# Patient Record
Sex: MALE | Race: WHITE | NOT HISPANIC OR LATINO | URBAN - METROPOLITAN AREA
[De-identification: names, ages, dates, MRNs, and addresses within clinical notes are randomized per-mention and may not be internally consistent; named-entity substitution may affect disease eponyms.]

---

## 2017-03-03 ENCOUNTER — OUTPATIENT (OUTPATIENT)
Dept: OUTPATIENT SERVICES | Facility: HOSPITAL | Age: 77
LOS: 1 days | End: 2017-03-03
Payer: MEDICARE

## 2017-03-03 DIAGNOSIS — L97.401 NON-PRESSURE CHRONIC ULCER OF UNSPECIFIED HEEL AND MIDFOOT LIMITED TO BREAKDOWN OF SKIN: ICD-10-CM

## 2017-03-03 DIAGNOSIS — Z95.1 PRESENCE OF AORTOCORONARY BYPASS GRAFT: Chronic | ICD-10-CM

## 2017-03-03 DIAGNOSIS — Z90.49 ACQUIRED ABSENCE OF OTHER SPECIFIED PARTS OF DIGESTIVE TRACT: Chronic | ICD-10-CM

## 2017-03-03 PROCEDURE — 73630 X-RAY EXAM OF FOOT: CPT | Mod: 26,50

## 2017-03-03 PROCEDURE — G0463: CPT

## 2017-03-03 PROCEDURE — 73630 X-RAY EXAM OF FOOT: CPT

## 2017-03-04 DIAGNOSIS — Z85.46 PERSONAL HISTORY OF MALIGNANT NEOPLASM OF PROSTATE: ICD-10-CM

## 2017-03-04 DIAGNOSIS — Z82.49 FAMILY HISTORY OF ISCHEMIC HEART DISEASE AND OTHER DISEASES OF THE CIRCULATORY SYSTEM: ICD-10-CM

## 2017-03-04 DIAGNOSIS — Z90.49 ACQUIRED ABSENCE OF OTHER SPECIFIED PARTS OF DIGESTIVE TRACT: ICD-10-CM

## 2017-03-04 DIAGNOSIS — M17.0 BILATERAL PRIMARY OSTEOARTHRITIS OF KNEE: ICD-10-CM

## 2017-03-04 DIAGNOSIS — I25.2 OLD MYOCARDIAL INFARCTION: ICD-10-CM

## 2017-03-04 DIAGNOSIS — Z83.3 FAMILY HISTORY OF DIABETES MELLITUS: ICD-10-CM

## 2017-03-04 DIAGNOSIS — I89.0 LYMPHEDEMA, NOT ELSEWHERE CLASSIFIED: ICD-10-CM

## 2017-03-04 DIAGNOSIS — E78.00 PURE HYPERCHOLESTEROLEMIA, UNSPECIFIED: ICD-10-CM

## 2017-03-04 DIAGNOSIS — E11.621 TYPE 2 DIABETES MELLITUS WITH FOOT ULCER: ICD-10-CM

## 2017-03-04 DIAGNOSIS — Z80.0 FAMILY HISTORY OF MALIGNANT NEOPLASM OF DIGESTIVE ORGANS: ICD-10-CM

## 2017-03-04 DIAGNOSIS — E11.40 TYPE 2 DIABETES MELLITUS WITH DIABETIC NEUROPATHY, UNSPECIFIED: ICD-10-CM

## 2017-03-04 DIAGNOSIS — Z98.890 OTHER SPECIFIED POSTPROCEDURAL STATES: ICD-10-CM

## 2017-03-04 DIAGNOSIS — Z79.899 OTHER LONG TERM (CURRENT) DRUG THERAPY: ICD-10-CM

## 2017-03-04 DIAGNOSIS — L97.423 NON-PRESSURE CHRONIC ULCER OF LEFT HEEL AND MIDFOOT WITH NECROSIS OF MUSCLE: ICD-10-CM

## 2017-03-04 DIAGNOSIS — I83.12 VARICOSE VEINS OF LEFT LOWER EXTREMITY WITH INFLAMMATION: ICD-10-CM

## 2017-03-04 DIAGNOSIS — Z83.6 FAMILY HISTORY OF OTHER DISEASES OF THE RESPIRATORY SYSTEM: ICD-10-CM

## 2017-03-04 DIAGNOSIS — E03.9 HYPOTHYROIDISM, UNSPECIFIED: ICD-10-CM

## 2017-03-04 DIAGNOSIS — I25.810 ATHEROSCLEROSIS OF CORONARY ARTERY BYPASS GRAFT(S) WITHOUT ANGINA PECTORIS: ICD-10-CM

## 2017-03-04 DIAGNOSIS — Z95.1 PRESENCE OF AORTOCORONARY BYPASS GRAFT: ICD-10-CM

## 2017-03-04 DIAGNOSIS — I83.11 VARICOSE VEINS OF RIGHT LOWER EXTREMITY WITH INFLAMMATION: ICD-10-CM

## 2017-03-04 DIAGNOSIS — Z87.891 PERSONAL HISTORY OF NICOTINE DEPENDENCE: ICD-10-CM

## 2017-03-04 DIAGNOSIS — Z92.3 PERSONAL HISTORY OF IRRADIATION: ICD-10-CM

## 2017-03-04 DIAGNOSIS — I10 ESSENTIAL (PRIMARY) HYPERTENSION: ICD-10-CM

## 2017-03-06 ENCOUNTER — OUTPATIENT (OUTPATIENT)
Dept: OUTPATIENT SERVICES | Facility: HOSPITAL | Age: 77
LOS: 1 days | End: 2017-03-06
Payer: MEDICARE

## 2017-03-06 DIAGNOSIS — Z95.1 PRESENCE OF AORTOCORONARY BYPASS GRAFT: Chronic | ICD-10-CM

## 2017-03-06 DIAGNOSIS — Z90.49 ACQUIRED ABSENCE OF OTHER SPECIFIED PARTS OF DIGESTIVE TRACT: Chronic | ICD-10-CM

## 2017-03-06 DIAGNOSIS — L97.401 NON-PRESSURE CHRONIC ULCER OF UNSPECIFIED HEEL AND MIDFOOT LIMITED TO BREAKDOWN OF SKIN: ICD-10-CM

## 2017-03-06 PROCEDURE — 87186 SC STD MICRODIL/AGAR DIL: CPT

## 2017-03-06 PROCEDURE — 11042 DBRDMT SUBQ TIS 1ST 20SQCM/<: CPT

## 2017-03-06 PROCEDURE — 87070 CULTURE OTHR SPECIMN AEROBIC: CPT

## 2017-03-07 LAB
GRAM STN FLD: SIGNIFICANT CHANGE UP
SPECIMEN SOURCE: SIGNIFICANT CHANGE UP

## 2017-03-08 LAB
-  AMIKACIN: SIGNIFICANT CHANGE UP
-  AMPICILLIN/SULBACTAM: SIGNIFICANT CHANGE UP
-  AMPICILLIN: SIGNIFICANT CHANGE UP
-  AZTREONAM: SIGNIFICANT CHANGE UP
-  CEFAZOLIN: SIGNIFICANT CHANGE UP
-  CEFEPIME: SIGNIFICANT CHANGE UP
-  CEFOXITIN: SIGNIFICANT CHANGE UP
-  CEFTAZIDIME: SIGNIFICANT CHANGE UP
-  CEFTRIAXONE: SIGNIFICANT CHANGE UP
-  CIPROFLOXACIN: SIGNIFICANT CHANGE UP
-  ERTAPENEM: SIGNIFICANT CHANGE UP
-  GENTAMICIN: SIGNIFICANT CHANGE UP
-  LEVOFLOXACIN: SIGNIFICANT CHANGE UP
-  MEROPENEM: SIGNIFICANT CHANGE UP
-  PIPERACILLIN/TAZOBACTAM: SIGNIFICANT CHANGE UP
-  TOBRAMYCIN: SIGNIFICANT CHANGE UP
-  TRIMETHOPRIM/SULFAMETHOXAZOLE: SIGNIFICANT CHANGE UP
METHOD TYPE: SIGNIFICANT CHANGE UP

## 2017-03-10 LAB
-  AMPICILLIN/SULBACTAM: SIGNIFICANT CHANGE UP
-  CEFAZOLIN: SIGNIFICANT CHANGE UP
-  CIPROFLOXACIN: SIGNIFICANT CHANGE UP
-  CLINDAMYCIN: SIGNIFICANT CHANGE UP
-  ERYTHROMYCIN: SIGNIFICANT CHANGE UP
-  GENTAMICIN: SIGNIFICANT CHANGE UP
-  LEVOFLOXACIN: SIGNIFICANT CHANGE UP
-  MOXIFLOXACIN(AEROBIC): SIGNIFICANT CHANGE UP
-  OXACILLIN: SIGNIFICANT CHANGE UP
-  PENICILLIN: SIGNIFICANT CHANGE UP
-  RIFAMPIN: SIGNIFICANT CHANGE UP
-  TETRACYCLINE: SIGNIFICANT CHANGE UP
-  TRIMETHOPRIM/SULFAMETHOXAZOLE: SIGNIFICANT CHANGE UP
-  VANCOMYCIN: SIGNIFICANT CHANGE UP
CULTURE RESULTS: SIGNIFICANT CHANGE UP
METHOD TYPE: SIGNIFICANT CHANGE UP
ORGANISM # SPEC MICROSCOPIC CNT: SIGNIFICANT CHANGE UP
SPECIMEN SOURCE: SIGNIFICANT CHANGE UP

## 2017-03-11 DIAGNOSIS — E11.621 TYPE 2 DIABETES MELLITUS WITH FOOT ULCER: ICD-10-CM

## 2017-03-11 DIAGNOSIS — E11.40 TYPE 2 DIABETES MELLITUS WITH DIABETIC NEUROPATHY, UNSPECIFIED: ICD-10-CM

## 2017-03-11 DIAGNOSIS — L97.423 NON-PRESSURE CHRONIC ULCER OF LEFT HEEL AND MIDFOOT WITH NECROSIS OF MUSCLE: ICD-10-CM

## 2017-03-12 DIAGNOSIS — A49.8 OTHER BACTERIAL INFECTIONS OF UNSPECIFIED SITE: ICD-10-CM

## 2017-03-12 DIAGNOSIS — A49.1 STREPTOCOCCAL INFECTION, UNSPECIFIED SITE: ICD-10-CM

## 2017-03-13 ENCOUNTER — OUTPATIENT (OUTPATIENT)
Dept: OUTPATIENT SERVICES | Facility: HOSPITAL | Age: 77
LOS: 1 days | End: 2017-03-13
Payer: MEDICARE

## 2017-03-13 DIAGNOSIS — E11.621 TYPE 2 DIABETES MELLITUS WITH FOOT ULCER: ICD-10-CM

## 2017-03-13 DIAGNOSIS — L97.401 NON-PRESSURE CHRONIC ULCER OF UNSPECIFIED HEEL AND MIDFOOT LIMITED TO BREAKDOWN OF SKIN: ICD-10-CM

## 2017-03-13 DIAGNOSIS — Z95.1 PRESENCE OF AORTOCORONARY BYPASS GRAFT: Chronic | ICD-10-CM

## 2017-03-13 DIAGNOSIS — Z90.49 ACQUIRED ABSENCE OF OTHER SPECIFIED PARTS OF DIGESTIVE TRACT: Chronic | ICD-10-CM

## 2017-03-13 LAB
ALBUMIN SERPL ELPH-MCNC: 3.5 G/DL — SIGNIFICANT CHANGE UP (ref 3.3–5)
ALP SERPL-CCNC: 132 U/L — HIGH (ref 40–120)
ALT FLD-CCNC: 25 U/L — SIGNIFICANT CHANGE UP (ref 12–78)
ANION GAP SERPL CALC-SCNC: 11 MMOL/L — SIGNIFICANT CHANGE UP (ref 5–17)
AST SERPL-CCNC: 20 U/L — SIGNIFICANT CHANGE UP (ref 15–37)
BASOPHILS # BLD AUTO: 0.1 K/UL — SIGNIFICANT CHANGE UP (ref 0–0.2)
BASOPHILS NFR BLD AUTO: 0.8 % — SIGNIFICANT CHANGE UP (ref 0–2)
BILIRUB SERPL-MCNC: 0.7 MG/DL — SIGNIFICANT CHANGE UP (ref 0.2–1.2)
BUN SERPL-MCNC: 39 MG/DL — HIGH (ref 7–23)
CALCIUM SERPL-MCNC: 9.5 MG/DL — SIGNIFICANT CHANGE UP (ref 8.5–10.1)
CHLORIDE SERPL-SCNC: 102 MMOL/L — SIGNIFICANT CHANGE UP (ref 96–108)
CO2 SERPL-SCNC: 27 MMOL/L — SIGNIFICANT CHANGE UP (ref 22–31)
CREAT SERPL-MCNC: 1.5 MG/DL — HIGH (ref 0.5–1.3)
CRP SERPL-MCNC: 0.85 MG/DL — HIGH (ref 0–0.4)
EOSINOPHIL # BLD AUTO: 0.2 K/UL — SIGNIFICANT CHANGE UP (ref 0–0.5)
EOSINOPHIL NFR BLD AUTO: 1.8 % — SIGNIFICANT CHANGE UP (ref 0–6)
ERYTHROCYTE [SEDIMENTATION RATE] IN BLOOD: 17 MM/HR — SIGNIFICANT CHANGE UP (ref 0–20)
GLUCOSE SERPL-MCNC: 115 MG/DL — HIGH (ref 70–99)
HCT VFR BLD CALC: 44.2 % — SIGNIFICANT CHANGE UP (ref 39–50)
HGB BLD-MCNC: 14.2 G/DL — SIGNIFICANT CHANGE UP (ref 13–17)
LYMPHOCYTES # BLD AUTO: 2.2 K/UL — SIGNIFICANT CHANGE UP (ref 1–3.3)
LYMPHOCYTES # BLD AUTO: 21.4 % — SIGNIFICANT CHANGE UP (ref 13–44)
MCHC RBC-ENTMCNC: 28.2 PG — SIGNIFICANT CHANGE UP (ref 27–34)
MCHC RBC-ENTMCNC: 32.1 GM/DL — SIGNIFICANT CHANGE UP (ref 32–36)
MCV RBC AUTO: 87.8 FL — SIGNIFICANT CHANGE UP (ref 80–100)
MONOCYTES # BLD AUTO: 0.9 K/UL — SIGNIFICANT CHANGE UP (ref 0–0.9)
MONOCYTES NFR BLD AUTO: 9 % — SIGNIFICANT CHANGE UP (ref 1–9)
NEUTROPHILS # BLD AUTO: 6.8 K/UL — SIGNIFICANT CHANGE UP (ref 1.8–7.4)
NEUTROPHILS NFR BLD AUTO: 67 % — SIGNIFICANT CHANGE UP (ref 43–77)
PLATELET # BLD AUTO: 223 K/UL — SIGNIFICANT CHANGE UP (ref 150–400)
POTASSIUM SERPL-MCNC: 4.9 MMOL/L — SIGNIFICANT CHANGE UP (ref 3.5–5.3)
POTASSIUM SERPL-SCNC: 4.9 MMOL/L — SIGNIFICANT CHANGE UP (ref 3.5–5.3)
PREALB SERPL-MCNC: 20 MG/DL — SIGNIFICANT CHANGE UP (ref 20–40)
PROT SERPL-MCNC: 7.5 G/DL — SIGNIFICANT CHANGE UP (ref 6–8.3)
RBC # BLD: 5.03 M/UL — SIGNIFICANT CHANGE UP (ref 4.2–5.8)
RBC # FLD: 14 % — SIGNIFICANT CHANGE UP (ref 10.3–14.5)
SODIUM SERPL-SCNC: 140 MMOL/L — SIGNIFICANT CHANGE UP (ref 135–145)
WBC # BLD: 10.1 K/UL — SIGNIFICANT CHANGE UP (ref 3.8–10.5)
WBC # FLD AUTO: 10.1 K/UL — SIGNIFICANT CHANGE UP (ref 3.8–10.5)

## 2017-03-13 PROCEDURE — 73720 MRI LWR EXTREMITY W/O&W/DYE: CPT | Mod: 26,LT

## 2017-03-13 PROCEDURE — 80053 COMPREHEN METABOLIC PANEL: CPT

## 2017-03-13 PROCEDURE — 71046 X-RAY EXAM CHEST 2 VIEWS: CPT

## 2017-03-13 PROCEDURE — G0463: CPT

## 2017-03-13 PROCEDURE — A9579: CPT

## 2017-03-13 PROCEDURE — 85027 COMPLETE CBC AUTOMATED: CPT

## 2017-03-13 PROCEDURE — 85652 RBC SED RATE AUTOMATED: CPT

## 2017-03-13 PROCEDURE — 71020: CPT | Mod: 26

## 2017-03-13 PROCEDURE — 83036 HEMOGLOBIN GLYCOSYLATED A1C: CPT

## 2017-03-13 PROCEDURE — 73720 MRI LWR EXTREMITY W/O&W/DYE: CPT

## 2017-03-13 PROCEDURE — 84134 ASSAY OF PREALBUMIN: CPT

## 2017-03-13 PROCEDURE — 86140 C-REACTIVE PROTEIN: CPT

## 2017-03-14 LAB — HBA1C BLD-MCNC: 10.7 % — HIGH (ref 4–5.6)

## 2017-03-16 ENCOUNTER — OUTPATIENT (OUTPATIENT)
Dept: OUTPATIENT SERVICES | Facility: HOSPITAL | Age: 77
LOS: 1 days | End: 2017-03-16
Payer: MEDICARE

## 2017-03-16 DIAGNOSIS — I89.0 LYMPHEDEMA, NOT ELSEWHERE CLASSIFIED: ICD-10-CM

## 2017-03-16 DIAGNOSIS — Z82.49 FAMILY HISTORY OF ISCHEMIC HEART DISEASE AND OTHER DISEASES OF THE CIRCULATORY SYSTEM: ICD-10-CM

## 2017-03-16 DIAGNOSIS — E11.40 TYPE 2 DIABETES MELLITUS WITH DIABETIC NEUROPATHY, UNSPECIFIED: ICD-10-CM

## 2017-03-16 DIAGNOSIS — Z83.3 FAMILY HISTORY OF DIABETES MELLITUS: ICD-10-CM

## 2017-03-16 DIAGNOSIS — E11.621 TYPE 2 DIABETES MELLITUS WITH FOOT ULCER: ICD-10-CM

## 2017-03-16 DIAGNOSIS — Z95.1 PRESENCE OF AORTOCORONARY BYPASS GRAFT: ICD-10-CM

## 2017-03-16 DIAGNOSIS — L97.401 NON-PRESSURE CHRONIC ULCER OF UNSPECIFIED HEEL AND MIDFOOT LIMITED TO BREAKDOWN OF SKIN: ICD-10-CM

## 2017-03-16 DIAGNOSIS — M17.0 BILATERAL PRIMARY OSTEOARTHRITIS OF KNEE: ICD-10-CM

## 2017-03-16 DIAGNOSIS — E78.00 PURE HYPERCHOLESTEROLEMIA, UNSPECIFIED: ICD-10-CM

## 2017-03-16 DIAGNOSIS — I83.11 VARICOSE VEINS OF RIGHT LOWER EXTREMITY WITH INFLAMMATION: ICD-10-CM

## 2017-03-16 DIAGNOSIS — Z98.890 OTHER SPECIFIED POSTPROCEDURAL STATES: ICD-10-CM

## 2017-03-16 DIAGNOSIS — I83.12 VARICOSE VEINS OF LEFT LOWER EXTREMITY WITH INFLAMMATION: ICD-10-CM

## 2017-03-16 DIAGNOSIS — L97.423 NON-PRESSURE CHRONIC ULCER OF LEFT HEEL AND MIDFOOT WITH NECROSIS OF MUSCLE: ICD-10-CM

## 2017-03-16 DIAGNOSIS — Z90.49 ACQUIRED ABSENCE OF OTHER SPECIFIED PARTS OF DIGESTIVE TRACT: Chronic | ICD-10-CM

## 2017-03-16 DIAGNOSIS — E03.9 HYPOTHYROIDISM, UNSPECIFIED: ICD-10-CM

## 2017-03-16 DIAGNOSIS — Z79.899 OTHER LONG TERM (CURRENT) DRUG THERAPY: ICD-10-CM

## 2017-03-16 DIAGNOSIS — I10 ESSENTIAL (PRIMARY) HYPERTENSION: ICD-10-CM

## 2017-03-16 DIAGNOSIS — Z92.3 PERSONAL HISTORY OF IRRADIATION: ICD-10-CM

## 2017-03-16 DIAGNOSIS — Z90.49 ACQUIRED ABSENCE OF OTHER SPECIFIED PARTS OF DIGESTIVE TRACT: ICD-10-CM

## 2017-03-16 DIAGNOSIS — Z95.1 PRESENCE OF AORTOCORONARY BYPASS GRAFT: Chronic | ICD-10-CM

## 2017-03-16 DIAGNOSIS — M86.672 OTHER CHRONIC OSTEOMYELITIS, LEFT ANKLE AND FOOT: ICD-10-CM

## 2017-03-16 DIAGNOSIS — Z85.46 PERSONAL HISTORY OF MALIGNANT NEOPLASM OF PROSTATE: ICD-10-CM

## 2017-03-16 DIAGNOSIS — Z80.0 FAMILY HISTORY OF MALIGNANT NEOPLASM OF DIGESTIVE ORGANS: ICD-10-CM

## 2017-03-16 DIAGNOSIS — Z87.891 PERSONAL HISTORY OF NICOTINE DEPENDENCE: ICD-10-CM

## 2017-03-16 DIAGNOSIS — E66.9 OBESITY, UNSPECIFIED: ICD-10-CM

## 2017-03-16 DIAGNOSIS — I25.810 ATHEROSCLEROSIS OF CORONARY ARTERY BYPASS GRAFT(S) WITHOUT ANGINA PECTORIS: ICD-10-CM

## 2017-03-16 DIAGNOSIS — I25.2 OLD MYOCARDIAL INFARCTION: ICD-10-CM

## 2017-03-16 DIAGNOSIS — Z83.6 FAMILY HISTORY OF OTHER DISEASES OF THE RESPIRATORY SYSTEM: ICD-10-CM

## 2017-03-16 PROCEDURE — 82962 GLUCOSE BLOOD TEST: CPT

## 2017-03-16 PROCEDURE — G0277: CPT

## 2017-03-17 ENCOUNTER — OUTPATIENT (OUTPATIENT)
Dept: OUTPATIENT SERVICES | Facility: HOSPITAL | Age: 77
LOS: 1 days | End: 2017-03-17

## 2017-03-17 DIAGNOSIS — Z90.49 ACQUIRED ABSENCE OF OTHER SPECIFIED PARTS OF DIGESTIVE TRACT: Chronic | ICD-10-CM

## 2017-03-17 DIAGNOSIS — Z95.1 PRESENCE OF AORTOCORONARY BYPASS GRAFT: Chronic | ICD-10-CM

## 2017-03-17 DIAGNOSIS — L97.401 NON-PRESSURE CHRONIC ULCER OF UNSPECIFIED HEEL AND MIDFOOT LIMITED TO BREAKDOWN OF SKIN: ICD-10-CM

## 2017-03-18 DIAGNOSIS — E11.621 TYPE 2 DIABETES MELLITUS WITH FOOT ULCER: ICD-10-CM

## 2017-03-18 DIAGNOSIS — L97.423 NON-PRESSURE CHRONIC ULCER OF LEFT HEEL AND MIDFOOT WITH NECROSIS OF MUSCLE: ICD-10-CM

## 2017-03-18 DIAGNOSIS — M86.672 OTHER CHRONIC OSTEOMYELITIS, LEFT ANKLE AND FOOT: ICD-10-CM

## 2017-03-18 DIAGNOSIS — Z79.4 LONG TERM (CURRENT) USE OF INSULIN: ICD-10-CM

## 2017-03-20 ENCOUNTER — OUTPATIENT (OUTPATIENT)
Dept: OUTPATIENT SERVICES | Facility: HOSPITAL | Age: 77
LOS: 1 days | End: 2017-03-20

## 2017-03-20 ENCOUNTER — INPATIENT (INPATIENT)
Facility: HOSPITAL | Age: 77
LOS: 3 days | Discharge: HOME | DRG: 603 | End: 2017-03-24
Attending: INTERNAL MEDICINE | Admitting: INTERNAL MEDICINE
Payer: MEDICARE

## 2017-03-20 VITALS
WEIGHT: 274.92 LBS | OXYGEN SATURATION: 97 % | HEIGHT: 71 IN | SYSTOLIC BLOOD PRESSURE: 155 MMHG | RESPIRATION RATE: 18 BRPM | DIASTOLIC BLOOD PRESSURE: 78 MMHG | HEART RATE: 71 BPM | TEMPERATURE: 98 F

## 2017-03-20 DIAGNOSIS — Z90.49 ACQUIRED ABSENCE OF OTHER SPECIFIED PARTS OF DIGESTIVE TRACT: Chronic | ICD-10-CM

## 2017-03-20 DIAGNOSIS — L97.401 NON-PRESSURE CHRONIC ULCER OF UNSPECIFIED HEEL AND MIDFOOT LIMITED TO BREAKDOWN OF SKIN: ICD-10-CM

## 2017-03-20 DIAGNOSIS — Z95.1 PRESENCE OF AORTOCORONARY BYPASS GRAFT: Chronic | ICD-10-CM

## 2017-03-20 LAB
ALBUMIN SERPL ELPH-MCNC: 3.1 G/DL — LOW (ref 3.3–5)
ALP SERPL-CCNC: 136 U/L — HIGH (ref 40–120)
ALT FLD-CCNC: 27 U/L — SIGNIFICANT CHANGE UP (ref 12–78)
ANION GAP SERPL CALC-SCNC: 10 MMOL/L — SIGNIFICANT CHANGE UP (ref 5–17)
AST SERPL-CCNC: 13 U/L — LOW (ref 15–37)
BASOPHILS # BLD AUTO: 0.1 K/UL — SIGNIFICANT CHANGE UP (ref 0–0.2)
BASOPHILS NFR BLD AUTO: 0.8 % — SIGNIFICANT CHANGE UP (ref 0–2)
BILIRUB SERPL-MCNC: 0.9 MG/DL — SIGNIFICANT CHANGE UP (ref 0.2–1.2)
BUN SERPL-MCNC: 40 MG/DL — HIGH (ref 7–23)
CALCIUM SERPL-MCNC: 9 MG/DL — SIGNIFICANT CHANGE UP (ref 8.5–10.1)
CHLORIDE SERPL-SCNC: 106 MMOL/L — SIGNIFICANT CHANGE UP (ref 96–108)
CO2 SERPL-SCNC: 24 MMOL/L — SIGNIFICANT CHANGE UP (ref 22–31)
CREAT SERPL-MCNC: 1.4 MG/DL — HIGH (ref 0.5–1.3)
EOSINOPHIL # BLD AUTO: 0.2 K/UL — SIGNIFICANT CHANGE UP (ref 0–0.5)
EOSINOPHIL NFR BLD AUTO: 1.6 % — SIGNIFICANT CHANGE UP (ref 0–6)
GLUCOSE SERPL-MCNC: 121 MG/DL — HIGH (ref 70–99)
HCT VFR BLD CALC: 40 % — SIGNIFICANT CHANGE UP (ref 39–50)
HGB BLD-MCNC: 13.6 G/DL — SIGNIFICANT CHANGE UP (ref 13–17)
INR BLD: 1.2 RATIO — HIGH (ref 0.88–1.16)
LACTATE SERPL-SCNC: 1.6 MMOL/L — SIGNIFICANT CHANGE UP (ref 0.7–2)
LYMPHOCYTES # BLD AUTO: 1.7 K/UL — SIGNIFICANT CHANGE UP (ref 1–3.3)
LYMPHOCYTES # BLD AUTO: 14.1 % — SIGNIFICANT CHANGE UP (ref 13–44)
MCHC RBC-ENTMCNC: 29 PG — SIGNIFICANT CHANGE UP (ref 27–34)
MCHC RBC-ENTMCNC: 34.1 GM/DL — SIGNIFICANT CHANGE UP (ref 32–36)
MCV RBC AUTO: 85 FL — SIGNIFICANT CHANGE UP (ref 80–100)
MONOCYTES # BLD AUTO: 0.9 K/UL — SIGNIFICANT CHANGE UP (ref 0–0.9)
MONOCYTES NFR BLD AUTO: 7.3 % — SIGNIFICANT CHANGE UP (ref 1–9)
NEUTROPHILS # BLD AUTO: 9.2 K/UL — HIGH (ref 1.8–7.4)
NEUTROPHILS NFR BLD AUTO: 76.2 % — SIGNIFICANT CHANGE UP (ref 43–77)
PLATELET # BLD AUTO: 235 K/UL — SIGNIFICANT CHANGE UP (ref 150–400)
POTASSIUM SERPL-MCNC: 4.4 MMOL/L — SIGNIFICANT CHANGE UP (ref 3.5–5.3)
POTASSIUM SERPL-SCNC: 4.4 MMOL/L — SIGNIFICANT CHANGE UP (ref 3.5–5.3)
PROT SERPL-MCNC: 7.3 G/DL — SIGNIFICANT CHANGE UP (ref 6–8.3)
PROTHROM AB SERPL-ACNC: 13.4 SEC — HIGH (ref 10–13.1)
RBC # BLD: 4.7 M/UL — SIGNIFICANT CHANGE UP (ref 4.2–5.8)
RBC # FLD: 12.6 % — SIGNIFICANT CHANGE UP (ref 10.3–14.5)
SODIUM SERPL-SCNC: 140 MMOL/L — SIGNIFICANT CHANGE UP (ref 135–145)
WBC # BLD: 12.1 K/UL — HIGH (ref 3.8–10.5)
WBC # FLD AUTO: 12.1 K/UL — HIGH (ref 3.8–10.5)

## 2017-03-20 PROCEDURE — 99285 EMERGENCY DEPT VISIT HI MDM: CPT | Mod: 25

## 2017-03-20 RX ORDER — PIPERACILLIN AND TAZOBACTAM 4; .5 G/20ML; G/20ML
3.38 INJECTION, POWDER, LYOPHILIZED, FOR SOLUTION INTRAVENOUS ONCE
Qty: 0 | Refills: 0 | Status: COMPLETED | OUTPATIENT
Start: 2017-03-20 | End: 2017-03-20

## 2017-03-20 RX ORDER — VANCOMYCIN HCL 1 G
1000 VIAL (EA) INTRAVENOUS ONCE
Qty: 0 | Refills: 0 | Status: COMPLETED | OUTPATIENT
Start: 2017-03-20 | End: 2017-03-20

## 2017-03-20 RX ADMIN — Medication 250 MILLIGRAM(S): at 22:00

## 2017-03-20 RX ADMIN — PIPERACILLIN AND TAZOBACTAM 200 GRAM(S): 4; .5 INJECTION, POWDER, LYOPHILIZED, FOR SOLUTION INTRAVENOUS at 23:30

## 2017-03-20 NOTE — ED PROVIDER NOTE - OBJECTIVE STATEMENT
A 76 year old male with a pmh of diabetes came into the ER presenting with left lower extremity redness and swelling. The patient has been seeing wound care everyday for a diabetic ulcer on his foot, and stated he first noticed the leg swelling during one of his wound care appointments on thursday. Due to peripheral neuropathy, the patient states he did not feel any type of pain, burning or sensation in that extremity. He was prescribed antibiotics on thursday for cellulitis which he took throughout the weekend. At his next appointment the following week, the wound care doctor felt he needed to go to the ER for his cellulitis and to rule out a DVT. The patient has no other complaints at this visit.

## 2017-03-20 NOTE — ED PROVIDER NOTE - PMH
Bladder stones  s/p extraction  CAD (coronary artery disease)    Hyperlipidemia    Hypothyroidism    Myocardial infarction  1995  Prostate cancer  s/p brachytherapy  Type 2 diabetes mellitus without complication    Venous stasis dermatitis of both lower extremities

## 2017-03-20 NOTE — ED ADULT NURSE NOTE - PSH
S/P appendectomy    S/P CABG x 3  saphenous vein harvested from LLE, performed in March 1996 at [facility] by Dr. Mcgraw

## 2017-03-20 NOTE — ED ADULT NURSE NOTE - OBJECTIVE STATEMENT
sent for eval of lower leg redness and heat from,  wound care, no fevers, no other c/o. multiple amol lower leg diabetic venous ulcers.

## 2017-03-20 NOTE — ED PROVIDER NOTE - PSH
S/P appendectomy    S/P CABG x 3  saphenous vein harvested from LLE, performed in March 1996 at [facility] by Dr. Mcgraw

## 2017-03-21 DIAGNOSIS — Z41.8 ENCOUNTER FOR OTHER PROCEDURES FOR PURPOSES OTHER THAN REMEDYING HEALTH STATE: ICD-10-CM

## 2017-03-21 DIAGNOSIS — R60.9 EDEMA, UNSPECIFIED: ICD-10-CM

## 2017-03-21 DIAGNOSIS — Z79.4 LONG TERM (CURRENT) USE OF INSULIN: ICD-10-CM

## 2017-03-21 DIAGNOSIS — I25.10 ATHEROSCLEROTIC HEART DISEASE OF NATIVE CORONARY ARTERY WITHOUT ANGINA PECTORIS: ICD-10-CM

## 2017-03-21 DIAGNOSIS — E11.621 TYPE 2 DIABETES MELLITUS WITH FOOT ULCER: ICD-10-CM

## 2017-03-21 DIAGNOSIS — E11.9 TYPE 2 DIABETES MELLITUS WITHOUT COMPLICATIONS: ICD-10-CM

## 2017-03-21 DIAGNOSIS — L03.119 CELLULITIS OF UNSPECIFIED PART OF LIMB: ICD-10-CM

## 2017-03-21 DIAGNOSIS — F39 UNSPECIFIED MOOD [AFFECTIVE] DISORDER: ICD-10-CM

## 2017-03-21 DIAGNOSIS — L03.115 CELLULITIS OF RIGHT LOWER LIMB: ICD-10-CM

## 2017-03-21 DIAGNOSIS — M86.672 OTHER CHRONIC OSTEOMYELITIS, LEFT ANKLE AND FOOT: ICD-10-CM

## 2017-03-21 DIAGNOSIS — E03.9 HYPOTHYROIDISM, UNSPECIFIED: ICD-10-CM

## 2017-03-21 DIAGNOSIS — R79.89 OTHER SPECIFIED ABNORMAL FINDINGS OF BLOOD CHEMISTRY: ICD-10-CM

## 2017-03-21 DIAGNOSIS — L97.423 NON-PRESSURE CHRONIC ULCER OF LEFT HEEL AND MIDFOOT WITH NECROSIS OF MUSCLE: ICD-10-CM

## 2017-03-21 PROCEDURE — 99223 1ST HOSP IP/OBS HIGH 75: CPT | Mod: AI,GC

## 2017-03-21 PROCEDURE — 93970 EXTREMITY STUDY: CPT | Mod: 26

## 2017-03-21 PROCEDURE — 93010 ELECTROCARDIOGRAM REPORT: CPT

## 2017-03-21 RX ORDER — SERTRALINE 25 MG/1
100 TABLET, FILM COATED ORAL DAILY
Qty: 0 | Refills: 0 | Status: DISCONTINUED | OUTPATIENT
Start: 2017-03-21 | End: 2017-03-24

## 2017-03-21 RX ORDER — INSULIN GLARGINE 100 [IU]/ML
12 INJECTION, SOLUTION SUBCUTANEOUS AT BEDTIME
Qty: 0 | Refills: 0 | Status: DISCONTINUED | OUTPATIENT
Start: 2017-03-21 | End: 2017-03-24

## 2017-03-21 RX ORDER — DEXTROSE 50 % IN WATER 50 %
1 SYRINGE (ML) INTRAVENOUS ONCE
Qty: 0 | Refills: 0 | Status: DISCONTINUED | OUTPATIENT
Start: 2017-03-21 | End: 2017-03-24

## 2017-03-21 RX ORDER — DIPHENHYDRAMINE HCL 50 MG
25 CAPSULE ORAL ONCE
Qty: 0 | Refills: 0 | Status: COMPLETED | OUTPATIENT
Start: 2017-03-21 | End: 2017-03-22

## 2017-03-21 RX ORDER — DEXTROSE 50 % IN WATER 50 %
25 SYRINGE (ML) INTRAVENOUS ONCE
Qty: 0 | Refills: 0 | Status: DISCONTINUED | OUTPATIENT
Start: 2017-03-21 | End: 2017-03-24

## 2017-03-21 RX ORDER — SODIUM CHLORIDE 9 MG/ML
1000 INJECTION, SOLUTION INTRAVENOUS
Qty: 0 | Refills: 0 | Status: DISCONTINUED | OUTPATIENT
Start: 2017-03-21 | End: 2017-03-24

## 2017-03-21 RX ORDER — LEVOTHYROXINE SODIUM 125 MCG
50 TABLET ORAL DAILY
Qty: 0 | Refills: 0 | Status: DISCONTINUED | OUTPATIENT
Start: 2017-03-21 | End: 2017-03-24

## 2017-03-21 RX ORDER — DEXTROSE 50 % IN WATER 50 %
12.5 SYRINGE (ML) INTRAVENOUS ONCE
Qty: 0 | Refills: 0 | Status: DISCONTINUED | OUTPATIENT
Start: 2017-03-21 | End: 2017-03-24

## 2017-03-21 RX ORDER — INSULIN LISPRO 100/ML
VIAL (ML) SUBCUTANEOUS
Qty: 0 | Refills: 0 | Status: DISCONTINUED | OUTPATIENT
Start: 2017-03-21 | End: 2017-03-24

## 2017-03-21 RX ORDER — HEPARIN SODIUM 5000 [USP'U]/ML
5000 INJECTION INTRAVENOUS; SUBCUTANEOUS EVERY 8 HOURS
Qty: 0 | Refills: 0 | Status: DISCONTINUED | OUTPATIENT
Start: 2017-03-21 | End: 2017-03-24

## 2017-03-21 RX ORDER — VANCOMYCIN HCL 1 G
1750 VIAL (EA) INTRAVENOUS EVERY 12 HOURS
Qty: 0 | Refills: 0 | Status: DISCONTINUED | OUTPATIENT
Start: 2017-03-21 | End: 2017-03-21

## 2017-03-21 RX ORDER — GLUCAGON INJECTION, SOLUTION 0.5 MG/.1ML
1 INJECTION, SOLUTION SUBCUTANEOUS ONCE
Qty: 0 | Refills: 0 | Status: DISCONTINUED | OUTPATIENT
Start: 2017-03-21 | End: 2017-03-24

## 2017-03-21 RX ORDER — PIPERACILLIN AND TAZOBACTAM 4; .5 G/20ML; G/20ML
3.38 INJECTION, POWDER, LYOPHILIZED, FOR SOLUTION INTRAVENOUS EVERY 8 HOURS
Qty: 0 | Refills: 0 | Status: DISCONTINUED | OUTPATIENT
Start: 2017-03-21 | End: 2017-03-24

## 2017-03-21 RX ORDER — INSULIN LISPRO 100/ML
VIAL (ML) SUBCUTANEOUS AT BEDTIME
Qty: 0 | Refills: 0 | Status: DISCONTINUED | OUTPATIENT
Start: 2017-03-21 | End: 2017-03-24

## 2017-03-21 RX ORDER — VANCOMYCIN HCL 1 G
1250 VIAL (EA) INTRAVENOUS EVERY 12 HOURS
Qty: 0 | Refills: 0 | Status: DISCONTINUED | OUTPATIENT
Start: 2017-03-21 | End: 2017-03-21

## 2017-03-21 RX ADMIN — INSULIN GLARGINE 12 UNIT(S): 100 INJECTION, SOLUTION SUBCUTANEOUS at 21:41

## 2017-03-21 RX ADMIN — HEPARIN SODIUM 5000 UNIT(S): 5000 INJECTION INTRAVENOUS; SUBCUTANEOUS at 14:17

## 2017-03-21 RX ADMIN — Medication 1: at 18:18

## 2017-03-21 RX ADMIN — PIPERACILLIN AND TAZOBACTAM 25 GRAM(S): 4; .5 INJECTION, POWDER, LYOPHILIZED, FOR SOLUTION INTRAVENOUS at 21:41

## 2017-03-21 RX ADMIN — Medication 50 MICROGRAM(S): at 07:23

## 2017-03-21 RX ADMIN — HEPARIN SODIUM 5000 UNIT(S): 5000 INJECTION INTRAVENOUS; SUBCUTANEOUS at 07:28

## 2017-03-21 RX ADMIN — HEPARIN SODIUM 5000 UNIT(S): 5000 INJECTION INTRAVENOUS; SUBCUTANEOUS at 21:42

## 2017-03-21 RX ADMIN — Medication 2: at 12:52

## 2017-03-21 RX ADMIN — PIPERACILLIN AND TAZOBACTAM 25 GRAM(S): 4; .5 INJECTION, POWDER, LYOPHILIZED, FOR SOLUTION INTRAVENOUS at 14:17

## 2017-03-21 RX ADMIN — SERTRALINE 100 MILLIGRAM(S): 25 TABLET, FILM COATED ORAL at 12:53

## 2017-03-21 RX ADMIN — PIPERACILLIN AND TAZOBACTAM 25 GRAM(S): 4; .5 INJECTION, POWDER, LYOPHILIZED, FOR SOLUTION INTRAVENOUS at 07:24

## 2017-03-21 NOTE — H&P ADULT. - PROBLEM SELECTOR PLAN 2
-continue with synthroid -pt unsure of medications, pt used to go to [facility] pharmacy which closed, now goes to [facility] in [place] which was closed  -pt will have wife bring in list of medications in morning -continue with levothyroxine 50mcg (dose as per pharmacy but confirm with med list brought in by wife in AM)

## 2017-03-21 NOTE — H&P ADULT. - ATTENDING COMMENTS
75 y.o. M with PMHx of poorly controlled type II DM, mood disorder, CAD s/p MI and CABG (1996), prostate Ca s/p radiation seed treatment, and hypothyroidism admitted for left leg / foot cellulitis; DVT ruled out - on IV vanco / zosyn (MRI negative for osteo)    Significant examination / lab findings include Left leg erythema, edema worse than right, WBC - 12.1, BUN / Cr - 40/1.4      Plan  Outlines:    - Admit to  Medicine  - IV vancomycin / zosyn   - Heparin  for DVT prophylaxis  - Home meds reconciled  - Consult - ID requested  - I have personally seen and examined the patient.  - Please see detailed History & Physical document above for additional / complete details regarding history, examination, assessment, plan - these were discussed and formulated together with the resident and I am in agreement.  - Treatment plan and benefits / risks / alternatives were explained and understood & all questions of the patient / relatives were answered.

## 2017-03-21 NOTE — H&P ADULT. - PROBLEM SELECTOR PLAN 1
-continue with vanco and zosyn   -follow-up ID c/s-Dr. Ochoa   -follow-up wound care c/s   -follow-up blood cultures  -dopplers negative for dvt -continue with vanco and zosyn (weight-based dosing)  -follow-up ID c/s-Dr. Ochoa   -follow-up wound care c/s   -follow-up blood cultures  -dopplers negative for dvt -continue with vanco and zosyn (weight-based dosing)  -follow-up ID c/s-Dr. Ochoa   -follow-up wound care c/s   -follow-up blood cultures  -dopplers negative for dvt  -had recent MRI of foot which showed cellulitis, no signs of osteo -continue with vanco (weight-based dosing) and zosyn   -follow-up ID c/s-Dr. Ochoa   -follow-up wound care c/s   -follow-up blood cultures  -had recent MRI (3/17) of foot which showed cellulitis, no signs of osteo

## 2017-03-21 NOTE — H&P ADULT. - HISTORY OF PRESENT ILLNESS
75 y.o. M with PMHx or poorly controlled type II DM and hypothyroidism presenting with worsening edema and erythema of left lower extremity for ..... Pt has been going to wound care center for treatment of diabetic ulcer of oot and noticed that he developed edema at his wound care appt on past thursday. He was given script for antibiotic for presumed cellulitis. At wound care appt he was told to go to ER for celluitis and rule out DVT. Pt denies fevers, night sweats, chills. Denies trauma to area, denies pain 2/2 peripheral neuropathy.     In ED, labs significant for mild leukocytosis: 12.1, bun/cr: 40/1.40   Vitals stable.   Given Vanco and Zosyn x1 75 y.o. M with PMHx of poorly controlled type II DM, CAD s/p MI and CABG (1996), prostate Ca s/p radiation seed treatment, and hypothyroidism presents with worsening edema and erythema of left lower extremity for 5-6 days. Pt was referred by his podiatrist to wound care center for treatment of diabetic ulcer of foot. Pt had first appt on Thursday where Dr. Melara noticed erythema and edema of left lower extremity. Pt was given script for antibiotic for presumed cellulitis. At wound care appt today, pt was told to go to ER for cellulitis vs DVT as erythema and edema had not resolved after antibiotic therapy. Pt denies fevers, night sweats, chills. Denies trauma to left lower extremity but had small abrasion on right lower extremity. Pt has some peripheral edema at baseline 2/2 venous insufficiency and sedentary lifestyle but reports left leg is worse than usual at this time. Pt was once placed on Lasix for LE edema but stopped it on his own as it caused him to urinate too fequently. Denies CP, SOB, abd pain, dysuria.    In ED, labs significant for mild leukocytosis: 12.1, bun/cr: 40/1.40   Vitals stable  Given Vanco and Zosyn x1 75 y.o. M with PMHx of poorly controlled type II DM (recent hgA1c: 10.7), peripheral neuropathy, CAD s/p MI and CABG (1996), prostate Ca s/p radiation seed treatment, and hypothyroidism presents with worsening edema and erythema of left lower extremity for 5-6 days. Pt was referred by his podiatrist to wound care center for treatment of left-sided diabetic foot ulcert. Pt had first appt on Thursday where Dr. Melara noticed erythema and edema of left lower extremity. Pt was given script for antibiotic for presumed cellulitis. At wound care appt today, pt was told to go to ER for cellulitis vs DVT as erythema and edema had not resolved after antibiotic therapy. Pt denies fevers, night sweats, chills. Denies trauma to left lower extremity but had small abrasion on right lower extremity. Pt has some peripheral edema at baseline 2/2 venous insufficiency and sedentary lifestyle but reports left leg is worse than usual at this time. Pt was once placed on Lasix for LE edema but stopped it on his own as it caused him to urinate too frequently Denies CP, SOB, abd pain, dysuria.    In ED, labs significant for mild leukocytosis: 12.1, bun/cr: 40/1.40   Vitals stable  Given Vanco and Zosyn x1 75 y.o. M with PMHx of poorly controlled type II DM (recent hgA1c: 10.7), peripheral neuropathy, mood disorder, CAD s/p MI and CABG (1996), prostate Ca s/p radiation seed treatment, and hypothyroidism presents with worsening edema and erythema of left lower extremity for 5-6 days. Pt was referred by his podiatrist to wound care center for treatment of left-sided diabetic foot ulcert. Pt had first appt on Thursday where Dr. Melara noticed erythema and edema of left lower extremity. Pt was given script for antibiotic for presumed cellulitis. At wound care appt today, pt was told to go to ER for cellulitis vs DVT as erythema and edema had not resolved after antibiotic therapy. Pt denies fevers, night sweats, chills. Denies trauma to left lower extremity but had small abrasion on right lower extremity. Pt has some peripheral edema at baseline 2/2 venous insufficiency and sedentary lifestyle but reports left leg is worse than usual at this time. Pt was once placed on Lasix for LE edema but stopped it on his own as it caused him to urinate too frequently Denies CP, SOB, abd pain, dysuria.    NOTE: Called [facility] pharmacy, pt has not filled the following meds since December: metformin ER 1000mg qd, Flomax 0,4mg BID, simvastatin 80mg daily, please confirm with med list which wife will bring in if he should be on these meds     In ED, labs significant for mild leukocytosis: 12.1, bun/cr: 40/1.40   Vitals stable  Given Vanco and Zosyn x1

## 2017-03-21 NOTE — H&P ADULT. - PROBLEM SELECTOR PLAN 4
-pt is on   -will start on low dose insulin sliding scale   -serial FS, hypoglycemic protocol, diabetic diet -pt is on insulin and oral diabetic medications but unsure of names and dosages, follow-up with med list from wife in AM   -will start on low dose insulin sliding scale   -serial FS, hypoglycemic protocol, diabetic diet -pt is on insulin and oral diabetic medications but unsure of names and dosages, follow-up with med list from wife in AM   -will start on low dose insulin sliding scale for now  -serial FS, hypoglycemic protocol, diabetic diet -pt is on insulin and oral diabetic medications  -according to [facility] pt filled victoza 1.2mg daily, Januvia 100mg qd and Tresiba 16u qd recently but has not filled Metformin ER 1000mg qd since December, follow-up with med list in AM   -called [facility] pharmacy, Treciba to Lantus conversion is roughly 1:1, will start at 80% of usual dosage which is ~12u, adjust as needed   -pre-meal and bedtime coverage with low dose insulin sliding scale   -serial FS, hypoglycemic protocol, diabetic diet  -endo c/s-dr. perlman -pt is on insulin and oral diabetic medications  -according to [facility] pt filled victoza 1.2mg daily, Januvia 100mg qd and Tresiba 16u qd recently but has not filled Metformin ER 1000mg qd since December, follow-up with med list in AM   -called [facility] pharmacy, Treciba to Lantus conversion is roughly 1:1, will start at 80% of usual dosage which is ~12u, adjust as needed   -pre-meal and bedtime coverage with low dose insulin sliding scale   -serial FS, hypoglycemic protocol, diabetic diet  -consider endo c/s-dr. perlman

## 2017-03-21 NOTE — H&P ADULT. - ASSESSMENT
75 y.o. M with PMHx of poorly controlled type II DM, CAD s/p MI and CABG (1996), prostate Ca s/p radiation seed treatment, and hypothyroidism presents with worsening edema and erythema of left lower extremity for 5-6 days, admitted with left LE cellulitis failing outpt abx. 75 y.o. M with PMHx of poorly controlled type II DM, mood disorder, CAD s/p MI and CABG (1996), prostate Ca s/p radiation seed treatment, and hypothyroidism presents with worsening edema and erythema of left lower extremity for 5-6 days, admitted with left LE cellulitis failing outpt abx.

## 2017-03-21 NOTE — ED ADULT NURSE REASSESSMENT NOTE - COMFORT CARE
meal provided/warm blanket provided/wait time explained/plan of care explained
wait time explained/plan of care explained
warm blanket provided/darkened lights/wait time explained
plan of care explained/wait time explained

## 2017-03-21 NOTE — ED ADULT NURSE REASSESSMENT NOTE - ANCILLARY STATUS
await U/S/lab results pending
called U/S, doing a few emergencies, will call when up to this pt/awaiting radiology
pt to US/awaiting radiology/radiology results pending

## 2017-03-21 NOTE — H&P ADULT. - PROBLEM SELECTOR PLAN 5
-pt unsure of baseline bun/cr, will have wife bring in recent bloodwork done at PCP office  -continue to trend bun/cr  -likely CKD from poorly controlled diabetes  -avoid nephrotoxic meds  -if bun/cr trends upwards, consider renal sono

## 2017-03-21 NOTE — H&P ADULT. - PROBLEM SELECTOR PLAN 3
-stable  -EKG shows -stable -stable  -pt gets occasional angina when he goes from lying to sitting position for which he takes nitrostat 0.4mg PRN, consider starting if pt complains of chest pain

## 2017-03-21 NOTE — ED ADULT NURSE REASSESSMENT NOTE - GENERAL PATIENT STATE
comfortable appearance/smiling/interactive/cooperative
cooperative/smiling/interactive/comfortable appearance
smiling/interactive/resting/sleeping/comfortable appearance
smiling/interactive/resting/sleeping/comfortable appearance/no change observed

## 2017-03-21 NOTE — PATIENT PROFILE ADULT. - PSH
S/P appendectomy    S/P CABG x 3  saphenous vein harvested from LLE, performed in March 1996 at [facility] by Dr. Mcgraw

## 2017-03-21 NOTE — H&P ADULT. - PROBLEM SELECTOR PLAN 7
-dopplers negative for dvt  -likely 2/2 venous insufficiency and PAD   -pt has been on Lasix in the past but stopped it on his own   -elevate legs  -consider lasix if no improvement and bun/cr stable

## 2017-03-21 NOTE — H&P ADULT. - PSH
S/P appendectomy    S/P CABG x 3  saphenous vein harvested from LLE, performed in March 1996 at [facility] by Dr. Mcgraw

## 2017-03-22 ENCOUNTER — TRANSCRIPTION ENCOUNTER (OUTPATIENT)
Age: 77
End: 2017-03-22

## 2017-03-22 PROCEDURE — 99233 SBSQ HOSP IP/OBS HIGH 50: CPT | Mod: GC

## 2017-03-22 RX ORDER — SENNA PLUS 8.6 MG/1
1 TABLET ORAL DAILY
Qty: 0 | Refills: 0 | Status: DISCONTINUED | OUTPATIENT
Start: 2017-03-22 | End: 2017-03-24

## 2017-03-22 RX ORDER — TAMSULOSIN HYDROCHLORIDE 0.4 MG/1
0.4 CAPSULE ORAL AT BEDTIME
Qty: 0 | Refills: 0 | Status: DISCONTINUED | OUTPATIENT
Start: 2017-03-22 | End: 2017-03-24

## 2017-03-22 RX ORDER — PETROLATUM,WHITE
1 JELLY (GRAM) TOPICAL DAILY
Qty: 0 | Refills: 0 | Status: DISCONTINUED | OUTPATIENT
Start: 2017-03-22 | End: 2017-03-24

## 2017-03-22 RX ORDER — SIMVASTATIN 20 MG/1
80 TABLET, FILM COATED ORAL AT BEDTIME
Qty: 0 | Refills: 0 | Status: DISCONTINUED | OUTPATIENT
Start: 2017-03-22 | End: 2017-03-24

## 2017-03-22 RX ORDER — DOCUSATE SODIUM 100 MG
100 CAPSULE ORAL THREE TIMES A DAY
Qty: 0 | Refills: 0 | Status: DISCONTINUED | OUTPATIENT
Start: 2017-03-22 | End: 2017-03-24

## 2017-03-22 RX ORDER — LOSARTAN POTASSIUM 100 MG/1
25 TABLET, FILM COATED ORAL DAILY
Qty: 0 | Refills: 0 | Status: DISCONTINUED | OUTPATIENT
Start: 2017-03-22 | End: 2017-03-24

## 2017-03-22 RX ORDER — DIPHENHYDRAMINE HCL 50 MG
25 CAPSULE ORAL AT BEDTIME
Qty: 0 | Refills: 0 | Status: DISCONTINUED | OUTPATIENT
Start: 2017-03-22 | End: 2017-03-24

## 2017-03-22 RX ORDER — LOSARTAN POTASSIUM 100 MG/1
25 TABLET, FILM COATED ORAL DAILY
Qty: 0 | Refills: 0 | Status: DISCONTINUED | OUTPATIENT
Start: 2017-03-22 | End: 2017-03-22

## 2017-03-22 RX ADMIN — HEPARIN SODIUM 5000 UNIT(S): 5000 INJECTION INTRAVENOUS; SUBCUTANEOUS at 05:38

## 2017-03-22 RX ADMIN — HEPARIN SODIUM 5000 UNIT(S): 5000 INJECTION INTRAVENOUS; SUBCUTANEOUS at 21:32

## 2017-03-22 RX ADMIN — HEPARIN SODIUM 5000 UNIT(S): 5000 INJECTION INTRAVENOUS; SUBCUTANEOUS at 14:45

## 2017-03-22 RX ADMIN — TAMSULOSIN HYDROCHLORIDE 0.4 MILLIGRAM(S): 0.4 CAPSULE ORAL at 21:32

## 2017-03-22 RX ADMIN — PIPERACILLIN AND TAZOBACTAM 25 GRAM(S): 4; .5 INJECTION, POWDER, LYOPHILIZED, FOR SOLUTION INTRAVENOUS at 14:45

## 2017-03-22 RX ADMIN — Medication 2: at 17:18

## 2017-03-22 RX ADMIN — Medication 50 MICROGRAM(S): at 05:38

## 2017-03-22 RX ADMIN — INSULIN GLARGINE 12 UNIT(S): 100 INJECTION, SOLUTION SUBCUTANEOUS at 21:33

## 2017-03-22 RX ADMIN — PIPERACILLIN AND TAZOBACTAM 25 GRAM(S): 4; .5 INJECTION, POWDER, LYOPHILIZED, FOR SOLUTION INTRAVENOUS at 05:37

## 2017-03-22 RX ADMIN — LOSARTAN POTASSIUM 25 MILLIGRAM(S): 100 TABLET, FILM COATED ORAL at 12:23

## 2017-03-22 RX ADMIN — Medication 25 MILLIGRAM(S): at 00:55

## 2017-03-22 RX ADMIN — Medication 25 MILLIGRAM(S): at 23:42

## 2017-03-22 RX ADMIN — PIPERACILLIN AND TAZOBACTAM 25 GRAM(S): 4; .5 INJECTION, POWDER, LYOPHILIZED, FOR SOLUTION INTRAVENOUS at 21:32

## 2017-03-22 RX ADMIN — SERTRALINE 100 MILLIGRAM(S): 25 TABLET, FILM COATED ORAL at 12:23

## 2017-03-22 RX ADMIN — Medication 100 MILLIGRAM(S): at 21:32

## 2017-03-22 RX ADMIN — Medication 1: at 12:23

## 2017-03-22 RX ADMIN — SENNA PLUS 1 TABLET(S): 8.6 TABLET ORAL at 17:18

## 2017-03-22 RX ADMIN — Medication 1 APPLICATION(S): at 12:24

## 2017-03-22 RX ADMIN — SIMVASTATIN 80 MILLIGRAM(S): 20 TABLET, FILM COATED ORAL at 21:32

## 2017-03-22 NOTE — DISCHARGE NOTE ADULT - CARE PROVIDERS DIRECT ADDRESSES
,DirectAddress_Unknown,DirectAddress_Unknown ,DirectAddress_Unknown,DirectAddress_Unknown,[email or web address],DirectAddress_Unknown

## 2017-03-22 NOTE — DISCHARGE NOTE ADULT - PLAN OF CARE
Continue with home meds Complete course of antibiotics  Follow up with PCP and wound care within one week of discharge Resolution Complete course of antibiotics  Follow up with PCP and wound care Dr. Melara within one week of discharge Complete levaquin 500mg for 10 days  Follow up with PCP and podiatry Dr. Govea within one week of discharge Complete levaquin 500mg for 5 days  Follow up with PCP and podiatry Dr. Govea within one week of discharge ckd2

## 2017-03-22 NOTE — DISCHARGE NOTE ADULT - MEDICATION SUMMARY - MEDICATIONS TO TAKE
I will START or STAY ON the medications listed below when I get home from the hospital:    Cozaar 25 mg oral tablet  -- 1 tab(s) by mouth once a day  -- Indication: For Htn    Flomax 0.4 mg oral capsule  -- 1 cap(s) by mouth once a day  -- Indication: For bph    nitroglycerin 2.5 mg oral capsule, extended release  -- 1 cap(s) by mouth once a day (at bedtime), As Needed  -- Indication: For Cad    sertraline 100 mg oral tablet  -- 1 tab(s) by mouth once a day  -- Indication: For Mood disorder    Victoza 18 mg/3 mL subcutaneous solution  -- 1.2 milligram(s) subcutaneous once a day  -- Indication: For diabetes    Tresiba FlexTouch 200 units/mL subcutaneous solution  -- 16 unit(s) subcutaneous once a day  -- Indication: For diabetes    Januvia 100 mg oral tablet  -- 1 tab(s) by mouth once a day  -- Indication: For diabetes    metFORMIN 1000 mg oral tablet  --  by mouth once a day  -- Indication: For diabetes    simvastatin 80 mg oral tablet  -- 1 tab(s) by mouth once a day (at bedtime)  -- Indication: For Hyperlipidemia    furosemide 20 mg oral tablet  -- 1 tab(s) by mouth once a day, As Needed  -- Indication: For Htn    Levaquin 500 mg oral tablet  -- 1 tab(s) by mouth once a day  -- Avoid prolonged or excessive exposure to direct and/or artificial sunlight while taking this medication.  Do not take dairy products, antacids, or iron preparations within one hour of this medication.  Finish all this medication unless otherwise directed by prescriber.  May cause drowsiness or dizziness.  Medication should be taken with plenty of water.    -- Indication: For Cellulitis of extremity    Synthroid 50 mcg (0.05 mg) oral tablet  -- 1 tab(s) by mouth once a day  -- Indication: For Hypothyroidism    potassium citrate 10 mEq oral tablet, extended release  -- 1 tab(s) by mouth 3 times a day  -- Indication: For Potassium I will START or STAY ON the medications listed below when I get home from the hospital:    Cozaar 25 mg oral tablet  -- 1 tab(s) by mouth once a day  -- Indication: For Htn    Flomax 0.4 mg oral capsule  -- 1 cap(s) by mouth once a day  -- Indication: For bph    nitroglycerin 2.5 mg oral capsule, extended release  -- 1 cap(s) by mouth once a day (at bedtime), As Needed  -- Indication: For Cad    sertraline 100 mg oral tablet  -- 1 tab(s) by mouth once a day  -- Indication: For Mood disorder    Victoza 18 mg/3 mL subcutaneous solution  -- 1.2 milligram(s) subcutaneous once a day  -- Indication: For diabetes    Tresiba FlexTouch 200 units/mL subcutaneous solution  -- 16 unit(s) subcutaneous once a day  -- Indication: For diabetes    Januvia 100 mg oral tablet  -- 1 tab(s) by mouth once a day  -- Indication: For diabetes    metFORMIN 1000 mg oral tablet  --  by mouth once a day  -- Indication: For diabetes    simvastatin 80 mg oral tablet  -- 1 tab(s) by mouth once a day (at bedtime)  -- Indication: For Hyperlipidemia    furosemide 20 mg oral tablet  -- 1 tab(s) by mouth once a day, As Needed  -- Indication: For leg edema as needed/ watch cr with it     Levaquin 500 mg oral tablet  -- 1 tab(s) by mouth once a day  -- Avoid prolonged or excessive exposure to direct and/or artificial sunlight while taking this medication.  Do not take dairy products, antacids, or iron preparations within one hour of this medication.  Finish all this medication unless otherwise directed by prescriber.  May cause drowsiness or dizziness.  Medication should be taken with plenty of water.    -- Indication: For Cellulitis of extremity    Synthroid 50 mcg (0.05 mg) oral tablet  -- 1 tab(s) by mouth once a day  -- Indication: For Hypothyroidism    potassium citrate 10 mEq oral tablet, extended release  -- 1 tab(s) by mouth 3 times a day  -- Indication: For use when use lasix

## 2017-03-22 NOTE — DISCHARGE NOTE ADULT - PATIENT PORTAL LINK FT
“You can access the [facility] Patient Portal, offered by [facility], by registering with the following website: [email or web address]”

## 2017-03-22 NOTE — DISCHARGE NOTE ADULT - CARE PROVIDER_API CALL
Isidro Melara), Surgery; [facility]  [address] [place] [address]  Phone: [phone]  Fax: [phone] Isidro Melara), Surgery; [place] [address]  Phone: [phone]  Fax: [phone]    Maurice Govea (KAYLI), Podiatric Medicine and Surgery  [address] [place] [address]  Phone: [phone]  Fax: [phone]    Goldberg, Steven M (MD), Cardiovascular Disease; Internal Medicine  [address] [place] [address]  Phone: [phone]  Fax: [phone]

## 2017-03-22 NOTE — DISCHARGE NOTE ADULT - INSTRUCTIONS
WOUND CARE INSTRUCTIONS    Change dressing every other day    1) Rinse  wound (LEFT Medial heel) with normal saline  2) Dry foot with sterile 4x4 gauze  3) Apply Silver Alingate to area of wound  4) Apply dry sterile dressing of gauze, abdominal pads and john  5) Monitor wound for signs of infection (redness, pus, out of proportion pain)  6) Alternative dressing is dry sterile dressing of gauze, abdominal pads and john  7) Keep dressing clean, dry and intact between dressing change  8) Pt may weightbear as tolerated. Keep offloaded while in bed    PLEASE FOLLOW UP IN WOUND CARE CLINIC WITHIN 1 WEEK OF DISCHARGE Continue with low salt, low fat, low carbohydrate diet

## 2017-03-22 NOTE — DISCHARGE NOTE ADULT - ADDITIONAL INSTRUCTIONS
Follow up with PCP within one week of discharge  Follow up with Dr. Melara wound care within one week of discharge Follow up with PCP within one week of discharge  Follow up with Dr. Govea podiatry within one week of discharge Follow up with PCP dr goldberg  within one week of discharge  Follow up with Dr. Govea podiatry within one week of discharge

## 2017-03-22 NOTE — DISCHARGE NOTE ADULT - CARE PLAN
Principal Discharge DX:	Cellulitis of lower leg  Goal:	Resolution  Instructions for follow-up, activity and diet:	Complete course of antibiotics  Follow up with PCP and wound care within one week of discharge  Secondary Diagnosis:	Hyperlipidemia  Instructions for follow-up, activity and diet:	Continue with home meds  Secondary Diagnosis:	Azotemia  Instructions for follow-up, activity and diet:	Continue with home meds  Secondary Diagnosis:	Mood disorder  Instructions for follow-up, activity and diet:	Continue with home meds  Secondary Diagnosis:	Hypothyroidism  Instructions for follow-up, activity and diet:	Continue with home meds Principal Discharge DX:	Cellulitis of lower leg  Goal:	Resolution  Instructions for follow-up, activity and diet:	Complete course of antibiotics  Follow up with PCP and wound care Dr. Melara within one week of discharge  Secondary Diagnosis:	Hyperlipidemia  Instructions for follow-up, activity and diet:	Continue with home meds  Secondary Diagnosis:	Azotemia  Instructions for follow-up, activity and diet:	Continue with home meds  Secondary Diagnosis:	Mood disorder  Instructions for follow-up, activity and diet:	Continue with home meds  Secondary Diagnosis:	Hypothyroidism  Instructions for follow-up, activity and diet:	Continue with home meds Principal Discharge DX:	Cellulitis of lower leg  Goal:	Resolution  Instructions for follow-up, activity and diet:	Complete levaquin 500mg for 10 days  Follow up with PCP and podiatry Dr. Govea within one week of discharge  Secondary Diagnosis:	Hyperlipidemia  Instructions for follow-up, activity and diet:	Continue with home meds  Secondary Diagnosis:	Azotemia  Instructions for follow-up, activity and diet:	Continue with home meds  Secondary Diagnosis:	Mood disorder  Instructions for follow-up, activity and diet:	Continue with home meds  Secondary Diagnosis:	Hypothyroidism  Instructions for follow-up, activity and diet:	Continue with home meds Principal Discharge DX:	Cellulitis of lower leg  Goal:	Resolution  Instructions for follow-up, activity and diet:	Complete levaquin 500mg for 5 days  Follow up with PCP and podiatry Dr. Govea within one week of discharge  Secondary Diagnosis:	Hyperlipidemia  Instructions for follow-up, activity and diet:	Continue with home meds  Secondary Diagnosis:	Azotemia  Instructions for follow-up, activity and diet:	Continue with home meds  Secondary Diagnosis:	Mood disorder  Instructions for follow-up, activity and diet:	Continue with home meds  Secondary Diagnosis:	Hypothyroidism  Instructions for follow-up, activity and diet:	Continue with home meds Principal Discharge DX:	Cellulitis of lower leg  Goal:	Resolution  Instructions for follow-up, activity and diet:	Complete levaquin 500mg for 5 days  Follow up with PCP and podiatry Dr. Govea within one week of discharge  Secondary Diagnosis:	Hyperlipidemia  Instructions for follow-up, activity and diet:	Continue with home meds  Secondary Diagnosis:	Azotemia  Goal:	ckd2  Instructions for follow-up, activity and diet:	Continue with home meds  Secondary Diagnosis:	Mood disorder  Instructions for follow-up, activity and diet:	Continue with home meds  Secondary Diagnosis:	Hypothyroidism  Instructions for follow-up, activity and diet:	Continue with home meds

## 2017-03-22 NOTE — DISCHARGE NOTE ADULT - HOSPITAL COURSE
75 y.o. M with PMHx of poorly controlled type II DM (recent hgA1c: 10.7), peripheral neuropathy, mood disorder, CAD s/p MI and CABG (1996), prostate Ca s/p radiation seed treatment, and hypothyroidism presents with worsening edema and erythema of left lower extremity for 5-6 days. Pt was referred by his podiatrist to wound care center for treatment of left-sided diabetic foot ulcert. Pt had first appt on Thursday where Dr. Melara noticed erythema and edema of left lower extremity. Pt was given script for antibiotic for presumed cellulitis. At wound care appt today, pt was told to go to ER for cellulitis vs DVT as erythema and edema had not resolved after antibiotic therapy. Pt denies fevers, night sweats, chills. Denies trauma to left lower extremity but had small abrasion on right lower extremity. Pt has some peripheral edema at baseline 2/2 venous insufficiency and sedentary lifestyle but reports left leg is worse than usual at this time. Pt was once placed on Lasix for LE edema but stopped it on his own as it caused him to urinate too frequently Denies CP, SOB, abd pain, dysuria.    In ED, labs significant for mild leukocytosis: 12.1, bun/cr: 40/1.40   Vitals stable  Given Vanco and Zosyn x1    Pt was admitted for LLE cellulitis. Podiatry and Infectious disease evaluated pt during stay. Continued on zosyn on floors. Blood cultures showed no growth. LE dopplers showed no DVT. Pt continued home medications and aquaphor. PCP notified. Pt will follow up outpatient with wound care, podiatry, and PCP within one week and complete course of outpatient antibiotics. 75 y.o. M with PMHx of poorly controlled type II DM (recent hgA1c: 10.7), peripheral neuropathy, mood disorder, CAD s/p MI and CABG (1996), prostate Ca s/p radiation seed treatment, and hypothyroidism presents with worsening edema and erythema of left lower extremity for 5-6 days. Pt was referred by his podiatrist to wound care center for treatment of left-sided diabetic foot ulcert. Pt had first appt on Thursday where Dr. Melara noticed erythema and edema of left lower extremity. Pt was given script for antibiotic for presumed cellulitis. At wound care appt today, pt was told to go to ER for cellulitis vs DVT as erythema and edema had not resolved after antibiotic therapy. Pt denies fevers, night sweats, chills. Denies trauma to left lower extremity but had small abrasion on right lower extremity. Pt has some peripheral edema at baseline 2/2 venous insufficiency and sedentary lifestyle but reports left leg is worse than usual at this time. Pt was once placed on Lasix for LE edema but stopped it on his own as it caused him to urinate too frequently Denies CP, SOB, abd pain, dysuria.    In ED, labs significant for mild leukocytosis: 12.1, bun/cr: 40/1.40   Vitals stable  Given Vanco and Zosyn x1    Pt was admitted for LLE cellulitis. Podiatry and Infectious disease evaluated pt during stay and recommended to continue zosyn on floors. Blood cultures showed no growth. LE dopplers showed no DVT. Pt continued home medications and aquaphor. PCP notified. Pt will follow up outpatient with wound care, podiatry, and PCP within one week and complete course of outpatient antibiotics.  Stable for discharge per ID, podiatry, wound care. 75 y.o. M with PMHx of poorly controlled type II DM (recent hgA1c: 10.7), peripheral neuropathy, mood disorder, CAD s/p MI and CABG (1996), prostate Ca s/p radiation seed treatment, and hypothyroidism presents with worsening edema and erythema of left lower extremity for 5-6 days. Pt was referred by his podiatrist to wound care center for treatment of left-sided diabetic foot ulcert. Pt had first appt on Thursday where Dr. Melara noticed erythema and edema of left lower extremity. Pt was given script for antibiotic for presumed cellulitis. At wound care appt today, pt was told to go to ER for cellulitis vs DVT as erythema and edema had not resolved after antibiotic therapy. Pt denies fevers, night sweats, chills. Denies trauma to left lower extremity but had small abrasion on right lower extremity. Pt has some peripheral edema at baseline 2/2 venous insufficiency and sedentary lifestyle but reports left leg is worse than usual at this time. Pt was once placed on Lasix for LE edema but stopped it on his own as it caused him to urinate too frequently Denies CP, SOB, abd pain, dysuria.    In ED, labs significant for mild leukocytosis: 12.1, bun/cr: 40/1.40   Vitals stable  Given Vanco and Zosyn x1    Pt was admitted for LLE cellulitis. Podiatry and Infectious disease evaluated pt during stay and recommended to continue zosyn on floors. Blood cultures showed no growth. LE dopplers showed no DVT. Pt continued home medications and aquaphor. PCP notified. Pt will follow up outpatient with wound care Dr. Melara, podiatry, and PCP within one week and complete course of outpatient antibiotics.  Stable for discharge per ID, podiatry, wound care. On day of discharge 3/24/17, Physical Exam: Vitals 97.2F, HR 76, 127/75, 16 RR , 100% room air. General: Resting comfortably, no acute distress. Head: NC/AT. Eyes: EOMI intact. Cardio: +s1s2, RRR. Resp: CTA B/L, no wheezes, rales, rhonchi B/L. Abd: nontender, nondistended, positive bowel sounds all four quadrants, obese. Ext: B/L LE venous insufficiency changes, skin discolored, edematatous. 75 y.o. M with PMHx of poorly controlled type II DM (recent hgA1c: 10.7), peripheral neuropathy, mood disorder, CAD s/p MI and CABG (1996), prostate Ca s/p radiation seed treatment, and hypothyroidism presents with worsening edema and erythema of left lower extremity for 5-6 days. Pt was referred by his podiatrist to wound care center for treatment of left-sided diabetic foot ulcert. Pt had first appt on Thursday where Dr. Melara noticed erythema and edema of left lower extremity. Pt was given script for antibiotic for presumed cellulitis. At wound care appt today, pt was told to go to ER for cellulitis vs DVT as erythema and edema had not resolved after antibiotic therapy. Pt denies fevers, night sweats, chills. Denies trauma to left lower extremity but had small abrasion on right lower extremity. Pt has some peripheral edema at baseline 2/2 venous insufficiency and sedentary lifestyle but reports left leg is worse than usual at this time. Pt was once placed on Lasix for LE edema but stopped it on his own as it caused him to urinate too frequently Denies CP, SOB, abd pain, dysuria.    In ED, labs significant for mild leukocytosis: 12.1, bun/cr: 40/1.40   Vitals stable  Given Vanco and Zosyn x1    Pt was admitted for LLE cellulitis. Podiatry Dr. Govea and Infectious disease Dr. Ochoa evaluated pt during stay and recommended to continue zosyn on floors. Blood cultures showed no growth. LE dopplers showed no DVT. Pt continued home medications and aquaphor. PCP Dr. Goldberg notified. Pt will follow up outpatient with Dr. Govea podiatry, and PCP within one week and complete course of outpatient antibiotics (levaquin 500mg for 10 days).  Stable for discharge per ID, podiatry, wound care. On day of discharge 3/24/17, Physical Exam: Vitals 97.2F, HR 76, 127/75, 16 RR , 100% room air. General: Resting comfortably, no acute distress. Head: NC/AT. Eyes: EOMI intact. Cardio: +s1s2, RRR. Resp: CTA B/L, no wheezes, rales, rhonchi B/L. Abd: nontender, nondistended, positive bowel sounds all four quadrants, obese. Ext: B/L LE venous insufficiency changes, skin discolored, edematatous. No complaints on day of discharge, stable for d/c. PCP aware. No change in any home medications. 75 y.o. M with PMHx of poorly controlled type II DM (recent hgA1c: 10.7), peripheral neuropathy, mood disorder, CAD s/p MI and CABG (1996), prostate Ca s/p radiation seed treatment, and hypothyroidism presents with worsening edema and erythema of left lower extremity for 5-6 days. Pt was referred by his podiatrist to wound care center for treatment of left-sided diabetic foot ulcert. Pt had first appt on Thursday where Dr. Melara noticed erythema and edema of left lower extremity. Pt was given script for antibiotic for presumed cellulitis. At wound care appt today, pt was told to go to ER for cellulitis vs DVT as erythema and edema had not resolved after antibiotic therapy. Pt denies fevers, night sweats, chills. Denies trauma to left lower extremity but had small abrasion on right lower extremity. Pt has some peripheral edema at baseline 2/2 venous insufficiency and sedentary lifestyle but reports left leg is worse than usual at this time. Pt was once placed on Lasix for LE edema but stopped it on his own as it caused him to urinate too frequently Denies CP, SOB, abd pain, dysuria.    In ED, labs significant for mild leukocytosis: 12.1, bun/cr: 40/1.40   Vitals stable  Given Vanco and Zosyn x1    Pt was admitted for LLE cellulitis. Podiatry Dr. Govea and Infectious disease Dr. Ochoa evaluated pt during stay and recommended to continue zosyn on floors. Blood cultures showed no growth. LE dopplers showed no DVT. Pt continued home medications and aquaphor. PCP Dr. Goldberg notified. Pt will follow up outpatient with Dr. Govea podiatry, and PCP within one week and complete course of outpatient antibiotics (levaquin 500mg for 5 days).  Stable for discharge per ID, podiatry, wound care. On day of discharge 3/24/17, Physical Exam: Vitals 97.2F, HR 76, 127/75, 16 RR , 100% room air. General: Resting comfortably, no acute distress. Head: NC/AT. Eyes: EOMI intact. Cardio: +s1s2, RRR. Resp: CTA B/L, no wheezes, rales, rhonchi B/L. Abd: nontender, nondistended, positive bowel sounds all four quadrants, obese. Ext: B/L LE venous insufficiency changes, skin discolored, edematatous. No complaints on day of discharge, stable for d/c. PCP aware. No change in any home medications. 75 y.o. M with PMHx of poorly controlled type II DM (recent hgA1c: 10.7), peripheral neuropathy, mood disorder, CAD s/p MI and CABG (1996), prostate Ca s/p radiation seed treatment, and hypothyroidism presents with worsening edema and erythema of left lower extremity for 5-6 days. Pt was referred by his podiatrist to wound care center for treatment of left-sided diabetic foot ulcert. Pt had first appt on Thursday where Dr. Melara noticed erythema and edema of left lower extremity. Pt was given script for antibiotic for presumed cellulitis. At wound care appt today, pt was told to go to ER for cellulitis vs DVT as erythema and edema had not resolved after antibiotic therapy. Pt denies fevers, night sweats, chills. Denies trauma to left lower extremity but had small abrasion on right lower extremity. Pt has some peripheral edema at baseline 2/2 venous insufficiency and sedentary lifestyle but reports left leg is worse than usual at this time. Pt was once placed on Lasix for LE edema but stopped it on his own as it caused him to urinate too frequently Denies CP, SOB, abd pain, dysuria.    In ED, labs significant for mild leukocytosis: 12.1, bun/cr: 40/1.40   Vitals stable  Given Vanco and Zosyn x1    Pt was admitted for LLE cellulitis with uncontrolled dm neuropathy with dm foot ulcer lt heel . Podiatry Dr. Govea and Infectious disease Dr. Ochoa evaluated pt during stay and recommended to continue zosyn on floors. Blood cultures showed no growth. LE dopplers showed no DVT.  pt cellulitis resolved  no acute intervantion needed for lt foot dm ulcer as per dr govea, pt have ckd2  as hx of lasix use prn / fu cr out pt ,  morbid obesity diet and exercise / Pt continued home medications and aquaphor. PCP Dr. Goldberg notified. Pt will follow up outpatient with Dr. Govea podiatry, and PCP within one week and complete course of outpatient antibiotics (levaquin 500mg for 5 days).  pt will need to resume hyperbaric chamber therapy out pt .    On day of discharge 3/24/17, Physical Exam: Vitals 97.2F, HR 76, 127/75, 16 RR , 100% room air. General: Resting comfortably, no acute distress. Head: NC/AT. Eyes: EOMI intact. Cardio: +s1s2, RRR. Resp: CTA B/L, no wheezes, rales, rhonchi B/L. Abd: nontender, nondistended, positive bowel sounds all four quadrants, obese. Ext: B/L LE venous insufficiency changes, skin discolored, edema tat ous No complaints on day of discharge, stable for d/c. PCP dr goldberg aware. No change in any home medications.

## 2017-03-23 PROCEDURE — 99233 SBSQ HOSP IP/OBS HIGH 50: CPT | Mod: GC

## 2017-03-23 RX ADMIN — Medication 50 MICROGRAM(S): at 06:26

## 2017-03-23 RX ADMIN — HEPARIN SODIUM 5000 UNIT(S): 5000 INJECTION INTRAVENOUS; SUBCUTANEOUS at 06:26

## 2017-03-23 RX ADMIN — HEPARIN SODIUM 5000 UNIT(S): 5000 INJECTION INTRAVENOUS; SUBCUTANEOUS at 14:45

## 2017-03-23 RX ADMIN — Medication 1 APPLICATION(S): at 11:49

## 2017-03-23 RX ADMIN — INSULIN GLARGINE 12 UNIT(S): 100 INJECTION, SOLUTION SUBCUTANEOUS at 21:51

## 2017-03-23 RX ADMIN — PIPERACILLIN AND TAZOBACTAM 25 GRAM(S): 4; .5 INJECTION, POWDER, LYOPHILIZED, FOR SOLUTION INTRAVENOUS at 14:45

## 2017-03-23 RX ADMIN — Medication 100 MILLIGRAM(S): at 14:45

## 2017-03-23 RX ADMIN — HEPARIN SODIUM 5000 UNIT(S): 5000 INJECTION INTRAVENOUS; SUBCUTANEOUS at 21:50

## 2017-03-23 RX ADMIN — Medication 1: at 11:50

## 2017-03-23 RX ADMIN — PIPERACILLIN AND TAZOBACTAM 25 GRAM(S): 4; .5 INJECTION, POWDER, LYOPHILIZED, FOR SOLUTION INTRAVENOUS at 21:50

## 2017-03-23 RX ADMIN — Medication 25 MILLIGRAM(S): at 21:50

## 2017-03-23 RX ADMIN — Medication 100 MILLIGRAM(S): at 06:26

## 2017-03-23 RX ADMIN — TAMSULOSIN HYDROCHLORIDE 0.4 MILLIGRAM(S): 0.4 CAPSULE ORAL at 21:50

## 2017-03-23 RX ADMIN — LOSARTAN POTASSIUM 25 MILLIGRAM(S): 100 TABLET, FILM COATED ORAL at 06:26

## 2017-03-23 RX ADMIN — SERTRALINE 100 MILLIGRAM(S): 25 TABLET, FILM COATED ORAL at 11:49

## 2017-03-23 RX ADMIN — PIPERACILLIN AND TAZOBACTAM 25 GRAM(S): 4; .5 INJECTION, POWDER, LYOPHILIZED, FOR SOLUTION INTRAVENOUS at 06:26

## 2017-03-23 RX ADMIN — Medication 1: at 17:28

## 2017-03-23 RX ADMIN — Medication 100 MILLIGRAM(S): at 21:50

## 2017-03-23 RX ADMIN — SIMVASTATIN 80 MILLIGRAM(S): 20 TABLET, FILM COATED ORAL at 21:50

## 2017-03-23 NOTE — PHYSICAL THERAPY INITIAL EVALUATION ADULT - GENERAL OBSERVATIONS, REHAB EVAL
Pt presents supine in bed venous stasis dermatitis noted bilateral distal legs. Kerlix dressings on left ankle /foot wound

## 2017-03-23 NOTE — PHYSICAL THERAPY INITIAL EVALUATION ADULT - PERTINENT HX OF CURRENT PROBLEM, REHAB EVAL
Pt admitted with cellulitis of extremities L>R, difficulty walking, PMH of DM, CAD Peripheral neuropathy

## 2017-03-23 NOTE — PHYSICAL THERAPY INITIAL EVALUATION ADULT - ADDITIONAL COMMENTS
Pt was ambulating independently with a walker prior to admission. Pt has 4 steps to enter home with one handrail. Once inside he has 6 steps to bedroom with one handrail.

## 2017-03-24 VITALS
TEMPERATURE: 97 F | HEART RATE: 46 BPM | DIASTOLIC BLOOD PRESSURE: 56 MMHG | OXYGEN SATURATION: 100 % | RESPIRATION RATE: 16 BRPM | SYSTOLIC BLOOD PRESSURE: 104 MMHG

## 2017-03-24 PROCEDURE — 93970 EXTREMITY STUDY: CPT

## 2017-03-24 PROCEDURE — 93005 ELECTROCARDIOGRAM TRACING: CPT

## 2017-03-24 PROCEDURE — 83605 ASSAY OF LACTIC ACID: CPT

## 2017-03-24 PROCEDURE — 99239 HOSP IP/OBS DSCHRG MGMT >30: CPT

## 2017-03-24 PROCEDURE — 80053 COMPREHEN METABOLIC PANEL: CPT

## 2017-03-24 PROCEDURE — 99285 EMERGENCY DEPT VISIT HI MDM: CPT | Mod: 25

## 2017-03-24 PROCEDURE — 87040 BLOOD CULTURE FOR BACTERIA: CPT

## 2017-03-24 PROCEDURE — 82962 GLUCOSE BLOOD TEST: CPT

## 2017-03-24 PROCEDURE — 96365 THER/PROPH/DIAG IV INF INIT: CPT

## 2017-03-24 PROCEDURE — 85027 COMPLETE CBC AUTOMATED: CPT

## 2017-03-24 PROCEDURE — 80048 BASIC METABOLIC PNL TOTAL CA: CPT

## 2017-03-24 PROCEDURE — 96372 THER/PROPH/DIAG INJ SC/IM: CPT | Mod: 59

## 2017-03-24 PROCEDURE — 96376 TX/PRO/DX INJ SAME DRUG ADON: CPT

## 2017-03-24 PROCEDURE — G0277: CPT

## 2017-03-24 PROCEDURE — G0463: CPT | Mod: 25

## 2017-03-24 PROCEDURE — 85610 PROTHROMBIN TIME: CPT

## 2017-03-24 PROCEDURE — 96375 TX/PRO/DX INJ NEW DRUG ADDON: CPT

## 2017-03-24 PROCEDURE — 97162 PT EVAL MOD COMPLEX 30 MIN: CPT

## 2017-03-24 RX ORDER — CIPROFLOXACIN LACTATE 400MG/40ML
1 VIAL (ML) INTRAVENOUS
Qty: 5 | Refills: 0 | OUTPATIENT
Start: 2017-03-24 | End: 2017-03-29

## 2017-03-24 RX ADMIN — LOSARTAN POTASSIUM 25 MILLIGRAM(S): 100 TABLET, FILM COATED ORAL at 06:37

## 2017-03-24 RX ADMIN — HEPARIN SODIUM 5000 UNIT(S): 5000 INJECTION INTRAVENOUS; SUBCUTANEOUS at 06:36

## 2017-03-24 RX ADMIN — PIPERACILLIN AND TAZOBACTAM 25 GRAM(S): 4; .5 INJECTION, POWDER, LYOPHILIZED, FOR SOLUTION INTRAVENOUS at 06:37

## 2017-03-24 RX ADMIN — HEPARIN SODIUM 5000 UNIT(S): 5000 INJECTION INTRAVENOUS; SUBCUTANEOUS at 14:27

## 2017-03-24 RX ADMIN — SERTRALINE 100 MILLIGRAM(S): 25 TABLET, FILM COATED ORAL at 11:48

## 2017-03-24 RX ADMIN — Medication 100 MILLIGRAM(S): at 06:37

## 2017-03-24 RX ADMIN — Medication 2: at 11:48

## 2017-03-24 RX ADMIN — Medication 50 MICROGRAM(S): at 06:37

## 2017-03-24 RX ADMIN — Medication 100 MILLIGRAM(S): at 14:27

## 2017-03-24 RX ADMIN — Medication 1 APPLICATION(S): at 11:48

## 2017-03-26 LAB
CULTURE RESULTS: SIGNIFICANT CHANGE UP
CULTURE RESULTS: SIGNIFICANT CHANGE UP
SPECIMEN SOURCE: SIGNIFICANT CHANGE UP
SPECIMEN SOURCE: SIGNIFICANT CHANGE UP

## 2017-03-27 ENCOUNTER — OUTPATIENT (OUTPATIENT)
Dept: OUTPATIENT SERVICES | Facility: HOSPITAL | Age: 77
LOS: 1 days | End: 2017-03-27
Payer: MEDICARE

## 2017-03-27 DIAGNOSIS — Z95.1 PRESENCE OF AORTOCORONARY BYPASS GRAFT: Chronic | ICD-10-CM

## 2017-03-27 DIAGNOSIS — L97.401 NON-PRESSURE CHRONIC ULCER OF UNSPECIFIED HEEL AND MIDFOOT LIMITED TO BREAKDOWN OF SKIN: ICD-10-CM

## 2017-03-27 DIAGNOSIS — Z90.49 ACQUIRED ABSENCE OF OTHER SPECIFIED PARTS OF DIGESTIVE TRACT: Chronic | ICD-10-CM

## 2017-03-27 PROCEDURE — G0277: CPT

## 2017-03-27 PROCEDURE — 82962 GLUCOSE BLOOD TEST: CPT

## 2017-03-28 DIAGNOSIS — E11.621 TYPE 2 DIABETES MELLITUS WITH FOOT ULCER: ICD-10-CM

## 2017-03-28 DIAGNOSIS — L97.423 NON-PRESSURE CHRONIC ULCER OF LEFT HEEL AND MIDFOOT WITH NECROSIS OF MUSCLE: ICD-10-CM

## 2017-03-28 DIAGNOSIS — Z79.4 LONG TERM (CURRENT) USE OF INSULIN: ICD-10-CM

## 2017-03-28 DIAGNOSIS — M86.672 OTHER CHRONIC OSTEOMYELITIS, LEFT ANKLE AND FOOT: ICD-10-CM

## 2017-03-29 ENCOUNTER — OUTPATIENT (OUTPATIENT)
Dept: OUTPATIENT SERVICES | Facility: HOSPITAL | Age: 77
LOS: 1 days | End: 2017-03-29
Payer: MEDICARE

## 2017-03-29 DIAGNOSIS — E66.9 OBESITY, UNSPECIFIED: ICD-10-CM

## 2017-03-29 DIAGNOSIS — Z90.49 ACQUIRED ABSENCE OF OTHER SPECIFIED PARTS OF DIGESTIVE TRACT: Chronic | ICD-10-CM

## 2017-03-29 DIAGNOSIS — I87.2 VENOUS INSUFFICIENCY (CHRONIC) (PERIPHERAL): ICD-10-CM

## 2017-03-29 DIAGNOSIS — Z92.3 PERSONAL HISTORY OF IRRADIATION: ICD-10-CM

## 2017-03-29 DIAGNOSIS — L03.116 CELLULITIS OF LEFT LOWER LIMB: ICD-10-CM

## 2017-03-29 DIAGNOSIS — Z95.1 PRESENCE OF AORTOCORONARY BYPASS GRAFT: ICD-10-CM

## 2017-03-29 DIAGNOSIS — E11.42 TYPE 2 DIABETES MELLITUS WITH DIABETIC POLYNEUROPATHY: ICD-10-CM

## 2017-03-29 DIAGNOSIS — L97.401 NON-PRESSURE CHRONIC ULCER OF UNSPECIFIED HEEL AND MIDFOOT LIMITED TO BREAKDOWN OF SKIN: ICD-10-CM

## 2017-03-29 DIAGNOSIS — N18.2 CHRONIC KIDNEY DISEASE, STAGE 2 (MILD): ICD-10-CM

## 2017-03-29 DIAGNOSIS — Z95.1 PRESENCE OF AORTOCORONARY BYPASS GRAFT: Chronic | ICD-10-CM

## 2017-03-29 DIAGNOSIS — E03.9 HYPOTHYROIDISM, UNSPECIFIED: ICD-10-CM

## 2017-03-29 DIAGNOSIS — I73.9 PERIPHERAL VASCULAR DISEASE, UNSPECIFIED: ICD-10-CM

## 2017-03-29 DIAGNOSIS — L97.429 NON-PRESSURE CHRONIC ULCER OF LEFT HEEL AND MIDFOOT WITH UNSPECIFIED SEVERITY: ICD-10-CM

## 2017-03-29 DIAGNOSIS — E11.65 TYPE 2 DIABETES MELLITUS WITH HYPERGLYCEMIA: ICD-10-CM

## 2017-03-29 DIAGNOSIS — E11.621 TYPE 2 DIABETES MELLITUS WITH FOOT ULCER: ICD-10-CM

## 2017-03-29 DIAGNOSIS — F39 UNSPECIFIED MOOD [AFFECTIVE] DISORDER: ICD-10-CM

## 2017-03-29 DIAGNOSIS — I25.2 OLD MYOCARDIAL INFARCTION: ICD-10-CM

## 2017-03-29 DIAGNOSIS — Z87.891 PERSONAL HISTORY OF NICOTINE DEPENDENCE: ICD-10-CM

## 2017-03-29 DIAGNOSIS — Z79.4 LONG TERM (CURRENT) USE OF INSULIN: ICD-10-CM

## 2017-03-29 DIAGNOSIS — I25.10 ATHEROSCLEROTIC HEART DISEASE OF NATIVE CORONARY ARTERY WITHOUT ANGINA PECTORIS: ICD-10-CM

## 2017-03-29 DIAGNOSIS — E11.21 TYPE 2 DIABETES MELLITUS WITH DIABETIC NEPHROPATHY: ICD-10-CM

## 2017-03-29 DIAGNOSIS — Z85.46 PERSONAL HISTORY OF MALIGNANT NEOPLASM OF PROSTATE: ICD-10-CM

## 2017-03-29 DIAGNOSIS — E78.5 HYPERLIPIDEMIA, UNSPECIFIED: ICD-10-CM

## 2017-03-29 DIAGNOSIS — Z79.84 LONG TERM (CURRENT) USE OF ORAL HYPOGLYCEMIC DRUGS: ICD-10-CM

## 2017-03-29 PROCEDURE — 82962 GLUCOSE BLOOD TEST: CPT

## 2017-03-29 PROCEDURE — G0277: CPT

## 2017-03-30 ENCOUNTER — OUTPATIENT (OUTPATIENT)
Dept: OUTPATIENT SERVICES | Facility: HOSPITAL | Age: 77
LOS: 1 days | End: 2017-03-30
Payer: MEDICARE

## 2017-03-30 DIAGNOSIS — Z95.1 PRESENCE OF AORTOCORONARY BYPASS GRAFT: Chronic | ICD-10-CM

## 2017-03-30 DIAGNOSIS — L97.401 NON-PRESSURE CHRONIC ULCER OF UNSPECIFIED HEEL AND MIDFOOT LIMITED TO BREAKDOWN OF SKIN: ICD-10-CM

## 2017-03-30 DIAGNOSIS — Z90.49 ACQUIRED ABSENCE OF OTHER SPECIFIED PARTS OF DIGESTIVE TRACT: Chronic | ICD-10-CM

## 2017-03-30 PROCEDURE — 82962 GLUCOSE BLOOD TEST: CPT

## 2017-03-30 PROCEDURE — G0277: CPT

## 2017-03-31 ENCOUNTER — OUTPATIENT (OUTPATIENT)
Dept: OUTPATIENT SERVICES | Facility: HOSPITAL | Age: 77
LOS: 1 days | End: 2017-03-31
Payer: MEDICARE

## 2017-03-31 DIAGNOSIS — L97.423 NON-PRESSURE CHRONIC ULCER OF LEFT HEEL AND MIDFOOT WITH NECROSIS OF MUSCLE: ICD-10-CM

## 2017-03-31 DIAGNOSIS — M86.672 OTHER CHRONIC OSTEOMYELITIS, LEFT ANKLE AND FOOT: ICD-10-CM

## 2017-03-31 DIAGNOSIS — L97.401 NON-PRESSURE CHRONIC ULCER OF UNSPECIFIED HEEL AND MIDFOOT LIMITED TO BREAKDOWN OF SKIN: ICD-10-CM

## 2017-03-31 DIAGNOSIS — Z95.1 PRESENCE OF AORTOCORONARY BYPASS GRAFT: Chronic | ICD-10-CM

## 2017-03-31 DIAGNOSIS — E11.621 TYPE 2 DIABETES MELLITUS WITH FOOT ULCER: ICD-10-CM

## 2017-03-31 DIAGNOSIS — Z90.49 ACQUIRED ABSENCE OF OTHER SPECIFIED PARTS OF DIGESTIVE TRACT: Chronic | ICD-10-CM

## 2017-03-31 DIAGNOSIS — Z79.4 LONG TERM (CURRENT) USE OF INSULIN: ICD-10-CM

## 2017-03-31 PROCEDURE — 82962 GLUCOSE BLOOD TEST: CPT

## 2017-03-31 PROCEDURE — G0277: CPT

## 2017-04-01 DIAGNOSIS — L97.423 NON-PRESSURE CHRONIC ULCER OF LEFT HEEL AND MIDFOOT WITH NECROSIS OF MUSCLE: ICD-10-CM

## 2017-04-01 DIAGNOSIS — E11.621 TYPE 2 DIABETES MELLITUS WITH FOOT ULCER: ICD-10-CM

## 2017-04-01 DIAGNOSIS — Z79.4 LONG TERM (CURRENT) USE OF INSULIN: ICD-10-CM

## 2017-04-01 DIAGNOSIS — M86.672 OTHER CHRONIC OSTEOMYELITIS, LEFT ANKLE AND FOOT: ICD-10-CM

## 2017-04-03 ENCOUNTER — OUTPATIENT (OUTPATIENT)
Dept: OUTPATIENT SERVICES | Facility: HOSPITAL | Age: 77
LOS: 1 days | End: 2017-04-03
Payer: MEDICARE

## 2017-04-03 DIAGNOSIS — Z95.1 PRESENCE OF AORTOCORONARY BYPASS GRAFT: Chronic | ICD-10-CM

## 2017-04-03 DIAGNOSIS — Z90.49 ACQUIRED ABSENCE OF OTHER SPECIFIED PARTS OF DIGESTIVE TRACT: Chronic | ICD-10-CM

## 2017-04-03 DIAGNOSIS — L97.401 NON-PRESSURE CHRONIC ULCER OF UNSPECIFIED HEEL AND MIDFOOT LIMITED TO BREAKDOWN OF SKIN: ICD-10-CM

## 2017-04-04 ENCOUNTER — OUTPATIENT (OUTPATIENT)
Dept: OUTPATIENT SERVICES | Facility: HOSPITAL | Age: 77
LOS: 1 days | End: 2017-04-04
Payer: MEDICARE

## 2017-04-04 DIAGNOSIS — Z92.3 PERSONAL HISTORY OF IRRADIATION: ICD-10-CM

## 2017-04-04 DIAGNOSIS — I10 ESSENTIAL (PRIMARY) HYPERTENSION: ICD-10-CM

## 2017-04-04 DIAGNOSIS — L97.423 NON-PRESSURE CHRONIC ULCER OF LEFT HEEL AND MIDFOOT WITH NECROSIS OF MUSCLE: ICD-10-CM

## 2017-04-04 DIAGNOSIS — E11.621 TYPE 2 DIABETES MELLITUS WITH FOOT ULCER: ICD-10-CM

## 2017-04-04 DIAGNOSIS — Z90.49 ACQUIRED ABSENCE OF OTHER SPECIFIED PARTS OF DIGESTIVE TRACT: ICD-10-CM

## 2017-04-04 DIAGNOSIS — L97.401 NON-PRESSURE CHRONIC ULCER OF UNSPECIFIED HEEL AND MIDFOOT LIMITED TO BREAKDOWN OF SKIN: ICD-10-CM

## 2017-04-04 DIAGNOSIS — I25.810 ATHEROSCLEROSIS OF CORONARY ARTERY BYPASS GRAFT(S) WITHOUT ANGINA PECTORIS: ICD-10-CM

## 2017-04-04 DIAGNOSIS — M86.672 OTHER CHRONIC OSTEOMYELITIS, LEFT ANKLE AND FOOT: ICD-10-CM

## 2017-04-04 DIAGNOSIS — I83.12 VARICOSE VEINS OF LEFT LOWER EXTREMITY WITH INFLAMMATION: ICD-10-CM

## 2017-04-04 DIAGNOSIS — I89.0 LYMPHEDEMA, NOT ELSEWHERE CLASSIFIED: ICD-10-CM

## 2017-04-04 DIAGNOSIS — E66.9 OBESITY, UNSPECIFIED: ICD-10-CM

## 2017-04-04 DIAGNOSIS — Z95.1 PRESENCE OF AORTOCORONARY BYPASS GRAFT: Chronic | ICD-10-CM

## 2017-04-04 DIAGNOSIS — Z79.899 OTHER LONG TERM (CURRENT) DRUG THERAPY: ICD-10-CM

## 2017-04-04 DIAGNOSIS — I83.11 VARICOSE VEINS OF RIGHT LOWER EXTREMITY WITH INFLAMMATION: ICD-10-CM

## 2017-04-04 DIAGNOSIS — Z90.49 ACQUIRED ABSENCE OF OTHER SPECIFIED PARTS OF DIGESTIVE TRACT: Chronic | ICD-10-CM

## 2017-04-04 DIAGNOSIS — E78.00 PURE HYPERCHOLESTEROLEMIA, UNSPECIFIED: ICD-10-CM

## 2017-04-04 DIAGNOSIS — E11.40 TYPE 2 DIABETES MELLITUS WITH DIABETIC NEUROPATHY, UNSPECIFIED: ICD-10-CM

## 2017-04-04 DIAGNOSIS — E03.9 HYPOTHYROIDISM, UNSPECIFIED: ICD-10-CM

## 2017-04-04 DIAGNOSIS — I25.2 OLD MYOCARDIAL INFARCTION: ICD-10-CM

## 2017-04-04 DIAGNOSIS — Z85.46 PERSONAL HISTORY OF MALIGNANT NEOPLASM OF PROSTATE: ICD-10-CM

## 2017-04-04 DIAGNOSIS — Z87.891 PERSONAL HISTORY OF NICOTINE DEPENDENCE: ICD-10-CM

## 2017-04-04 DIAGNOSIS — Z98.890 OTHER SPECIFIED POSTPROCEDURAL STATES: ICD-10-CM

## 2017-04-04 DIAGNOSIS — Z95.1 PRESENCE OF AORTOCORONARY BYPASS GRAFT: ICD-10-CM

## 2017-04-04 DIAGNOSIS — M17.0 BILATERAL PRIMARY OSTEOARTHRITIS OF KNEE: ICD-10-CM

## 2017-04-04 PROCEDURE — G0277: CPT

## 2017-04-04 PROCEDURE — 82962 GLUCOSE BLOOD TEST: CPT

## 2017-04-05 ENCOUNTER — OUTPATIENT (OUTPATIENT)
Dept: OUTPATIENT SERVICES | Facility: HOSPITAL | Age: 77
LOS: 1 days | End: 2017-04-05
Payer: MEDICARE

## 2017-04-05 DIAGNOSIS — M86.672 OTHER CHRONIC OSTEOMYELITIS, LEFT ANKLE AND FOOT: ICD-10-CM

## 2017-04-05 DIAGNOSIS — Z79.4 LONG TERM (CURRENT) USE OF INSULIN: ICD-10-CM

## 2017-04-05 DIAGNOSIS — Z95.1 PRESENCE OF AORTOCORONARY BYPASS GRAFT: Chronic | ICD-10-CM

## 2017-04-05 DIAGNOSIS — E11.621 TYPE 2 DIABETES MELLITUS WITH FOOT ULCER: ICD-10-CM

## 2017-04-05 DIAGNOSIS — L97.401 NON-PRESSURE CHRONIC ULCER OF UNSPECIFIED HEEL AND MIDFOOT LIMITED TO BREAKDOWN OF SKIN: ICD-10-CM

## 2017-04-05 DIAGNOSIS — L97.423 NON-PRESSURE CHRONIC ULCER OF LEFT HEEL AND MIDFOOT WITH NECROSIS OF MUSCLE: ICD-10-CM

## 2017-04-05 DIAGNOSIS — Z90.49 ACQUIRED ABSENCE OF OTHER SPECIFIED PARTS OF DIGESTIVE TRACT: Chronic | ICD-10-CM

## 2017-04-05 PROCEDURE — G0277: CPT

## 2017-04-05 PROCEDURE — 82962 GLUCOSE BLOOD TEST: CPT

## 2017-04-06 ENCOUNTER — OUTPATIENT (OUTPATIENT)
Dept: OUTPATIENT SERVICES | Facility: HOSPITAL | Age: 77
LOS: 1 days | End: 2017-04-06
Payer: MEDICARE

## 2017-04-06 DIAGNOSIS — Z95.1 PRESENCE OF AORTOCORONARY BYPASS GRAFT: Chronic | ICD-10-CM

## 2017-04-06 DIAGNOSIS — Z90.49 ACQUIRED ABSENCE OF OTHER SPECIFIED PARTS OF DIGESTIVE TRACT: Chronic | ICD-10-CM

## 2017-04-06 DIAGNOSIS — E11.621 TYPE 2 DIABETES MELLITUS WITH FOOT ULCER: ICD-10-CM

## 2017-04-06 DIAGNOSIS — M86.672 OTHER CHRONIC OSTEOMYELITIS, LEFT ANKLE AND FOOT: ICD-10-CM

## 2017-04-06 DIAGNOSIS — Z79.4 LONG TERM (CURRENT) USE OF INSULIN: ICD-10-CM

## 2017-04-06 DIAGNOSIS — L97.401 NON-PRESSURE CHRONIC ULCER OF UNSPECIFIED HEEL AND MIDFOOT LIMITED TO BREAKDOWN OF SKIN: ICD-10-CM

## 2017-04-06 DIAGNOSIS — L97.423 NON-PRESSURE CHRONIC ULCER OF LEFT HEEL AND MIDFOOT WITH NECROSIS OF MUSCLE: ICD-10-CM

## 2017-04-06 PROCEDURE — 82962 GLUCOSE BLOOD TEST: CPT

## 2017-04-06 PROCEDURE — G0277: CPT

## 2017-04-06 PROCEDURE — G0463: CPT

## 2017-04-07 ENCOUNTER — OUTPATIENT (OUTPATIENT)
Dept: OUTPATIENT SERVICES | Facility: HOSPITAL | Age: 77
LOS: 1 days | End: 2017-04-07
Payer: MEDICARE

## 2017-04-07 DIAGNOSIS — Z90.49 ACQUIRED ABSENCE OF OTHER SPECIFIED PARTS OF DIGESTIVE TRACT: Chronic | ICD-10-CM

## 2017-04-07 DIAGNOSIS — Z95.1 PRESENCE OF AORTOCORONARY BYPASS GRAFT: Chronic | ICD-10-CM

## 2017-04-07 DIAGNOSIS — L97.401 NON-PRESSURE CHRONIC ULCER OF UNSPECIFIED HEEL AND MIDFOOT LIMITED TO BREAKDOWN OF SKIN: ICD-10-CM

## 2017-04-07 PROCEDURE — 82962 GLUCOSE BLOOD TEST: CPT

## 2017-04-07 PROCEDURE — G0277: CPT

## 2017-04-08 DIAGNOSIS — Z79.82 LONG TERM (CURRENT) USE OF ASPIRIN: ICD-10-CM

## 2017-04-08 DIAGNOSIS — K59.00 CONSTIPATION, UNSPECIFIED: ICD-10-CM

## 2017-04-08 DIAGNOSIS — L97.812 NON-PRESSURE CHRONIC ULCER OF OTHER PART OF RIGHT LOWER LEG WITH FAT LAYER EXPOSED: ICD-10-CM

## 2017-04-08 DIAGNOSIS — Z79.4 LONG TERM (CURRENT) USE OF INSULIN: ICD-10-CM

## 2017-04-08 DIAGNOSIS — M86.672 OTHER CHRONIC OSTEOMYELITIS, LEFT ANKLE AND FOOT: ICD-10-CM

## 2017-04-08 DIAGNOSIS — Z79.01 LONG TERM (CURRENT) USE OF ANTICOAGULANTS: ICD-10-CM

## 2017-04-08 DIAGNOSIS — E78.5 HYPERLIPIDEMIA, UNSPECIFIED: ICD-10-CM

## 2017-04-08 DIAGNOSIS — E11.621 TYPE 2 DIABETES MELLITUS WITH FOOT ULCER: ICD-10-CM

## 2017-04-08 DIAGNOSIS — E66.3 OVERWEIGHT: ICD-10-CM

## 2017-04-08 DIAGNOSIS — L97.822 NON-PRESSURE CHRONIC ULCER OF OTHER PART OF LEFT LOWER LEG WITH FAT LAYER EXPOSED: ICD-10-CM

## 2017-04-08 DIAGNOSIS — I50.9 HEART FAILURE, UNSPECIFIED: ICD-10-CM

## 2017-04-08 DIAGNOSIS — I83.228 VARICOSE VEINS OF LEFT LOWER EXTREMITY WITH BOTH ULCER OF OTHER PART OF LOWER EXTREMITY AND INFLAMMATION: ICD-10-CM

## 2017-04-08 DIAGNOSIS — I83.218 VARICOSE VEINS OF RIGHT LOWER EXTREMITY WITH BOTH ULCER OF OTHER PART OF LOWER EXTREMITY AND INFLAMMATION: ICD-10-CM

## 2017-04-08 DIAGNOSIS — H93.19 TINNITUS, UNSPECIFIED EAR: ICD-10-CM

## 2017-04-08 DIAGNOSIS — L97.423 NON-PRESSURE CHRONIC ULCER OF LEFT HEEL AND MIDFOOT WITH NECROSIS OF MUSCLE: ICD-10-CM

## 2017-04-08 DIAGNOSIS — Z79.899 OTHER LONG TERM (CURRENT) DRUG THERAPY: ICD-10-CM

## 2017-04-08 DIAGNOSIS — H91.90 UNSPECIFIED HEARING LOSS, UNSPECIFIED EAR: ICD-10-CM

## 2017-04-08 DIAGNOSIS — K21.9 GASTRO-ESOPHAGEAL REFLUX DISEASE WITHOUT ESOPHAGITIS: ICD-10-CM

## 2017-04-08 DIAGNOSIS — I48.91 UNSPECIFIED ATRIAL FIBRILLATION: ICD-10-CM

## 2017-04-08 DIAGNOSIS — Z82.49 FAMILY HISTORY OF ISCHEMIC HEART DISEASE AND OTHER DISEASES OF THE CIRCULATORY SYSTEM: ICD-10-CM

## 2017-04-10 ENCOUNTER — OUTPATIENT (OUTPATIENT)
Dept: OUTPATIENT SERVICES | Facility: HOSPITAL | Age: 77
LOS: 1 days | End: 2017-04-10
Payer: MEDICARE

## 2017-04-10 DIAGNOSIS — Z95.1 PRESENCE OF AORTOCORONARY BYPASS GRAFT: Chronic | ICD-10-CM

## 2017-04-10 DIAGNOSIS — L97.401 NON-PRESSURE CHRONIC ULCER OF UNSPECIFIED HEEL AND MIDFOOT LIMITED TO BREAKDOWN OF SKIN: ICD-10-CM

## 2017-04-10 DIAGNOSIS — Z90.49 ACQUIRED ABSENCE OF OTHER SPECIFIED PARTS OF DIGESTIVE TRACT: Chronic | ICD-10-CM

## 2017-04-10 PROCEDURE — 82962 GLUCOSE BLOOD TEST: CPT

## 2017-04-10 PROCEDURE — G0277: CPT

## 2017-04-11 ENCOUNTER — OUTPATIENT (OUTPATIENT)
Dept: OUTPATIENT SERVICES | Facility: HOSPITAL | Age: 77
LOS: 1 days | End: 2017-04-11
Payer: MEDICARE

## 2017-04-11 DIAGNOSIS — E11.621 TYPE 2 DIABETES MELLITUS WITH FOOT ULCER: ICD-10-CM

## 2017-04-11 DIAGNOSIS — Z95.1 PRESENCE OF AORTOCORONARY BYPASS GRAFT: Chronic | ICD-10-CM

## 2017-04-11 DIAGNOSIS — M86.672 OTHER CHRONIC OSTEOMYELITIS, LEFT ANKLE AND FOOT: ICD-10-CM

## 2017-04-11 DIAGNOSIS — Z90.49 ACQUIRED ABSENCE OF OTHER SPECIFIED PARTS OF DIGESTIVE TRACT: Chronic | ICD-10-CM

## 2017-04-11 DIAGNOSIS — L97.401 NON-PRESSURE CHRONIC ULCER OF UNSPECIFIED HEEL AND MIDFOOT LIMITED TO BREAKDOWN OF SKIN: ICD-10-CM

## 2017-04-11 DIAGNOSIS — Z79.4 LONG TERM (CURRENT) USE OF INSULIN: ICD-10-CM

## 2017-04-11 DIAGNOSIS — L97.423 NON-PRESSURE CHRONIC ULCER OF LEFT HEEL AND MIDFOOT WITH NECROSIS OF MUSCLE: ICD-10-CM

## 2017-04-11 PROCEDURE — 82962 GLUCOSE BLOOD TEST: CPT

## 2017-04-11 PROCEDURE — G0277: CPT

## 2017-04-12 ENCOUNTER — OUTPATIENT (OUTPATIENT)
Dept: OUTPATIENT SERVICES | Facility: HOSPITAL | Age: 77
LOS: 1 days | End: 2017-04-12
Payer: MEDICARE

## 2017-04-12 DIAGNOSIS — E11.621 TYPE 2 DIABETES MELLITUS WITH FOOT ULCER: ICD-10-CM

## 2017-04-12 DIAGNOSIS — Z90.49 ACQUIRED ABSENCE OF OTHER SPECIFIED PARTS OF DIGESTIVE TRACT: Chronic | ICD-10-CM

## 2017-04-12 DIAGNOSIS — M86.672 OTHER CHRONIC OSTEOMYELITIS, LEFT ANKLE AND FOOT: ICD-10-CM

## 2017-04-12 DIAGNOSIS — Z79.4 LONG TERM (CURRENT) USE OF INSULIN: ICD-10-CM

## 2017-04-12 DIAGNOSIS — L97.401 NON-PRESSURE CHRONIC ULCER OF UNSPECIFIED HEEL AND MIDFOOT LIMITED TO BREAKDOWN OF SKIN: ICD-10-CM

## 2017-04-12 DIAGNOSIS — L97.423 NON-PRESSURE CHRONIC ULCER OF LEFT HEEL AND MIDFOOT WITH NECROSIS OF MUSCLE: ICD-10-CM

## 2017-04-12 DIAGNOSIS — Z95.1 PRESENCE OF AORTOCORONARY BYPASS GRAFT: Chronic | ICD-10-CM

## 2017-04-12 PROCEDURE — 82962 GLUCOSE BLOOD TEST: CPT

## 2017-04-12 PROCEDURE — G0277: CPT

## 2017-04-13 ENCOUNTER — OUTPATIENT (OUTPATIENT)
Dept: OUTPATIENT SERVICES | Facility: HOSPITAL | Age: 77
LOS: 1 days | End: 2017-04-13
Payer: MEDICARE

## 2017-04-13 DIAGNOSIS — L97.423 NON-PRESSURE CHRONIC ULCER OF LEFT HEEL AND MIDFOOT WITH NECROSIS OF MUSCLE: ICD-10-CM

## 2017-04-13 DIAGNOSIS — E11.621 TYPE 2 DIABETES MELLITUS WITH FOOT ULCER: ICD-10-CM

## 2017-04-13 DIAGNOSIS — Z79.4 LONG TERM (CURRENT) USE OF INSULIN: ICD-10-CM

## 2017-04-13 DIAGNOSIS — Z95.1 PRESENCE OF AORTOCORONARY BYPASS GRAFT: Chronic | ICD-10-CM

## 2017-04-13 DIAGNOSIS — L97.401 NON-PRESSURE CHRONIC ULCER OF UNSPECIFIED HEEL AND MIDFOOT LIMITED TO BREAKDOWN OF SKIN: ICD-10-CM

## 2017-04-13 DIAGNOSIS — M86.672 OTHER CHRONIC OSTEOMYELITIS, LEFT ANKLE AND FOOT: ICD-10-CM

## 2017-04-13 DIAGNOSIS — Z90.49 ACQUIRED ABSENCE OF OTHER SPECIFIED PARTS OF DIGESTIVE TRACT: Chronic | ICD-10-CM

## 2017-04-13 PROCEDURE — 82962 GLUCOSE BLOOD TEST: CPT

## 2017-04-13 PROCEDURE — G0277: CPT

## 2017-04-14 ENCOUNTER — OUTPATIENT (OUTPATIENT)
Dept: OUTPATIENT SERVICES | Facility: HOSPITAL | Age: 77
LOS: 1 days | End: 2017-04-14
Payer: MEDICARE

## 2017-04-14 DIAGNOSIS — Z90.49 ACQUIRED ABSENCE OF OTHER SPECIFIED PARTS OF DIGESTIVE TRACT: Chronic | ICD-10-CM

## 2017-04-14 DIAGNOSIS — Z95.1 PRESENCE OF AORTOCORONARY BYPASS GRAFT: Chronic | ICD-10-CM

## 2017-04-14 PROCEDURE — 82962 GLUCOSE BLOOD TEST: CPT

## 2017-04-14 PROCEDURE — G0277: CPT

## 2017-04-15 DIAGNOSIS — Z79.4 LONG TERM (CURRENT) USE OF INSULIN: ICD-10-CM

## 2017-04-15 DIAGNOSIS — E11.621 TYPE 2 DIABETES MELLITUS WITH FOOT ULCER: ICD-10-CM

## 2017-04-15 DIAGNOSIS — M86.672 OTHER CHRONIC OSTEOMYELITIS, LEFT ANKLE AND FOOT: ICD-10-CM

## 2017-04-15 DIAGNOSIS — L97.423 NON-PRESSURE CHRONIC ULCER OF LEFT HEEL AND MIDFOOT WITH NECROSIS OF MUSCLE: ICD-10-CM

## 2017-04-17 ENCOUNTER — OUTPATIENT (OUTPATIENT)
Dept: OUTPATIENT SERVICES | Facility: HOSPITAL | Age: 77
LOS: 1 days | End: 2017-04-17
Payer: MEDICARE

## 2017-04-17 DIAGNOSIS — M86.672 OTHER CHRONIC OSTEOMYELITIS, LEFT ANKLE AND FOOT: ICD-10-CM

## 2017-04-17 DIAGNOSIS — L97.423 NON-PRESSURE CHRONIC ULCER OF LEFT HEEL AND MIDFOOT WITH NECROSIS OF MUSCLE: ICD-10-CM

## 2017-04-17 DIAGNOSIS — E11.621 TYPE 2 DIABETES MELLITUS WITH FOOT ULCER: ICD-10-CM

## 2017-04-17 DIAGNOSIS — L97.401 NON-PRESSURE CHRONIC ULCER OF UNSPECIFIED HEEL AND MIDFOOT LIMITED TO BREAKDOWN OF SKIN: ICD-10-CM

## 2017-04-17 DIAGNOSIS — Z95.1 PRESENCE OF AORTOCORONARY BYPASS GRAFT: Chronic | ICD-10-CM

## 2017-04-17 DIAGNOSIS — Z79.4 LONG TERM (CURRENT) USE OF INSULIN: ICD-10-CM

## 2017-04-17 DIAGNOSIS — Z90.49 ACQUIRED ABSENCE OF OTHER SPECIFIED PARTS OF DIGESTIVE TRACT: Chronic | ICD-10-CM

## 2017-04-17 PROCEDURE — 82962 GLUCOSE BLOOD TEST: CPT

## 2017-04-17 PROCEDURE — G0277: CPT

## 2017-04-18 ENCOUNTER — OUTPATIENT (OUTPATIENT)
Dept: OUTPATIENT SERVICES | Facility: HOSPITAL | Age: 77
LOS: 1 days | End: 2017-04-18
Payer: MEDICARE

## 2017-04-18 DIAGNOSIS — Z79.4 LONG TERM (CURRENT) USE OF INSULIN: ICD-10-CM

## 2017-04-18 DIAGNOSIS — Z90.49 ACQUIRED ABSENCE OF OTHER SPECIFIED PARTS OF DIGESTIVE TRACT: Chronic | ICD-10-CM

## 2017-04-18 DIAGNOSIS — E11.621 TYPE 2 DIABETES MELLITUS WITH FOOT ULCER: ICD-10-CM

## 2017-04-18 DIAGNOSIS — Z95.1 PRESENCE OF AORTOCORONARY BYPASS GRAFT: Chronic | ICD-10-CM

## 2017-04-18 DIAGNOSIS — L97.401 NON-PRESSURE CHRONIC ULCER OF UNSPECIFIED HEEL AND MIDFOOT LIMITED TO BREAKDOWN OF SKIN: ICD-10-CM

## 2017-04-18 DIAGNOSIS — M86.672 OTHER CHRONIC OSTEOMYELITIS, LEFT ANKLE AND FOOT: ICD-10-CM

## 2017-04-18 DIAGNOSIS — L97.423 NON-PRESSURE CHRONIC ULCER OF LEFT HEEL AND MIDFOOT WITH NECROSIS OF MUSCLE: ICD-10-CM

## 2017-04-18 PROCEDURE — 82962 GLUCOSE BLOOD TEST: CPT

## 2017-04-18 PROCEDURE — G0277: CPT

## 2017-04-19 ENCOUNTER — OUTPATIENT (OUTPATIENT)
Dept: OUTPATIENT SERVICES | Facility: HOSPITAL | Age: 77
LOS: 1 days | End: 2017-04-19
Payer: MEDICARE

## 2017-04-19 DIAGNOSIS — L97.401 NON-PRESSURE CHRONIC ULCER OF UNSPECIFIED HEEL AND MIDFOOT LIMITED TO BREAKDOWN OF SKIN: ICD-10-CM

## 2017-04-19 DIAGNOSIS — Z95.1 PRESENCE OF AORTOCORONARY BYPASS GRAFT: Chronic | ICD-10-CM

## 2017-04-19 DIAGNOSIS — Z90.49 ACQUIRED ABSENCE OF OTHER SPECIFIED PARTS OF DIGESTIVE TRACT: Chronic | ICD-10-CM

## 2017-04-19 PROCEDURE — G0277: CPT

## 2017-04-19 PROCEDURE — 82962 GLUCOSE BLOOD TEST: CPT

## 2017-04-19 PROCEDURE — 99183 HYPERBARIC OXYGEN THERAPY: CPT

## 2017-04-20 ENCOUNTER — OUTPATIENT (OUTPATIENT)
Dept: OUTPATIENT SERVICES | Facility: HOSPITAL | Age: 77
LOS: 1 days | End: 2017-04-20
Payer: MEDICARE

## 2017-04-20 DIAGNOSIS — L97.401 NON-PRESSURE CHRONIC ULCER OF UNSPECIFIED HEEL AND MIDFOOT LIMITED TO BREAKDOWN OF SKIN: ICD-10-CM

## 2017-04-20 DIAGNOSIS — Z95.1 PRESENCE OF AORTOCORONARY BYPASS GRAFT: Chronic | ICD-10-CM

## 2017-04-20 DIAGNOSIS — Z90.49 ACQUIRED ABSENCE OF OTHER SPECIFIED PARTS OF DIGESTIVE TRACT: Chronic | ICD-10-CM

## 2017-04-20 PROCEDURE — G0277: CPT

## 2017-04-20 PROCEDURE — 82962 GLUCOSE BLOOD TEST: CPT

## 2017-04-21 ENCOUNTER — OUTPATIENT (OUTPATIENT)
Dept: OUTPATIENT SERVICES | Facility: HOSPITAL | Age: 77
LOS: 1 days | End: 2017-04-21
Payer: MEDICARE

## 2017-04-21 DIAGNOSIS — L97.423 NON-PRESSURE CHRONIC ULCER OF LEFT HEEL AND MIDFOOT WITH NECROSIS OF MUSCLE: ICD-10-CM

## 2017-04-21 DIAGNOSIS — Z90.49 ACQUIRED ABSENCE OF OTHER SPECIFIED PARTS OF DIGESTIVE TRACT: Chronic | ICD-10-CM

## 2017-04-21 DIAGNOSIS — M86.672 OTHER CHRONIC OSTEOMYELITIS, LEFT ANKLE AND FOOT: ICD-10-CM

## 2017-04-21 DIAGNOSIS — E11.621 TYPE 2 DIABETES MELLITUS WITH FOOT ULCER: ICD-10-CM

## 2017-04-21 DIAGNOSIS — L97.401 NON-PRESSURE CHRONIC ULCER OF UNSPECIFIED HEEL AND MIDFOOT LIMITED TO BREAKDOWN OF SKIN: ICD-10-CM

## 2017-04-21 DIAGNOSIS — Z95.1 PRESENCE OF AORTOCORONARY BYPASS GRAFT: Chronic | ICD-10-CM

## 2017-04-21 DIAGNOSIS — Z79.4 LONG TERM (CURRENT) USE OF INSULIN: ICD-10-CM

## 2017-04-21 PROCEDURE — 82962 GLUCOSE BLOOD TEST: CPT

## 2017-04-21 PROCEDURE — G0277: CPT

## 2017-04-24 ENCOUNTER — OUTPATIENT (OUTPATIENT)
Dept: OUTPATIENT SERVICES | Facility: HOSPITAL | Age: 77
LOS: 1 days | End: 2017-04-24
Payer: MEDICARE

## 2017-04-24 DIAGNOSIS — Z95.1 PRESENCE OF AORTOCORONARY BYPASS GRAFT: Chronic | ICD-10-CM

## 2017-04-24 DIAGNOSIS — E11.621 TYPE 2 DIABETES MELLITUS WITH FOOT ULCER: ICD-10-CM

## 2017-04-24 DIAGNOSIS — M86.672 OTHER CHRONIC OSTEOMYELITIS, LEFT ANKLE AND FOOT: ICD-10-CM

## 2017-04-24 DIAGNOSIS — L97.401 NON-PRESSURE CHRONIC ULCER OF UNSPECIFIED HEEL AND MIDFOOT LIMITED TO BREAKDOWN OF SKIN: ICD-10-CM

## 2017-04-24 DIAGNOSIS — Z90.49 ACQUIRED ABSENCE OF OTHER SPECIFIED PARTS OF DIGESTIVE TRACT: Chronic | ICD-10-CM

## 2017-04-24 DIAGNOSIS — L97.423 NON-PRESSURE CHRONIC ULCER OF LEFT HEEL AND MIDFOOT WITH NECROSIS OF MUSCLE: ICD-10-CM

## 2017-04-24 DIAGNOSIS — Z79.4 LONG TERM (CURRENT) USE OF INSULIN: ICD-10-CM

## 2017-04-24 PROCEDURE — 82962 GLUCOSE BLOOD TEST: CPT

## 2017-04-24 PROCEDURE — G0277: CPT

## 2017-04-25 ENCOUNTER — OUTPATIENT (OUTPATIENT)
Dept: OUTPATIENT SERVICES | Facility: HOSPITAL | Age: 77
LOS: 1 days | End: 2017-04-25
Payer: MEDICARE

## 2017-04-25 DIAGNOSIS — L97.401 NON-PRESSURE CHRONIC ULCER OF UNSPECIFIED HEEL AND MIDFOOT LIMITED TO BREAKDOWN OF SKIN: ICD-10-CM

## 2017-04-25 DIAGNOSIS — Z90.49 ACQUIRED ABSENCE OF OTHER SPECIFIED PARTS OF DIGESTIVE TRACT: Chronic | ICD-10-CM

## 2017-04-25 DIAGNOSIS — Z95.1 PRESENCE OF AORTOCORONARY BYPASS GRAFT: Chronic | ICD-10-CM

## 2017-04-25 PROCEDURE — G0277: CPT

## 2017-04-25 PROCEDURE — 82962 GLUCOSE BLOOD TEST: CPT

## 2017-04-26 ENCOUNTER — OUTPATIENT (OUTPATIENT)
Dept: OUTPATIENT SERVICES | Facility: HOSPITAL | Age: 77
LOS: 1 days | End: 2017-04-26
Payer: MEDICARE

## 2017-04-26 DIAGNOSIS — L97.423 NON-PRESSURE CHRONIC ULCER OF LEFT HEEL AND MIDFOOT WITH NECROSIS OF MUSCLE: ICD-10-CM

## 2017-04-26 DIAGNOSIS — Z79.4 LONG TERM (CURRENT) USE OF INSULIN: ICD-10-CM

## 2017-04-26 DIAGNOSIS — E11.621 TYPE 2 DIABETES MELLITUS WITH FOOT ULCER: ICD-10-CM

## 2017-04-26 DIAGNOSIS — L97.401 NON-PRESSURE CHRONIC ULCER OF UNSPECIFIED HEEL AND MIDFOOT LIMITED TO BREAKDOWN OF SKIN: ICD-10-CM

## 2017-04-26 DIAGNOSIS — Z95.1 PRESENCE OF AORTOCORONARY BYPASS GRAFT: Chronic | ICD-10-CM

## 2017-04-26 DIAGNOSIS — M86.672 OTHER CHRONIC OSTEOMYELITIS, LEFT ANKLE AND FOOT: ICD-10-CM

## 2017-04-26 DIAGNOSIS — Z90.49 ACQUIRED ABSENCE OF OTHER SPECIFIED PARTS OF DIGESTIVE TRACT: Chronic | ICD-10-CM

## 2017-04-26 PROCEDURE — 82962 GLUCOSE BLOOD TEST: CPT

## 2017-04-26 PROCEDURE — G0277: CPT

## 2017-04-27 ENCOUNTER — OUTPATIENT (OUTPATIENT)
Dept: OUTPATIENT SERVICES | Facility: HOSPITAL | Age: 77
LOS: 1 days | End: 2017-04-27
Payer: MEDICARE

## 2017-04-27 DIAGNOSIS — E11.621 TYPE 2 DIABETES MELLITUS WITH FOOT ULCER: ICD-10-CM

## 2017-04-27 DIAGNOSIS — M86.672 OTHER CHRONIC OSTEOMYELITIS, LEFT ANKLE AND FOOT: ICD-10-CM

## 2017-04-27 DIAGNOSIS — Z79.4 LONG TERM (CURRENT) USE OF INSULIN: ICD-10-CM

## 2017-04-27 DIAGNOSIS — Z90.49 ACQUIRED ABSENCE OF OTHER SPECIFIED PARTS OF DIGESTIVE TRACT: Chronic | ICD-10-CM

## 2017-04-27 DIAGNOSIS — L97.401 NON-PRESSURE CHRONIC ULCER OF UNSPECIFIED HEEL AND MIDFOOT LIMITED TO BREAKDOWN OF SKIN: ICD-10-CM

## 2017-04-27 DIAGNOSIS — L97.423 NON-PRESSURE CHRONIC ULCER OF LEFT HEEL AND MIDFOOT WITH NECROSIS OF MUSCLE: ICD-10-CM

## 2017-04-27 DIAGNOSIS — Z95.1 PRESENCE OF AORTOCORONARY BYPASS GRAFT: Chronic | ICD-10-CM

## 2017-04-27 PROCEDURE — 82962 GLUCOSE BLOOD TEST: CPT

## 2017-04-27 PROCEDURE — G0277: CPT

## 2017-04-28 ENCOUNTER — OUTPATIENT (OUTPATIENT)
Dept: OUTPATIENT SERVICES | Facility: HOSPITAL | Age: 77
LOS: 1 days | End: 2017-04-28
Payer: MEDICARE

## 2017-04-28 DIAGNOSIS — L97.401 NON-PRESSURE CHRONIC ULCER OF UNSPECIFIED HEEL AND MIDFOOT LIMITED TO BREAKDOWN OF SKIN: ICD-10-CM

## 2017-04-28 DIAGNOSIS — Z79.4 LONG TERM (CURRENT) USE OF INSULIN: ICD-10-CM

## 2017-04-28 DIAGNOSIS — Z90.49 ACQUIRED ABSENCE OF OTHER SPECIFIED PARTS OF DIGESTIVE TRACT: Chronic | ICD-10-CM

## 2017-04-28 DIAGNOSIS — M86.672 OTHER CHRONIC OSTEOMYELITIS, LEFT ANKLE AND FOOT: ICD-10-CM

## 2017-04-28 DIAGNOSIS — E11.621 TYPE 2 DIABETES MELLITUS WITH FOOT ULCER: ICD-10-CM

## 2017-04-28 DIAGNOSIS — L97.423 NON-PRESSURE CHRONIC ULCER OF LEFT HEEL AND MIDFOOT WITH NECROSIS OF MUSCLE: ICD-10-CM

## 2017-04-28 DIAGNOSIS — Z95.1 PRESENCE OF AORTOCORONARY BYPASS GRAFT: Chronic | ICD-10-CM

## 2017-04-28 PROCEDURE — 82962 GLUCOSE BLOOD TEST: CPT

## 2017-04-28 PROCEDURE — G0277: CPT

## 2017-04-29 DIAGNOSIS — Z79.4 LONG TERM (CURRENT) USE OF INSULIN: ICD-10-CM

## 2017-04-29 DIAGNOSIS — E11.621 TYPE 2 DIABETES MELLITUS WITH FOOT ULCER: ICD-10-CM

## 2017-04-29 DIAGNOSIS — L97.423 NON-PRESSURE CHRONIC ULCER OF LEFT HEEL AND MIDFOOT WITH NECROSIS OF MUSCLE: ICD-10-CM

## 2017-04-29 DIAGNOSIS — M86.672 OTHER CHRONIC OSTEOMYELITIS, LEFT ANKLE AND FOOT: ICD-10-CM

## 2017-05-01 ENCOUNTER — OUTPATIENT (OUTPATIENT)
Dept: OUTPATIENT SERVICES | Facility: HOSPITAL | Age: 77
LOS: 1 days | End: 2017-05-01
Payer: MEDICARE

## 2017-05-01 DIAGNOSIS — Z95.1 PRESENCE OF AORTOCORONARY BYPASS GRAFT: Chronic | ICD-10-CM

## 2017-05-01 DIAGNOSIS — Z90.49 ACQUIRED ABSENCE OF OTHER SPECIFIED PARTS OF DIGESTIVE TRACT: Chronic | ICD-10-CM

## 2017-05-01 DIAGNOSIS — L97.401 NON-PRESSURE CHRONIC ULCER OF UNSPECIFIED HEEL AND MIDFOOT LIMITED TO BREAKDOWN OF SKIN: ICD-10-CM

## 2017-05-01 PROCEDURE — 82962 GLUCOSE BLOOD TEST: CPT

## 2017-05-01 PROCEDURE — G0277: CPT

## 2017-05-02 ENCOUNTER — OUTPATIENT (OUTPATIENT)
Dept: OUTPATIENT SERVICES | Facility: HOSPITAL | Age: 77
LOS: 1 days | End: 2017-05-02
Payer: MEDICARE

## 2017-05-02 DIAGNOSIS — E11.621 TYPE 2 DIABETES MELLITUS WITH FOOT ULCER: ICD-10-CM

## 2017-05-02 DIAGNOSIS — Z95.1 PRESENCE OF AORTOCORONARY BYPASS GRAFT: Chronic | ICD-10-CM

## 2017-05-02 DIAGNOSIS — L97.401 NON-PRESSURE CHRONIC ULCER OF UNSPECIFIED HEEL AND MIDFOOT LIMITED TO BREAKDOWN OF SKIN: ICD-10-CM

## 2017-05-02 DIAGNOSIS — Z90.49 ACQUIRED ABSENCE OF OTHER SPECIFIED PARTS OF DIGESTIVE TRACT: Chronic | ICD-10-CM

## 2017-05-02 DIAGNOSIS — M86.672 OTHER CHRONIC OSTEOMYELITIS, LEFT ANKLE AND FOOT: ICD-10-CM

## 2017-05-02 DIAGNOSIS — L97.423 NON-PRESSURE CHRONIC ULCER OF LEFT HEEL AND MIDFOOT WITH NECROSIS OF MUSCLE: ICD-10-CM

## 2017-05-02 DIAGNOSIS — Z79.4 LONG TERM (CURRENT) USE OF INSULIN: ICD-10-CM

## 2017-05-02 PROCEDURE — G0277: CPT

## 2017-05-02 PROCEDURE — 82962 GLUCOSE BLOOD TEST: CPT

## 2017-05-03 ENCOUNTER — OUTPATIENT (OUTPATIENT)
Dept: OUTPATIENT SERVICES | Facility: HOSPITAL | Age: 77
LOS: 1 days | End: 2017-05-03
Payer: MEDICARE

## 2017-05-03 DIAGNOSIS — Z90.49 ACQUIRED ABSENCE OF OTHER SPECIFIED PARTS OF DIGESTIVE TRACT: Chronic | ICD-10-CM

## 2017-05-03 DIAGNOSIS — L97.423 NON-PRESSURE CHRONIC ULCER OF LEFT HEEL AND MIDFOOT WITH NECROSIS OF MUSCLE: ICD-10-CM

## 2017-05-03 DIAGNOSIS — M86.672 OTHER CHRONIC OSTEOMYELITIS, LEFT ANKLE AND FOOT: ICD-10-CM

## 2017-05-03 DIAGNOSIS — Z79.4 LONG TERM (CURRENT) USE OF INSULIN: ICD-10-CM

## 2017-05-03 DIAGNOSIS — E11.621 TYPE 2 DIABETES MELLITUS WITH FOOT ULCER: ICD-10-CM

## 2017-05-03 DIAGNOSIS — Z95.1 PRESENCE OF AORTOCORONARY BYPASS GRAFT: Chronic | ICD-10-CM

## 2017-05-03 DIAGNOSIS — L97.401 NON-PRESSURE CHRONIC ULCER OF UNSPECIFIED HEEL AND MIDFOOT LIMITED TO BREAKDOWN OF SKIN: ICD-10-CM

## 2017-05-03 PROCEDURE — 82962 GLUCOSE BLOOD TEST: CPT

## 2017-05-03 PROCEDURE — G0277: CPT

## 2017-05-04 DIAGNOSIS — M86.672 OTHER CHRONIC OSTEOMYELITIS, LEFT ANKLE AND FOOT: ICD-10-CM

## 2017-05-04 DIAGNOSIS — E11.621 TYPE 2 DIABETES MELLITUS WITH FOOT ULCER: ICD-10-CM

## 2017-05-04 DIAGNOSIS — L97.423 NON-PRESSURE CHRONIC ULCER OF LEFT HEEL AND MIDFOOT WITH NECROSIS OF MUSCLE: ICD-10-CM

## 2017-05-04 DIAGNOSIS — Z79.4 LONG TERM (CURRENT) USE OF INSULIN: ICD-10-CM

## 2017-05-05 ENCOUNTER — OUTPATIENT (OUTPATIENT)
Dept: OUTPATIENT SERVICES | Facility: HOSPITAL | Age: 77
LOS: 1 days | End: 2017-05-05
Payer: MEDICARE

## 2017-05-05 DIAGNOSIS — L97.401 NON-PRESSURE CHRONIC ULCER OF UNSPECIFIED HEEL AND MIDFOOT LIMITED TO BREAKDOWN OF SKIN: ICD-10-CM

## 2017-05-05 DIAGNOSIS — Z95.1 PRESENCE OF AORTOCORONARY BYPASS GRAFT: Chronic | ICD-10-CM

## 2017-05-05 DIAGNOSIS — Z90.49 ACQUIRED ABSENCE OF OTHER SPECIFIED PARTS OF DIGESTIVE TRACT: Chronic | ICD-10-CM

## 2017-05-05 PROCEDURE — 82962 GLUCOSE BLOOD TEST: CPT

## 2017-05-05 PROCEDURE — G0277: CPT

## 2017-05-06 DIAGNOSIS — M86.672 OTHER CHRONIC OSTEOMYELITIS, LEFT ANKLE AND FOOT: ICD-10-CM

## 2017-05-06 DIAGNOSIS — Z79.4 LONG TERM (CURRENT) USE OF INSULIN: ICD-10-CM

## 2017-05-06 DIAGNOSIS — E11.621 TYPE 2 DIABETES MELLITUS WITH FOOT ULCER: ICD-10-CM

## 2017-05-06 DIAGNOSIS — L97.423 NON-PRESSURE CHRONIC ULCER OF LEFT HEEL AND MIDFOOT WITH NECROSIS OF MUSCLE: ICD-10-CM

## 2017-05-12 ENCOUNTER — OUTPATIENT (OUTPATIENT)
Dept: OUTPATIENT SERVICES | Facility: HOSPITAL | Age: 77
LOS: 1 days | End: 2017-05-12
Payer: MEDICARE

## 2017-05-12 DIAGNOSIS — E11.621 TYPE 2 DIABETES MELLITUS WITH FOOT ULCER: ICD-10-CM

## 2017-05-12 DIAGNOSIS — Z95.1 PRESENCE OF AORTOCORONARY BYPASS GRAFT: Chronic | ICD-10-CM

## 2017-05-12 DIAGNOSIS — Z90.49 ACQUIRED ABSENCE OF OTHER SPECIFIED PARTS OF DIGESTIVE TRACT: Chronic | ICD-10-CM

## 2017-05-12 PROCEDURE — G0463: CPT

## 2017-05-13 DIAGNOSIS — Z98.890 OTHER SPECIFIED POSTPROCEDURAL STATES: ICD-10-CM

## 2017-05-13 DIAGNOSIS — E03.9 HYPOTHYROIDISM, UNSPECIFIED: ICD-10-CM

## 2017-05-13 DIAGNOSIS — E11.40 TYPE 2 DIABETES MELLITUS WITH DIABETIC NEUROPATHY, UNSPECIFIED: ICD-10-CM

## 2017-05-13 DIAGNOSIS — Z85.46 PERSONAL HISTORY OF MALIGNANT NEOPLASM OF PROSTATE: ICD-10-CM

## 2017-05-13 DIAGNOSIS — Z82.49 FAMILY HISTORY OF ISCHEMIC HEART DISEASE AND OTHER DISEASES OF THE CIRCULATORY SYSTEM: ICD-10-CM

## 2017-05-13 DIAGNOSIS — Z87.891 PERSONAL HISTORY OF NICOTINE DEPENDENCE: ICD-10-CM

## 2017-05-13 DIAGNOSIS — Z83.6 FAMILY HISTORY OF OTHER DISEASES OF THE RESPIRATORY SYSTEM: ICD-10-CM

## 2017-05-13 DIAGNOSIS — L97.423 NON-PRESSURE CHRONIC ULCER OF LEFT HEEL AND MIDFOOT WITH NECROSIS OF MUSCLE: ICD-10-CM

## 2017-05-13 DIAGNOSIS — I83.12 VARICOSE VEINS OF LEFT LOWER EXTREMITY WITH INFLAMMATION: ICD-10-CM

## 2017-05-13 DIAGNOSIS — Z79.899 OTHER LONG TERM (CURRENT) DRUG THERAPY: ICD-10-CM

## 2017-05-13 DIAGNOSIS — I83.11 VARICOSE VEINS OF RIGHT LOWER EXTREMITY WITH INFLAMMATION: ICD-10-CM

## 2017-05-13 DIAGNOSIS — I25.2 OLD MYOCARDIAL INFARCTION: ICD-10-CM

## 2017-05-13 DIAGNOSIS — Z90.49 ACQUIRED ABSENCE OF OTHER SPECIFIED PARTS OF DIGESTIVE TRACT: ICD-10-CM

## 2017-05-13 DIAGNOSIS — M86.672 OTHER CHRONIC OSTEOMYELITIS, LEFT ANKLE AND FOOT: ICD-10-CM

## 2017-05-13 DIAGNOSIS — Z83.3 FAMILY HISTORY OF DIABETES MELLITUS: ICD-10-CM

## 2017-05-13 DIAGNOSIS — I89.0 LYMPHEDEMA, NOT ELSEWHERE CLASSIFIED: ICD-10-CM

## 2017-05-13 DIAGNOSIS — Z80.0 FAMILY HISTORY OF MALIGNANT NEOPLASM OF DIGESTIVE ORGANS: ICD-10-CM

## 2017-05-13 DIAGNOSIS — I25.810 ATHEROSCLEROSIS OF CORONARY ARTERY BYPASS GRAFT(S) WITHOUT ANGINA PECTORIS: ICD-10-CM

## 2017-05-13 DIAGNOSIS — E11.621 TYPE 2 DIABETES MELLITUS WITH FOOT ULCER: ICD-10-CM

## 2017-05-13 DIAGNOSIS — M17.0 BILATERAL PRIMARY OSTEOARTHRITIS OF KNEE: ICD-10-CM

## 2017-05-13 DIAGNOSIS — E66.9 OBESITY, UNSPECIFIED: ICD-10-CM

## 2017-05-13 DIAGNOSIS — Z95.1 PRESENCE OF AORTOCORONARY BYPASS GRAFT: ICD-10-CM

## 2017-05-13 DIAGNOSIS — Z92.3 PERSONAL HISTORY OF IRRADIATION: ICD-10-CM

## 2017-05-13 DIAGNOSIS — I10 ESSENTIAL (PRIMARY) HYPERTENSION: ICD-10-CM

## 2017-05-13 DIAGNOSIS — E78.00 PURE HYPERCHOLESTEROLEMIA, UNSPECIFIED: ICD-10-CM

## 2017-05-19 ENCOUNTER — OUTPATIENT (OUTPATIENT)
Dept: OUTPATIENT SERVICES | Facility: HOSPITAL | Age: 77
LOS: 1 days | End: 2017-05-19
Payer: MEDICARE

## 2017-05-19 DIAGNOSIS — E11.621 TYPE 2 DIABETES MELLITUS WITH FOOT ULCER: ICD-10-CM

## 2017-05-19 DIAGNOSIS — Z95.1 PRESENCE OF AORTOCORONARY BYPASS GRAFT: Chronic | ICD-10-CM

## 2017-05-19 DIAGNOSIS — Z90.49 ACQUIRED ABSENCE OF OTHER SPECIFIED PARTS OF DIGESTIVE TRACT: Chronic | ICD-10-CM

## 2017-05-19 PROCEDURE — G0463: CPT

## 2017-05-20 DIAGNOSIS — Z95.1 PRESENCE OF AORTOCORONARY BYPASS GRAFT: ICD-10-CM

## 2017-05-20 DIAGNOSIS — Z85.46 PERSONAL HISTORY OF MALIGNANT NEOPLASM OF PROSTATE: ICD-10-CM

## 2017-05-20 DIAGNOSIS — L97.423 NON-PRESSURE CHRONIC ULCER OF LEFT HEEL AND MIDFOOT WITH NECROSIS OF MUSCLE: ICD-10-CM

## 2017-05-20 DIAGNOSIS — I25.810 ATHEROSCLEROSIS OF CORONARY ARTERY BYPASS GRAFT(S) WITHOUT ANGINA PECTORIS: ICD-10-CM

## 2017-05-20 DIAGNOSIS — Z80.0 FAMILY HISTORY OF MALIGNANT NEOPLASM OF DIGESTIVE ORGANS: ICD-10-CM

## 2017-05-20 DIAGNOSIS — I83.12 VARICOSE VEINS OF LEFT LOWER EXTREMITY WITH INFLAMMATION: ICD-10-CM

## 2017-05-20 DIAGNOSIS — I89.0 LYMPHEDEMA, NOT ELSEWHERE CLASSIFIED: ICD-10-CM

## 2017-05-20 DIAGNOSIS — Z92.3 PERSONAL HISTORY OF IRRADIATION: ICD-10-CM

## 2017-05-20 DIAGNOSIS — Z82.49 FAMILY HISTORY OF ISCHEMIC HEART DISEASE AND OTHER DISEASES OF THE CIRCULATORY SYSTEM: ICD-10-CM

## 2017-05-20 DIAGNOSIS — Z83.3 FAMILY HISTORY OF DIABETES MELLITUS: ICD-10-CM

## 2017-05-20 DIAGNOSIS — M17.0 BILATERAL PRIMARY OSTEOARTHRITIS OF KNEE: ICD-10-CM

## 2017-05-20 DIAGNOSIS — Z98.890 OTHER SPECIFIED POSTPROCEDURAL STATES: ICD-10-CM

## 2017-05-20 DIAGNOSIS — I25.2 OLD MYOCARDIAL INFARCTION: ICD-10-CM

## 2017-05-20 DIAGNOSIS — Z83.6 FAMILY HISTORY OF OTHER DISEASES OF THE RESPIRATORY SYSTEM: ICD-10-CM

## 2017-05-20 DIAGNOSIS — E66.9 OBESITY, UNSPECIFIED: ICD-10-CM

## 2017-05-20 DIAGNOSIS — Z90.49 ACQUIRED ABSENCE OF OTHER SPECIFIED PARTS OF DIGESTIVE TRACT: ICD-10-CM

## 2017-05-20 DIAGNOSIS — Z79.899 OTHER LONG TERM (CURRENT) DRUG THERAPY: ICD-10-CM

## 2017-05-20 DIAGNOSIS — I10 ESSENTIAL (PRIMARY) HYPERTENSION: ICD-10-CM

## 2017-05-20 DIAGNOSIS — Z87.891 PERSONAL HISTORY OF NICOTINE DEPENDENCE: ICD-10-CM

## 2017-05-20 DIAGNOSIS — M86.672 OTHER CHRONIC OSTEOMYELITIS, LEFT ANKLE AND FOOT: ICD-10-CM

## 2017-05-20 DIAGNOSIS — E11.40 TYPE 2 DIABETES MELLITUS WITH DIABETIC NEUROPATHY, UNSPECIFIED: ICD-10-CM

## 2017-05-20 DIAGNOSIS — E03.9 HYPOTHYROIDISM, UNSPECIFIED: ICD-10-CM

## 2017-05-20 DIAGNOSIS — I83.11 VARICOSE VEINS OF RIGHT LOWER EXTREMITY WITH INFLAMMATION: ICD-10-CM

## 2017-05-20 DIAGNOSIS — E78.00 PURE HYPERCHOLESTEROLEMIA, UNSPECIFIED: ICD-10-CM

## 2017-05-20 DIAGNOSIS — E11.621 TYPE 2 DIABETES MELLITUS WITH FOOT ULCER: ICD-10-CM

## 2017-06-02 ENCOUNTER — OUTPATIENT (OUTPATIENT)
Dept: OUTPATIENT SERVICES | Facility: HOSPITAL | Age: 77
LOS: 1 days | End: 2017-06-02
Payer: MEDICARE

## 2017-06-02 DIAGNOSIS — Z90.49 ACQUIRED ABSENCE OF OTHER SPECIFIED PARTS OF DIGESTIVE TRACT: Chronic | ICD-10-CM

## 2017-06-02 DIAGNOSIS — Z95.1 PRESENCE OF AORTOCORONARY BYPASS GRAFT: Chronic | ICD-10-CM

## 2017-06-02 DIAGNOSIS — E11.621 TYPE 2 DIABETES MELLITUS WITH FOOT ULCER: ICD-10-CM

## 2017-06-02 PROCEDURE — G0463: CPT

## 2017-06-05 DIAGNOSIS — I89.0 LYMPHEDEMA, NOT ELSEWHERE CLASSIFIED: ICD-10-CM

## 2017-06-05 DIAGNOSIS — I83.12 VARICOSE VEINS OF LEFT LOWER EXTREMITY WITH INFLAMMATION: ICD-10-CM

## 2017-06-05 DIAGNOSIS — E11.621 TYPE 2 DIABETES MELLITUS WITH FOOT ULCER: ICD-10-CM

## 2017-06-05 DIAGNOSIS — I25.2 OLD MYOCARDIAL INFARCTION: ICD-10-CM

## 2017-06-05 DIAGNOSIS — Z83.3 FAMILY HISTORY OF DIABETES MELLITUS: ICD-10-CM

## 2017-06-05 DIAGNOSIS — Z82.49 FAMILY HISTORY OF ISCHEMIC HEART DISEASE AND OTHER DISEASES OF THE CIRCULATORY SYSTEM: ICD-10-CM

## 2017-06-05 DIAGNOSIS — Z80.0 FAMILY HISTORY OF MALIGNANT NEOPLASM OF DIGESTIVE ORGANS: ICD-10-CM

## 2017-06-05 DIAGNOSIS — M86.672 OTHER CHRONIC OSTEOMYELITIS, LEFT ANKLE AND FOOT: ICD-10-CM

## 2017-06-05 DIAGNOSIS — E03.9 HYPOTHYROIDISM, UNSPECIFIED: ICD-10-CM

## 2017-06-05 DIAGNOSIS — E66.9 OBESITY, UNSPECIFIED: ICD-10-CM

## 2017-06-05 DIAGNOSIS — M17.0 BILATERAL PRIMARY OSTEOARTHRITIS OF KNEE: ICD-10-CM

## 2017-06-05 DIAGNOSIS — Z85.46 PERSONAL HISTORY OF MALIGNANT NEOPLASM OF PROSTATE: ICD-10-CM

## 2017-06-05 DIAGNOSIS — L97.423 NON-PRESSURE CHRONIC ULCER OF LEFT HEEL AND MIDFOOT WITH NECROSIS OF MUSCLE: ICD-10-CM

## 2017-06-05 DIAGNOSIS — Z87.891 PERSONAL HISTORY OF NICOTINE DEPENDENCE: ICD-10-CM

## 2017-06-05 DIAGNOSIS — I83.11 VARICOSE VEINS OF RIGHT LOWER EXTREMITY WITH INFLAMMATION: ICD-10-CM

## 2017-06-05 DIAGNOSIS — E11.40 TYPE 2 DIABETES MELLITUS WITH DIABETIC NEUROPATHY, UNSPECIFIED: ICD-10-CM

## 2017-06-05 DIAGNOSIS — Z90.49 ACQUIRED ABSENCE OF OTHER SPECIFIED PARTS OF DIGESTIVE TRACT: ICD-10-CM

## 2017-06-05 DIAGNOSIS — Z95.1 PRESENCE OF AORTOCORONARY BYPASS GRAFT: ICD-10-CM

## 2017-06-05 DIAGNOSIS — I25.810 ATHEROSCLEROSIS OF CORONARY ARTERY BYPASS GRAFT(S) WITHOUT ANGINA PECTORIS: ICD-10-CM

## 2017-06-05 DIAGNOSIS — I10 ESSENTIAL (PRIMARY) HYPERTENSION: ICD-10-CM

## 2017-06-05 DIAGNOSIS — Z83.6 FAMILY HISTORY OF OTHER DISEASES OF THE RESPIRATORY SYSTEM: ICD-10-CM

## 2017-06-05 DIAGNOSIS — Z92.3 PERSONAL HISTORY OF IRRADIATION: ICD-10-CM

## 2017-06-05 DIAGNOSIS — E78.00 PURE HYPERCHOLESTEROLEMIA, UNSPECIFIED: ICD-10-CM

## 2017-06-05 DIAGNOSIS — Z79.899 OTHER LONG TERM (CURRENT) DRUG THERAPY: ICD-10-CM

## 2017-06-05 DIAGNOSIS — Z98.890 OTHER SPECIFIED POSTPROCEDURAL STATES: ICD-10-CM

## 2017-06-08 ENCOUNTER — OUTPATIENT (OUTPATIENT)
Dept: OUTPATIENT SERVICES | Facility: HOSPITAL | Age: 77
LOS: 1 days | End: 2017-06-08
Payer: MEDICARE

## 2017-06-08 DIAGNOSIS — Z90.49 ACQUIRED ABSENCE OF OTHER SPECIFIED PARTS OF DIGESTIVE TRACT: Chronic | ICD-10-CM

## 2017-06-08 DIAGNOSIS — Z95.1 PRESENCE OF AORTOCORONARY BYPASS GRAFT: Chronic | ICD-10-CM

## 2017-06-08 DIAGNOSIS — L97.322 NON-PRESSURE CHRONIC ULCER OF LEFT ANKLE WITH FAT LAYER EXPOSED: ICD-10-CM

## 2017-06-08 PROCEDURE — G0463: CPT

## 2017-06-08 PROCEDURE — 99213 OFFICE O/P EST LOW 20 MIN: CPT

## 2017-06-10 DIAGNOSIS — Z80.0 FAMILY HISTORY OF MALIGNANT NEOPLASM OF DIGESTIVE ORGANS: ICD-10-CM

## 2017-06-10 DIAGNOSIS — I83.12 VARICOSE VEINS OF LEFT LOWER EXTREMITY WITH INFLAMMATION: ICD-10-CM

## 2017-06-10 DIAGNOSIS — Z85.46 PERSONAL HISTORY OF MALIGNANT NEOPLASM OF PROSTATE: ICD-10-CM

## 2017-06-10 DIAGNOSIS — I83.11 VARICOSE VEINS OF RIGHT LOWER EXTREMITY WITH INFLAMMATION: ICD-10-CM

## 2017-06-10 DIAGNOSIS — E78.00 PURE HYPERCHOLESTEROLEMIA, UNSPECIFIED: ICD-10-CM

## 2017-06-10 DIAGNOSIS — Z90.49 ACQUIRED ABSENCE OF OTHER SPECIFIED PARTS OF DIGESTIVE TRACT: ICD-10-CM

## 2017-06-10 DIAGNOSIS — Z82.49 FAMILY HISTORY OF ISCHEMIC HEART DISEASE AND OTHER DISEASES OF THE CIRCULATORY SYSTEM: ICD-10-CM

## 2017-06-10 DIAGNOSIS — Z98.890 OTHER SPECIFIED POSTPROCEDURAL STATES: ICD-10-CM

## 2017-06-10 DIAGNOSIS — I25.810 ATHEROSCLEROSIS OF CORONARY ARTERY BYPASS GRAFT(S) WITHOUT ANGINA PECTORIS: ICD-10-CM

## 2017-06-10 DIAGNOSIS — I89.0 LYMPHEDEMA, NOT ELSEWHERE CLASSIFIED: ICD-10-CM

## 2017-06-10 DIAGNOSIS — E11.621 TYPE 2 DIABETES MELLITUS WITH FOOT ULCER: ICD-10-CM

## 2017-06-10 DIAGNOSIS — E03.9 HYPOTHYROIDISM, UNSPECIFIED: ICD-10-CM

## 2017-06-10 DIAGNOSIS — I25.2 OLD MYOCARDIAL INFARCTION: ICD-10-CM

## 2017-06-10 DIAGNOSIS — E66.9 OBESITY, UNSPECIFIED: ICD-10-CM

## 2017-06-10 DIAGNOSIS — Z92.3 PERSONAL HISTORY OF IRRADIATION: ICD-10-CM

## 2017-06-10 DIAGNOSIS — I10 ESSENTIAL (PRIMARY) HYPERTENSION: ICD-10-CM

## 2017-06-10 DIAGNOSIS — L97.423 NON-PRESSURE CHRONIC ULCER OF LEFT HEEL AND MIDFOOT WITH NECROSIS OF MUSCLE: ICD-10-CM

## 2017-06-10 DIAGNOSIS — M86.672 OTHER CHRONIC OSTEOMYELITIS, LEFT ANKLE AND FOOT: ICD-10-CM

## 2017-06-10 DIAGNOSIS — M17.0 BILATERAL PRIMARY OSTEOARTHRITIS OF KNEE: ICD-10-CM

## 2017-06-10 DIAGNOSIS — Z83.6 FAMILY HISTORY OF OTHER DISEASES OF THE RESPIRATORY SYSTEM: ICD-10-CM

## 2017-06-10 DIAGNOSIS — Z95.1 PRESENCE OF AORTOCORONARY BYPASS GRAFT: ICD-10-CM

## 2017-06-10 DIAGNOSIS — Z79.899 OTHER LONG TERM (CURRENT) DRUG THERAPY: ICD-10-CM

## 2017-06-10 DIAGNOSIS — E11.40 TYPE 2 DIABETES MELLITUS WITH DIABETIC NEUROPATHY, UNSPECIFIED: ICD-10-CM

## 2017-06-10 DIAGNOSIS — Z83.3 FAMILY HISTORY OF DIABETES MELLITUS: ICD-10-CM

## 2017-06-10 DIAGNOSIS — Z87.891 PERSONAL HISTORY OF NICOTINE DEPENDENCE: ICD-10-CM

## 2017-06-16 ENCOUNTER — OUTPATIENT (OUTPATIENT)
Dept: OUTPATIENT SERVICES | Facility: HOSPITAL | Age: 77
LOS: 1 days | End: 2017-06-16
Payer: MEDICARE

## 2017-06-16 DIAGNOSIS — Z90.49 ACQUIRED ABSENCE OF OTHER SPECIFIED PARTS OF DIGESTIVE TRACT: Chronic | ICD-10-CM

## 2017-06-16 DIAGNOSIS — Z95.1 PRESENCE OF AORTOCORONARY BYPASS GRAFT: Chronic | ICD-10-CM

## 2017-06-16 DIAGNOSIS — L97.301 NON-PRESSURE CHRONIC ULCER OF UNSPECIFIED ANKLE LIMITED TO BREAKDOWN OF SKIN: ICD-10-CM

## 2017-06-16 PROCEDURE — G0463: CPT

## 2017-06-17 DIAGNOSIS — I83.11 VARICOSE VEINS OF RIGHT LOWER EXTREMITY WITH INFLAMMATION: ICD-10-CM

## 2017-06-17 DIAGNOSIS — Z83.6 FAMILY HISTORY OF OTHER DISEASES OF THE RESPIRATORY SYSTEM: ICD-10-CM

## 2017-06-17 DIAGNOSIS — Z83.3 FAMILY HISTORY OF DIABETES MELLITUS: ICD-10-CM

## 2017-06-17 DIAGNOSIS — E11.621 TYPE 2 DIABETES MELLITUS WITH FOOT ULCER: ICD-10-CM

## 2017-06-17 DIAGNOSIS — Z82.49 FAMILY HISTORY OF ISCHEMIC HEART DISEASE AND OTHER DISEASES OF THE CIRCULATORY SYSTEM: ICD-10-CM

## 2017-06-17 DIAGNOSIS — Z85.46 PERSONAL HISTORY OF MALIGNANT NEOPLASM OF PROSTATE: ICD-10-CM

## 2017-06-17 DIAGNOSIS — L97.423 NON-PRESSURE CHRONIC ULCER OF LEFT HEEL AND MIDFOOT WITH NECROSIS OF MUSCLE: ICD-10-CM

## 2017-06-17 DIAGNOSIS — I10 ESSENTIAL (PRIMARY) HYPERTENSION: ICD-10-CM

## 2017-06-17 DIAGNOSIS — Z98.890 OTHER SPECIFIED POSTPROCEDURAL STATES: ICD-10-CM

## 2017-06-17 DIAGNOSIS — I25.2 OLD MYOCARDIAL INFARCTION: ICD-10-CM

## 2017-06-17 DIAGNOSIS — L84 CORNS AND CALLOSITIES: ICD-10-CM

## 2017-06-17 DIAGNOSIS — I89.0 LYMPHEDEMA, NOT ELSEWHERE CLASSIFIED: ICD-10-CM

## 2017-06-17 DIAGNOSIS — E11.40 TYPE 2 DIABETES MELLITUS WITH DIABETIC NEUROPATHY, UNSPECIFIED: ICD-10-CM

## 2017-06-17 DIAGNOSIS — E66.9 OBESITY, UNSPECIFIED: ICD-10-CM

## 2017-06-17 DIAGNOSIS — Z95.1 PRESENCE OF AORTOCORONARY BYPASS GRAFT: ICD-10-CM

## 2017-06-17 DIAGNOSIS — Z87.891 PERSONAL HISTORY OF NICOTINE DEPENDENCE: ICD-10-CM

## 2017-06-17 DIAGNOSIS — M86.672 OTHER CHRONIC OSTEOMYELITIS, LEFT ANKLE AND FOOT: ICD-10-CM

## 2017-06-17 DIAGNOSIS — I83.12 VARICOSE VEINS OF LEFT LOWER EXTREMITY WITH INFLAMMATION: ICD-10-CM

## 2017-06-17 DIAGNOSIS — E03.9 HYPOTHYROIDISM, UNSPECIFIED: ICD-10-CM

## 2017-06-17 DIAGNOSIS — M17.0 BILATERAL PRIMARY OSTEOARTHRITIS OF KNEE: ICD-10-CM

## 2017-06-17 DIAGNOSIS — Z79.899 OTHER LONG TERM (CURRENT) DRUG THERAPY: ICD-10-CM

## 2017-06-17 DIAGNOSIS — E78.00 PURE HYPERCHOLESTEROLEMIA, UNSPECIFIED: ICD-10-CM

## 2017-06-17 DIAGNOSIS — Z80.0 FAMILY HISTORY OF MALIGNANT NEOPLASM OF DIGESTIVE ORGANS: ICD-10-CM

## 2017-06-17 DIAGNOSIS — I25.810 ATHEROSCLEROSIS OF CORONARY ARTERY BYPASS GRAFT(S) WITHOUT ANGINA PECTORIS: ICD-10-CM

## 2017-06-17 DIAGNOSIS — Z92.3 PERSONAL HISTORY OF IRRADIATION: ICD-10-CM

## 2017-06-17 DIAGNOSIS — Z90.49 ACQUIRED ABSENCE OF OTHER SPECIFIED PARTS OF DIGESTIVE TRACT: ICD-10-CM

## 2017-06-30 ENCOUNTER — OUTPATIENT (OUTPATIENT)
Dept: OUTPATIENT SERVICES | Facility: HOSPITAL | Age: 77
LOS: 1 days | End: 2017-06-30
Payer: MEDICARE

## 2017-06-30 DIAGNOSIS — L97.322 NON-PRESSURE CHRONIC ULCER OF LEFT ANKLE WITH FAT LAYER EXPOSED: ICD-10-CM

## 2017-06-30 DIAGNOSIS — Z95.1 PRESENCE OF AORTOCORONARY BYPASS GRAFT: Chronic | ICD-10-CM

## 2017-06-30 DIAGNOSIS — Z90.49 ACQUIRED ABSENCE OF OTHER SPECIFIED PARTS OF DIGESTIVE TRACT: Chronic | ICD-10-CM

## 2017-06-30 PROCEDURE — G0463: CPT

## 2017-07-01 DIAGNOSIS — E03.9 HYPOTHYROIDISM, UNSPECIFIED: ICD-10-CM

## 2017-07-01 DIAGNOSIS — E11.40 TYPE 2 DIABETES MELLITUS WITH DIABETIC NEUROPATHY, UNSPECIFIED: ICD-10-CM

## 2017-07-01 DIAGNOSIS — Z83.6 FAMILY HISTORY OF OTHER DISEASES OF THE RESPIRATORY SYSTEM: ICD-10-CM

## 2017-07-01 DIAGNOSIS — I89.0 LYMPHEDEMA, NOT ELSEWHERE CLASSIFIED: ICD-10-CM

## 2017-07-01 DIAGNOSIS — Z87.891 PERSONAL HISTORY OF NICOTINE DEPENDENCE: ICD-10-CM

## 2017-07-01 DIAGNOSIS — Z98.890 OTHER SPECIFIED POSTPROCEDURAL STATES: ICD-10-CM

## 2017-07-01 DIAGNOSIS — I83.11 VARICOSE VEINS OF RIGHT LOWER EXTREMITY WITH INFLAMMATION: ICD-10-CM

## 2017-07-01 DIAGNOSIS — Z85.46 PERSONAL HISTORY OF MALIGNANT NEOPLASM OF PROSTATE: ICD-10-CM

## 2017-07-01 DIAGNOSIS — M17.0 BILATERAL PRIMARY OSTEOARTHRITIS OF KNEE: ICD-10-CM

## 2017-07-01 DIAGNOSIS — I25.810 ATHEROSCLEROSIS OF CORONARY ARTERY BYPASS GRAFT(S) WITHOUT ANGINA PECTORIS: ICD-10-CM

## 2017-07-01 DIAGNOSIS — M86.672 OTHER CHRONIC OSTEOMYELITIS, LEFT ANKLE AND FOOT: ICD-10-CM

## 2017-07-01 DIAGNOSIS — E66.9 OBESITY, UNSPECIFIED: ICD-10-CM

## 2017-07-01 DIAGNOSIS — L84 CORNS AND CALLOSITIES: ICD-10-CM

## 2017-07-01 DIAGNOSIS — Z83.3 FAMILY HISTORY OF DIABETES MELLITUS: ICD-10-CM

## 2017-07-01 DIAGNOSIS — I25.2 OLD MYOCARDIAL INFARCTION: ICD-10-CM

## 2017-07-01 DIAGNOSIS — Z80.0 FAMILY HISTORY OF MALIGNANT NEOPLASM OF DIGESTIVE ORGANS: ICD-10-CM

## 2017-07-01 DIAGNOSIS — Z95.1 PRESENCE OF AORTOCORONARY BYPASS GRAFT: ICD-10-CM

## 2017-07-01 DIAGNOSIS — E78.00 PURE HYPERCHOLESTEROLEMIA, UNSPECIFIED: ICD-10-CM

## 2017-07-01 DIAGNOSIS — Z90.49 ACQUIRED ABSENCE OF OTHER SPECIFIED PARTS OF DIGESTIVE TRACT: ICD-10-CM

## 2017-07-01 DIAGNOSIS — L97.423 NON-PRESSURE CHRONIC ULCER OF LEFT HEEL AND MIDFOOT WITH NECROSIS OF MUSCLE: ICD-10-CM

## 2017-07-01 DIAGNOSIS — Z79.899 OTHER LONG TERM (CURRENT) DRUG THERAPY: ICD-10-CM

## 2017-07-01 DIAGNOSIS — E11.621 TYPE 2 DIABETES MELLITUS WITH FOOT ULCER: ICD-10-CM

## 2017-07-01 DIAGNOSIS — Z92.3 PERSONAL HISTORY OF IRRADIATION: ICD-10-CM

## 2017-07-01 DIAGNOSIS — I83.12 VARICOSE VEINS OF LEFT LOWER EXTREMITY WITH INFLAMMATION: ICD-10-CM

## 2017-07-01 DIAGNOSIS — Z82.49 FAMILY HISTORY OF ISCHEMIC HEART DISEASE AND OTHER DISEASES OF THE CIRCULATORY SYSTEM: ICD-10-CM

## 2017-07-01 DIAGNOSIS — I10 ESSENTIAL (PRIMARY) HYPERTENSION: ICD-10-CM

## 2017-07-14 ENCOUNTER — OUTPATIENT (OUTPATIENT)
Dept: OUTPATIENT SERVICES | Facility: HOSPITAL | Age: 77
LOS: 1 days | End: 2017-07-14
Payer: MEDICARE

## 2017-07-14 DIAGNOSIS — E11.621 TYPE 2 DIABETES MELLITUS WITH FOOT ULCER: ICD-10-CM

## 2017-07-14 DIAGNOSIS — Z95.1 PRESENCE OF AORTOCORONARY BYPASS GRAFT: Chronic | ICD-10-CM

## 2017-07-14 DIAGNOSIS — Z90.49 ACQUIRED ABSENCE OF OTHER SPECIFIED PARTS OF DIGESTIVE TRACT: Chronic | ICD-10-CM

## 2017-07-14 PROCEDURE — G0463: CPT

## 2017-07-15 DIAGNOSIS — Z85.46 PERSONAL HISTORY OF MALIGNANT NEOPLASM OF PROSTATE: ICD-10-CM

## 2017-07-15 DIAGNOSIS — E11.621 TYPE 2 DIABETES MELLITUS WITH FOOT ULCER: ICD-10-CM

## 2017-07-15 DIAGNOSIS — Z83.3 FAMILY HISTORY OF DIABETES MELLITUS: ICD-10-CM

## 2017-07-15 DIAGNOSIS — L97.423 NON-PRESSURE CHRONIC ULCER OF LEFT HEEL AND MIDFOOT WITH NECROSIS OF MUSCLE: ICD-10-CM

## 2017-07-15 DIAGNOSIS — E11.40 TYPE 2 DIABETES MELLITUS WITH DIABETIC NEUROPATHY, UNSPECIFIED: ICD-10-CM

## 2017-07-15 DIAGNOSIS — Z95.1 PRESENCE OF AORTOCORONARY BYPASS GRAFT: ICD-10-CM

## 2017-07-15 DIAGNOSIS — Z92.3 PERSONAL HISTORY OF IRRADIATION: ICD-10-CM

## 2017-07-15 DIAGNOSIS — I10 ESSENTIAL (PRIMARY) HYPERTENSION: ICD-10-CM

## 2017-07-15 DIAGNOSIS — Z79.899 OTHER LONG TERM (CURRENT) DRUG THERAPY: ICD-10-CM

## 2017-07-15 DIAGNOSIS — M86.672 OTHER CHRONIC OSTEOMYELITIS, LEFT ANKLE AND FOOT: ICD-10-CM

## 2017-07-15 DIAGNOSIS — M17.0 BILATERAL PRIMARY OSTEOARTHRITIS OF KNEE: ICD-10-CM

## 2017-07-15 DIAGNOSIS — I25.810 ATHEROSCLEROSIS OF CORONARY ARTERY BYPASS GRAFT(S) WITHOUT ANGINA PECTORIS: ICD-10-CM

## 2017-07-15 DIAGNOSIS — Z90.49 ACQUIRED ABSENCE OF OTHER SPECIFIED PARTS OF DIGESTIVE TRACT: ICD-10-CM

## 2017-07-15 DIAGNOSIS — I25.2 OLD MYOCARDIAL INFARCTION: ICD-10-CM

## 2017-07-15 DIAGNOSIS — Z87.891 PERSONAL HISTORY OF NICOTINE DEPENDENCE: ICD-10-CM

## 2017-07-15 DIAGNOSIS — Z80.0 FAMILY HISTORY OF MALIGNANT NEOPLASM OF DIGESTIVE ORGANS: ICD-10-CM

## 2017-07-15 DIAGNOSIS — E78.00 PURE HYPERCHOLESTEROLEMIA, UNSPECIFIED: ICD-10-CM

## 2017-07-15 DIAGNOSIS — Z83.6 FAMILY HISTORY OF OTHER DISEASES OF THE RESPIRATORY SYSTEM: ICD-10-CM

## 2017-07-15 DIAGNOSIS — E66.9 OBESITY, UNSPECIFIED: ICD-10-CM

## 2017-07-15 DIAGNOSIS — Z82.49 FAMILY HISTORY OF ISCHEMIC HEART DISEASE AND OTHER DISEASES OF THE CIRCULATORY SYSTEM: ICD-10-CM

## 2017-07-15 DIAGNOSIS — E03.9 HYPOTHYROIDISM, UNSPECIFIED: ICD-10-CM

## 2017-07-15 DIAGNOSIS — I83.12 VARICOSE VEINS OF LEFT LOWER EXTREMITY WITH INFLAMMATION: ICD-10-CM

## 2017-07-15 DIAGNOSIS — L84 CORNS AND CALLOSITIES: ICD-10-CM

## 2017-07-15 DIAGNOSIS — I89.0 LYMPHEDEMA, NOT ELSEWHERE CLASSIFIED: ICD-10-CM

## 2017-07-15 DIAGNOSIS — Z98.890 OTHER SPECIFIED POSTPROCEDURAL STATES: ICD-10-CM

## 2017-07-15 DIAGNOSIS — I83.11 VARICOSE VEINS OF RIGHT LOWER EXTREMITY WITH INFLAMMATION: ICD-10-CM

## 2017-07-28 ENCOUNTER — OUTPATIENT (OUTPATIENT)
Dept: OUTPATIENT SERVICES | Facility: HOSPITAL | Age: 77
LOS: 1 days | End: 2017-07-28
Payer: MEDICARE

## 2017-07-28 DIAGNOSIS — Z90.49 ACQUIRED ABSENCE OF OTHER SPECIFIED PARTS OF DIGESTIVE TRACT: Chronic | ICD-10-CM

## 2017-07-28 DIAGNOSIS — E11.621 TYPE 2 DIABETES MELLITUS WITH FOOT ULCER: ICD-10-CM

## 2017-07-28 DIAGNOSIS — Z95.1 PRESENCE OF AORTOCORONARY BYPASS GRAFT: Chronic | ICD-10-CM

## 2017-07-28 PROCEDURE — G0463: CPT

## 2017-07-29 DIAGNOSIS — Z82.49 FAMILY HISTORY OF ISCHEMIC HEART DISEASE AND OTHER DISEASES OF THE CIRCULATORY SYSTEM: ICD-10-CM

## 2017-07-29 DIAGNOSIS — Z80.0 FAMILY HISTORY OF MALIGNANT NEOPLASM OF DIGESTIVE ORGANS: ICD-10-CM

## 2017-07-29 DIAGNOSIS — Z85.46 PERSONAL HISTORY OF MALIGNANT NEOPLASM OF PROSTATE: ICD-10-CM

## 2017-07-29 DIAGNOSIS — I83.12 VARICOSE VEINS OF LEFT LOWER EXTREMITY WITH INFLAMMATION: ICD-10-CM

## 2017-07-29 DIAGNOSIS — M17.0 BILATERAL PRIMARY OSTEOARTHRITIS OF KNEE: ICD-10-CM

## 2017-07-29 DIAGNOSIS — Z95.1 PRESENCE OF AORTOCORONARY BYPASS GRAFT: ICD-10-CM

## 2017-07-29 DIAGNOSIS — Z83.3 FAMILY HISTORY OF DIABETES MELLITUS: ICD-10-CM

## 2017-07-29 DIAGNOSIS — E11.40 TYPE 2 DIABETES MELLITUS WITH DIABETIC NEUROPATHY, UNSPECIFIED: ICD-10-CM

## 2017-07-29 DIAGNOSIS — Z98.890 OTHER SPECIFIED POSTPROCEDURAL STATES: ICD-10-CM

## 2017-07-29 DIAGNOSIS — Z90.49 ACQUIRED ABSENCE OF OTHER SPECIFIED PARTS OF DIGESTIVE TRACT: ICD-10-CM

## 2017-07-29 DIAGNOSIS — Z79.899 OTHER LONG TERM (CURRENT) DRUG THERAPY: ICD-10-CM

## 2017-07-29 DIAGNOSIS — E78.00 PURE HYPERCHOLESTEROLEMIA, UNSPECIFIED: ICD-10-CM

## 2017-07-29 DIAGNOSIS — I89.0 LYMPHEDEMA, NOT ELSEWHERE CLASSIFIED: ICD-10-CM

## 2017-07-29 DIAGNOSIS — E11.621 TYPE 2 DIABETES MELLITUS WITH FOOT ULCER: ICD-10-CM

## 2017-07-29 DIAGNOSIS — Z83.6 FAMILY HISTORY OF OTHER DISEASES OF THE RESPIRATORY SYSTEM: ICD-10-CM

## 2017-07-29 DIAGNOSIS — M86.672 OTHER CHRONIC OSTEOMYELITIS, LEFT ANKLE AND FOOT: ICD-10-CM

## 2017-07-29 DIAGNOSIS — I83.11 VARICOSE VEINS OF RIGHT LOWER EXTREMITY WITH INFLAMMATION: ICD-10-CM

## 2017-07-29 DIAGNOSIS — Z92.3 PERSONAL HISTORY OF IRRADIATION: ICD-10-CM

## 2017-07-29 DIAGNOSIS — E66.9 OBESITY, UNSPECIFIED: ICD-10-CM

## 2017-07-29 DIAGNOSIS — I25.810 ATHEROSCLEROSIS OF CORONARY ARTERY BYPASS GRAFT(S) WITHOUT ANGINA PECTORIS: ICD-10-CM

## 2017-07-29 DIAGNOSIS — I25.2 OLD MYOCARDIAL INFARCTION: ICD-10-CM

## 2017-07-29 DIAGNOSIS — I10 ESSENTIAL (PRIMARY) HYPERTENSION: ICD-10-CM

## 2017-07-29 DIAGNOSIS — E03.9 HYPOTHYROIDISM, UNSPECIFIED: ICD-10-CM

## 2017-07-29 DIAGNOSIS — L97.423 NON-PRESSURE CHRONIC ULCER OF LEFT HEEL AND MIDFOOT WITH NECROSIS OF MUSCLE: ICD-10-CM

## 2017-07-29 DIAGNOSIS — Z87.891 PERSONAL HISTORY OF NICOTINE DEPENDENCE: ICD-10-CM

## 2017-08-08 ENCOUNTER — OUTPATIENT (OUTPATIENT)
Dept: OUTPATIENT SERVICES | Facility: HOSPITAL | Age: 77
LOS: 1 days | End: 2017-08-08
Payer: MEDICARE

## 2017-08-08 DIAGNOSIS — E11.69 TYPE 2 DIABETES MELLITUS WITH OTHER SPECIFIED COMPLICATION: ICD-10-CM

## 2017-08-08 DIAGNOSIS — Z95.1 PRESENCE OF AORTOCORONARY BYPASS GRAFT: Chronic | ICD-10-CM

## 2017-08-08 DIAGNOSIS — Z90.49 ACQUIRED ABSENCE OF OTHER SPECIFIED PARTS OF DIGESTIVE TRACT: Chronic | ICD-10-CM

## 2017-08-08 DIAGNOSIS — E11.621 TYPE 2 DIABETES MELLITUS WITH FOOT ULCER: ICD-10-CM

## 2017-08-08 PROCEDURE — 87070 CULTURE OTHR SPECIMN AEROBIC: CPT

## 2017-08-08 PROCEDURE — 11042 DBRDMT SUBQ TIS 1ST 20SQCM/<: CPT

## 2017-08-08 PROCEDURE — 73630 X-RAY EXAM OF FOOT: CPT | Mod: 26,LT

## 2017-08-08 PROCEDURE — 73630 X-RAY EXAM OF FOOT: CPT

## 2017-08-08 PROCEDURE — 87186 SC STD MICRODIL/AGAR DIL: CPT

## 2017-08-09 LAB
GRAM STN FLD: SIGNIFICANT CHANGE UP
SPECIMEN SOURCE: SIGNIFICANT CHANGE UP

## 2017-08-10 DIAGNOSIS — I25.2 OLD MYOCARDIAL INFARCTION: ICD-10-CM

## 2017-08-10 DIAGNOSIS — M17.0 BILATERAL PRIMARY OSTEOARTHRITIS OF KNEE: ICD-10-CM

## 2017-08-10 DIAGNOSIS — Z80.0 FAMILY HISTORY OF MALIGNANT NEOPLASM OF DIGESTIVE ORGANS: ICD-10-CM

## 2017-08-10 DIAGNOSIS — Z82.49 FAMILY HISTORY OF ISCHEMIC HEART DISEASE AND OTHER DISEASES OF THE CIRCULATORY SYSTEM: ICD-10-CM

## 2017-08-10 DIAGNOSIS — E11.40 TYPE 2 DIABETES MELLITUS WITH DIABETIC NEUROPATHY, UNSPECIFIED: ICD-10-CM

## 2017-08-10 DIAGNOSIS — E78.00 PURE HYPERCHOLESTEROLEMIA, UNSPECIFIED: ICD-10-CM

## 2017-08-10 DIAGNOSIS — E66.9 OBESITY, UNSPECIFIED: ICD-10-CM

## 2017-08-10 DIAGNOSIS — Z85.46 PERSONAL HISTORY OF MALIGNANT NEOPLASM OF PROSTATE: ICD-10-CM

## 2017-08-10 DIAGNOSIS — Z95.1 PRESENCE OF AORTOCORONARY BYPASS GRAFT: ICD-10-CM

## 2017-08-10 DIAGNOSIS — I83.11 VARICOSE VEINS OF RIGHT LOWER EXTREMITY WITH INFLAMMATION: ICD-10-CM

## 2017-08-10 DIAGNOSIS — L97.422 NON-PRESSURE CHRONIC ULCER OF LEFT HEEL AND MIDFOOT WITH FAT LAYER EXPOSED: ICD-10-CM

## 2017-08-10 DIAGNOSIS — E11.621 TYPE 2 DIABETES MELLITUS WITH FOOT ULCER: ICD-10-CM

## 2017-08-10 DIAGNOSIS — I25.810 ATHEROSCLEROSIS OF CORONARY ARTERY BYPASS GRAFT(S) WITHOUT ANGINA PECTORIS: ICD-10-CM

## 2017-08-10 DIAGNOSIS — Z92.3 PERSONAL HISTORY OF IRRADIATION: ICD-10-CM

## 2017-08-10 DIAGNOSIS — I83.12 VARICOSE VEINS OF LEFT LOWER EXTREMITY WITH INFLAMMATION: ICD-10-CM

## 2017-08-10 DIAGNOSIS — Z83.3 FAMILY HISTORY OF DIABETES MELLITUS: ICD-10-CM

## 2017-08-10 DIAGNOSIS — I89.0 LYMPHEDEMA, NOT ELSEWHERE CLASSIFIED: ICD-10-CM

## 2017-08-10 DIAGNOSIS — Z83.6 FAMILY HISTORY OF OTHER DISEASES OF THE RESPIRATORY SYSTEM: ICD-10-CM

## 2017-08-10 DIAGNOSIS — Z87.891 PERSONAL HISTORY OF NICOTINE DEPENDENCE: ICD-10-CM

## 2017-08-10 DIAGNOSIS — E03.9 HYPOTHYROIDISM, UNSPECIFIED: ICD-10-CM

## 2017-08-10 DIAGNOSIS — I10 ESSENTIAL (PRIMARY) HYPERTENSION: ICD-10-CM

## 2017-08-10 DIAGNOSIS — Z98.890 OTHER SPECIFIED POSTPROCEDURAL STATES: ICD-10-CM

## 2017-08-10 DIAGNOSIS — Z90.49 ACQUIRED ABSENCE OF OTHER SPECIFIED PARTS OF DIGESTIVE TRACT: ICD-10-CM

## 2017-08-10 DIAGNOSIS — Z79.899 OTHER LONG TERM (CURRENT) DRUG THERAPY: ICD-10-CM

## 2017-08-11 LAB
-  AMPICILLIN/SULBACTAM: SIGNIFICANT CHANGE UP
-  CEFAZOLIN: SIGNIFICANT CHANGE UP
-  CIPROFLOXACIN: SIGNIFICANT CHANGE UP
-  CLINDAMYCIN: SIGNIFICANT CHANGE UP
-  ERYTHROMYCIN: SIGNIFICANT CHANGE UP
-  GENTAMICIN: SIGNIFICANT CHANGE UP
-  LEVOFLOXACIN: SIGNIFICANT CHANGE UP
-  MOXIFLOXACIN(AEROBIC): SIGNIFICANT CHANGE UP
-  OXACILLIN: SIGNIFICANT CHANGE UP
-  PENICILLIN: SIGNIFICANT CHANGE UP
-  RIFAMPIN: SIGNIFICANT CHANGE UP
-  TETRACYCLINE: SIGNIFICANT CHANGE UP
-  TRIMETHOPRIM/SULFAMETHOXAZOLE: SIGNIFICANT CHANGE UP
-  VANCOMYCIN: SIGNIFICANT CHANGE UP
METHOD TYPE: SIGNIFICANT CHANGE UP

## 2017-08-12 LAB
-  AMPICILLIN: SIGNIFICANT CHANGE UP
-  CIPROFLOXACIN: SIGNIFICANT CHANGE UP
-  LINEZOLID: SIGNIFICANT CHANGE UP
-  TETRACYCLINE: SIGNIFICANT CHANGE UP
-  VANCOMYCIN: SIGNIFICANT CHANGE UP
CULTURE RESULTS: SIGNIFICANT CHANGE UP
METHOD TYPE: SIGNIFICANT CHANGE UP
ORGANISM # SPEC MICROSCOPIC CNT: SIGNIFICANT CHANGE UP
SPECIMEN SOURCE: SIGNIFICANT CHANGE UP

## 2017-08-14 ENCOUNTER — APPOINTMENT (OUTPATIENT)
Dept: VASCULAR SURGERY | Facility: CLINIC | Age: 77
End: 2017-08-14
Payer: MEDICARE

## 2017-08-14 VITALS
HEART RATE: 75 BPM | WEIGHT: 280 LBS | DIASTOLIC BLOOD PRESSURE: 74 MMHG | HEIGHT: 71 IN | BODY MASS INDEX: 39.2 KG/M2 | SYSTOLIC BLOOD PRESSURE: 142 MMHG | TEMPERATURE: 97.7 F

## 2017-08-14 DIAGNOSIS — R60.0 LOCALIZED EDEMA: ICD-10-CM

## 2017-08-14 DIAGNOSIS — L97.409 NON-PRESSURE CHRONIC ULCER OF UNSPECIFIED HEEL AND MIDFOOT WITH UNSPECIFIED SEVERITY: ICD-10-CM

## 2017-08-14 PROCEDURE — 93970 EXTREMITY STUDY: CPT

## 2017-08-14 PROCEDURE — 93923 UPR/LXTR ART STDY 3+ LVLS: CPT

## 2017-08-14 PROCEDURE — 99214 OFFICE O/P EST MOD 30 MIN: CPT

## 2017-08-15 ENCOUNTER — OUTPATIENT (OUTPATIENT)
Dept: OUTPATIENT SERVICES | Facility: HOSPITAL | Age: 77
LOS: 1 days | End: 2017-08-15
Payer: MEDICARE

## 2017-08-15 DIAGNOSIS — Z90.49 ACQUIRED ABSENCE OF OTHER SPECIFIED PARTS OF DIGESTIVE TRACT: Chronic | ICD-10-CM

## 2017-08-15 DIAGNOSIS — Z95.1 PRESENCE OF AORTOCORONARY BYPASS GRAFT: Chronic | ICD-10-CM

## 2017-08-15 DIAGNOSIS — E11.621 TYPE 2 DIABETES MELLITUS WITH FOOT ULCER: ICD-10-CM

## 2017-08-15 PROCEDURE — G0463: CPT

## 2017-08-16 DIAGNOSIS — Z98.890 OTHER SPECIFIED POSTPROCEDURAL STATES: ICD-10-CM

## 2017-08-16 DIAGNOSIS — E11.40 TYPE 2 DIABETES MELLITUS WITH DIABETIC NEUROPATHY, UNSPECIFIED: ICD-10-CM

## 2017-08-16 DIAGNOSIS — Z92.3 PERSONAL HISTORY OF IRRADIATION: ICD-10-CM

## 2017-08-16 DIAGNOSIS — I83.12 VARICOSE VEINS OF LEFT LOWER EXTREMITY WITH INFLAMMATION: ICD-10-CM

## 2017-08-16 DIAGNOSIS — Z87.891 PERSONAL HISTORY OF NICOTINE DEPENDENCE: ICD-10-CM

## 2017-08-16 DIAGNOSIS — Z79.899 OTHER LONG TERM (CURRENT) DRUG THERAPY: ICD-10-CM

## 2017-08-16 DIAGNOSIS — Z83.6 FAMILY HISTORY OF OTHER DISEASES OF THE RESPIRATORY SYSTEM: ICD-10-CM

## 2017-08-16 DIAGNOSIS — I83.11 VARICOSE VEINS OF RIGHT LOWER EXTREMITY WITH INFLAMMATION: ICD-10-CM

## 2017-08-16 DIAGNOSIS — E66.9 OBESITY, UNSPECIFIED: ICD-10-CM

## 2017-08-16 DIAGNOSIS — Z83.3 FAMILY HISTORY OF DIABETES MELLITUS: ICD-10-CM

## 2017-08-16 DIAGNOSIS — I89.0 LYMPHEDEMA, NOT ELSEWHERE CLASSIFIED: ICD-10-CM

## 2017-08-16 DIAGNOSIS — I25.810 ATHEROSCLEROSIS OF CORONARY ARTERY BYPASS GRAFT(S) WITHOUT ANGINA PECTORIS: ICD-10-CM

## 2017-08-16 DIAGNOSIS — L97.422 NON-PRESSURE CHRONIC ULCER OF LEFT HEEL AND MIDFOOT WITH FAT LAYER EXPOSED: ICD-10-CM

## 2017-08-16 DIAGNOSIS — Z95.1 PRESENCE OF AORTOCORONARY BYPASS GRAFT: ICD-10-CM

## 2017-08-16 DIAGNOSIS — I25.2 OLD MYOCARDIAL INFARCTION: ICD-10-CM

## 2017-08-16 DIAGNOSIS — I10 ESSENTIAL (PRIMARY) HYPERTENSION: ICD-10-CM

## 2017-08-16 DIAGNOSIS — Z90.49 ACQUIRED ABSENCE OF OTHER SPECIFIED PARTS OF DIGESTIVE TRACT: ICD-10-CM

## 2017-08-16 DIAGNOSIS — E03.9 HYPOTHYROIDISM, UNSPECIFIED: ICD-10-CM

## 2017-08-16 DIAGNOSIS — Z82.49 FAMILY HISTORY OF ISCHEMIC HEART DISEASE AND OTHER DISEASES OF THE CIRCULATORY SYSTEM: ICD-10-CM

## 2017-08-16 DIAGNOSIS — E78.00 PURE HYPERCHOLESTEROLEMIA, UNSPECIFIED: ICD-10-CM

## 2017-08-16 DIAGNOSIS — E11.621 TYPE 2 DIABETES MELLITUS WITH FOOT ULCER: ICD-10-CM

## 2017-08-16 DIAGNOSIS — M17.0 BILATERAL PRIMARY OSTEOARTHRITIS OF KNEE: ICD-10-CM

## 2017-08-16 DIAGNOSIS — Z85.46 PERSONAL HISTORY OF MALIGNANT NEOPLASM OF PROSTATE: ICD-10-CM

## 2017-08-16 DIAGNOSIS — Z80.0 FAMILY HISTORY OF MALIGNANT NEOPLASM OF DIGESTIVE ORGANS: ICD-10-CM

## 2017-08-18 PROBLEM — L97.409 HEEL ULCER: Status: ACTIVE | Noted: 2017-08-18

## 2017-08-18 PROBLEM — R60.0 LOWER EXTREMITY EDEMA: Status: ACTIVE | Noted: 2017-08-18

## 2017-08-22 ENCOUNTER — OUTPATIENT (OUTPATIENT)
Dept: OUTPATIENT SERVICES | Facility: HOSPITAL | Age: 77
LOS: 1 days | End: 2017-08-22
Payer: MEDICARE

## 2017-08-22 DIAGNOSIS — E11.621 TYPE 2 DIABETES MELLITUS WITH FOOT ULCER: ICD-10-CM

## 2017-08-22 DIAGNOSIS — Z90.49 ACQUIRED ABSENCE OF OTHER SPECIFIED PARTS OF DIGESTIVE TRACT: Chronic | ICD-10-CM

## 2017-08-22 DIAGNOSIS — Z95.1 PRESENCE OF AORTOCORONARY BYPASS GRAFT: Chronic | ICD-10-CM

## 2017-08-22 PROCEDURE — G0463: CPT

## 2017-08-24 DIAGNOSIS — Z90.49 ACQUIRED ABSENCE OF OTHER SPECIFIED PARTS OF DIGESTIVE TRACT: ICD-10-CM

## 2017-08-24 DIAGNOSIS — E66.01 MORBID (SEVERE) OBESITY DUE TO EXCESS CALORIES: ICD-10-CM

## 2017-08-24 DIAGNOSIS — Z80.0 FAMILY HISTORY OF MALIGNANT NEOPLASM OF DIGESTIVE ORGANS: ICD-10-CM

## 2017-08-24 DIAGNOSIS — I83.11 VARICOSE VEINS OF RIGHT LOWER EXTREMITY WITH INFLAMMATION: ICD-10-CM

## 2017-08-24 DIAGNOSIS — Z83.3 FAMILY HISTORY OF DIABETES MELLITUS: ICD-10-CM

## 2017-08-24 DIAGNOSIS — I89.0 LYMPHEDEMA, NOT ELSEWHERE CLASSIFIED: ICD-10-CM

## 2017-08-24 DIAGNOSIS — Z83.6 FAMILY HISTORY OF OTHER DISEASES OF THE RESPIRATORY SYSTEM: ICD-10-CM

## 2017-08-24 DIAGNOSIS — Z98.890 OTHER SPECIFIED POSTPROCEDURAL STATES: ICD-10-CM

## 2017-08-24 DIAGNOSIS — I83.12 VARICOSE VEINS OF LEFT LOWER EXTREMITY WITH INFLAMMATION: ICD-10-CM

## 2017-08-24 DIAGNOSIS — E78.00 PURE HYPERCHOLESTEROLEMIA, UNSPECIFIED: ICD-10-CM

## 2017-08-24 DIAGNOSIS — A49.1 STREPTOCOCCAL INFECTION, UNSPECIFIED SITE: ICD-10-CM

## 2017-08-24 DIAGNOSIS — L97.422 NON-PRESSURE CHRONIC ULCER OF LEFT HEEL AND MIDFOOT WITH FAT LAYER EXPOSED: ICD-10-CM

## 2017-08-24 DIAGNOSIS — I10 ESSENTIAL (PRIMARY) HYPERTENSION: ICD-10-CM

## 2017-08-24 DIAGNOSIS — Z95.1 PRESENCE OF AORTOCORONARY BYPASS GRAFT: ICD-10-CM

## 2017-08-24 DIAGNOSIS — E11.40 TYPE 2 DIABETES MELLITUS WITH DIABETIC NEUROPATHY, UNSPECIFIED: ICD-10-CM

## 2017-08-24 DIAGNOSIS — Z92.3 PERSONAL HISTORY OF IRRADIATION: ICD-10-CM

## 2017-08-24 DIAGNOSIS — E03.9 HYPOTHYROIDISM, UNSPECIFIED: ICD-10-CM

## 2017-08-24 DIAGNOSIS — Z85.46 PERSONAL HISTORY OF MALIGNANT NEOPLASM OF PROSTATE: ICD-10-CM

## 2017-08-24 DIAGNOSIS — M17.0 BILATERAL PRIMARY OSTEOARTHRITIS OF KNEE: ICD-10-CM

## 2017-08-24 DIAGNOSIS — A49.8 OTHER BACTERIAL INFECTIONS OF UNSPECIFIED SITE: ICD-10-CM

## 2017-08-24 DIAGNOSIS — Z87.891 PERSONAL HISTORY OF NICOTINE DEPENDENCE: ICD-10-CM

## 2017-08-24 DIAGNOSIS — E11.621 TYPE 2 DIABETES MELLITUS WITH FOOT ULCER: ICD-10-CM

## 2017-08-24 DIAGNOSIS — I25.810 ATHEROSCLEROSIS OF CORONARY ARTERY BYPASS GRAFT(S) WITHOUT ANGINA PECTORIS: ICD-10-CM

## 2017-08-24 DIAGNOSIS — Z79.899 OTHER LONG TERM (CURRENT) DRUG THERAPY: ICD-10-CM

## 2017-08-24 DIAGNOSIS — I25.2 OLD MYOCARDIAL INFARCTION: ICD-10-CM

## 2017-08-24 DIAGNOSIS — Z82.49 FAMILY HISTORY OF ISCHEMIC HEART DISEASE AND OTHER DISEASES OF THE CIRCULATORY SYSTEM: ICD-10-CM

## 2017-08-25 ENCOUNTER — APPOINTMENT (OUTPATIENT)
Dept: MRI IMAGING | Facility: CLINIC | Age: 77
End: 2017-08-25

## 2017-08-25 ENCOUNTER — OUTPATIENT (OUTPATIENT)
Dept: OUTPATIENT SERVICES | Facility: HOSPITAL | Age: 77
LOS: 1 days | End: 2017-08-25
Payer: MEDICARE

## 2017-08-25 DIAGNOSIS — Z90.49 ACQUIRED ABSENCE OF OTHER SPECIFIED PARTS OF DIGESTIVE TRACT: Chronic | ICD-10-CM

## 2017-08-25 DIAGNOSIS — Z95.1 PRESENCE OF AORTOCORONARY BYPASS GRAFT: Chronic | ICD-10-CM

## 2017-08-25 DIAGNOSIS — Z00.8 ENCOUNTER FOR OTHER GENERAL EXAMINATION: ICD-10-CM

## 2017-08-25 PROCEDURE — 73718 MRI LOWER EXTREMITY W/O DYE: CPT

## 2017-08-25 PROCEDURE — 73718 MRI LOWER EXTREMITY W/O DYE: CPT | Mod: 26,LT

## 2017-08-29 ENCOUNTER — OUTPATIENT (OUTPATIENT)
Dept: OUTPATIENT SERVICES | Facility: HOSPITAL | Age: 77
LOS: 1 days | End: 2017-08-29
Payer: MEDICARE

## 2017-08-29 DIAGNOSIS — Z90.49 ACQUIRED ABSENCE OF OTHER SPECIFIED PARTS OF DIGESTIVE TRACT: Chronic | ICD-10-CM

## 2017-08-29 DIAGNOSIS — E11.621 TYPE 2 DIABETES MELLITUS WITH FOOT ULCER: ICD-10-CM

## 2017-08-29 DIAGNOSIS — Z95.1 PRESENCE OF AORTOCORONARY BYPASS GRAFT: Chronic | ICD-10-CM

## 2017-08-29 PROCEDURE — G0463: CPT

## 2017-08-31 DIAGNOSIS — Z85.46 PERSONAL HISTORY OF MALIGNANT NEOPLASM OF PROSTATE: ICD-10-CM

## 2017-08-31 DIAGNOSIS — Z90.49 ACQUIRED ABSENCE OF OTHER SPECIFIED PARTS OF DIGESTIVE TRACT: ICD-10-CM

## 2017-08-31 DIAGNOSIS — Z83.6 FAMILY HISTORY OF OTHER DISEASES OF THE RESPIRATORY SYSTEM: ICD-10-CM

## 2017-08-31 DIAGNOSIS — I10 ESSENTIAL (PRIMARY) HYPERTENSION: ICD-10-CM

## 2017-08-31 DIAGNOSIS — E78.00 PURE HYPERCHOLESTEROLEMIA, UNSPECIFIED: ICD-10-CM

## 2017-08-31 DIAGNOSIS — Z79.899 OTHER LONG TERM (CURRENT) DRUG THERAPY: ICD-10-CM

## 2017-08-31 DIAGNOSIS — Z82.49 FAMILY HISTORY OF ISCHEMIC HEART DISEASE AND OTHER DISEASES OF THE CIRCULATORY SYSTEM: ICD-10-CM

## 2017-08-31 DIAGNOSIS — E11.40 TYPE 2 DIABETES MELLITUS WITH DIABETIC NEUROPATHY, UNSPECIFIED: ICD-10-CM

## 2017-08-31 DIAGNOSIS — I89.0 LYMPHEDEMA, NOT ELSEWHERE CLASSIFIED: ICD-10-CM

## 2017-08-31 DIAGNOSIS — E11.621 TYPE 2 DIABETES MELLITUS WITH FOOT ULCER: ICD-10-CM

## 2017-08-31 DIAGNOSIS — E66.9 OBESITY, UNSPECIFIED: ICD-10-CM

## 2017-08-31 DIAGNOSIS — L97.422 NON-PRESSURE CHRONIC ULCER OF LEFT HEEL AND MIDFOOT WITH FAT LAYER EXPOSED: ICD-10-CM

## 2017-08-31 DIAGNOSIS — Z92.3 PERSONAL HISTORY OF IRRADIATION: ICD-10-CM

## 2017-08-31 DIAGNOSIS — M17.0 BILATERAL PRIMARY OSTEOARTHRITIS OF KNEE: ICD-10-CM

## 2017-08-31 DIAGNOSIS — I25.810 ATHEROSCLEROSIS OF CORONARY ARTERY BYPASS GRAFT(S) WITHOUT ANGINA PECTORIS: ICD-10-CM

## 2017-08-31 DIAGNOSIS — Z80.0 FAMILY HISTORY OF MALIGNANT NEOPLASM OF DIGESTIVE ORGANS: ICD-10-CM

## 2017-08-31 DIAGNOSIS — Z95.1 PRESENCE OF AORTOCORONARY BYPASS GRAFT: ICD-10-CM

## 2017-08-31 DIAGNOSIS — Z87.891 PERSONAL HISTORY OF NICOTINE DEPENDENCE: ICD-10-CM

## 2017-08-31 DIAGNOSIS — I83.12 VARICOSE VEINS OF LEFT LOWER EXTREMITY WITH INFLAMMATION: ICD-10-CM

## 2017-08-31 DIAGNOSIS — I25.2 OLD MYOCARDIAL INFARCTION: ICD-10-CM

## 2017-08-31 DIAGNOSIS — Z83.3 FAMILY HISTORY OF DIABETES MELLITUS: ICD-10-CM

## 2017-08-31 DIAGNOSIS — I83.11 VARICOSE VEINS OF RIGHT LOWER EXTREMITY WITH INFLAMMATION: ICD-10-CM

## 2017-08-31 DIAGNOSIS — E03.9 HYPOTHYROIDISM, UNSPECIFIED: ICD-10-CM

## 2017-08-31 DIAGNOSIS — Z98.890 OTHER SPECIFIED POSTPROCEDURAL STATES: ICD-10-CM

## 2017-09-08 ENCOUNTER — OUTPATIENT (OUTPATIENT)
Dept: OUTPATIENT SERVICES | Facility: HOSPITAL | Age: 77
LOS: 1 days | End: 2017-09-08
Payer: MEDICARE

## 2017-09-08 DIAGNOSIS — Z95.1 PRESENCE OF AORTOCORONARY BYPASS GRAFT: Chronic | ICD-10-CM

## 2017-09-08 DIAGNOSIS — Z90.49 ACQUIRED ABSENCE OF OTHER SPECIFIED PARTS OF DIGESTIVE TRACT: Chronic | ICD-10-CM

## 2017-09-08 DIAGNOSIS — E11.621 TYPE 2 DIABETES MELLITUS WITH FOOT ULCER: ICD-10-CM

## 2017-09-08 PROCEDURE — G0463: CPT

## 2017-09-09 DIAGNOSIS — I10 ESSENTIAL (PRIMARY) HYPERTENSION: ICD-10-CM

## 2017-09-09 DIAGNOSIS — Z87.891 PERSONAL HISTORY OF NICOTINE DEPENDENCE: ICD-10-CM

## 2017-09-09 DIAGNOSIS — Z95.1 PRESENCE OF AORTOCORONARY BYPASS GRAFT: ICD-10-CM

## 2017-09-09 DIAGNOSIS — E03.9 HYPOTHYROIDISM, UNSPECIFIED: ICD-10-CM

## 2017-09-09 DIAGNOSIS — Z83.6 FAMILY HISTORY OF OTHER DISEASES OF THE RESPIRATORY SYSTEM: ICD-10-CM

## 2017-09-09 DIAGNOSIS — E11.40 TYPE 2 DIABETES MELLITUS WITH DIABETIC NEUROPATHY, UNSPECIFIED: ICD-10-CM

## 2017-09-09 DIAGNOSIS — I83.12 VARICOSE VEINS OF LEFT LOWER EXTREMITY WITH INFLAMMATION: ICD-10-CM

## 2017-09-09 DIAGNOSIS — Z80.0 FAMILY HISTORY OF MALIGNANT NEOPLASM OF DIGESTIVE ORGANS: ICD-10-CM

## 2017-09-09 DIAGNOSIS — Z79.899 OTHER LONG TERM (CURRENT) DRUG THERAPY: ICD-10-CM

## 2017-09-09 DIAGNOSIS — Z98.890 OTHER SPECIFIED POSTPROCEDURAL STATES: ICD-10-CM

## 2017-09-09 DIAGNOSIS — L97.422 NON-PRESSURE CHRONIC ULCER OF LEFT HEEL AND MIDFOOT WITH FAT LAYER EXPOSED: ICD-10-CM

## 2017-09-09 DIAGNOSIS — Z90.49 ACQUIRED ABSENCE OF OTHER SPECIFIED PARTS OF DIGESTIVE TRACT: ICD-10-CM

## 2017-09-09 DIAGNOSIS — I83.11 VARICOSE VEINS OF RIGHT LOWER EXTREMITY WITH INFLAMMATION: ICD-10-CM

## 2017-09-09 DIAGNOSIS — Z82.49 FAMILY HISTORY OF ISCHEMIC HEART DISEASE AND OTHER DISEASES OF THE CIRCULATORY SYSTEM: ICD-10-CM

## 2017-09-09 DIAGNOSIS — I25.810 ATHEROSCLEROSIS OF CORONARY ARTERY BYPASS GRAFT(S) WITHOUT ANGINA PECTORIS: ICD-10-CM

## 2017-09-09 DIAGNOSIS — Z83.3 FAMILY HISTORY OF DIABETES MELLITUS: ICD-10-CM

## 2017-09-09 DIAGNOSIS — Z85.46 PERSONAL HISTORY OF MALIGNANT NEOPLASM OF PROSTATE: ICD-10-CM

## 2017-09-09 DIAGNOSIS — E11.621 TYPE 2 DIABETES MELLITUS WITH FOOT ULCER: ICD-10-CM

## 2017-09-09 DIAGNOSIS — I25.2 OLD MYOCARDIAL INFARCTION: ICD-10-CM

## 2017-09-09 DIAGNOSIS — E66.01 MORBID (SEVERE) OBESITY DUE TO EXCESS CALORIES: ICD-10-CM

## 2017-09-09 DIAGNOSIS — E78.00 PURE HYPERCHOLESTEROLEMIA, UNSPECIFIED: ICD-10-CM

## 2017-09-09 DIAGNOSIS — M17.0 BILATERAL PRIMARY OSTEOARTHRITIS OF KNEE: ICD-10-CM

## 2017-09-09 DIAGNOSIS — Z92.3 PERSONAL HISTORY OF IRRADIATION: ICD-10-CM

## 2017-09-09 DIAGNOSIS — I89.0 LYMPHEDEMA, NOT ELSEWHERE CLASSIFIED: ICD-10-CM

## 2017-09-25 ENCOUNTER — OUTPATIENT (OUTPATIENT)
Dept: OUTPATIENT SERVICES | Facility: HOSPITAL | Age: 77
LOS: 1 days | End: 2017-09-25
Payer: MEDICARE

## 2017-09-25 DIAGNOSIS — Z90.49 ACQUIRED ABSENCE OF OTHER SPECIFIED PARTS OF DIGESTIVE TRACT: Chronic | ICD-10-CM

## 2017-09-25 DIAGNOSIS — E11.621 TYPE 2 DIABETES MELLITUS WITH FOOT ULCER: ICD-10-CM

## 2017-09-25 DIAGNOSIS — Z95.1 PRESENCE OF AORTOCORONARY BYPASS GRAFT: Chronic | ICD-10-CM

## 2017-09-25 PROCEDURE — G0463: CPT

## 2017-09-27 DIAGNOSIS — E11.40 TYPE 2 DIABETES MELLITUS WITH DIABETIC NEUROPATHY, UNSPECIFIED: ICD-10-CM

## 2017-09-27 DIAGNOSIS — Z92.3 PERSONAL HISTORY OF IRRADIATION: ICD-10-CM

## 2017-09-27 DIAGNOSIS — Z82.49 FAMILY HISTORY OF ISCHEMIC HEART DISEASE AND OTHER DISEASES OF THE CIRCULATORY SYSTEM: ICD-10-CM

## 2017-09-27 DIAGNOSIS — Z83.3 FAMILY HISTORY OF DIABETES MELLITUS: ICD-10-CM

## 2017-09-27 DIAGNOSIS — Z90.49 ACQUIRED ABSENCE OF OTHER SPECIFIED PARTS OF DIGESTIVE TRACT: ICD-10-CM

## 2017-09-27 DIAGNOSIS — Z87.891 PERSONAL HISTORY OF NICOTINE DEPENDENCE: ICD-10-CM

## 2017-09-27 DIAGNOSIS — Z85.46 PERSONAL HISTORY OF MALIGNANT NEOPLASM OF PROSTATE: ICD-10-CM

## 2017-09-27 DIAGNOSIS — I25.2 OLD MYOCARDIAL INFARCTION: ICD-10-CM

## 2017-09-27 DIAGNOSIS — E78.00 PURE HYPERCHOLESTEROLEMIA, UNSPECIFIED: ICD-10-CM

## 2017-09-27 DIAGNOSIS — M17.0 BILATERAL PRIMARY OSTEOARTHRITIS OF KNEE: ICD-10-CM

## 2017-09-27 DIAGNOSIS — Z80.0 FAMILY HISTORY OF MALIGNANT NEOPLASM OF DIGESTIVE ORGANS: ICD-10-CM

## 2017-09-27 DIAGNOSIS — I83.12 VARICOSE VEINS OF LEFT LOWER EXTREMITY WITH INFLAMMATION: ICD-10-CM

## 2017-09-27 DIAGNOSIS — Z79.899 OTHER LONG TERM (CURRENT) DRUG THERAPY: ICD-10-CM

## 2017-09-27 DIAGNOSIS — E11.621 TYPE 2 DIABETES MELLITUS WITH FOOT ULCER: ICD-10-CM

## 2017-09-27 DIAGNOSIS — A49.1 STREPTOCOCCAL INFECTION, UNSPECIFIED SITE: ICD-10-CM

## 2017-09-27 DIAGNOSIS — Z98.890 OTHER SPECIFIED POSTPROCEDURAL STATES: ICD-10-CM

## 2017-09-27 DIAGNOSIS — I25.810 ATHEROSCLEROSIS OF CORONARY ARTERY BYPASS GRAFT(S) WITHOUT ANGINA PECTORIS: ICD-10-CM

## 2017-09-27 DIAGNOSIS — Z95.1 PRESENCE OF AORTOCORONARY BYPASS GRAFT: ICD-10-CM

## 2017-09-27 DIAGNOSIS — A49.8 OTHER BACTERIAL INFECTIONS OF UNSPECIFIED SITE: ICD-10-CM

## 2017-09-27 DIAGNOSIS — E03.9 HYPOTHYROIDISM, UNSPECIFIED: ICD-10-CM

## 2017-09-27 DIAGNOSIS — I89.0 LYMPHEDEMA, NOT ELSEWHERE CLASSIFIED: ICD-10-CM

## 2017-09-27 DIAGNOSIS — E66.9 OBESITY, UNSPECIFIED: ICD-10-CM

## 2017-09-27 DIAGNOSIS — Z83.6 FAMILY HISTORY OF OTHER DISEASES OF THE RESPIRATORY SYSTEM: ICD-10-CM

## 2017-09-27 DIAGNOSIS — I10 ESSENTIAL (PRIMARY) HYPERTENSION: ICD-10-CM

## 2017-09-27 DIAGNOSIS — I83.11 VARICOSE VEINS OF RIGHT LOWER EXTREMITY WITH INFLAMMATION: ICD-10-CM

## 2017-09-27 DIAGNOSIS — L97.422 NON-PRESSURE CHRONIC ULCER OF LEFT HEEL AND MIDFOOT WITH FAT LAYER EXPOSED: ICD-10-CM

## 2017-10-13 ENCOUNTER — OUTPATIENT (OUTPATIENT)
Dept: OUTPATIENT SERVICES | Facility: HOSPITAL | Age: 77
LOS: 1 days | End: 2017-10-13
Payer: MEDICARE

## 2017-10-13 DIAGNOSIS — E11.621 TYPE 2 DIABETES MELLITUS WITH FOOT ULCER: ICD-10-CM

## 2017-10-13 DIAGNOSIS — Z90.49 ACQUIRED ABSENCE OF OTHER SPECIFIED PARTS OF DIGESTIVE TRACT: Chronic | ICD-10-CM

## 2017-10-13 DIAGNOSIS — Z95.1 PRESENCE OF AORTOCORONARY BYPASS GRAFT: Chronic | ICD-10-CM

## 2017-10-13 PROCEDURE — G0463: CPT

## 2017-10-17 DIAGNOSIS — Z90.49 ACQUIRED ABSENCE OF OTHER SPECIFIED PARTS OF DIGESTIVE TRACT: ICD-10-CM

## 2017-10-17 DIAGNOSIS — E03.9 HYPOTHYROIDISM, UNSPECIFIED: ICD-10-CM

## 2017-10-17 DIAGNOSIS — Z83.3 FAMILY HISTORY OF DIABETES MELLITUS: ICD-10-CM

## 2017-10-17 DIAGNOSIS — E11.621 TYPE 2 DIABETES MELLITUS WITH FOOT ULCER: ICD-10-CM

## 2017-10-17 DIAGNOSIS — Z95.1 PRESENCE OF AORTOCORONARY BYPASS GRAFT: ICD-10-CM

## 2017-10-17 DIAGNOSIS — M17.0 BILATERAL PRIMARY OSTEOARTHRITIS OF KNEE: ICD-10-CM

## 2017-10-17 DIAGNOSIS — E11.40 TYPE 2 DIABETES MELLITUS WITH DIABETIC NEUROPATHY, UNSPECIFIED: ICD-10-CM

## 2017-10-17 DIAGNOSIS — Z79.899 OTHER LONG TERM (CURRENT) DRUG THERAPY: ICD-10-CM

## 2017-10-17 DIAGNOSIS — Z92.3 PERSONAL HISTORY OF IRRADIATION: ICD-10-CM

## 2017-10-17 DIAGNOSIS — Z83.6 FAMILY HISTORY OF OTHER DISEASES OF THE RESPIRATORY SYSTEM: ICD-10-CM

## 2017-10-17 DIAGNOSIS — I10 ESSENTIAL (PRIMARY) HYPERTENSION: ICD-10-CM

## 2017-10-17 DIAGNOSIS — E66.9 OBESITY, UNSPECIFIED: ICD-10-CM

## 2017-10-17 DIAGNOSIS — I83.11 VARICOSE VEINS OF RIGHT LOWER EXTREMITY WITH INFLAMMATION: ICD-10-CM

## 2017-10-17 DIAGNOSIS — I25.810 ATHEROSCLEROSIS OF CORONARY ARTERY BYPASS GRAFT(S) WITHOUT ANGINA PECTORIS: ICD-10-CM

## 2017-10-17 DIAGNOSIS — Z87.891 PERSONAL HISTORY OF NICOTINE DEPENDENCE: ICD-10-CM

## 2017-10-17 DIAGNOSIS — Z98.890 OTHER SPECIFIED POSTPROCEDURAL STATES: ICD-10-CM

## 2017-10-17 DIAGNOSIS — I83.12 VARICOSE VEINS OF LEFT LOWER EXTREMITY WITH INFLAMMATION: ICD-10-CM

## 2017-10-17 DIAGNOSIS — E78.00 PURE HYPERCHOLESTEROLEMIA, UNSPECIFIED: ICD-10-CM

## 2017-10-17 DIAGNOSIS — I25.2 OLD MYOCARDIAL INFARCTION: ICD-10-CM

## 2017-10-17 DIAGNOSIS — Z85.46 PERSONAL HISTORY OF MALIGNANT NEOPLASM OF PROSTATE: ICD-10-CM

## 2017-10-17 DIAGNOSIS — Z82.49 FAMILY HISTORY OF ISCHEMIC HEART DISEASE AND OTHER DISEASES OF THE CIRCULATORY SYSTEM: ICD-10-CM

## 2017-10-17 DIAGNOSIS — Z80.0 FAMILY HISTORY OF MALIGNANT NEOPLASM OF DIGESTIVE ORGANS: ICD-10-CM

## 2017-10-17 DIAGNOSIS — I89.0 LYMPHEDEMA, NOT ELSEWHERE CLASSIFIED: ICD-10-CM

## 2017-10-17 DIAGNOSIS — L97.422 NON-PRESSURE CHRONIC ULCER OF LEFT HEEL AND MIDFOOT WITH FAT LAYER EXPOSED: ICD-10-CM

## 2017-10-27 ENCOUNTER — OUTPATIENT (OUTPATIENT)
Dept: OUTPATIENT SERVICES | Facility: HOSPITAL | Age: 77
LOS: 1 days | End: 2017-10-27
Payer: MEDICARE

## 2017-10-27 DIAGNOSIS — Z90.49 ACQUIRED ABSENCE OF OTHER SPECIFIED PARTS OF DIGESTIVE TRACT: Chronic | ICD-10-CM

## 2017-10-27 DIAGNOSIS — E11.621 TYPE 2 DIABETES MELLITUS WITH FOOT ULCER: ICD-10-CM

## 2017-10-27 DIAGNOSIS — Z95.1 PRESENCE OF AORTOCORONARY BYPASS GRAFT: Chronic | ICD-10-CM

## 2017-10-27 PROCEDURE — G0463: CPT

## 2017-10-28 DIAGNOSIS — I25.2 OLD MYOCARDIAL INFARCTION: ICD-10-CM

## 2017-10-28 DIAGNOSIS — I83.11 VARICOSE VEINS OF RIGHT LOWER EXTREMITY WITH INFLAMMATION: ICD-10-CM

## 2017-10-28 DIAGNOSIS — E66.9 OBESITY, UNSPECIFIED: ICD-10-CM

## 2017-10-28 DIAGNOSIS — E11.621 TYPE 2 DIABETES MELLITUS WITH FOOT ULCER: ICD-10-CM

## 2017-10-28 DIAGNOSIS — Z80.0 FAMILY HISTORY OF MALIGNANT NEOPLASM OF DIGESTIVE ORGANS: ICD-10-CM

## 2017-10-28 DIAGNOSIS — M17.0 BILATERAL PRIMARY OSTEOARTHRITIS OF KNEE: ICD-10-CM

## 2017-10-28 DIAGNOSIS — I83.12 VARICOSE VEINS OF LEFT LOWER EXTREMITY WITH INFLAMMATION: ICD-10-CM

## 2017-10-28 DIAGNOSIS — Z83.3 FAMILY HISTORY OF DIABETES MELLITUS: ICD-10-CM

## 2017-10-28 DIAGNOSIS — I25.810 ATHEROSCLEROSIS OF CORONARY ARTERY BYPASS GRAFT(S) WITHOUT ANGINA PECTORIS: ICD-10-CM

## 2017-10-28 DIAGNOSIS — Z98.890 OTHER SPECIFIED POSTPROCEDURAL STATES: ICD-10-CM

## 2017-10-28 DIAGNOSIS — Z92.3 PERSONAL HISTORY OF IRRADIATION: ICD-10-CM

## 2017-10-28 DIAGNOSIS — L97.422 NON-PRESSURE CHRONIC ULCER OF LEFT HEEL AND MIDFOOT WITH FAT LAYER EXPOSED: ICD-10-CM

## 2017-10-28 DIAGNOSIS — E03.9 HYPOTHYROIDISM, UNSPECIFIED: ICD-10-CM

## 2017-10-28 DIAGNOSIS — Z90.49 ACQUIRED ABSENCE OF OTHER SPECIFIED PARTS OF DIGESTIVE TRACT: ICD-10-CM

## 2017-10-28 DIAGNOSIS — Z87.891 PERSONAL HISTORY OF NICOTINE DEPENDENCE: ICD-10-CM

## 2017-10-28 DIAGNOSIS — I10 ESSENTIAL (PRIMARY) HYPERTENSION: ICD-10-CM

## 2017-10-28 DIAGNOSIS — Z95.1 PRESENCE OF AORTOCORONARY BYPASS GRAFT: ICD-10-CM

## 2017-10-28 DIAGNOSIS — E11.40 TYPE 2 DIABETES MELLITUS WITH DIABETIC NEUROPATHY, UNSPECIFIED: ICD-10-CM

## 2017-10-28 DIAGNOSIS — Z83.6 FAMILY HISTORY OF OTHER DISEASES OF THE RESPIRATORY SYSTEM: ICD-10-CM

## 2017-10-28 DIAGNOSIS — Z82.49 FAMILY HISTORY OF ISCHEMIC HEART DISEASE AND OTHER DISEASES OF THE CIRCULATORY SYSTEM: ICD-10-CM

## 2017-10-28 DIAGNOSIS — Z85.46 PERSONAL HISTORY OF MALIGNANT NEOPLASM OF PROSTATE: ICD-10-CM

## 2017-10-28 DIAGNOSIS — I89.0 LYMPHEDEMA, NOT ELSEWHERE CLASSIFIED: ICD-10-CM

## 2017-10-28 DIAGNOSIS — Z79.899 OTHER LONG TERM (CURRENT) DRUG THERAPY: ICD-10-CM

## 2017-10-28 DIAGNOSIS — E78.00 PURE HYPERCHOLESTEROLEMIA, UNSPECIFIED: ICD-10-CM

## 2017-11-06 ENCOUNTER — OUTPATIENT (OUTPATIENT)
Dept: OUTPATIENT SERVICES | Facility: HOSPITAL | Age: 77
LOS: 1 days | Discharge: HOME | End: 2017-11-06
Payer: MEDICARE

## 2017-11-06 DIAGNOSIS — Z95.1 PRESENCE OF AORTOCORONARY BYPASS GRAFT: Chronic | ICD-10-CM

## 2017-11-06 DIAGNOSIS — E11.621 TYPE 2 DIABETES MELLITUS WITH FOOT ULCER: ICD-10-CM

## 2017-11-06 DIAGNOSIS — Z90.49 ACQUIRED ABSENCE OF OTHER SPECIFIED PARTS OF DIGESTIVE TRACT: Chronic | ICD-10-CM

## 2017-11-06 PROCEDURE — G0463: CPT

## 2017-11-10 DIAGNOSIS — Z83.3 FAMILY HISTORY OF DIABETES MELLITUS: ICD-10-CM

## 2017-11-10 DIAGNOSIS — E03.9 HYPOTHYROIDISM, UNSPECIFIED: ICD-10-CM

## 2017-11-10 DIAGNOSIS — I89.0 LYMPHEDEMA, NOT ELSEWHERE CLASSIFIED: ICD-10-CM

## 2017-11-10 DIAGNOSIS — I25.810 ATHEROSCLEROSIS OF CORONARY ARTERY BYPASS GRAFT(S) WITHOUT ANGINA PECTORIS: ICD-10-CM

## 2017-11-10 DIAGNOSIS — Z92.3 PERSONAL HISTORY OF IRRADIATION: ICD-10-CM

## 2017-11-10 DIAGNOSIS — Z79.899 OTHER LONG TERM (CURRENT) DRUG THERAPY: ICD-10-CM

## 2017-11-10 DIAGNOSIS — I83.12 VARICOSE VEINS OF LEFT LOWER EXTREMITY WITH INFLAMMATION: ICD-10-CM

## 2017-11-10 DIAGNOSIS — M17.0 BILATERAL PRIMARY OSTEOARTHRITIS OF KNEE: ICD-10-CM

## 2017-11-10 DIAGNOSIS — Z85.46 PERSONAL HISTORY OF MALIGNANT NEOPLASM OF PROSTATE: ICD-10-CM

## 2017-11-10 DIAGNOSIS — I25.2 OLD MYOCARDIAL INFARCTION: ICD-10-CM

## 2017-11-10 DIAGNOSIS — E11.40 TYPE 2 DIABETES MELLITUS WITH DIABETIC NEUROPATHY, UNSPECIFIED: ICD-10-CM

## 2017-11-10 DIAGNOSIS — Z82.49 FAMILY HISTORY OF ISCHEMIC HEART DISEASE AND OTHER DISEASES OF THE CIRCULATORY SYSTEM: ICD-10-CM

## 2017-11-10 DIAGNOSIS — E11.621 TYPE 2 DIABETES MELLITUS WITH FOOT ULCER: ICD-10-CM

## 2017-11-10 DIAGNOSIS — Z87.891 PERSONAL HISTORY OF NICOTINE DEPENDENCE: ICD-10-CM

## 2017-11-10 DIAGNOSIS — L97.422 NON-PRESSURE CHRONIC ULCER OF LEFT HEEL AND MIDFOOT WITH FAT LAYER EXPOSED: ICD-10-CM

## 2017-11-10 DIAGNOSIS — I10 ESSENTIAL (PRIMARY) HYPERTENSION: ICD-10-CM

## 2017-11-10 DIAGNOSIS — I83.11 VARICOSE VEINS OF RIGHT LOWER EXTREMITY WITH INFLAMMATION: ICD-10-CM

## 2017-11-10 DIAGNOSIS — Z90.49 ACQUIRED ABSENCE OF OTHER SPECIFIED PARTS OF DIGESTIVE TRACT: ICD-10-CM

## 2017-11-10 DIAGNOSIS — Z83.6 FAMILY HISTORY OF OTHER DISEASES OF THE RESPIRATORY SYSTEM: ICD-10-CM

## 2017-11-10 DIAGNOSIS — E78.00 PURE HYPERCHOLESTEROLEMIA, UNSPECIFIED: ICD-10-CM

## 2017-11-10 DIAGNOSIS — Z80.0 FAMILY HISTORY OF MALIGNANT NEOPLASM OF DIGESTIVE ORGANS: ICD-10-CM

## 2017-11-10 DIAGNOSIS — E66.9 OBESITY, UNSPECIFIED: ICD-10-CM

## 2017-11-10 DIAGNOSIS — Z95.1 PRESENCE OF AORTOCORONARY BYPASS GRAFT: ICD-10-CM

## 2017-11-14 ENCOUNTER — OUTPATIENT (OUTPATIENT)
Dept: OUTPATIENT SERVICES | Facility: HOSPITAL | Age: 77
LOS: 1 days | Discharge: HOME | End: 2017-11-14
Payer: MEDICARE

## 2017-11-14 DIAGNOSIS — E11.621 TYPE 2 DIABETES MELLITUS WITH FOOT ULCER: ICD-10-CM

## 2017-11-14 DIAGNOSIS — Z95.1 PRESENCE OF AORTOCORONARY BYPASS GRAFT: Chronic | ICD-10-CM

## 2017-11-14 DIAGNOSIS — Z90.49 ACQUIRED ABSENCE OF OTHER SPECIFIED PARTS OF DIGESTIVE TRACT: Chronic | ICD-10-CM

## 2017-11-14 PROCEDURE — G0463: CPT

## 2017-11-16 DIAGNOSIS — E03.9 HYPOTHYROIDISM, UNSPECIFIED: ICD-10-CM

## 2017-11-16 DIAGNOSIS — I25.810 ATHEROSCLEROSIS OF CORONARY ARTERY BYPASS GRAFT(S) WITHOUT ANGINA PECTORIS: ICD-10-CM

## 2017-11-16 DIAGNOSIS — Z92.3 PERSONAL HISTORY OF IRRADIATION: ICD-10-CM

## 2017-11-16 DIAGNOSIS — I89.0 LYMPHEDEMA, NOT ELSEWHERE CLASSIFIED: ICD-10-CM

## 2017-11-16 DIAGNOSIS — Z79.899 OTHER LONG TERM (CURRENT) DRUG THERAPY: ICD-10-CM

## 2017-11-16 DIAGNOSIS — Z95.1 PRESENCE OF AORTOCORONARY BYPASS GRAFT: ICD-10-CM

## 2017-11-16 DIAGNOSIS — M17.0 BILATERAL PRIMARY OSTEOARTHRITIS OF KNEE: ICD-10-CM

## 2017-11-16 DIAGNOSIS — Z83.3 FAMILY HISTORY OF DIABETES MELLITUS: ICD-10-CM

## 2017-11-16 DIAGNOSIS — L97.422 NON-PRESSURE CHRONIC ULCER OF LEFT HEEL AND MIDFOOT WITH FAT LAYER EXPOSED: ICD-10-CM

## 2017-11-16 DIAGNOSIS — Z90.49 ACQUIRED ABSENCE OF OTHER SPECIFIED PARTS OF DIGESTIVE TRACT: ICD-10-CM

## 2017-11-16 DIAGNOSIS — E11.621 TYPE 2 DIABETES MELLITUS WITH FOOT ULCER: ICD-10-CM

## 2017-11-16 DIAGNOSIS — I10 ESSENTIAL (PRIMARY) HYPERTENSION: ICD-10-CM

## 2017-11-16 DIAGNOSIS — Z87.891 PERSONAL HISTORY OF NICOTINE DEPENDENCE: ICD-10-CM

## 2017-11-16 DIAGNOSIS — Z85.46 PERSONAL HISTORY OF MALIGNANT NEOPLASM OF PROSTATE: ICD-10-CM

## 2017-11-16 DIAGNOSIS — Z80.0 FAMILY HISTORY OF MALIGNANT NEOPLASM OF DIGESTIVE ORGANS: ICD-10-CM

## 2017-11-16 DIAGNOSIS — I25.2 OLD MYOCARDIAL INFARCTION: ICD-10-CM

## 2017-11-16 DIAGNOSIS — E11.40 TYPE 2 DIABETES MELLITUS WITH DIABETIC NEUROPATHY, UNSPECIFIED: ICD-10-CM

## 2017-11-16 DIAGNOSIS — E78.00 PURE HYPERCHOLESTEROLEMIA, UNSPECIFIED: ICD-10-CM

## 2017-11-16 DIAGNOSIS — Z82.49 FAMILY HISTORY OF ISCHEMIC HEART DISEASE AND OTHER DISEASES OF THE CIRCULATORY SYSTEM: ICD-10-CM

## 2017-11-16 DIAGNOSIS — E66.9 OBESITY, UNSPECIFIED: ICD-10-CM

## 2017-11-16 DIAGNOSIS — I83.12 VARICOSE VEINS OF LEFT LOWER EXTREMITY WITH INFLAMMATION: ICD-10-CM

## 2017-11-16 DIAGNOSIS — Z83.6 FAMILY HISTORY OF OTHER DISEASES OF THE RESPIRATORY SYSTEM: ICD-10-CM

## 2017-11-16 DIAGNOSIS — I83.11 VARICOSE VEINS OF RIGHT LOWER EXTREMITY WITH INFLAMMATION: ICD-10-CM

## 2017-12-01 ENCOUNTER — OUTPATIENT (OUTPATIENT)
Dept: OUTPATIENT SERVICES | Facility: HOSPITAL | Age: 77
LOS: 1 days | Discharge: HOME | End: 2017-12-01
Payer: MEDICARE

## 2017-12-01 DIAGNOSIS — Z95.1 PRESENCE OF AORTOCORONARY BYPASS GRAFT: Chronic | ICD-10-CM

## 2017-12-01 DIAGNOSIS — Z90.49 ACQUIRED ABSENCE OF OTHER SPECIFIED PARTS OF DIGESTIVE TRACT: Chronic | ICD-10-CM

## 2017-12-01 DIAGNOSIS — E11.621 TYPE 2 DIABETES MELLITUS WITH FOOT ULCER: ICD-10-CM

## 2017-12-01 PROCEDURE — G0463: CPT

## 2017-12-02 DIAGNOSIS — Z85.46 PERSONAL HISTORY OF MALIGNANT NEOPLASM OF PROSTATE: ICD-10-CM

## 2017-12-02 DIAGNOSIS — Z80.0 FAMILY HISTORY OF MALIGNANT NEOPLASM OF DIGESTIVE ORGANS: ICD-10-CM

## 2017-12-02 DIAGNOSIS — Z83.3 FAMILY HISTORY OF DIABETES MELLITUS: ICD-10-CM

## 2017-12-02 DIAGNOSIS — M17.0 BILATERAL PRIMARY OSTEOARTHRITIS OF KNEE: ICD-10-CM

## 2017-12-02 DIAGNOSIS — E11.40 TYPE 2 DIABETES MELLITUS WITH DIABETIC NEUROPATHY, UNSPECIFIED: ICD-10-CM

## 2017-12-02 DIAGNOSIS — I83.12 VARICOSE VEINS OF LEFT LOWER EXTREMITY WITH INFLAMMATION: ICD-10-CM

## 2017-12-02 DIAGNOSIS — E03.9 HYPOTHYROIDISM, UNSPECIFIED: ICD-10-CM

## 2017-12-02 DIAGNOSIS — I25.2 OLD MYOCARDIAL INFARCTION: ICD-10-CM

## 2017-12-02 DIAGNOSIS — I89.0 LYMPHEDEMA, NOT ELSEWHERE CLASSIFIED: ICD-10-CM

## 2017-12-02 DIAGNOSIS — Z95.1 PRESENCE OF AORTOCORONARY BYPASS GRAFT: ICD-10-CM

## 2017-12-02 DIAGNOSIS — E66.9 OBESITY, UNSPECIFIED: ICD-10-CM

## 2017-12-02 DIAGNOSIS — I10 ESSENTIAL (PRIMARY) HYPERTENSION: ICD-10-CM

## 2017-12-02 DIAGNOSIS — Z83.6 FAMILY HISTORY OF OTHER DISEASES OF THE RESPIRATORY SYSTEM: ICD-10-CM

## 2017-12-02 DIAGNOSIS — E78.00 PURE HYPERCHOLESTEROLEMIA, UNSPECIFIED: ICD-10-CM

## 2017-12-02 DIAGNOSIS — E11.621 TYPE 2 DIABETES MELLITUS WITH FOOT ULCER: ICD-10-CM

## 2017-12-02 DIAGNOSIS — I25.810 ATHEROSCLEROSIS OF CORONARY ARTERY BYPASS GRAFT(S) WITHOUT ANGINA PECTORIS: ICD-10-CM

## 2017-12-02 DIAGNOSIS — Z82.49 FAMILY HISTORY OF ISCHEMIC HEART DISEASE AND OTHER DISEASES OF THE CIRCULATORY SYSTEM: ICD-10-CM

## 2017-12-02 DIAGNOSIS — Z87.891 PERSONAL HISTORY OF NICOTINE DEPENDENCE: ICD-10-CM

## 2017-12-02 DIAGNOSIS — I83.11 VARICOSE VEINS OF RIGHT LOWER EXTREMITY WITH INFLAMMATION: ICD-10-CM

## 2017-12-02 DIAGNOSIS — Z92.3 PERSONAL HISTORY OF IRRADIATION: ICD-10-CM

## 2017-12-02 DIAGNOSIS — Z79.899 OTHER LONG TERM (CURRENT) DRUG THERAPY: ICD-10-CM

## 2017-12-02 DIAGNOSIS — Z90.49 ACQUIRED ABSENCE OF OTHER SPECIFIED PARTS OF DIGESTIVE TRACT: ICD-10-CM

## 2017-12-02 DIAGNOSIS — L97.422 NON-PRESSURE CHRONIC ULCER OF LEFT HEEL AND MIDFOOT WITH FAT LAYER EXPOSED: ICD-10-CM

## 2017-12-19 ENCOUNTER — OUTPATIENT (OUTPATIENT)
Dept: OUTPATIENT SERVICES | Facility: HOSPITAL | Age: 77
LOS: 1 days | Discharge: HOME | End: 2017-12-19
Payer: MEDICARE

## 2017-12-19 DIAGNOSIS — Z95.1 PRESENCE OF AORTOCORONARY BYPASS GRAFT: Chronic | ICD-10-CM

## 2017-12-19 DIAGNOSIS — E11.621 TYPE 2 DIABETES MELLITUS WITH FOOT ULCER: ICD-10-CM

## 2017-12-19 DIAGNOSIS — Z90.49 ACQUIRED ABSENCE OF OTHER SPECIFIED PARTS OF DIGESTIVE TRACT: Chronic | ICD-10-CM

## 2017-12-19 PROCEDURE — G0463: CPT

## 2017-12-21 DIAGNOSIS — E78.00 PURE HYPERCHOLESTEROLEMIA, UNSPECIFIED: ICD-10-CM

## 2017-12-21 DIAGNOSIS — I83.11 VARICOSE VEINS OF RIGHT LOWER EXTREMITY WITH INFLAMMATION: ICD-10-CM

## 2017-12-21 DIAGNOSIS — L92.9 GRANULOMATOUS DISORDER OF THE SKIN AND SUBCUTANEOUS TISSUE, UNSPECIFIED: ICD-10-CM

## 2017-12-21 DIAGNOSIS — I10 ESSENTIAL (PRIMARY) HYPERTENSION: ICD-10-CM

## 2017-12-21 DIAGNOSIS — E66.01 MORBID (SEVERE) OBESITY DUE TO EXCESS CALORIES: ICD-10-CM

## 2017-12-21 DIAGNOSIS — Z90.49 ACQUIRED ABSENCE OF OTHER SPECIFIED PARTS OF DIGESTIVE TRACT: ICD-10-CM

## 2017-12-21 DIAGNOSIS — L97.422 NON-PRESSURE CHRONIC ULCER OF LEFT HEEL AND MIDFOOT WITH FAT LAYER EXPOSED: ICD-10-CM

## 2017-12-21 DIAGNOSIS — Z83.3 FAMILY HISTORY OF DIABETES MELLITUS: ICD-10-CM

## 2017-12-21 DIAGNOSIS — I83.12 VARICOSE VEINS OF LEFT LOWER EXTREMITY WITH INFLAMMATION: ICD-10-CM

## 2017-12-21 DIAGNOSIS — L84 CORNS AND CALLOSITIES: ICD-10-CM

## 2017-12-21 DIAGNOSIS — Z80.0 FAMILY HISTORY OF MALIGNANT NEOPLASM OF DIGESTIVE ORGANS: ICD-10-CM

## 2017-12-21 DIAGNOSIS — E03.9 HYPOTHYROIDISM, UNSPECIFIED: ICD-10-CM

## 2017-12-21 DIAGNOSIS — Z92.3 PERSONAL HISTORY OF IRRADIATION: ICD-10-CM

## 2017-12-21 DIAGNOSIS — Z83.6 FAMILY HISTORY OF OTHER DISEASES OF THE RESPIRATORY SYSTEM: ICD-10-CM

## 2017-12-21 DIAGNOSIS — E11.621 TYPE 2 DIABETES MELLITUS WITH FOOT ULCER: ICD-10-CM

## 2017-12-21 DIAGNOSIS — Z85.46 PERSONAL HISTORY OF MALIGNANT NEOPLASM OF PROSTATE: ICD-10-CM

## 2017-12-21 DIAGNOSIS — Z79.899 OTHER LONG TERM (CURRENT) DRUG THERAPY: ICD-10-CM

## 2017-12-21 DIAGNOSIS — Z95.1 PRESENCE OF AORTOCORONARY BYPASS GRAFT: ICD-10-CM

## 2017-12-21 DIAGNOSIS — Z82.49 FAMILY HISTORY OF ISCHEMIC HEART DISEASE AND OTHER DISEASES OF THE CIRCULATORY SYSTEM: ICD-10-CM

## 2017-12-21 DIAGNOSIS — M17.0 BILATERAL PRIMARY OSTEOARTHRITIS OF KNEE: ICD-10-CM

## 2017-12-21 DIAGNOSIS — E11.40 TYPE 2 DIABETES MELLITUS WITH DIABETIC NEUROPATHY, UNSPECIFIED: ICD-10-CM

## 2017-12-21 DIAGNOSIS — Z87.891 PERSONAL HISTORY OF NICOTINE DEPENDENCE: ICD-10-CM

## 2017-12-21 DIAGNOSIS — I25.810 ATHEROSCLEROSIS OF CORONARY ARTERY BYPASS GRAFT(S) WITHOUT ANGINA PECTORIS: ICD-10-CM

## 2017-12-21 DIAGNOSIS — I25.2 OLD MYOCARDIAL INFARCTION: ICD-10-CM

## 2018-01-12 ENCOUNTER — OUTPATIENT (OUTPATIENT)
Dept: OUTPATIENT SERVICES | Facility: HOSPITAL | Age: 78
LOS: 1 days | Discharge: HOME | End: 2018-01-12
Payer: MEDICARE

## 2018-01-12 DIAGNOSIS — E11.621 TYPE 2 DIABETES MELLITUS WITH FOOT ULCER: ICD-10-CM

## 2018-01-12 DIAGNOSIS — Z95.1 PRESENCE OF AORTOCORONARY BYPASS GRAFT: Chronic | ICD-10-CM

## 2018-01-12 DIAGNOSIS — Z90.49 ACQUIRED ABSENCE OF OTHER SPECIFIED PARTS OF DIGESTIVE TRACT: Chronic | ICD-10-CM

## 2018-01-12 PROCEDURE — G0463: CPT

## 2018-01-13 DIAGNOSIS — Z95.1 PRESENCE OF AORTOCORONARY BYPASS GRAFT: ICD-10-CM

## 2018-01-13 DIAGNOSIS — E11.621 TYPE 2 DIABETES MELLITUS WITH FOOT ULCER: ICD-10-CM

## 2018-01-13 DIAGNOSIS — M17.0 BILATERAL PRIMARY OSTEOARTHRITIS OF KNEE: ICD-10-CM

## 2018-01-13 DIAGNOSIS — I83.12 VARICOSE VEINS OF LEFT LOWER EXTREMITY WITH INFLAMMATION: ICD-10-CM

## 2018-01-13 DIAGNOSIS — E11.40 TYPE 2 DIABETES MELLITUS WITH DIABETIC NEUROPATHY, UNSPECIFIED: ICD-10-CM

## 2018-01-13 DIAGNOSIS — Z83.6 FAMILY HISTORY OF OTHER DISEASES OF THE RESPIRATORY SYSTEM: ICD-10-CM

## 2018-01-13 DIAGNOSIS — Z90.49 ACQUIRED ABSENCE OF OTHER SPECIFIED PARTS OF DIGESTIVE TRACT: ICD-10-CM

## 2018-01-13 DIAGNOSIS — Z83.3 FAMILY HISTORY OF DIABETES MELLITUS: ICD-10-CM

## 2018-01-13 DIAGNOSIS — I25.810 ATHEROSCLEROSIS OF CORONARY ARTERY BYPASS GRAFT(S) WITHOUT ANGINA PECTORIS: ICD-10-CM

## 2018-01-13 DIAGNOSIS — E66.01 MORBID (SEVERE) OBESITY DUE TO EXCESS CALORIES: ICD-10-CM

## 2018-01-13 DIAGNOSIS — I10 ESSENTIAL (PRIMARY) HYPERTENSION: ICD-10-CM

## 2018-01-13 DIAGNOSIS — E03.9 HYPOTHYROIDISM, UNSPECIFIED: ICD-10-CM

## 2018-01-13 DIAGNOSIS — Z79.899 OTHER LONG TERM (CURRENT) DRUG THERAPY: ICD-10-CM

## 2018-01-13 DIAGNOSIS — E78.00 PURE HYPERCHOLESTEROLEMIA, UNSPECIFIED: ICD-10-CM

## 2018-01-13 DIAGNOSIS — Z82.49 FAMILY HISTORY OF ISCHEMIC HEART DISEASE AND OTHER DISEASES OF THE CIRCULATORY SYSTEM: ICD-10-CM

## 2018-01-13 DIAGNOSIS — L97.422 NON-PRESSURE CHRONIC ULCER OF LEFT HEEL AND MIDFOOT WITH FAT LAYER EXPOSED: ICD-10-CM

## 2018-01-13 DIAGNOSIS — Z80.0 FAMILY HISTORY OF MALIGNANT NEOPLASM OF DIGESTIVE ORGANS: ICD-10-CM

## 2018-01-13 DIAGNOSIS — Z92.3 PERSONAL HISTORY OF IRRADIATION: ICD-10-CM

## 2018-01-13 DIAGNOSIS — Z87.891 PERSONAL HISTORY OF NICOTINE DEPENDENCE: ICD-10-CM

## 2018-01-13 DIAGNOSIS — I83.11 VARICOSE VEINS OF RIGHT LOWER EXTREMITY WITH INFLAMMATION: ICD-10-CM

## 2018-01-13 DIAGNOSIS — Z85.46 PERSONAL HISTORY OF MALIGNANT NEOPLASM OF PROSTATE: ICD-10-CM

## 2018-01-13 DIAGNOSIS — I25.2 OLD MYOCARDIAL INFARCTION: ICD-10-CM

## 2018-01-29 ENCOUNTER — OUTPATIENT (OUTPATIENT)
Dept: OUTPATIENT SERVICES | Facility: HOSPITAL | Age: 78
LOS: 1 days | Discharge: HOME | End: 2018-01-29
Payer: MEDICARE

## 2018-01-29 DIAGNOSIS — Z90.49 ACQUIRED ABSENCE OF OTHER SPECIFIED PARTS OF DIGESTIVE TRACT: Chronic | ICD-10-CM

## 2018-01-29 DIAGNOSIS — E11.621 TYPE 2 DIABETES MELLITUS WITH FOOT ULCER: ICD-10-CM

## 2018-01-29 DIAGNOSIS — Z95.1 PRESENCE OF AORTOCORONARY BYPASS GRAFT: Chronic | ICD-10-CM

## 2018-01-29 PROCEDURE — G0463: CPT

## 2018-01-30 DIAGNOSIS — Z85.46 PERSONAL HISTORY OF MALIGNANT NEOPLASM OF PROSTATE: ICD-10-CM

## 2018-01-30 DIAGNOSIS — I83.11 VARICOSE VEINS OF RIGHT LOWER EXTREMITY WITH INFLAMMATION: ICD-10-CM

## 2018-01-30 DIAGNOSIS — L97.422 NON-PRESSURE CHRONIC ULCER OF LEFT HEEL AND MIDFOOT WITH FAT LAYER EXPOSED: ICD-10-CM

## 2018-01-30 DIAGNOSIS — Z83.3 FAMILY HISTORY OF DIABETES MELLITUS: ICD-10-CM

## 2018-01-30 DIAGNOSIS — E78.00 PURE HYPERCHOLESTEROLEMIA, UNSPECIFIED: ICD-10-CM

## 2018-01-30 DIAGNOSIS — I83.12 VARICOSE VEINS OF LEFT LOWER EXTREMITY WITH INFLAMMATION: ICD-10-CM

## 2018-01-30 DIAGNOSIS — Z95.1 PRESENCE OF AORTOCORONARY BYPASS GRAFT: ICD-10-CM

## 2018-01-30 DIAGNOSIS — E11.621 TYPE 2 DIABETES MELLITUS WITH FOOT ULCER: ICD-10-CM

## 2018-01-30 DIAGNOSIS — E66.01 MORBID (SEVERE) OBESITY DUE TO EXCESS CALORIES: ICD-10-CM

## 2018-01-30 DIAGNOSIS — Z83.6 FAMILY HISTORY OF OTHER DISEASES OF THE RESPIRATORY SYSTEM: ICD-10-CM

## 2018-01-30 DIAGNOSIS — Z80.0 FAMILY HISTORY OF MALIGNANT NEOPLASM OF DIGESTIVE ORGANS: ICD-10-CM

## 2018-01-30 DIAGNOSIS — I10 ESSENTIAL (PRIMARY) HYPERTENSION: ICD-10-CM

## 2018-01-30 DIAGNOSIS — Z82.49 FAMILY HISTORY OF ISCHEMIC HEART DISEASE AND OTHER DISEASES OF THE CIRCULATORY SYSTEM: ICD-10-CM

## 2018-01-30 DIAGNOSIS — Z79.899 OTHER LONG TERM (CURRENT) DRUG THERAPY: ICD-10-CM

## 2018-01-30 DIAGNOSIS — I25.810 ATHEROSCLEROSIS OF CORONARY ARTERY BYPASS GRAFT(S) WITHOUT ANGINA PECTORIS: ICD-10-CM

## 2018-01-30 DIAGNOSIS — Z90.49 ACQUIRED ABSENCE OF OTHER SPECIFIED PARTS OF DIGESTIVE TRACT: ICD-10-CM

## 2018-01-30 DIAGNOSIS — E11.40 TYPE 2 DIABETES MELLITUS WITH DIABETIC NEUROPATHY, UNSPECIFIED: ICD-10-CM

## 2018-01-30 DIAGNOSIS — M17.0 BILATERAL PRIMARY OSTEOARTHRITIS OF KNEE: ICD-10-CM

## 2018-01-30 DIAGNOSIS — Z92.3 PERSONAL HISTORY OF IRRADIATION: ICD-10-CM

## 2018-01-30 DIAGNOSIS — Z87.891 PERSONAL HISTORY OF NICOTINE DEPENDENCE: ICD-10-CM

## 2018-01-30 DIAGNOSIS — E03.9 HYPOTHYROIDISM, UNSPECIFIED: ICD-10-CM

## 2018-01-30 DIAGNOSIS — I25.2 OLD MYOCARDIAL INFARCTION: ICD-10-CM

## 2018-02-05 ENCOUNTER — OUTPATIENT (OUTPATIENT)
Dept: OUTPATIENT SERVICES | Facility: HOSPITAL | Age: 78
LOS: 1 days | Discharge: HOME | End: 2018-02-05
Payer: MEDICARE

## 2018-02-05 DIAGNOSIS — Z95.1 PRESENCE OF AORTOCORONARY BYPASS GRAFT: Chronic | ICD-10-CM

## 2018-02-05 DIAGNOSIS — Z90.49 ACQUIRED ABSENCE OF OTHER SPECIFIED PARTS OF DIGESTIVE TRACT: Chronic | ICD-10-CM

## 2018-02-05 DIAGNOSIS — E11.621 TYPE 2 DIABETES MELLITUS WITH FOOT ULCER: ICD-10-CM

## 2018-02-05 PROCEDURE — G0463: CPT

## 2018-02-07 ENCOUNTER — OUTPATIENT (OUTPATIENT)
Dept: OUTPATIENT SERVICES | Facility: HOSPITAL | Age: 78
LOS: 1 days | Discharge: HOME | End: 2018-02-07
Payer: MEDICARE

## 2018-02-07 DIAGNOSIS — Z90.49 ACQUIRED ABSENCE OF OTHER SPECIFIED PARTS OF DIGESTIVE TRACT: Chronic | ICD-10-CM

## 2018-02-07 DIAGNOSIS — Z95.1 PRESENCE OF AORTOCORONARY BYPASS GRAFT: Chronic | ICD-10-CM

## 2018-02-07 DIAGNOSIS — E11.621 TYPE 2 DIABETES MELLITUS WITH FOOT ULCER: ICD-10-CM

## 2018-02-07 PROCEDURE — 15275 SKIN SUB GRAFT FACE/NK/HF/G: CPT

## 2018-02-09 DIAGNOSIS — Z95.1 PRESENCE OF AORTOCORONARY BYPASS GRAFT: ICD-10-CM

## 2018-02-09 DIAGNOSIS — Z90.49 ACQUIRED ABSENCE OF OTHER SPECIFIED PARTS OF DIGESTIVE TRACT: ICD-10-CM

## 2018-02-09 DIAGNOSIS — Z82.49 FAMILY HISTORY OF ISCHEMIC HEART DISEASE AND OTHER DISEASES OF THE CIRCULATORY SYSTEM: ICD-10-CM

## 2018-02-09 DIAGNOSIS — E11.40 TYPE 2 DIABETES MELLITUS WITH DIABETIC NEUROPATHY, UNSPECIFIED: ICD-10-CM

## 2018-02-09 DIAGNOSIS — I25.2 OLD MYOCARDIAL INFARCTION: ICD-10-CM

## 2018-02-09 DIAGNOSIS — Z83.3 FAMILY HISTORY OF DIABETES MELLITUS: ICD-10-CM

## 2018-02-09 DIAGNOSIS — E66.01 MORBID (SEVERE) OBESITY DUE TO EXCESS CALORIES: ICD-10-CM

## 2018-02-09 DIAGNOSIS — I83.11 VARICOSE VEINS OF RIGHT LOWER EXTREMITY WITH INFLAMMATION: ICD-10-CM

## 2018-02-09 DIAGNOSIS — I25.810 ATHEROSCLEROSIS OF CORONARY ARTERY BYPASS GRAFT(S) WITHOUT ANGINA PECTORIS: ICD-10-CM

## 2018-02-09 DIAGNOSIS — L97.422 NON-PRESSURE CHRONIC ULCER OF LEFT HEEL AND MIDFOOT WITH FAT LAYER EXPOSED: ICD-10-CM

## 2018-02-09 DIAGNOSIS — E78.00 PURE HYPERCHOLESTEROLEMIA, UNSPECIFIED: ICD-10-CM

## 2018-02-09 DIAGNOSIS — Z87.891 PERSONAL HISTORY OF NICOTINE DEPENDENCE: ICD-10-CM

## 2018-02-09 DIAGNOSIS — Z80.0 FAMILY HISTORY OF MALIGNANT NEOPLASM OF DIGESTIVE ORGANS: ICD-10-CM

## 2018-02-09 DIAGNOSIS — M17.0 BILATERAL PRIMARY OSTEOARTHRITIS OF KNEE: ICD-10-CM

## 2018-02-09 DIAGNOSIS — E11.621 TYPE 2 DIABETES MELLITUS WITH FOOT ULCER: ICD-10-CM

## 2018-02-09 DIAGNOSIS — Z92.3 PERSONAL HISTORY OF IRRADIATION: ICD-10-CM

## 2018-02-09 DIAGNOSIS — I10 ESSENTIAL (PRIMARY) HYPERTENSION: ICD-10-CM

## 2018-02-09 DIAGNOSIS — Z83.6 FAMILY HISTORY OF OTHER DISEASES OF THE RESPIRATORY SYSTEM: ICD-10-CM

## 2018-02-09 DIAGNOSIS — I83.12 VARICOSE VEINS OF LEFT LOWER EXTREMITY WITH INFLAMMATION: ICD-10-CM

## 2018-02-09 DIAGNOSIS — E03.9 HYPOTHYROIDISM, UNSPECIFIED: ICD-10-CM

## 2018-02-09 DIAGNOSIS — Z79.899 OTHER LONG TERM (CURRENT) DRUG THERAPY: ICD-10-CM

## 2018-02-09 DIAGNOSIS — Z85.46 PERSONAL HISTORY OF MALIGNANT NEOPLASM OF PROSTATE: ICD-10-CM

## 2018-02-11 DIAGNOSIS — M17.0 BILATERAL PRIMARY OSTEOARTHRITIS OF KNEE: ICD-10-CM

## 2018-02-11 DIAGNOSIS — E66.01 MORBID (SEVERE) OBESITY DUE TO EXCESS CALORIES: ICD-10-CM

## 2018-02-11 DIAGNOSIS — Z95.1 PRESENCE OF AORTOCORONARY BYPASS GRAFT: ICD-10-CM

## 2018-02-11 DIAGNOSIS — Z80.0 FAMILY HISTORY OF MALIGNANT NEOPLASM OF DIGESTIVE ORGANS: ICD-10-CM

## 2018-02-11 DIAGNOSIS — Z83.6 FAMILY HISTORY OF OTHER DISEASES OF THE RESPIRATORY SYSTEM: ICD-10-CM

## 2018-02-11 DIAGNOSIS — Z79.899 OTHER LONG TERM (CURRENT) DRUG THERAPY: ICD-10-CM

## 2018-02-11 DIAGNOSIS — I83.12 VARICOSE VEINS OF LEFT LOWER EXTREMITY WITH INFLAMMATION: ICD-10-CM

## 2018-02-11 DIAGNOSIS — I10 ESSENTIAL (PRIMARY) HYPERTENSION: ICD-10-CM

## 2018-02-11 DIAGNOSIS — Z85.46 PERSONAL HISTORY OF MALIGNANT NEOPLASM OF PROSTATE: ICD-10-CM

## 2018-02-11 DIAGNOSIS — Z92.3 PERSONAL HISTORY OF IRRADIATION: ICD-10-CM

## 2018-02-11 DIAGNOSIS — L97.422 NON-PRESSURE CHRONIC ULCER OF LEFT HEEL AND MIDFOOT WITH FAT LAYER EXPOSED: ICD-10-CM

## 2018-02-11 DIAGNOSIS — I25.2 OLD MYOCARDIAL INFARCTION: ICD-10-CM

## 2018-02-11 DIAGNOSIS — E11.621 TYPE 2 DIABETES MELLITUS WITH FOOT ULCER: ICD-10-CM

## 2018-02-11 DIAGNOSIS — Z83.3 FAMILY HISTORY OF DIABETES MELLITUS: ICD-10-CM

## 2018-02-11 DIAGNOSIS — Z90.49 ACQUIRED ABSENCE OF OTHER SPECIFIED PARTS OF DIGESTIVE TRACT: ICD-10-CM

## 2018-02-11 DIAGNOSIS — I25.810 ATHEROSCLEROSIS OF CORONARY ARTERY BYPASS GRAFT(S) WITHOUT ANGINA PECTORIS: ICD-10-CM

## 2018-02-11 DIAGNOSIS — Z87.891 PERSONAL HISTORY OF NICOTINE DEPENDENCE: ICD-10-CM

## 2018-02-11 DIAGNOSIS — E11.40 TYPE 2 DIABETES MELLITUS WITH DIABETIC NEUROPATHY, UNSPECIFIED: ICD-10-CM

## 2018-02-11 DIAGNOSIS — E78.00 PURE HYPERCHOLESTEROLEMIA, UNSPECIFIED: ICD-10-CM

## 2018-02-11 DIAGNOSIS — Z82.49 FAMILY HISTORY OF ISCHEMIC HEART DISEASE AND OTHER DISEASES OF THE CIRCULATORY SYSTEM: ICD-10-CM

## 2018-02-11 DIAGNOSIS — I83.11 VARICOSE VEINS OF RIGHT LOWER EXTREMITY WITH INFLAMMATION: ICD-10-CM

## 2018-02-11 DIAGNOSIS — E03.9 HYPOTHYROIDISM, UNSPECIFIED: ICD-10-CM

## 2018-02-14 ENCOUNTER — OUTPATIENT (OUTPATIENT)
Dept: OUTPATIENT SERVICES | Facility: HOSPITAL | Age: 78
LOS: 1 days | Discharge: HOME | End: 2018-02-14
Payer: MEDICARE

## 2018-02-14 DIAGNOSIS — Z95.1 PRESENCE OF AORTOCORONARY BYPASS GRAFT: Chronic | ICD-10-CM

## 2018-02-14 DIAGNOSIS — Z90.49 ACQUIRED ABSENCE OF OTHER SPECIFIED PARTS OF DIGESTIVE TRACT: Chronic | ICD-10-CM

## 2018-02-14 DIAGNOSIS — E11.621 TYPE 2 DIABETES MELLITUS WITH FOOT ULCER: ICD-10-CM

## 2018-02-14 PROCEDURE — G0463: CPT

## 2018-02-18 DIAGNOSIS — Z82.49 FAMILY HISTORY OF ISCHEMIC HEART DISEASE AND OTHER DISEASES OF THE CIRCULATORY SYSTEM: ICD-10-CM

## 2018-02-18 DIAGNOSIS — L97.422 NON-PRESSURE CHRONIC ULCER OF LEFT HEEL AND MIDFOOT WITH FAT LAYER EXPOSED: ICD-10-CM

## 2018-02-18 DIAGNOSIS — Z95.1 PRESENCE OF AORTOCORONARY BYPASS GRAFT: ICD-10-CM

## 2018-02-18 DIAGNOSIS — I83.12 VARICOSE VEINS OF LEFT LOWER EXTREMITY WITH INFLAMMATION: ICD-10-CM

## 2018-02-18 DIAGNOSIS — Z87.891 PERSONAL HISTORY OF NICOTINE DEPENDENCE: ICD-10-CM

## 2018-02-18 DIAGNOSIS — M17.0 BILATERAL PRIMARY OSTEOARTHRITIS OF KNEE: ICD-10-CM

## 2018-02-18 DIAGNOSIS — Z79.899 OTHER LONG TERM (CURRENT) DRUG THERAPY: ICD-10-CM

## 2018-02-18 DIAGNOSIS — E11.40 TYPE 2 DIABETES MELLITUS WITH DIABETIC NEUROPATHY, UNSPECIFIED: ICD-10-CM

## 2018-02-18 DIAGNOSIS — E66.01 MORBID (SEVERE) OBESITY DUE TO EXCESS CALORIES: ICD-10-CM

## 2018-02-18 DIAGNOSIS — Z92.3 PERSONAL HISTORY OF IRRADIATION: ICD-10-CM

## 2018-02-18 DIAGNOSIS — Z90.49 ACQUIRED ABSENCE OF OTHER SPECIFIED PARTS OF DIGESTIVE TRACT: ICD-10-CM

## 2018-02-18 DIAGNOSIS — Z83.3 FAMILY HISTORY OF DIABETES MELLITUS: ICD-10-CM

## 2018-02-18 DIAGNOSIS — I10 ESSENTIAL (PRIMARY) HYPERTENSION: ICD-10-CM

## 2018-02-18 DIAGNOSIS — Z83.6 FAMILY HISTORY OF OTHER DISEASES OF THE RESPIRATORY SYSTEM: ICD-10-CM

## 2018-02-18 DIAGNOSIS — Z85.46 PERSONAL HISTORY OF MALIGNANT NEOPLASM OF PROSTATE: ICD-10-CM

## 2018-02-18 DIAGNOSIS — E03.9 HYPOTHYROIDISM, UNSPECIFIED: ICD-10-CM

## 2018-02-18 DIAGNOSIS — E78.00 PURE HYPERCHOLESTEROLEMIA, UNSPECIFIED: ICD-10-CM

## 2018-02-18 DIAGNOSIS — E11.621 TYPE 2 DIABETES MELLITUS WITH FOOT ULCER: ICD-10-CM

## 2018-02-18 DIAGNOSIS — Z80.0 FAMILY HISTORY OF MALIGNANT NEOPLASM OF DIGESTIVE ORGANS: ICD-10-CM

## 2018-02-18 DIAGNOSIS — I25.810 ATHEROSCLEROSIS OF CORONARY ARTERY BYPASS GRAFT(S) WITHOUT ANGINA PECTORIS: ICD-10-CM

## 2018-02-18 DIAGNOSIS — I83.11 VARICOSE VEINS OF RIGHT LOWER EXTREMITY WITH INFLAMMATION: ICD-10-CM

## 2018-02-18 DIAGNOSIS — I25.2 OLD MYOCARDIAL INFARCTION: ICD-10-CM

## 2018-02-21 ENCOUNTER — OUTPATIENT (OUTPATIENT)
Dept: OUTPATIENT SERVICES | Facility: HOSPITAL | Age: 78
LOS: 1 days | Discharge: HOME | End: 2018-02-21
Payer: MEDICARE

## 2018-02-21 DIAGNOSIS — Z90.49 ACQUIRED ABSENCE OF OTHER SPECIFIED PARTS OF DIGESTIVE TRACT: Chronic | ICD-10-CM

## 2018-02-21 DIAGNOSIS — Z95.1 PRESENCE OF AORTOCORONARY BYPASS GRAFT: Chronic | ICD-10-CM

## 2018-02-21 DIAGNOSIS — E11.621 TYPE 2 DIABETES MELLITUS WITH FOOT ULCER: ICD-10-CM

## 2018-02-21 PROCEDURE — 15275 SKIN SUB GRAFT FACE/NK/HF/G: CPT

## 2018-02-22 DIAGNOSIS — E78.00 PURE HYPERCHOLESTEROLEMIA, UNSPECIFIED: ICD-10-CM

## 2018-02-22 DIAGNOSIS — E03.9 HYPOTHYROIDISM, UNSPECIFIED: ICD-10-CM

## 2018-02-22 DIAGNOSIS — E11.621 TYPE 2 DIABETES MELLITUS WITH FOOT ULCER: ICD-10-CM

## 2018-02-22 DIAGNOSIS — Z79.899 OTHER LONG TERM (CURRENT) DRUG THERAPY: ICD-10-CM

## 2018-02-22 DIAGNOSIS — Z83.6 FAMILY HISTORY OF OTHER DISEASES OF THE RESPIRATORY SYSTEM: ICD-10-CM

## 2018-02-22 DIAGNOSIS — Z80.0 FAMILY HISTORY OF MALIGNANT NEOPLASM OF DIGESTIVE ORGANS: ICD-10-CM

## 2018-02-22 DIAGNOSIS — I83.12 VARICOSE VEINS OF LEFT LOWER EXTREMITY WITH INFLAMMATION: ICD-10-CM

## 2018-02-22 DIAGNOSIS — I83.11 VARICOSE VEINS OF RIGHT LOWER EXTREMITY WITH INFLAMMATION: ICD-10-CM

## 2018-02-22 DIAGNOSIS — I25.810 ATHEROSCLEROSIS OF CORONARY ARTERY BYPASS GRAFT(S) WITHOUT ANGINA PECTORIS: ICD-10-CM

## 2018-02-22 DIAGNOSIS — L97.422 NON-PRESSURE CHRONIC ULCER OF LEFT HEEL AND MIDFOOT WITH FAT LAYER EXPOSED: ICD-10-CM

## 2018-02-22 DIAGNOSIS — Z83.3 FAMILY HISTORY OF DIABETES MELLITUS: ICD-10-CM

## 2018-02-22 DIAGNOSIS — Z92.3 PERSONAL HISTORY OF IRRADIATION: ICD-10-CM

## 2018-02-22 DIAGNOSIS — Z82.49 FAMILY HISTORY OF ISCHEMIC HEART DISEASE AND OTHER DISEASES OF THE CIRCULATORY SYSTEM: ICD-10-CM

## 2018-02-22 DIAGNOSIS — Z87.891 PERSONAL HISTORY OF NICOTINE DEPENDENCE: ICD-10-CM

## 2018-02-22 DIAGNOSIS — Z90.49 ACQUIRED ABSENCE OF OTHER SPECIFIED PARTS OF DIGESTIVE TRACT: ICD-10-CM

## 2018-02-22 DIAGNOSIS — Z95.1 PRESENCE OF AORTOCORONARY BYPASS GRAFT: ICD-10-CM

## 2018-02-22 DIAGNOSIS — I25.2 OLD MYOCARDIAL INFARCTION: ICD-10-CM

## 2018-02-22 DIAGNOSIS — Z85.46 PERSONAL HISTORY OF MALIGNANT NEOPLASM OF PROSTATE: ICD-10-CM

## 2018-02-22 DIAGNOSIS — I10 ESSENTIAL (PRIMARY) HYPERTENSION: ICD-10-CM

## 2018-02-22 DIAGNOSIS — M17.0 BILATERAL PRIMARY OSTEOARTHRITIS OF KNEE: ICD-10-CM

## 2018-02-22 DIAGNOSIS — E11.40 TYPE 2 DIABETES MELLITUS WITH DIABETIC NEUROPATHY, UNSPECIFIED: ICD-10-CM

## 2018-02-22 DIAGNOSIS — E66.01 MORBID (SEVERE) OBESITY DUE TO EXCESS CALORIES: ICD-10-CM

## 2018-02-28 ENCOUNTER — OUTPATIENT (OUTPATIENT)
Dept: OUTPATIENT SERVICES | Facility: HOSPITAL | Age: 78
LOS: 1 days | Discharge: HOME | End: 2018-02-28
Payer: MEDICARE

## 2018-02-28 DIAGNOSIS — Z95.1 PRESENCE OF AORTOCORONARY BYPASS GRAFT: Chronic | ICD-10-CM

## 2018-02-28 DIAGNOSIS — Z90.49 ACQUIRED ABSENCE OF OTHER SPECIFIED PARTS OF DIGESTIVE TRACT: Chronic | ICD-10-CM

## 2018-02-28 DIAGNOSIS — E11.621 TYPE 2 DIABETES MELLITUS WITH FOOT ULCER: ICD-10-CM

## 2018-02-28 PROCEDURE — 15275 SKIN SUB GRAFT FACE/NK/HF/G: CPT

## 2018-03-01 DIAGNOSIS — I25.810 ATHEROSCLEROSIS OF CORONARY ARTERY BYPASS GRAFT(S) WITHOUT ANGINA PECTORIS: ICD-10-CM

## 2018-03-01 DIAGNOSIS — E11.40 TYPE 2 DIABETES MELLITUS WITH DIABETIC NEUROPATHY, UNSPECIFIED: ICD-10-CM

## 2018-03-01 DIAGNOSIS — E66.01 MORBID (SEVERE) OBESITY DUE TO EXCESS CALORIES: ICD-10-CM

## 2018-03-01 DIAGNOSIS — E11.621 TYPE 2 DIABETES MELLITUS WITH FOOT ULCER: ICD-10-CM

## 2018-03-01 DIAGNOSIS — Z83.6 FAMILY HISTORY OF OTHER DISEASES OF THE RESPIRATORY SYSTEM: ICD-10-CM

## 2018-03-01 DIAGNOSIS — Z90.49 ACQUIRED ABSENCE OF OTHER SPECIFIED PARTS OF DIGESTIVE TRACT: ICD-10-CM

## 2018-03-01 DIAGNOSIS — I10 ESSENTIAL (PRIMARY) HYPERTENSION: ICD-10-CM

## 2018-03-01 DIAGNOSIS — Z82.49 FAMILY HISTORY OF ISCHEMIC HEART DISEASE AND OTHER DISEASES OF THE CIRCULATORY SYSTEM: ICD-10-CM

## 2018-03-01 DIAGNOSIS — Z95.1 PRESENCE OF AORTOCORONARY BYPASS GRAFT: ICD-10-CM

## 2018-03-01 DIAGNOSIS — Z87.891 PERSONAL HISTORY OF NICOTINE DEPENDENCE: ICD-10-CM

## 2018-03-01 DIAGNOSIS — L97.422 NON-PRESSURE CHRONIC ULCER OF LEFT HEEL AND MIDFOOT WITH FAT LAYER EXPOSED: ICD-10-CM

## 2018-03-01 DIAGNOSIS — Z79.899 OTHER LONG TERM (CURRENT) DRUG THERAPY: ICD-10-CM

## 2018-03-01 DIAGNOSIS — Z85.46 PERSONAL HISTORY OF MALIGNANT NEOPLASM OF PROSTATE: ICD-10-CM

## 2018-03-01 DIAGNOSIS — Z80.0 FAMILY HISTORY OF MALIGNANT NEOPLASM OF DIGESTIVE ORGANS: ICD-10-CM

## 2018-03-01 DIAGNOSIS — Z83.3 FAMILY HISTORY OF DIABETES MELLITUS: ICD-10-CM

## 2018-03-01 DIAGNOSIS — E03.9 HYPOTHYROIDISM, UNSPECIFIED: ICD-10-CM

## 2018-03-01 DIAGNOSIS — E78.00 PURE HYPERCHOLESTEROLEMIA, UNSPECIFIED: ICD-10-CM

## 2018-03-01 DIAGNOSIS — I83.11 VARICOSE VEINS OF RIGHT LOWER EXTREMITY WITH INFLAMMATION: ICD-10-CM

## 2018-03-01 DIAGNOSIS — I25.2 OLD MYOCARDIAL INFARCTION: ICD-10-CM

## 2018-03-01 DIAGNOSIS — I83.12 VARICOSE VEINS OF LEFT LOWER EXTREMITY WITH INFLAMMATION: ICD-10-CM

## 2018-03-01 DIAGNOSIS — M17.0 BILATERAL PRIMARY OSTEOARTHRITIS OF KNEE: ICD-10-CM

## 2018-03-01 DIAGNOSIS — Z92.3 PERSONAL HISTORY OF IRRADIATION: ICD-10-CM

## 2018-03-09 ENCOUNTER — OUTPATIENT (OUTPATIENT)
Dept: OUTPATIENT SERVICES | Facility: HOSPITAL | Age: 78
LOS: 1 days | Discharge: HOME | End: 2018-03-09
Payer: MEDICARE

## 2018-03-09 DIAGNOSIS — Z95.1 PRESENCE OF AORTOCORONARY BYPASS GRAFT: Chronic | ICD-10-CM

## 2018-03-09 DIAGNOSIS — E11.621 TYPE 2 DIABETES MELLITUS WITH FOOT ULCER: ICD-10-CM

## 2018-03-09 DIAGNOSIS — Z90.49 ACQUIRED ABSENCE OF OTHER SPECIFIED PARTS OF DIGESTIVE TRACT: Chronic | ICD-10-CM

## 2018-03-09 PROCEDURE — 11042 DBRDMT SUBQ TIS 1ST 20SQCM/<: CPT

## 2018-03-09 PROCEDURE — G0463: CPT | Mod: 25

## 2018-03-11 DIAGNOSIS — I10 ESSENTIAL (PRIMARY) HYPERTENSION: ICD-10-CM

## 2018-03-11 DIAGNOSIS — L97.812 NON-PRESSURE CHRONIC ULCER OF OTHER PART OF RIGHT LOWER LEG WITH FAT LAYER EXPOSED: ICD-10-CM

## 2018-03-11 DIAGNOSIS — Z82.49 FAMILY HISTORY OF ISCHEMIC HEART DISEASE AND OTHER DISEASES OF THE CIRCULATORY SYSTEM: ICD-10-CM

## 2018-03-11 DIAGNOSIS — I83.018 VARICOSE VEINS OF RIGHT LOWER EXTREMITY WITH ULCER OTHER PART OF LOWER LEG: ICD-10-CM

## 2018-03-11 DIAGNOSIS — I25.810 ATHEROSCLEROSIS OF CORONARY ARTERY BYPASS GRAFT(S) WITHOUT ANGINA PECTORIS: ICD-10-CM

## 2018-03-11 DIAGNOSIS — I83.12 VARICOSE VEINS OF LEFT LOWER EXTREMITY WITH INFLAMMATION: ICD-10-CM

## 2018-03-11 DIAGNOSIS — Z83.3 FAMILY HISTORY OF DIABETES MELLITUS: ICD-10-CM

## 2018-03-11 DIAGNOSIS — Z92.3 PERSONAL HISTORY OF IRRADIATION: ICD-10-CM

## 2018-03-11 DIAGNOSIS — Z90.49 ACQUIRED ABSENCE OF OTHER SPECIFIED PARTS OF DIGESTIVE TRACT: ICD-10-CM

## 2018-03-11 DIAGNOSIS — L97.422 NON-PRESSURE CHRONIC ULCER OF LEFT HEEL AND MIDFOOT WITH FAT LAYER EXPOSED: ICD-10-CM

## 2018-03-11 DIAGNOSIS — I25.2 OLD MYOCARDIAL INFARCTION: ICD-10-CM

## 2018-03-11 DIAGNOSIS — E11.40 TYPE 2 DIABETES MELLITUS WITH DIABETIC NEUROPATHY, UNSPECIFIED: ICD-10-CM

## 2018-03-11 DIAGNOSIS — E03.9 HYPOTHYROIDISM, UNSPECIFIED: ICD-10-CM

## 2018-03-11 DIAGNOSIS — E66.01 MORBID (SEVERE) OBESITY DUE TO EXCESS CALORIES: ICD-10-CM

## 2018-03-11 DIAGNOSIS — Z80.0 FAMILY HISTORY OF MALIGNANT NEOPLASM OF DIGESTIVE ORGANS: ICD-10-CM

## 2018-03-11 DIAGNOSIS — E78.00 PURE HYPERCHOLESTEROLEMIA, UNSPECIFIED: ICD-10-CM

## 2018-03-11 DIAGNOSIS — E11.621 TYPE 2 DIABETES MELLITUS WITH FOOT ULCER: ICD-10-CM

## 2018-03-11 DIAGNOSIS — M17.0 BILATERAL PRIMARY OSTEOARTHRITIS OF KNEE: ICD-10-CM

## 2018-03-11 DIAGNOSIS — Z87.891 PERSONAL HISTORY OF NICOTINE DEPENDENCE: ICD-10-CM

## 2018-03-11 DIAGNOSIS — Z83.6 FAMILY HISTORY OF OTHER DISEASES OF THE RESPIRATORY SYSTEM: ICD-10-CM

## 2018-03-11 DIAGNOSIS — Z95.1 PRESENCE OF AORTOCORONARY BYPASS GRAFT: ICD-10-CM

## 2018-03-11 DIAGNOSIS — Z85.46 PERSONAL HISTORY OF MALIGNANT NEOPLASM OF PROSTATE: ICD-10-CM

## 2018-03-11 DIAGNOSIS — Z79.899 OTHER LONG TERM (CURRENT) DRUG THERAPY: ICD-10-CM

## 2018-03-16 ENCOUNTER — OUTPATIENT (OUTPATIENT)
Dept: OUTPATIENT SERVICES | Facility: HOSPITAL | Age: 78
LOS: 1 days | Discharge: HOME | End: 2018-03-16
Payer: MEDICARE

## 2018-03-16 DIAGNOSIS — Z90.49 ACQUIRED ABSENCE OF OTHER SPECIFIED PARTS OF DIGESTIVE TRACT: Chronic | ICD-10-CM

## 2018-03-16 DIAGNOSIS — Z95.1 PRESENCE OF AORTOCORONARY BYPASS GRAFT: Chronic | ICD-10-CM

## 2018-03-16 DIAGNOSIS — I83.018 VARICOSE VEINS OF RIGHT LOWER EXTREMITY WITH ULCER OTHER PART OF LOWER LEG: ICD-10-CM

## 2018-03-16 PROCEDURE — G0463: CPT

## 2018-03-17 DIAGNOSIS — I10 ESSENTIAL (PRIMARY) HYPERTENSION: ICD-10-CM

## 2018-03-17 DIAGNOSIS — E11.40 TYPE 2 DIABETES MELLITUS WITH DIABETIC NEUROPATHY, UNSPECIFIED: ICD-10-CM

## 2018-03-17 DIAGNOSIS — Z83.6 FAMILY HISTORY OF OTHER DISEASES OF THE RESPIRATORY SYSTEM: ICD-10-CM

## 2018-03-17 DIAGNOSIS — L97.422 NON-PRESSURE CHRONIC ULCER OF LEFT HEEL AND MIDFOOT WITH FAT LAYER EXPOSED: ICD-10-CM

## 2018-03-17 DIAGNOSIS — R06.02 SHORTNESS OF BREATH: ICD-10-CM

## 2018-03-17 DIAGNOSIS — Z95.1 PRESENCE OF AORTOCORONARY BYPASS GRAFT: ICD-10-CM

## 2018-03-17 DIAGNOSIS — L97.812 NON-PRESSURE CHRONIC ULCER OF OTHER PART OF RIGHT LOWER LEG WITH FAT LAYER EXPOSED: ICD-10-CM

## 2018-03-17 DIAGNOSIS — I83.12 VARICOSE VEINS OF LEFT LOWER EXTREMITY WITH INFLAMMATION: ICD-10-CM

## 2018-03-17 DIAGNOSIS — Z79.899 OTHER LONG TERM (CURRENT) DRUG THERAPY: ICD-10-CM

## 2018-03-17 DIAGNOSIS — Z90.49 ACQUIRED ABSENCE OF OTHER SPECIFIED PARTS OF DIGESTIVE TRACT: ICD-10-CM

## 2018-03-17 DIAGNOSIS — I83.218 VARICOSE VEINS OF RIGHT LOWER EXTREMITY WITH BOTH ULCER OF OTHER PART OF LOWER EXTREMITY AND INFLAMMATION: ICD-10-CM

## 2018-03-17 DIAGNOSIS — L97.821 NON-PRESSURE CHRONIC ULCER OF OTHER PART OF LEFT LOWER LEG LIMITED TO BREAKDOWN OF SKIN: ICD-10-CM

## 2018-03-17 DIAGNOSIS — Z80.0 FAMILY HISTORY OF MALIGNANT NEOPLASM OF DIGESTIVE ORGANS: ICD-10-CM

## 2018-03-17 DIAGNOSIS — Z85.46 PERSONAL HISTORY OF MALIGNANT NEOPLASM OF PROSTATE: ICD-10-CM

## 2018-03-17 DIAGNOSIS — E11.621 TYPE 2 DIABETES MELLITUS WITH FOOT ULCER: ICD-10-CM

## 2018-03-17 DIAGNOSIS — Z92.3 PERSONAL HISTORY OF IRRADIATION: ICD-10-CM

## 2018-03-17 DIAGNOSIS — Z82.49 FAMILY HISTORY OF ISCHEMIC HEART DISEASE AND OTHER DISEASES OF THE CIRCULATORY SYSTEM: ICD-10-CM

## 2018-03-17 DIAGNOSIS — Z83.3 FAMILY HISTORY OF DIABETES MELLITUS: ICD-10-CM

## 2018-03-17 DIAGNOSIS — E03.9 HYPOTHYROIDISM, UNSPECIFIED: ICD-10-CM

## 2018-03-17 DIAGNOSIS — I83.228 VARICOSE VEINS OF LEFT LOWER EXTREMITY WITH BOTH ULCER OF OTHER PART OF LOWER EXTREMITY AND INFLAMMATION: ICD-10-CM

## 2018-03-17 DIAGNOSIS — Z87.891 PERSONAL HISTORY OF NICOTINE DEPENDENCE: ICD-10-CM

## 2018-03-17 DIAGNOSIS — E66.01 MORBID (SEVERE) OBESITY DUE TO EXCESS CALORIES: ICD-10-CM

## 2018-03-17 DIAGNOSIS — E78.00 PURE HYPERCHOLESTEROLEMIA, UNSPECIFIED: ICD-10-CM

## 2018-03-17 DIAGNOSIS — M17.0 BILATERAL PRIMARY OSTEOARTHRITIS OF KNEE: ICD-10-CM

## 2018-03-17 DIAGNOSIS — I25.810 ATHEROSCLEROSIS OF CORONARY ARTERY BYPASS GRAFT(S) WITHOUT ANGINA PECTORIS: ICD-10-CM

## 2018-03-23 ENCOUNTER — OUTPATIENT (OUTPATIENT)
Dept: OUTPATIENT SERVICES | Facility: HOSPITAL | Age: 78
LOS: 1 days | Discharge: HOME | End: 2018-03-23
Payer: MEDICARE

## 2018-03-23 DIAGNOSIS — Z90.49 ACQUIRED ABSENCE OF OTHER SPECIFIED PARTS OF DIGESTIVE TRACT: Chronic | ICD-10-CM

## 2018-03-23 DIAGNOSIS — I83.018 VARICOSE VEINS OF RIGHT LOWER EXTREMITY WITH ULCER OTHER PART OF LOWER LEG: ICD-10-CM

## 2018-03-23 DIAGNOSIS — Z95.1 PRESENCE OF AORTOCORONARY BYPASS GRAFT: Chronic | ICD-10-CM

## 2018-03-23 PROCEDURE — G0463: CPT

## 2018-03-24 DIAGNOSIS — L03.116 CELLULITIS OF LEFT LOWER LIMB: ICD-10-CM

## 2018-03-24 DIAGNOSIS — Z90.49 ACQUIRED ABSENCE OF OTHER SPECIFIED PARTS OF DIGESTIVE TRACT: ICD-10-CM

## 2018-03-24 DIAGNOSIS — I25.810 ATHEROSCLEROSIS OF CORONARY ARTERY BYPASS GRAFT(S) WITHOUT ANGINA PECTORIS: ICD-10-CM

## 2018-03-24 DIAGNOSIS — Z87.891 PERSONAL HISTORY OF NICOTINE DEPENDENCE: ICD-10-CM

## 2018-03-24 DIAGNOSIS — M17.0 BILATERAL PRIMARY OSTEOARTHRITIS OF KNEE: ICD-10-CM

## 2018-03-24 DIAGNOSIS — Z83.3 FAMILY HISTORY OF DIABETES MELLITUS: ICD-10-CM

## 2018-03-24 DIAGNOSIS — L97.812 NON-PRESSURE CHRONIC ULCER OF OTHER PART OF RIGHT LOWER LEG WITH FAT LAYER EXPOSED: ICD-10-CM

## 2018-03-24 DIAGNOSIS — Z92.3 PERSONAL HISTORY OF IRRADIATION: ICD-10-CM

## 2018-03-24 DIAGNOSIS — Z95.1 PRESENCE OF AORTOCORONARY BYPASS GRAFT: ICD-10-CM

## 2018-03-24 DIAGNOSIS — Z85.46 PERSONAL HISTORY OF MALIGNANT NEOPLASM OF PROSTATE: ICD-10-CM

## 2018-03-24 DIAGNOSIS — E11.40 TYPE 2 DIABETES MELLITUS WITH DIABETIC NEUROPATHY, UNSPECIFIED: ICD-10-CM

## 2018-03-24 DIAGNOSIS — L97.422 NON-PRESSURE CHRONIC ULCER OF LEFT HEEL AND MIDFOOT WITH FAT LAYER EXPOSED: ICD-10-CM

## 2018-03-24 DIAGNOSIS — E78.00 PURE HYPERCHOLESTEROLEMIA, UNSPECIFIED: ICD-10-CM

## 2018-03-24 DIAGNOSIS — E03.9 HYPOTHYROIDISM, UNSPECIFIED: ICD-10-CM

## 2018-03-24 DIAGNOSIS — Z83.6 FAMILY HISTORY OF OTHER DISEASES OF THE RESPIRATORY SYSTEM: ICD-10-CM

## 2018-03-24 DIAGNOSIS — L97.821 NON-PRESSURE CHRONIC ULCER OF OTHER PART OF LEFT LOWER LEG LIMITED TO BREAKDOWN OF SKIN: ICD-10-CM

## 2018-03-24 DIAGNOSIS — I83.218 VARICOSE VEINS OF RIGHT LOWER EXTREMITY WITH BOTH ULCER OF OTHER PART OF LOWER EXTREMITY AND INFLAMMATION: ICD-10-CM

## 2018-03-24 DIAGNOSIS — E66.01 MORBID (SEVERE) OBESITY DUE TO EXCESS CALORIES: ICD-10-CM

## 2018-03-24 DIAGNOSIS — I10 ESSENTIAL (PRIMARY) HYPERTENSION: ICD-10-CM

## 2018-03-24 DIAGNOSIS — R06.02 SHORTNESS OF BREATH: ICD-10-CM

## 2018-03-24 DIAGNOSIS — I83.228 VARICOSE VEINS OF LEFT LOWER EXTREMITY WITH BOTH ULCER OF OTHER PART OF LOWER EXTREMITY AND INFLAMMATION: ICD-10-CM

## 2018-03-24 DIAGNOSIS — I25.2 OLD MYOCARDIAL INFARCTION: ICD-10-CM

## 2018-03-24 DIAGNOSIS — Z82.49 FAMILY HISTORY OF ISCHEMIC HEART DISEASE AND OTHER DISEASES OF THE CIRCULATORY SYSTEM: ICD-10-CM

## 2018-03-24 DIAGNOSIS — Z80.0 FAMILY HISTORY OF MALIGNANT NEOPLASM OF DIGESTIVE ORGANS: ICD-10-CM

## 2018-03-24 DIAGNOSIS — Z79.899 OTHER LONG TERM (CURRENT) DRUG THERAPY: ICD-10-CM

## 2018-03-26 ENCOUNTER — OUTPATIENT (OUTPATIENT)
Dept: OUTPATIENT SERVICES | Facility: HOSPITAL | Age: 78
LOS: 1 days | Discharge: HOME | End: 2018-03-26
Payer: MEDICARE

## 2018-03-26 DIAGNOSIS — Z90.49 ACQUIRED ABSENCE OF OTHER SPECIFIED PARTS OF DIGESTIVE TRACT: Chronic | ICD-10-CM

## 2018-03-26 DIAGNOSIS — Z95.1 PRESENCE OF AORTOCORONARY BYPASS GRAFT: Chronic | ICD-10-CM

## 2018-03-26 DIAGNOSIS — I83.018 VARICOSE VEINS OF RIGHT LOWER EXTREMITY WITH ULCER OTHER PART OF LOWER LEG: ICD-10-CM

## 2018-03-26 PROCEDURE — G0463: CPT

## 2018-03-28 ENCOUNTER — OUTPATIENT (OUTPATIENT)
Dept: OUTPATIENT SERVICES | Facility: HOSPITAL | Age: 78
LOS: 1 days | Discharge: HOME | End: 2018-03-28
Payer: MEDICARE

## 2018-03-28 DIAGNOSIS — E11.621 TYPE 2 DIABETES MELLITUS WITH FOOT ULCER: ICD-10-CM

## 2018-03-28 DIAGNOSIS — I83.018 VARICOSE VEINS OF RIGHT LOWER EXTREMITY WITH ULCER OTHER PART OF LOWER LEG: ICD-10-CM

## 2018-03-28 DIAGNOSIS — I83.228 VARICOSE VEINS OF LEFT LOWER EXTREMITY WITH BOTH ULCER OF OTHER PART OF LOWER EXTREMITY AND INFLAMMATION: ICD-10-CM

## 2018-03-28 DIAGNOSIS — Z95.1 PRESENCE OF AORTOCORONARY BYPASS GRAFT: ICD-10-CM

## 2018-03-28 DIAGNOSIS — Z82.49 FAMILY HISTORY OF ISCHEMIC HEART DISEASE AND OTHER DISEASES OF THE CIRCULATORY SYSTEM: ICD-10-CM

## 2018-03-28 DIAGNOSIS — I25.810 ATHEROSCLEROSIS OF CORONARY ARTERY BYPASS GRAFT(S) WITHOUT ANGINA PECTORIS: ICD-10-CM

## 2018-03-28 DIAGNOSIS — M17.0 BILATERAL PRIMARY OSTEOARTHRITIS OF KNEE: ICD-10-CM

## 2018-03-28 DIAGNOSIS — I10 ESSENTIAL (PRIMARY) HYPERTENSION: ICD-10-CM

## 2018-03-28 DIAGNOSIS — Z92.3 PERSONAL HISTORY OF IRRADIATION: ICD-10-CM

## 2018-03-28 DIAGNOSIS — L97.812 NON-PRESSURE CHRONIC ULCER OF OTHER PART OF RIGHT LOWER LEG WITH FAT LAYER EXPOSED: ICD-10-CM

## 2018-03-28 DIAGNOSIS — E11.40 TYPE 2 DIABETES MELLITUS WITH DIABETIC NEUROPATHY, UNSPECIFIED: ICD-10-CM

## 2018-03-28 DIAGNOSIS — Z83.3 FAMILY HISTORY OF DIABETES MELLITUS: ICD-10-CM

## 2018-03-28 DIAGNOSIS — Z95.1 PRESENCE OF AORTOCORONARY BYPASS GRAFT: Chronic | ICD-10-CM

## 2018-03-28 DIAGNOSIS — I83.218 VARICOSE VEINS OF RIGHT LOWER EXTREMITY WITH BOTH ULCER OF OTHER PART OF LOWER EXTREMITY AND INFLAMMATION: ICD-10-CM

## 2018-03-28 DIAGNOSIS — I25.2 OLD MYOCARDIAL INFARCTION: ICD-10-CM

## 2018-03-28 DIAGNOSIS — L97.821 NON-PRESSURE CHRONIC ULCER OF OTHER PART OF LEFT LOWER LEG LIMITED TO BREAKDOWN OF SKIN: ICD-10-CM

## 2018-03-28 DIAGNOSIS — E66.01 MORBID (SEVERE) OBESITY DUE TO EXCESS CALORIES: ICD-10-CM

## 2018-03-28 DIAGNOSIS — Z87.891 PERSONAL HISTORY OF NICOTINE DEPENDENCE: ICD-10-CM

## 2018-03-28 DIAGNOSIS — Z90.49 ACQUIRED ABSENCE OF OTHER SPECIFIED PARTS OF DIGESTIVE TRACT: Chronic | ICD-10-CM

## 2018-03-28 DIAGNOSIS — Z80.0 FAMILY HISTORY OF MALIGNANT NEOPLASM OF DIGESTIVE ORGANS: ICD-10-CM

## 2018-03-28 DIAGNOSIS — Z90.49 ACQUIRED ABSENCE OF OTHER SPECIFIED PARTS OF DIGESTIVE TRACT: ICD-10-CM

## 2018-03-28 DIAGNOSIS — Z83.6 FAMILY HISTORY OF OTHER DISEASES OF THE RESPIRATORY SYSTEM: ICD-10-CM

## 2018-03-28 DIAGNOSIS — L97.422 NON-PRESSURE CHRONIC ULCER OF LEFT HEEL AND MIDFOOT WITH FAT LAYER EXPOSED: ICD-10-CM

## 2018-03-28 DIAGNOSIS — E78.00 PURE HYPERCHOLESTEROLEMIA, UNSPECIFIED: ICD-10-CM

## 2018-03-28 DIAGNOSIS — E03.9 HYPOTHYROIDISM, UNSPECIFIED: ICD-10-CM

## 2018-03-28 DIAGNOSIS — Z79.899 OTHER LONG TERM (CURRENT) DRUG THERAPY: ICD-10-CM

## 2018-03-28 DIAGNOSIS — Z85.46 PERSONAL HISTORY OF MALIGNANT NEOPLASM OF PROSTATE: ICD-10-CM

## 2018-03-28 PROCEDURE — G0463: CPT

## 2018-03-28 PROCEDURE — 82962 GLUCOSE BLOOD TEST: CPT

## 2018-03-30 ENCOUNTER — OUTPATIENT (OUTPATIENT)
Dept: OUTPATIENT SERVICES | Facility: HOSPITAL | Age: 78
LOS: 1 days | Discharge: HOME | End: 2018-03-30
Payer: MEDICARE

## 2018-03-30 DIAGNOSIS — Z90.49 ACQUIRED ABSENCE OF OTHER SPECIFIED PARTS OF DIGESTIVE TRACT: Chronic | ICD-10-CM

## 2018-03-30 DIAGNOSIS — I83.018 VARICOSE VEINS OF RIGHT LOWER EXTREMITY WITH ULCER OTHER PART OF LOWER LEG: ICD-10-CM

## 2018-03-30 DIAGNOSIS — Z95.1 PRESENCE OF AORTOCORONARY BYPASS GRAFT: Chronic | ICD-10-CM

## 2018-03-30 PROCEDURE — G0463: CPT

## 2018-03-31 DIAGNOSIS — Z85.46 PERSONAL HISTORY OF MALIGNANT NEOPLASM OF PROSTATE: ICD-10-CM

## 2018-03-31 DIAGNOSIS — Z83.3 FAMILY HISTORY OF DIABETES MELLITUS: ICD-10-CM

## 2018-03-31 DIAGNOSIS — I83.228 VARICOSE VEINS OF LEFT LOWER EXTREMITY WITH BOTH ULCER OF OTHER PART OF LOWER EXTREMITY AND INFLAMMATION: ICD-10-CM

## 2018-03-31 DIAGNOSIS — I10 ESSENTIAL (PRIMARY) HYPERTENSION: ICD-10-CM

## 2018-03-31 DIAGNOSIS — I25.810 ATHEROSCLEROSIS OF CORONARY ARTERY BYPASS GRAFT(S) WITHOUT ANGINA PECTORIS: ICD-10-CM

## 2018-03-31 DIAGNOSIS — E11.40 TYPE 2 DIABETES MELLITUS WITH DIABETIC NEUROPATHY, UNSPECIFIED: ICD-10-CM

## 2018-03-31 DIAGNOSIS — L97.422 NON-PRESSURE CHRONIC ULCER OF LEFT HEEL AND MIDFOOT WITH FAT LAYER EXPOSED: ICD-10-CM

## 2018-03-31 DIAGNOSIS — Z82.49 FAMILY HISTORY OF ISCHEMIC HEART DISEASE AND OTHER DISEASES OF THE CIRCULATORY SYSTEM: ICD-10-CM

## 2018-03-31 DIAGNOSIS — I83.218 VARICOSE VEINS OF RIGHT LOWER EXTREMITY WITH BOTH ULCER OF OTHER PART OF LOWER EXTREMITY AND INFLAMMATION: ICD-10-CM

## 2018-03-31 DIAGNOSIS — E03.9 HYPOTHYROIDISM, UNSPECIFIED: ICD-10-CM

## 2018-03-31 DIAGNOSIS — Z95.1 PRESENCE OF AORTOCORONARY BYPASS GRAFT: ICD-10-CM

## 2018-03-31 DIAGNOSIS — E78.00 PURE HYPERCHOLESTEROLEMIA, UNSPECIFIED: ICD-10-CM

## 2018-03-31 DIAGNOSIS — L97.812 NON-PRESSURE CHRONIC ULCER OF OTHER PART OF RIGHT LOWER LEG WITH FAT LAYER EXPOSED: ICD-10-CM

## 2018-03-31 DIAGNOSIS — E11.621 TYPE 2 DIABETES MELLITUS WITH FOOT ULCER: ICD-10-CM

## 2018-03-31 DIAGNOSIS — Z80.0 FAMILY HISTORY OF MALIGNANT NEOPLASM OF DIGESTIVE ORGANS: ICD-10-CM

## 2018-03-31 DIAGNOSIS — Z87.891 PERSONAL HISTORY OF NICOTINE DEPENDENCE: ICD-10-CM

## 2018-03-31 DIAGNOSIS — M17.0 BILATERAL PRIMARY OSTEOARTHRITIS OF KNEE: ICD-10-CM

## 2018-03-31 DIAGNOSIS — Z92.3 PERSONAL HISTORY OF IRRADIATION: ICD-10-CM

## 2018-03-31 DIAGNOSIS — E66.9 OBESITY, UNSPECIFIED: ICD-10-CM

## 2018-03-31 DIAGNOSIS — L97.821 NON-PRESSURE CHRONIC ULCER OF OTHER PART OF LEFT LOWER LEG LIMITED TO BREAKDOWN OF SKIN: ICD-10-CM

## 2018-03-31 DIAGNOSIS — Z83.6 FAMILY HISTORY OF OTHER DISEASES OF THE RESPIRATORY SYSTEM: ICD-10-CM

## 2018-03-31 DIAGNOSIS — Z79.899 OTHER LONG TERM (CURRENT) DRUG THERAPY: ICD-10-CM

## 2018-03-31 DIAGNOSIS — Z90.49 ACQUIRED ABSENCE OF OTHER SPECIFIED PARTS OF DIGESTIVE TRACT: ICD-10-CM

## 2018-04-04 ENCOUNTER — OUTPATIENT (OUTPATIENT)
Dept: OUTPATIENT SERVICES | Facility: HOSPITAL | Age: 78
LOS: 1 days | Discharge: HOME | End: 2018-04-04
Payer: MEDICARE

## 2018-04-04 DIAGNOSIS — I83.218 VARICOSE VEINS OF RIGHT LOWER EXTREMITY WITH BOTH ULCER OF OTHER PART OF LOWER EXTREMITY AND INFLAMMATION: ICD-10-CM

## 2018-04-04 DIAGNOSIS — Z95.1 PRESENCE OF AORTOCORONARY BYPASS GRAFT: Chronic | ICD-10-CM

## 2018-04-04 DIAGNOSIS — E11.621 TYPE 2 DIABETES MELLITUS WITH FOOT ULCER: ICD-10-CM

## 2018-04-04 DIAGNOSIS — Z90.49 ACQUIRED ABSENCE OF OTHER SPECIFIED PARTS OF DIGESTIVE TRACT: Chronic | ICD-10-CM

## 2018-04-04 PROCEDURE — G0463: CPT

## 2018-04-04 PROCEDURE — 82962 GLUCOSE BLOOD TEST: CPT

## 2018-04-09 DIAGNOSIS — Z82.49 FAMILY HISTORY OF ISCHEMIC HEART DISEASE AND OTHER DISEASES OF THE CIRCULATORY SYSTEM: ICD-10-CM

## 2018-04-09 DIAGNOSIS — Z90.49 ACQUIRED ABSENCE OF OTHER SPECIFIED PARTS OF DIGESTIVE TRACT: ICD-10-CM

## 2018-04-09 DIAGNOSIS — I83.218 VARICOSE VEINS OF RIGHT LOWER EXTREMITY WITH BOTH ULCER OF OTHER PART OF LOWER EXTREMITY AND INFLAMMATION: ICD-10-CM

## 2018-04-09 DIAGNOSIS — Z92.3 PERSONAL HISTORY OF IRRADIATION: ICD-10-CM

## 2018-04-09 DIAGNOSIS — E11.621 TYPE 2 DIABETES MELLITUS WITH FOOT ULCER: ICD-10-CM

## 2018-04-09 DIAGNOSIS — L97.812 NON-PRESSURE CHRONIC ULCER OF OTHER PART OF RIGHT LOWER LEG WITH FAT LAYER EXPOSED: ICD-10-CM

## 2018-04-09 DIAGNOSIS — M17.0 BILATERAL PRIMARY OSTEOARTHRITIS OF KNEE: ICD-10-CM

## 2018-04-09 DIAGNOSIS — Z87.891 PERSONAL HISTORY OF NICOTINE DEPENDENCE: ICD-10-CM

## 2018-04-09 DIAGNOSIS — Z95.1 PRESENCE OF AORTOCORONARY BYPASS GRAFT: ICD-10-CM

## 2018-04-09 DIAGNOSIS — Z80.0 FAMILY HISTORY OF MALIGNANT NEOPLASM OF DIGESTIVE ORGANS: ICD-10-CM

## 2018-04-09 DIAGNOSIS — E03.9 HYPOTHYROIDISM, UNSPECIFIED: ICD-10-CM

## 2018-04-09 DIAGNOSIS — L97.422 NON-PRESSURE CHRONIC ULCER OF LEFT HEEL AND MIDFOOT WITH FAT LAYER EXPOSED: ICD-10-CM

## 2018-04-09 DIAGNOSIS — I25.810 ATHEROSCLEROSIS OF CORONARY ARTERY BYPASS GRAFT(S) WITHOUT ANGINA PECTORIS: ICD-10-CM

## 2018-04-09 DIAGNOSIS — I25.2 OLD MYOCARDIAL INFARCTION: ICD-10-CM

## 2018-04-09 DIAGNOSIS — E11.40 TYPE 2 DIABETES MELLITUS WITH DIABETIC NEUROPATHY, UNSPECIFIED: ICD-10-CM

## 2018-04-09 DIAGNOSIS — Z83.6 FAMILY HISTORY OF OTHER DISEASES OF THE RESPIRATORY SYSTEM: ICD-10-CM

## 2018-04-09 DIAGNOSIS — Z79.899 OTHER LONG TERM (CURRENT) DRUG THERAPY: ICD-10-CM

## 2018-04-09 DIAGNOSIS — E66.9 OBESITY, UNSPECIFIED: ICD-10-CM

## 2018-04-09 DIAGNOSIS — Z83.3 FAMILY HISTORY OF DIABETES MELLITUS: ICD-10-CM

## 2018-04-09 DIAGNOSIS — E78.00 PURE HYPERCHOLESTEROLEMIA, UNSPECIFIED: ICD-10-CM

## 2018-04-09 DIAGNOSIS — I10 ESSENTIAL (PRIMARY) HYPERTENSION: ICD-10-CM

## 2018-04-11 ENCOUNTER — OUTPATIENT (OUTPATIENT)
Dept: OUTPATIENT SERVICES | Facility: HOSPITAL | Age: 78
LOS: 1 days | Discharge: HOME | End: 2018-04-11
Payer: MEDICARE

## 2018-04-11 DIAGNOSIS — Z95.1 PRESENCE OF AORTOCORONARY BYPASS GRAFT: Chronic | ICD-10-CM

## 2018-04-11 DIAGNOSIS — I83.218 VARICOSE VEINS OF RIGHT LOWER EXTREMITY WITH BOTH ULCER OF OTHER PART OF LOWER EXTREMITY AND INFLAMMATION: ICD-10-CM

## 2018-04-11 DIAGNOSIS — Z90.49 ACQUIRED ABSENCE OF OTHER SPECIFIED PARTS OF DIGESTIVE TRACT: Chronic | ICD-10-CM

## 2018-04-11 PROCEDURE — G0463: CPT

## 2018-04-13 DIAGNOSIS — Z95.1 PRESENCE OF AORTOCORONARY BYPASS GRAFT: ICD-10-CM

## 2018-04-13 DIAGNOSIS — I83.12 VARICOSE VEINS OF LEFT LOWER EXTREMITY WITH INFLAMMATION: ICD-10-CM

## 2018-04-13 DIAGNOSIS — I25.810 ATHEROSCLEROSIS OF CORONARY ARTERY BYPASS GRAFT(S) WITHOUT ANGINA PECTORIS: ICD-10-CM

## 2018-04-13 DIAGNOSIS — E03.9 HYPOTHYROIDISM, UNSPECIFIED: ICD-10-CM

## 2018-04-13 DIAGNOSIS — Z92.3 PERSONAL HISTORY OF IRRADIATION: ICD-10-CM

## 2018-04-13 DIAGNOSIS — Z83.3 FAMILY HISTORY OF DIABETES MELLITUS: ICD-10-CM

## 2018-04-13 DIAGNOSIS — Z80.0 FAMILY HISTORY OF MALIGNANT NEOPLASM OF DIGESTIVE ORGANS: ICD-10-CM

## 2018-04-13 DIAGNOSIS — Z79.899 OTHER LONG TERM (CURRENT) DRUG THERAPY: ICD-10-CM

## 2018-04-13 DIAGNOSIS — Z83.6 FAMILY HISTORY OF OTHER DISEASES OF THE RESPIRATORY SYSTEM: ICD-10-CM

## 2018-04-13 DIAGNOSIS — Z85.46 PERSONAL HISTORY OF MALIGNANT NEOPLASM OF PROSTATE: ICD-10-CM

## 2018-04-13 DIAGNOSIS — B35.1 TINEA UNGUIUM: ICD-10-CM

## 2018-04-13 DIAGNOSIS — L97.812 NON-PRESSURE CHRONIC ULCER OF OTHER PART OF RIGHT LOWER LEG WITH FAT LAYER EXPOSED: ICD-10-CM

## 2018-04-13 DIAGNOSIS — E66.9 OBESITY, UNSPECIFIED: ICD-10-CM

## 2018-04-13 DIAGNOSIS — I25.2 OLD MYOCARDIAL INFARCTION: ICD-10-CM

## 2018-04-13 DIAGNOSIS — I10 ESSENTIAL (PRIMARY) HYPERTENSION: ICD-10-CM

## 2018-04-13 DIAGNOSIS — Z90.49 ACQUIRED ABSENCE OF OTHER SPECIFIED PARTS OF DIGESTIVE TRACT: ICD-10-CM

## 2018-04-13 DIAGNOSIS — M17.0 BILATERAL PRIMARY OSTEOARTHRITIS OF KNEE: ICD-10-CM

## 2018-04-13 DIAGNOSIS — E11.40 TYPE 2 DIABETES MELLITUS WITH DIABETIC NEUROPATHY, UNSPECIFIED: ICD-10-CM

## 2018-04-13 DIAGNOSIS — I83.218 VARICOSE VEINS OF RIGHT LOWER EXTREMITY WITH BOTH ULCER OF OTHER PART OF LOWER EXTREMITY AND INFLAMMATION: ICD-10-CM

## 2018-04-13 DIAGNOSIS — Z87.891 PERSONAL HISTORY OF NICOTINE DEPENDENCE: ICD-10-CM

## 2018-04-13 DIAGNOSIS — E78.00 PURE HYPERCHOLESTEROLEMIA, UNSPECIFIED: ICD-10-CM

## 2018-04-13 DIAGNOSIS — Z82.49 FAMILY HISTORY OF ISCHEMIC HEART DISEASE AND OTHER DISEASES OF THE CIRCULATORY SYSTEM: ICD-10-CM

## 2018-04-18 ENCOUNTER — OUTPATIENT (OUTPATIENT)
Dept: OUTPATIENT SERVICES | Facility: HOSPITAL | Age: 78
LOS: 1 days | Discharge: HOME | End: 2018-04-18
Payer: MEDICARE

## 2018-04-18 DIAGNOSIS — I83.218 VARICOSE VEINS OF RIGHT LOWER EXTREMITY WITH BOTH ULCER OF OTHER PART OF LOWER EXTREMITY AND INFLAMMATION: ICD-10-CM

## 2018-04-18 DIAGNOSIS — Z95.1 PRESENCE OF AORTOCORONARY BYPASS GRAFT: Chronic | ICD-10-CM

## 2018-04-18 DIAGNOSIS — Z90.49 ACQUIRED ABSENCE OF OTHER SPECIFIED PARTS OF DIGESTIVE TRACT: Chronic | ICD-10-CM

## 2018-04-18 PROCEDURE — G0463: CPT

## 2018-04-20 DIAGNOSIS — Z83.6 FAMILY HISTORY OF OTHER DISEASES OF THE RESPIRATORY SYSTEM: ICD-10-CM
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
## 2018-04-20 DIAGNOSIS — I25.810 ATHEROSCLEROSIS OF CORONARY ARTERY BYPASS GRAFT(S) WITHOUT ANGINA PECTORIS: ICD-10-CM

## 2018-04-20 DIAGNOSIS — E11.40 TYPE 2 DIABETES MELLITUS WITH DIABETIC NEUROPATHY, UNSPECIFIED: ICD-10-CM

## 2018-04-20 DIAGNOSIS — E03.9 HYPOTHYROIDISM, UNSPECIFIED: ICD-10-CM

## 2018-04-20 DIAGNOSIS — Z79.899 OTHER LONG TERM (CURRENT) DRUG THERAPY: ICD-10-CM

## 2018-04-20 DIAGNOSIS — S90.211A CONTUSION OF RIGHT GREAT TOE WITH DAMAGE TO NAIL, INITIAL ENCOUNTER: ICD-10-CM
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
## 2018-04-20 DIAGNOSIS — E78.00 PURE HYPERCHOLESTEROLEMIA, UNSPECIFIED: ICD-10-CM

## 2018-04-20 DIAGNOSIS — I10 ESSENTIAL (PRIMARY) HYPERTENSION: ICD-10-CM

## 2018-04-20 DIAGNOSIS — Z80.0 FAMILY HISTORY OF MALIGNANT NEOPLASM OF DIGESTIVE ORGANS: ICD-10-CM

## 2018-04-20 DIAGNOSIS — Z82.49 FAMILY HISTORY OF ISCHEMIC HEART DISEASE AND OTHER DISEASES OF THE CIRCULATORY SYSTEM: ICD-10-CM

## 2018-04-20 DIAGNOSIS — Z95.1 PRESENCE OF AORTOCORONARY BYPASS GRAFT: ICD-10-CM

## 2018-04-20 DIAGNOSIS — Z90.49 ACQUIRED ABSENCE OF OTHER SPECIFIED PARTS OF DIGESTIVE TRACT: ICD-10-CM

## 2018-04-20 DIAGNOSIS — E11.621 TYPE 2 DIABETES MELLITUS WITH FOOT ULCER: ICD-10-CM

## 2018-04-20 DIAGNOSIS — Z87.891 PERSONAL HISTORY OF NICOTINE DEPENDENCE: ICD-10-CM
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
## 2018-04-20 DIAGNOSIS — Z83.3 FAMILY HISTORY OF DIABETES MELLITUS: ICD-10-CM

## 2018-04-20 DIAGNOSIS — I25.2 OLD MYOCARDIAL INFARCTION: ICD-10-CM

## 2018-04-20 DIAGNOSIS — I83.12 VARICOSE VEINS OF LEFT LOWER EXTREMITY WITH INFLAMMATION: ICD-10-CM

## 2018-04-20 DIAGNOSIS — M17.0 BILATERAL PRIMARY OSTEOARTHRITIS OF KNEE: ICD-10-CM

## 2018-04-20 DIAGNOSIS — L97.422 NON-PRESSURE CHRONIC ULCER OF LEFT HEEL AND MIDFOOT WITH FAT LAYER EXPOSED: ICD-10-CM

## 2018-04-20 DIAGNOSIS — Z92.3 PERSONAL HISTORY OF IRRADIATION: ICD-10-CM

## 2018-04-20 DIAGNOSIS — E66.3 OVERWEIGHT: ICD-10-CM
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
## 2018-04-25 ENCOUNTER — OUTPATIENT (OUTPATIENT)
Dept: OUTPATIENT SERVICES | Facility: HOSPITAL | Age: 78
LOS: 1 days | Discharge: HOME | End: 2018-04-25
Payer: MEDICARE

## 2018-04-25 DIAGNOSIS — Z95.1 PRESENCE OF AORTOCORONARY BYPASS GRAFT: Chronic | ICD-10-CM

## 2018-04-25 DIAGNOSIS — Z90.49 ACQUIRED ABSENCE OF OTHER SPECIFIED PARTS OF DIGESTIVE TRACT: Chronic | ICD-10-CM

## 2018-04-25 DIAGNOSIS — E11.621 TYPE 2 DIABETES MELLITUS WITH FOOT ULCER: ICD-10-CM

## 2018-04-25 PROCEDURE — G0463: CPT

## 2018-04-26 DIAGNOSIS — Z90.49 ACQUIRED ABSENCE OF OTHER SPECIFIED PARTS OF DIGESTIVE TRACT: ICD-10-CM

## 2018-04-26 DIAGNOSIS — E11.40 TYPE 2 DIABETES MELLITUS WITH DIABETIC NEUROPATHY, UNSPECIFIED: ICD-10-CM
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
## 2018-04-26 DIAGNOSIS — L97.422 NON-PRESSURE CHRONIC ULCER OF LEFT HEEL AND MIDFOOT WITH FAT LAYER EXPOSED: ICD-10-CM

## 2018-04-26 DIAGNOSIS — Z83.6 FAMILY HISTORY OF OTHER DISEASES OF THE RESPIRATORY SYSTEM: ICD-10-CM

## 2018-04-26 DIAGNOSIS — I25.2 OLD MYOCARDIAL INFARCTION: ICD-10-CM

## 2018-04-26 DIAGNOSIS — S90.211D CONTUSION OF RIGHT GREAT TOE WITH DAMAGE TO NAIL, SUBSEQUENT ENCOUNTER: ICD-10-CM

## 2018-04-26 DIAGNOSIS — Z79.899 OTHER LONG TERM (CURRENT) DRUG THERAPY: ICD-10-CM

## 2018-04-26 DIAGNOSIS — E66.3 OVERWEIGHT: ICD-10-CM
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
## 2018-04-26 DIAGNOSIS — Z87.891 PERSONAL HISTORY OF NICOTINE DEPENDENCE: ICD-10-CM

## 2018-04-26 DIAGNOSIS — M17.0 BILATERAL PRIMARY OSTEOARTHRITIS OF KNEE: ICD-10-CM

## 2018-04-26 DIAGNOSIS — E78.00 PURE HYPERCHOLESTEROLEMIA, UNSPECIFIED: ICD-10-CM

## 2018-04-26 DIAGNOSIS — E03.9 HYPOTHYROIDISM, UNSPECIFIED: ICD-10-CM
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
## 2018-04-26 DIAGNOSIS — Z85.46 PERSONAL HISTORY OF MALIGNANT NEOPLASM OF PROSTATE: ICD-10-CM

## 2018-04-26 DIAGNOSIS — Z83.3 FAMILY HISTORY OF DIABETES MELLITUS: ICD-10-CM

## 2018-04-26 DIAGNOSIS — I10 ESSENTIAL (PRIMARY) HYPERTENSION: ICD-10-CM

## 2018-04-26 DIAGNOSIS — Z82.49 FAMILY HISTORY OF ISCHEMIC HEART DISEASE AND OTHER DISEASES OF THE CIRCULATORY SYSTEM: ICD-10-CM

## 2018-04-26 DIAGNOSIS — I25.810 ATHEROSCLEROSIS OF CORONARY ARTERY BYPASS GRAFT(S) WITHOUT ANGINA PECTORIS: ICD-10-CM
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
## 2018-04-26 DIAGNOSIS — E11.621 TYPE 2 DIABETES MELLITUS WITH FOOT ULCER: ICD-10-CM

## 2018-04-26 DIAGNOSIS — I83.12 VARICOSE VEINS OF LEFT LOWER EXTREMITY WITH INFLAMMATION: ICD-10-CM

## 2018-04-26 DIAGNOSIS — Z80.0 FAMILY HISTORY OF MALIGNANT NEOPLASM OF DIGESTIVE ORGANS: ICD-10-CM

## 2018-04-26 DIAGNOSIS — Z92.3 PERSONAL HISTORY OF IRRADIATION: ICD-10-CM

## 2018-04-26 DIAGNOSIS — Z95.1 PRESENCE OF AORTOCORONARY BYPASS GRAFT: ICD-10-CM

## 2018-05-02 ENCOUNTER — OUTPATIENT (OUTPATIENT)
Dept: OUTPATIENT SERVICES | Facility: HOSPITAL | Age: 78
LOS: 1 days | Discharge: HOME | End: 2018-05-02
Payer: MEDICARE

## 2018-05-02 DIAGNOSIS — Z95.1 PRESENCE OF AORTOCORONARY BYPASS GRAFT: Chronic | ICD-10-CM

## 2018-05-02 DIAGNOSIS — Z90.49 ACQUIRED ABSENCE OF OTHER SPECIFIED PARTS OF DIGESTIVE TRACT: Chronic | ICD-10-CM

## 2018-05-02 DIAGNOSIS — E11.621 TYPE 2 DIABETES MELLITUS WITH FOOT ULCER: ICD-10-CM

## 2018-05-02 PROCEDURE — G0463: CPT

## 2018-05-04 DIAGNOSIS — I25.2 OLD MYOCARDIAL INFARCTION: ICD-10-CM

## 2018-05-04 DIAGNOSIS — E78.00 PURE HYPERCHOLESTEROLEMIA, UNSPECIFIED: ICD-10-CM

## 2018-05-04 DIAGNOSIS — I25.810 ATHEROSCLEROSIS OF CORONARY ARTERY BYPASS GRAFT(S) WITHOUT ANGINA PECTORIS: ICD-10-CM

## 2018-05-04 DIAGNOSIS — E11.621 TYPE 2 DIABETES MELLITUS WITH FOOT ULCER: ICD-10-CM
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
## 2018-05-04 DIAGNOSIS — Z82.49 FAMILY HISTORY OF ISCHEMIC HEART DISEASE AND OTHER DISEASES OF THE CIRCULATORY SYSTEM: ICD-10-CM

## 2018-05-04 DIAGNOSIS — Z90.49 ACQUIRED ABSENCE OF OTHER SPECIFIED PARTS OF DIGESTIVE TRACT: ICD-10-CM

## 2018-05-04 DIAGNOSIS — E03.9 HYPOTHYROIDISM, UNSPECIFIED: ICD-10-CM

## 2018-05-04 DIAGNOSIS — L97.422 NON-PRESSURE CHRONIC ULCER OF LEFT HEEL AND MIDFOOT WITH FAT LAYER EXPOSED: ICD-10-CM

## 2018-05-04 DIAGNOSIS — Z92.3 PERSONAL HISTORY OF IRRADIATION: ICD-10-CM

## 2018-05-04 DIAGNOSIS — Z83.3 FAMILY HISTORY OF DIABETES MELLITUS: ICD-10-CM

## 2018-05-04 DIAGNOSIS — M17.0 BILATERAL PRIMARY OSTEOARTHRITIS OF KNEE: ICD-10-CM

## 2018-05-04 DIAGNOSIS — E11.40 TYPE 2 DIABETES MELLITUS WITH DIABETIC NEUROPATHY, UNSPECIFIED: ICD-10-CM

## 2018-05-04 DIAGNOSIS — Z87.891 PERSONAL HISTORY OF NICOTINE DEPENDENCE: ICD-10-CM

## 2018-05-04 DIAGNOSIS — Z83.6 FAMILY HISTORY OF OTHER DISEASES OF THE RESPIRATORY SYSTEM: ICD-10-CM

## 2018-05-04 DIAGNOSIS — Z79.899 OTHER LONG TERM (CURRENT) DRUG THERAPY: ICD-10-CM
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
## 2018-05-04 DIAGNOSIS — Z95.1 PRESENCE OF AORTOCORONARY BYPASS GRAFT: ICD-10-CM

## 2018-05-04 DIAGNOSIS — S90.211D CONTUSION OF RIGHT GREAT TOE WITH DAMAGE TO NAIL, SUBSEQUENT ENCOUNTER: ICD-10-CM

## 2018-05-04 DIAGNOSIS — I10 ESSENTIAL (PRIMARY) HYPERTENSION: ICD-10-CM

## 2018-05-04 DIAGNOSIS — Z80.0 FAMILY HISTORY OF MALIGNANT NEOPLASM OF DIGESTIVE ORGANS: ICD-10-CM

## 2018-05-04 DIAGNOSIS — I83.12 VARICOSE VEINS OF LEFT LOWER EXTREMITY WITH INFLAMMATION: ICD-10-CM

## 2018-05-04 DIAGNOSIS — E66.01 MORBID (SEVERE) OBESITY DUE TO EXCESS CALORIES: ICD-10-CM

## 2018-05-16 ENCOUNTER — OUTPATIENT (OUTPATIENT)
Dept: OUTPATIENT SERVICES | Facility: HOSPITAL | Age: 78
LOS: 1 days | Discharge: HOME | End: 2018-05-16
Payer: MEDICARE

## 2018-05-16 DIAGNOSIS — Z95.1 PRESENCE OF AORTOCORONARY BYPASS GRAFT: Chronic | ICD-10-CM

## 2018-05-16 DIAGNOSIS — Z90.49 ACQUIRED ABSENCE OF OTHER SPECIFIED PARTS OF DIGESTIVE TRACT: Chronic | ICD-10-CM

## 2018-05-16 DIAGNOSIS — E11.621 TYPE 2 DIABETES MELLITUS WITH FOOT ULCER: ICD-10-CM

## 2018-05-16 PROCEDURE — 17250 CHEM CAUT OF GRANLTJ TISSUE: CPT

## 2018-05-18 DIAGNOSIS — E66.9 OBESITY, UNSPECIFIED: ICD-10-CM

## 2018-05-18 DIAGNOSIS — Z82.49 FAMILY HISTORY OF ISCHEMIC HEART DISEASE AND OTHER DISEASES OF THE CIRCULATORY SYSTEM: ICD-10-CM

## 2018-05-18 DIAGNOSIS — Z92.3 PERSONAL HISTORY OF IRRADIATION: ICD-10-CM

## 2018-05-18 DIAGNOSIS — Z87.891 PERSONAL HISTORY OF NICOTINE DEPENDENCE: ICD-10-CM
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
## 2018-05-18 DIAGNOSIS — E78.00 PURE HYPERCHOLESTEROLEMIA, UNSPECIFIED: ICD-10-CM

## 2018-05-18 DIAGNOSIS — E03.9 HYPOTHYROIDISM, UNSPECIFIED: ICD-10-CM
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
## 2018-05-18 DIAGNOSIS — Z85.46 PERSONAL HISTORY OF MALIGNANT NEOPLASM OF PROSTATE: ICD-10-CM
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
## 2018-05-18 DIAGNOSIS — M17.0 BILATERAL PRIMARY OSTEOARTHRITIS OF KNEE: ICD-10-CM

## 2018-05-18 DIAGNOSIS — I25.2 OLD MYOCARDIAL INFARCTION: ICD-10-CM

## 2018-05-18 DIAGNOSIS — Z90.49 ACQUIRED ABSENCE OF OTHER SPECIFIED PARTS OF DIGESTIVE TRACT: ICD-10-CM

## 2018-05-18 DIAGNOSIS — E11.621 TYPE 2 DIABETES MELLITUS WITH FOOT ULCER: ICD-10-CM

## 2018-05-18 DIAGNOSIS — Z83.6 FAMILY HISTORY OF OTHER DISEASES OF THE RESPIRATORY SYSTEM: ICD-10-CM

## 2018-05-18 DIAGNOSIS — Z80.0 FAMILY HISTORY OF MALIGNANT NEOPLASM OF DIGESTIVE ORGANS: ICD-10-CM

## 2018-05-18 DIAGNOSIS — L92.9 GRANULOMATOUS DISORDER OF THE SKIN AND SUBCUTANEOUS TISSUE, UNSPECIFIED: ICD-10-CM

## 2018-05-18 DIAGNOSIS — I25.810 ATHEROSCLEROSIS OF CORONARY ARTERY BYPASS GRAFT(S) WITHOUT ANGINA PECTORIS: ICD-10-CM

## 2018-05-18 DIAGNOSIS — L97.422 NON-PRESSURE CHRONIC ULCER OF LEFT HEEL AND MIDFOOT WITH FAT LAYER EXPOSED: ICD-10-CM

## 2018-05-18 DIAGNOSIS — Z79.899 OTHER LONG TERM (CURRENT) DRUG THERAPY: ICD-10-CM

## 2018-05-18 DIAGNOSIS — S90.211D CONTUSION OF RIGHT GREAT TOE WITH DAMAGE TO NAIL, SUBSEQUENT ENCOUNTER: ICD-10-CM
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
## 2018-05-18 DIAGNOSIS — Z83.3 FAMILY HISTORY OF DIABETES MELLITUS: ICD-10-CM

## 2018-05-18 DIAGNOSIS — E11.40 TYPE 2 DIABETES MELLITUS WITH DIABETIC NEUROPATHY, UNSPECIFIED: ICD-10-CM

## 2018-05-18 DIAGNOSIS — Z95.1 PRESENCE OF AORTOCORONARY BYPASS GRAFT: ICD-10-CM

## 2018-05-18 DIAGNOSIS — I10 ESSENTIAL (PRIMARY) HYPERTENSION: ICD-10-CM

## 2018-05-21 ENCOUNTER — OUTPATIENT (OUTPATIENT)
Dept: OUTPATIENT SERVICES | Facility: HOSPITAL | Age: 78
LOS: 1 days | End: 2018-05-21
Payer: MEDICARE

## 2018-05-21 DIAGNOSIS — Z90.49 ACQUIRED ABSENCE OF OTHER SPECIFIED PARTS OF DIGESTIVE TRACT: Chronic | ICD-10-CM

## 2018-05-21 DIAGNOSIS — Z95.1 PRESENCE OF AORTOCORONARY BYPASS GRAFT: Chronic | ICD-10-CM

## 2018-05-21 DIAGNOSIS — M86.672 OTHER CHRONIC OSTEOMYELITIS, LEFT ANKLE AND FOOT: ICD-10-CM

## 2018-05-21 PROCEDURE — 93923 UPR/LXTR ART STDY 3+ LVLS: CPT | Mod: 26

## 2018-05-21 PROCEDURE — 93923 UPR/LXTR ART STDY 3+ LVLS: CPT

## 2018-05-30 ENCOUNTER — OUTPATIENT (OUTPATIENT)
Dept: OUTPATIENT SERVICES | Facility: HOSPITAL | Age: 78
LOS: 1 days | Discharge: HOME | End: 2018-05-30
Payer: MEDICARE

## 2018-05-30 DIAGNOSIS — Z90.49 ACQUIRED ABSENCE OF OTHER SPECIFIED PARTS OF DIGESTIVE TRACT: Chronic | ICD-10-CM

## 2018-05-30 DIAGNOSIS — Z95.1 PRESENCE OF AORTOCORONARY BYPASS GRAFT: Chronic | ICD-10-CM

## 2018-05-30 DIAGNOSIS — L92.9 GRANULOMATOUS DISORDER OF THE SKIN AND SUBCUTANEOUS TISSUE, UNSPECIFIED: ICD-10-CM

## 2018-05-30 PROCEDURE — 82962 GLUCOSE BLOOD TEST: CPT

## 2018-05-30 PROCEDURE — 99213 OFFICE O/P EST LOW 20 MIN: CPT

## 2018-05-30 PROCEDURE — G0463: CPT

## 2018-05-31 DIAGNOSIS — Z92.3 PERSONAL HISTORY OF IRRADIATION: ICD-10-CM

## 2018-05-31 DIAGNOSIS — I83.12 VARICOSE VEINS OF LEFT LOWER EXTREMITY WITH INFLAMMATION: ICD-10-CM

## 2018-05-31 DIAGNOSIS — E66.9 OBESITY, UNSPECIFIED: ICD-10-CM

## 2018-05-31 DIAGNOSIS — E11.621 TYPE 2 DIABETES MELLITUS WITH FOOT ULCER: ICD-10-CM

## 2018-05-31 DIAGNOSIS — E11.40 TYPE 2 DIABETES MELLITUS WITH DIABETIC NEUROPATHY, UNSPECIFIED: ICD-10-CM

## 2018-05-31 DIAGNOSIS — I83.11 VARICOSE VEINS OF RIGHT LOWER EXTREMITY WITH INFLAMMATION: ICD-10-CM

## 2018-05-31 DIAGNOSIS — Z79.899 OTHER LONG TERM (CURRENT) DRUG THERAPY: ICD-10-CM

## 2018-05-31 DIAGNOSIS — L97.422 NON-PRESSURE CHRONIC ULCER OF LEFT HEEL AND MIDFOOT WITH FAT LAYER EXPOSED: ICD-10-CM

## 2018-05-31 DIAGNOSIS — Z90.49 ACQUIRED ABSENCE OF OTHER SPECIFIED PARTS OF DIGESTIVE TRACT: ICD-10-CM

## 2018-05-31 DIAGNOSIS — Z82.49 FAMILY HISTORY OF ISCHEMIC HEART DISEASE AND OTHER DISEASES OF THE CIRCULATORY SYSTEM: ICD-10-CM

## 2018-05-31 DIAGNOSIS — I10 ESSENTIAL (PRIMARY) HYPERTENSION: ICD-10-CM

## 2018-05-31 DIAGNOSIS — E11.51 TYPE 2 DIABETES MELLITUS WITH DIABETIC PERIPHERAL ANGIOPATHY WITHOUT GANGRENE: ICD-10-CM

## 2018-05-31 DIAGNOSIS — Z83.6 FAMILY HISTORY OF OTHER DISEASES OF THE RESPIRATORY SYSTEM: ICD-10-CM

## 2018-05-31 DIAGNOSIS — Z80.0 FAMILY HISTORY OF MALIGNANT NEOPLASM OF DIGESTIVE ORGANS: ICD-10-CM

## 2018-05-31 DIAGNOSIS — I25.810 ATHEROSCLEROSIS OF CORONARY ARTERY BYPASS GRAFT(S) WITHOUT ANGINA PECTORIS: ICD-10-CM

## 2018-05-31 DIAGNOSIS — Z83.3 FAMILY HISTORY OF DIABETES MELLITUS: ICD-10-CM

## 2018-05-31 DIAGNOSIS — I25.2 OLD MYOCARDIAL INFARCTION: ICD-10-CM

## 2018-05-31 DIAGNOSIS — M17.0 BILATERAL PRIMARY OSTEOARTHRITIS OF KNEE: ICD-10-CM

## 2018-05-31 DIAGNOSIS — E03.9 HYPOTHYROIDISM, UNSPECIFIED: ICD-10-CM

## 2018-05-31 DIAGNOSIS — Z87.891 PERSONAL HISTORY OF NICOTINE DEPENDENCE: ICD-10-CM

## 2018-05-31 DIAGNOSIS — E78.00 PURE HYPERCHOLESTEROLEMIA, UNSPECIFIED: ICD-10-CM

## 2018-05-31 DIAGNOSIS — L84 CORNS AND CALLOSITIES: ICD-10-CM

## 2018-05-31 DIAGNOSIS — Z95.1 PRESENCE OF AORTOCORONARY BYPASS GRAFT: ICD-10-CM

## 2018-06-13 ENCOUNTER — OUTPATIENT (OUTPATIENT)
Dept: OUTPATIENT SERVICES | Facility: HOSPITAL | Age: 78
LOS: 1 days | Discharge: HOME | End: 2018-06-13
Payer: MEDICARE

## 2018-06-13 DIAGNOSIS — E11.621 TYPE 2 DIABETES MELLITUS WITH FOOT ULCER: ICD-10-CM

## 2018-06-13 DIAGNOSIS — Z95.1 PRESENCE OF AORTOCORONARY BYPASS GRAFT: Chronic | ICD-10-CM

## 2018-06-13 DIAGNOSIS — Z90.49 ACQUIRED ABSENCE OF OTHER SPECIFIED PARTS OF DIGESTIVE TRACT: Chronic | ICD-10-CM

## 2018-06-13 PROCEDURE — G0463: CPT

## 2018-06-15 DIAGNOSIS — Z80.0 FAMILY HISTORY OF MALIGNANT NEOPLASM OF DIGESTIVE ORGANS: ICD-10-CM

## 2018-06-15 DIAGNOSIS — E66.9 OBESITY, UNSPECIFIED: ICD-10-CM

## 2018-06-15 DIAGNOSIS — Z79.899 OTHER LONG TERM (CURRENT) DRUG THERAPY: ICD-10-CM

## 2018-06-15 DIAGNOSIS — I25.810 ATHEROSCLEROSIS OF CORONARY ARTERY BYPASS GRAFT(S) WITHOUT ANGINA PECTORIS: ICD-10-CM

## 2018-06-15 DIAGNOSIS — L97.422 NON-PRESSURE CHRONIC ULCER OF LEFT HEEL AND MIDFOOT WITH FAT LAYER EXPOSED: ICD-10-CM

## 2018-06-15 DIAGNOSIS — Z83.3 FAMILY HISTORY OF DIABETES MELLITUS: ICD-10-CM

## 2018-06-15 DIAGNOSIS — E78.00 PURE HYPERCHOLESTEROLEMIA, UNSPECIFIED: ICD-10-CM

## 2018-06-15 DIAGNOSIS — Z87.891 PERSONAL HISTORY OF NICOTINE DEPENDENCE: ICD-10-CM

## 2018-06-15 DIAGNOSIS — M17.0 BILATERAL PRIMARY OSTEOARTHRITIS OF KNEE: ICD-10-CM

## 2018-06-15 DIAGNOSIS — Z83.6 FAMILY HISTORY OF OTHER DISEASES OF THE RESPIRATORY SYSTEM: ICD-10-CM

## 2018-06-15 DIAGNOSIS — E11.51 TYPE 2 DIABETES MELLITUS WITH DIABETIC PERIPHERAL ANGIOPATHY WITHOUT GANGRENE: ICD-10-CM

## 2018-06-15 DIAGNOSIS — I83.11 VARICOSE VEINS OF RIGHT LOWER EXTREMITY WITH INFLAMMATION: ICD-10-CM

## 2018-06-15 DIAGNOSIS — L84 CORNS AND CALLOSITIES: ICD-10-CM

## 2018-06-15 DIAGNOSIS — Z82.49 FAMILY HISTORY OF ISCHEMIC HEART DISEASE AND OTHER DISEASES OF THE CIRCULATORY SYSTEM: ICD-10-CM

## 2018-06-15 DIAGNOSIS — Z92.3 PERSONAL HISTORY OF IRRADIATION: ICD-10-CM

## 2018-06-15 DIAGNOSIS — E11.621 TYPE 2 DIABETES MELLITUS WITH FOOT ULCER: ICD-10-CM

## 2018-06-15 DIAGNOSIS — E11.40 TYPE 2 DIABETES MELLITUS WITH DIABETIC NEUROPATHY, UNSPECIFIED: ICD-10-CM

## 2018-06-15 DIAGNOSIS — I10 ESSENTIAL (PRIMARY) HYPERTENSION: ICD-10-CM

## 2018-06-15 DIAGNOSIS — Z90.49 ACQUIRED ABSENCE OF OTHER SPECIFIED PARTS OF DIGESTIVE TRACT: ICD-10-CM

## 2018-06-15 DIAGNOSIS — I25.2 OLD MYOCARDIAL INFARCTION: ICD-10-CM

## 2018-06-15 DIAGNOSIS — L97.822 NON-PRESSURE CHRONIC ULCER OF OTHER PART OF LEFT LOWER LEG WITH FAT LAYER EXPOSED: ICD-10-CM

## 2018-06-15 DIAGNOSIS — Z95.1 PRESENCE OF AORTOCORONARY BYPASS GRAFT: ICD-10-CM

## 2018-06-15 DIAGNOSIS — I83.218 VARICOSE VEINS OF RIGHT LOWER EXTREMITY WITH BOTH ULCER OF OTHER PART OF LOWER EXTREMITY AND INFLAMMATION: ICD-10-CM

## 2018-06-15 DIAGNOSIS — E03.9 HYPOTHYROIDISM, UNSPECIFIED: ICD-10-CM

## 2018-06-18 ENCOUNTER — OUTPATIENT (OUTPATIENT)
Dept: OUTPATIENT SERVICES | Facility: HOSPITAL | Age: 78
LOS: 1 days | Discharge: HOME | End: 2018-06-18
Payer: MEDICARE

## 2018-06-18 DIAGNOSIS — Z95.1 PRESENCE OF AORTOCORONARY BYPASS GRAFT: Chronic | ICD-10-CM

## 2018-06-18 DIAGNOSIS — Z90.49 ACQUIRED ABSENCE OF OTHER SPECIFIED PARTS OF DIGESTIVE TRACT: Chronic | ICD-10-CM

## 2018-06-18 DIAGNOSIS — E11.621 TYPE 2 DIABETES MELLITUS WITH FOOT ULCER: ICD-10-CM

## 2018-06-18 PROCEDURE — G0463: CPT

## 2018-06-19 DIAGNOSIS — E11.51 TYPE 2 DIABETES MELLITUS WITH DIABETIC PERIPHERAL ANGIOPATHY WITHOUT GANGRENE: ICD-10-CM

## 2018-06-19 DIAGNOSIS — E11.621 TYPE 2 DIABETES MELLITUS WITH FOOT ULCER: ICD-10-CM

## 2018-06-19 DIAGNOSIS — I25.810 ATHEROSCLEROSIS OF CORONARY ARTERY BYPASS GRAFT(S) WITHOUT ANGINA PECTORIS: ICD-10-CM

## 2018-06-19 DIAGNOSIS — I83.11 VARICOSE VEINS OF RIGHT LOWER EXTREMITY WITH INFLAMMATION: ICD-10-CM

## 2018-06-19 DIAGNOSIS — M17.0 BILATERAL PRIMARY OSTEOARTHRITIS OF KNEE: ICD-10-CM

## 2018-06-19 DIAGNOSIS — Z83.6 FAMILY HISTORY OF OTHER DISEASES OF THE RESPIRATORY SYSTEM: ICD-10-CM

## 2018-06-19 DIAGNOSIS — Z92.3 PERSONAL HISTORY OF IRRADIATION: ICD-10-CM

## 2018-06-19 DIAGNOSIS — I25.2 OLD MYOCARDIAL INFARCTION: ICD-10-CM

## 2018-06-19 DIAGNOSIS — I83.218 VARICOSE VEINS OF RIGHT LOWER EXTREMITY WITH BOTH ULCER OF OTHER PART OF LOWER EXTREMITY AND INFLAMMATION: ICD-10-CM

## 2018-06-19 DIAGNOSIS — Z83.3 FAMILY HISTORY OF DIABETES MELLITUS: ICD-10-CM

## 2018-06-19 DIAGNOSIS — E03.9 HYPOTHYROIDISM, UNSPECIFIED: ICD-10-CM

## 2018-06-19 DIAGNOSIS — E66.9 OBESITY, UNSPECIFIED: ICD-10-CM

## 2018-06-19 DIAGNOSIS — Z95.1 PRESENCE OF AORTOCORONARY BYPASS GRAFT: ICD-10-CM

## 2018-06-19 DIAGNOSIS — L97.422 NON-PRESSURE CHRONIC ULCER OF LEFT HEEL AND MIDFOOT WITH FAT LAYER EXPOSED: ICD-10-CM

## 2018-06-19 DIAGNOSIS — Z87.891 PERSONAL HISTORY OF NICOTINE DEPENDENCE: ICD-10-CM

## 2018-06-19 DIAGNOSIS — Z80.0 FAMILY HISTORY OF MALIGNANT NEOPLASM OF DIGESTIVE ORGANS: ICD-10-CM

## 2018-06-19 DIAGNOSIS — E11.40 TYPE 2 DIABETES MELLITUS WITH DIABETIC NEUROPATHY, UNSPECIFIED: ICD-10-CM

## 2018-06-19 DIAGNOSIS — E78.00 PURE HYPERCHOLESTEROLEMIA, UNSPECIFIED: ICD-10-CM

## 2018-06-19 DIAGNOSIS — Z79.899 OTHER LONG TERM (CURRENT) DRUG THERAPY: ICD-10-CM

## 2018-06-19 DIAGNOSIS — L97.822 NON-PRESSURE CHRONIC ULCER OF OTHER PART OF LEFT LOWER LEG WITH FAT LAYER EXPOSED: ICD-10-CM

## 2018-06-19 DIAGNOSIS — Z90.49 ACQUIRED ABSENCE OF OTHER SPECIFIED PARTS OF DIGESTIVE TRACT: ICD-10-CM

## 2018-06-19 DIAGNOSIS — Z82.49 FAMILY HISTORY OF ISCHEMIC HEART DISEASE AND OTHER DISEASES OF THE CIRCULATORY SYSTEM: ICD-10-CM

## 2018-06-19 DIAGNOSIS — I10 ESSENTIAL (PRIMARY) HYPERTENSION: ICD-10-CM

## 2018-06-25 ENCOUNTER — OUTPATIENT (OUTPATIENT)
Dept: OUTPATIENT SERVICES | Facility: HOSPITAL | Age: 78
LOS: 1 days | Discharge: HOME | End: 2018-06-25
Payer: MEDICARE

## 2018-06-25 DIAGNOSIS — E11.621 TYPE 2 DIABETES MELLITUS WITH FOOT ULCER: ICD-10-CM

## 2018-06-25 DIAGNOSIS — Z90.49 ACQUIRED ABSENCE OF OTHER SPECIFIED PARTS OF DIGESTIVE TRACT: Chronic | ICD-10-CM

## 2018-06-25 DIAGNOSIS — Z95.1 PRESENCE OF AORTOCORONARY BYPASS GRAFT: Chronic | ICD-10-CM

## 2018-06-25 PROCEDURE — G0463: CPT

## 2018-06-25 PROCEDURE — 99213 OFFICE O/P EST LOW 20 MIN: CPT

## 2018-06-25 PROCEDURE — 82962 GLUCOSE BLOOD TEST: CPT

## 2018-06-26 DIAGNOSIS — E11.51 TYPE 2 DIABETES MELLITUS WITH DIABETIC PERIPHERAL ANGIOPATHY WITHOUT GANGRENE: ICD-10-CM

## 2018-06-26 DIAGNOSIS — I83.11 VARICOSE VEINS OF RIGHT LOWER EXTREMITY WITH INFLAMMATION: ICD-10-CM

## 2018-06-26 DIAGNOSIS — I25.810 ATHEROSCLEROSIS OF CORONARY ARTERY BYPASS GRAFT(S) WITHOUT ANGINA PECTORIS: ICD-10-CM

## 2018-06-26 DIAGNOSIS — L97.822 NON-PRESSURE CHRONIC ULCER OF OTHER PART OF LEFT LOWER LEG WITH FAT LAYER EXPOSED: ICD-10-CM

## 2018-06-26 DIAGNOSIS — E66.9 OBESITY, UNSPECIFIED: ICD-10-CM

## 2018-06-26 DIAGNOSIS — Z95.1 PRESENCE OF AORTOCORONARY BYPASS GRAFT: ICD-10-CM

## 2018-06-26 DIAGNOSIS — E03.9 HYPOTHYROIDISM, UNSPECIFIED: ICD-10-CM

## 2018-06-26 DIAGNOSIS — L97.422 NON-PRESSURE CHRONIC ULCER OF LEFT HEEL AND MIDFOOT WITH FAT LAYER EXPOSED: ICD-10-CM

## 2018-06-26 DIAGNOSIS — I25.2 OLD MYOCARDIAL INFARCTION: ICD-10-CM

## 2018-06-26 DIAGNOSIS — Z83.3 FAMILY HISTORY OF DIABETES MELLITUS: ICD-10-CM

## 2018-06-26 DIAGNOSIS — E78.00 PURE HYPERCHOLESTEROLEMIA, UNSPECIFIED: ICD-10-CM

## 2018-06-26 DIAGNOSIS — Z87.891 PERSONAL HISTORY OF NICOTINE DEPENDENCE: ICD-10-CM

## 2018-06-26 DIAGNOSIS — M17.0 BILATERAL PRIMARY OSTEOARTHRITIS OF KNEE: ICD-10-CM

## 2018-06-26 DIAGNOSIS — Z92.3 PERSONAL HISTORY OF IRRADIATION: ICD-10-CM

## 2018-06-26 DIAGNOSIS — E11.40 TYPE 2 DIABETES MELLITUS WITH DIABETIC NEUROPATHY, UNSPECIFIED: ICD-10-CM

## 2018-06-26 DIAGNOSIS — E11.621 TYPE 2 DIABETES MELLITUS WITH FOOT ULCER: ICD-10-CM

## 2018-06-26 DIAGNOSIS — Z80.0 FAMILY HISTORY OF MALIGNANT NEOPLASM OF DIGESTIVE ORGANS: ICD-10-CM

## 2018-06-26 DIAGNOSIS — Z83.6 FAMILY HISTORY OF OTHER DISEASES OF THE RESPIRATORY SYSTEM: ICD-10-CM

## 2018-06-26 DIAGNOSIS — Z82.49 FAMILY HISTORY OF ISCHEMIC HEART DISEASE AND OTHER DISEASES OF THE CIRCULATORY SYSTEM: ICD-10-CM

## 2018-06-26 DIAGNOSIS — Z90.49 ACQUIRED ABSENCE OF OTHER SPECIFIED PARTS OF DIGESTIVE TRACT: ICD-10-CM

## 2018-06-26 DIAGNOSIS — Z79.899 OTHER LONG TERM (CURRENT) DRUG THERAPY: ICD-10-CM

## 2018-06-26 DIAGNOSIS — I10 ESSENTIAL (PRIMARY) HYPERTENSION: ICD-10-CM

## 2018-07-02 ENCOUNTER — OUTPATIENT (OUTPATIENT)
Dept: OUTPATIENT SERVICES | Facility: HOSPITAL | Age: 78
LOS: 1 days | Discharge: HOME | End: 2018-07-02
Payer: MEDICARE

## 2018-07-02 DIAGNOSIS — Z90.49 ACQUIRED ABSENCE OF OTHER SPECIFIED PARTS OF DIGESTIVE TRACT: Chronic | ICD-10-CM

## 2018-07-02 DIAGNOSIS — Z95.1 PRESENCE OF AORTOCORONARY BYPASS GRAFT: Chronic | ICD-10-CM

## 2018-07-02 DIAGNOSIS — E11.621 TYPE 2 DIABETES MELLITUS WITH FOOT ULCER: ICD-10-CM

## 2018-07-02 PROCEDURE — 82962 GLUCOSE BLOOD TEST: CPT

## 2018-07-02 PROCEDURE — G0463: CPT

## 2018-07-04 DIAGNOSIS — L84 CORNS AND CALLOSITIES: ICD-10-CM

## 2018-07-04 DIAGNOSIS — L97.422 NON-PRESSURE CHRONIC ULCER OF LEFT HEEL AND MIDFOOT WITH FAT LAYER EXPOSED: ICD-10-CM

## 2018-07-04 DIAGNOSIS — I25.810 ATHEROSCLEROSIS OF CORONARY ARTERY BYPASS GRAFT(S) WITHOUT ANGINA PECTORIS: ICD-10-CM

## 2018-07-04 DIAGNOSIS — M17.0 BILATERAL PRIMARY OSTEOARTHRITIS OF KNEE: ICD-10-CM

## 2018-07-04 DIAGNOSIS — E03.9 HYPOTHYROIDISM, UNSPECIFIED: ICD-10-CM

## 2018-07-04 DIAGNOSIS — Z95.1 PRESENCE OF AORTOCORONARY BYPASS GRAFT: ICD-10-CM

## 2018-07-04 DIAGNOSIS — Z83.6 FAMILY HISTORY OF OTHER DISEASES OF THE RESPIRATORY SYSTEM: ICD-10-CM

## 2018-07-04 DIAGNOSIS — I83.12 VARICOSE VEINS OF LEFT LOWER EXTREMITY WITH INFLAMMATION: ICD-10-CM

## 2018-07-04 DIAGNOSIS — I83.11 VARICOSE VEINS OF RIGHT LOWER EXTREMITY WITH INFLAMMATION: ICD-10-CM

## 2018-07-04 DIAGNOSIS — I10 ESSENTIAL (PRIMARY) HYPERTENSION: ICD-10-CM

## 2018-07-04 DIAGNOSIS — Z92.3 PERSONAL HISTORY OF IRRADIATION: ICD-10-CM

## 2018-07-04 DIAGNOSIS — Z83.3 FAMILY HISTORY OF DIABETES MELLITUS: ICD-10-CM

## 2018-07-04 DIAGNOSIS — I25.2 OLD MYOCARDIAL INFARCTION: ICD-10-CM

## 2018-07-04 DIAGNOSIS — E78.00 PURE HYPERCHOLESTEROLEMIA, UNSPECIFIED: ICD-10-CM

## 2018-07-04 DIAGNOSIS — E11.40 TYPE 2 DIABETES MELLITUS WITH DIABETIC NEUROPATHY, UNSPECIFIED: ICD-10-CM

## 2018-07-04 DIAGNOSIS — Z80.0 FAMILY HISTORY OF MALIGNANT NEOPLASM OF DIGESTIVE ORGANS: ICD-10-CM

## 2018-07-04 DIAGNOSIS — E11.51 TYPE 2 DIABETES MELLITUS WITH DIABETIC PERIPHERAL ANGIOPATHY WITHOUT GANGRENE: ICD-10-CM

## 2018-07-04 DIAGNOSIS — E11.621 TYPE 2 DIABETES MELLITUS WITH FOOT ULCER: ICD-10-CM

## 2018-07-04 DIAGNOSIS — Z79.899 OTHER LONG TERM (CURRENT) DRUG THERAPY: ICD-10-CM

## 2018-07-04 DIAGNOSIS — Z82.49 FAMILY HISTORY OF ISCHEMIC HEART DISEASE AND OTHER DISEASES OF THE CIRCULATORY SYSTEM: ICD-10-CM

## 2018-07-04 DIAGNOSIS — E66.9 OBESITY, UNSPECIFIED: ICD-10-CM

## 2018-07-04 DIAGNOSIS — Z90.49 ACQUIRED ABSENCE OF OTHER SPECIFIED PARTS OF DIGESTIVE TRACT: ICD-10-CM

## 2018-07-04 DIAGNOSIS — Z85.46 PERSONAL HISTORY OF MALIGNANT NEOPLASM OF PROSTATE: ICD-10-CM

## 2018-07-04 DIAGNOSIS — Z87.891 PERSONAL HISTORY OF NICOTINE DEPENDENCE: ICD-10-CM

## 2018-07-16 ENCOUNTER — OUTPATIENT (OUTPATIENT)
Dept: OUTPATIENT SERVICES | Facility: HOSPITAL | Age: 78
LOS: 1 days | Discharge: HOME | End: 2018-07-16

## 2018-07-16 DIAGNOSIS — E11.621 TYPE 2 DIABETES MELLITUS WITH FOOT ULCER: ICD-10-CM

## 2018-07-16 DIAGNOSIS — Z95.1 PRESENCE OF AORTOCORONARY BYPASS GRAFT: Chronic | ICD-10-CM

## 2018-07-16 DIAGNOSIS — Z90.49 ACQUIRED ABSENCE OF OTHER SPECIFIED PARTS OF DIGESTIVE TRACT: Chronic | ICD-10-CM

## 2018-07-18 DIAGNOSIS — L84 CORNS AND CALLOSITIES: ICD-10-CM

## 2018-07-18 DIAGNOSIS — Z83.3 FAMILY HISTORY OF DIABETES MELLITUS: ICD-10-CM

## 2018-07-18 DIAGNOSIS — S90.821A BLISTER (NONTHERMAL), RIGHT FOOT, INITIAL ENCOUNTER: ICD-10-CM

## 2018-07-18 DIAGNOSIS — Z92.3 PERSONAL HISTORY OF IRRADIATION: ICD-10-CM

## 2018-07-18 DIAGNOSIS — I10 ESSENTIAL (PRIMARY) HYPERTENSION: ICD-10-CM

## 2018-07-18 DIAGNOSIS — L92.9 GRANULOMATOUS DISORDER OF THE SKIN AND SUBCUTANEOUS TISSUE, UNSPECIFIED: ICD-10-CM

## 2018-07-18 DIAGNOSIS — Z82.49 FAMILY HISTORY OF ISCHEMIC HEART DISEASE AND OTHER DISEASES OF THE CIRCULATORY SYSTEM: ICD-10-CM
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
## 2018-07-18 DIAGNOSIS — E11.40 TYPE 2 DIABETES MELLITUS WITH DIABETIC NEUROPATHY, UNSPECIFIED: ICD-10-CM

## 2018-07-18 DIAGNOSIS — Z79.899 OTHER LONG TERM (CURRENT) DRUG THERAPY: ICD-10-CM

## 2018-07-18 DIAGNOSIS — I83.11 VARICOSE VEINS OF RIGHT LOWER EXTREMITY WITH INFLAMMATION: ICD-10-CM

## 2018-07-18 DIAGNOSIS — Z90.49 ACQUIRED ABSENCE OF OTHER SPECIFIED PARTS OF DIGESTIVE TRACT: ICD-10-CM

## 2018-07-18 DIAGNOSIS — S81.001A UNSPECIFIED OPEN WOUND, RIGHT KNEE, INITIAL ENCOUNTER: ICD-10-CM
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
## 2018-07-18 DIAGNOSIS — L97.422 NON-PRESSURE CHRONIC ULCER OF LEFT HEEL AND MIDFOOT WITH FAT LAYER EXPOSED: ICD-10-CM

## 2018-07-18 DIAGNOSIS — E03.9 HYPOTHYROIDISM, UNSPECIFIED: ICD-10-CM

## 2018-07-18 DIAGNOSIS — Z80.0 FAMILY HISTORY OF MALIGNANT NEOPLASM OF DIGESTIVE ORGANS: ICD-10-CM

## 2018-07-18 DIAGNOSIS — Z85.46 PERSONAL HISTORY OF MALIGNANT NEOPLASM OF PROSTATE: ICD-10-CM

## 2018-07-18 DIAGNOSIS — I25.810 ATHEROSCLEROSIS OF CORONARY ARTERY BYPASS GRAFT(S) WITHOUT ANGINA PECTORIS: ICD-10-CM

## 2018-07-18 DIAGNOSIS — E66.9 OBESITY, UNSPECIFIED: ICD-10-CM

## 2018-07-18 DIAGNOSIS — E11.51 TYPE 2 DIABETES MELLITUS WITH DIABETIC PERIPHERAL ANGIOPATHY WITHOUT GANGRENE: ICD-10-CM

## 2018-07-18 DIAGNOSIS — Z87.891 PERSONAL HISTORY OF NICOTINE DEPENDENCE: ICD-10-CM

## 2018-07-18 DIAGNOSIS — E11.621 TYPE 2 DIABETES MELLITUS WITH FOOT ULCER: ICD-10-CM

## 2018-07-18 DIAGNOSIS — M17.0 BILATERAL PRIMARY OSTEOARTHRITIS OF KNEE: ICD-10-CM
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
## 2018-07-18 DIAGNOSIS — Z95.1 PRESENCE OF AORTOCORONARY BYPASS GRAFT: ICD-10-CM

## 2018-07-18 DIAGNOSIS — I83.12 VARICOSE VEINS OF LEFT LOWER EXTREMITY WITH INFLAMMATION: ICD-10-CM

## 2018-07-18 DIAGNOSIS — Z83.6 FAMILY HISTORY OF OTHER DISEASES OF THE RESPIRATORY SYSTEM: ICD-10-CM

## 2018-07-18 DIAGNOSIS — I25.2 OLD MYOCARDIAL INFARCTION: ICD-10-CM

## 2018-07-18 DIAGNOSIS — E78.00 PURE HYPERCHOLESTEROLEMIA, UNSPECIFIED: ICD-10-CM

## 2018-07-19 ENCOUNTER — OUTPATIENT (OUTPATIENT)
Dept: OUTPATIENT SERVICES | Facility: HOSPITAL | Age: 78
LOS: 1 days | Discharge: ANOTHER HOSPITAL | End: 2018-07-19

## 2018-07-19 ENCOUNTER — INPATIENT (INPATIENT)
Facility: HOSPITAL | Age: 78
LOS: 4 days | Discharge: NURSING, REHAB OR LONG-TERM CARE | DRG: 602 | End: 2018-07-24
Attending: STUDENT IN AN ORGANIZED HEALTH CARE EDUCATION/TRAINING PROGRAM | Admitting: HOSPITALIST
Payer: MEDICARE

## 2018-07-19 VITALS
SYSTOLIC BLOOD PRESSURE: 134 MMHG | OXYGEN SATURATION: 98 % | DIASTOLIC BLOOD PRESSURE: 78 MMHG | TEMPERATURE: 98 F | WEIGHT: 279.99 LBS | HEIGHT: 70 IN | HEART RATE: 97 BPM | RESPIRATION RATE: 16 BRPM

## 2018-07-19 DIAGNOSIS — L92.9 GRANULOMATOUS DISORDER OF THE SKIN AND SUBCUTANEOUS TISSUE, UNSPECIFIED: ICD-10-CM

## 2018-07-19 DIAGNOSIS — Z90.49 ACQUIRED ABSENCE OF OTHER SPECIFIED PARTS OF DIGESTIVE TRACT: Chronic | ICD-10-CM

## 2018-07-19 DIAGNOSIS — I87.2 VENOUS INSUFFICIENCY (CHRONIC) (PERIPHERAL): ICD-10-CM

## 2018-07-19 DIAGNOSIS — Z95.1 PRESENCE OF AORTOCORONARY BYPASS GRAFT: Chronic | ICD-10-CM

## 2018-07-19 DIAGNOSIS — Z29.9 ENCOUNTER FOR PROPHYLACTIC MEASURES, UNSPECIFIED: ICD-10-CM

## 2018-07-19 DIAGNOSIS — E78.5 HYPERLIPIDEMIA, UNSPECIFIED: ICD-10-CM

## 2018-07-19 DIAGNOSIS — L03.115 CELLULITIS OF RIGHT LOWER LIMB: ICD-10-CM

## 2018-07-19 DIAGNOSIS — N17.9 ACUTE KIDNEY FAILURE, UNSPECIFIED: ICD-10-CM

## 2018-07-19 DIAGNOSIS — R74.8 ABNORMAL LEVELS OF OTHER SERUM ENZYMES: ICD-10-CM

## 2018-07-19 DIAGNOSIS — E03.9 HYPOTHYROIDISM, UNSPECIFIED: ICD-10-CM

## 2018-07-19 DIAGNOSIS — J90 PLEURAL EFFUSION, NOT ELSEWHERE CLASSIFIED: ICD-10-CM

## 2018-07-19 DIAGNOSIS — E11.42 TYPE 2 DIABETES MELLITUS WITH DIABETIC POLYNEUROPATHY: ICD-10-CM

## 2018-07-19 DIAGNOSIS — I25.10 ATHEROSCLEROTIC HEART DISEASE OF NATIVE CORONARY ARTERY WITHOUT ANGINA PECTORIS: ICD-10-CM

## 2018-07-19 DIAGNOSIS — L03.90 CELLULITIS, UNSPECIFIED: ICD-10-CM

## 2018-07-19 DIAGNOSIS — I10 ESSENTIAL (PRIMARY) HYPERTENSION: ICD-10-CM

## 2018-07-19 LAB
ALBUMIN SERPL ELPH-MCNC: 3 G/DL — LOW (ref 3.3–5)
ALP SERPL-CCNC: 334 U/L — HIGH (ref 40–120)
ALT FLD-CCNC: 104 U/L — HIGH (ref 12–78)
ANION GAP SERPL CALC-SCNC: 8 MMOL/L — SIGNIFICANT CHANGE UP (ref 5–17)
AST SERPL-CCNC: 92 U/L — HIGH (ref 15–37)
BASOPHILS # BLD AUTO: 0.03 K/UL — SIGNIFICANT CHANGE UP (ref 0–0.2)
BASOPHILS NFR BLD AUTO: 0.3 % — SIGNIFICANT CHANGE UP (ref 0–2)
BILIRUB SERPL-MCNC: 1.3 MG/DL — HIGH (ref 0.2–1.2)
BUN SERPL-MCNC: 51 MG/DL — HIGH (ref 7–23)
CALCIUM SERPL-MCNC: 8.4 MG/DL — LOW (ref 8.5–10.1)
CHLORIDE SERPL-SCNC: 107 MMOL/L — SIGNIFICANT CHANGE UP (ref 96–108)
CO2 SERPL-SCNC: 25 MMOL/L — SIGNIFICANT CHANGE UP (ref 22–31)
CREAT SERPL-MCNC: 1.5 MG/DL — HIGH (ref 0.5–1.3)
EOSINOPHIL # BLD AUTO: 0.14 K/UL — SIGNIFICANT CHANGE UP (ref 0–0.5)
EOSINOPHIL NFR BLD AUTO: 1.3 % — SIGNIFICANT CHANGE UP (ref 0–6)
GLUCOSE SERPL-MCNC: 154 MG/DL — HIGH (ref 70–99)
HCT VFR BLD CALC: 43.5 % — SIGNIFICANT CHANGE UP (ref 39–50)
HGB BLD-MCNC: 14.2 G/DL — SIGNIFICANT CHANGE UP (ref 13–17)
IMM GRANULOCYTES NFR BLD AUTO: 1.4 % — SIGNIFICANT CHANGE UP (ref 0–1.5)
INR BLD: 1.39 RATIO — HIGH (ref 0.88–1.16)
LACTATE SERPL-SCNC: 1.3 MMOL/L — SIGNIFICANT CHANGE UP (ref 0.7–2)
LYMPHOCYTES # BLD AUTO: 0.92 K/UL — LOW (ref 1–3.3)
LYMPHOCYTES # BLD AUTO: 8.3 % — LOW (ref 13–44)
MCHC RBC-ENTMCNC: 27.7 PG — SIGNIFICANT CHANGE UP (ref 27–34)
MCHC RBC-ENTMCNC: 32.6 GM/DL — SIGNIFICANT CHANGE UP (ref 32–36)
MCV RBC AUTO: 84.8 FL — SIGNIFICANT CHANGE UP (ref 80–100)
MONOCYTES # BLD AUTO: 0.91 K/UL — HIGH (ref 0–0.9)
MONOCYTES NFR BLD AUTO: 8.2 % — SIGNIFICANT CHANGE UP (ref 2–14)
NEUTROPHILS # BLD AUTO: 8.97 K/UL — HIGH (ref 1.8–7.4)
NEUTROPHILS NFR BLD AUTO: 80.5 % — HIGH (ref 43–77)
NRBC # BLD: 0 /100 WBCS — SIGNIFICANT CHANGE UP (ref 0–0)
PLATELET # BLD AUTO: 190 K/UL — SIGNIFICANT CHANGE UP (ref 150–400)
POTASSIUM SERPL-MCNC: 4.8 MMOL/L — SIGNIFICANT CHANGE UP (ref 3.5–5.3)
POTASSIUM SERPL-SCNC: 4.8 MMOL/L — SIGNIFICANT CHANGE UP (ref 3.5–5.3)
PROT SERPL-MCNC: 7.1 G/DL — SIGNIFICANT CHANGE UP (ref 6–8.3)
PROTHROM AB SERPL-ACNC: 15.3 SEC — HIGH (ref 9.8–12.7)
RBC # BLD: 5.13 M/UL — SIGNIFICANT CHANGE UP (ref 4.2–5.8)
RBC # FLD: 15.6 % — HIGH (ref 10.3–14.5)
SODIUM SERPL-SCNC: 140 MMOL/L — SIGNIFICANT CHANGE UP (ref 135–145)
WBC # BLD: 11.13 K/UL — HIGH (ref 3.8–10.5)
WBC # FLD AUTO: 11.13 K/UL — HIGH (ref 3.8–10.5)

## 2018-07-19 PROCEDURE — 71045 X-RAY EXAM CHEST 1 VIEW: CPT | Mod: 26

## 2018-07-19 PROCEDURE — 99223 1ST HOSP IP/OBS HIGH 75: CPT | Mod: AI,GC

## 2018-07-19 PROCEDURE — 93010 ELECTROCARDIOGRAM REPORT: CPT

## 2018-07-19 PROCEDURE — 99283 EMERGENCY DEPT VISIT LOW MDM: CPT

## 2018-07-19 RX ORDER — SITAGLIPTIN 50 MG/1
1 TABLET, FILM COATED ORAL
Qty: 0 | Refills: 0 | COMMUNITY

## 2018-07-19 RX ORDER — ASPIRIN/CALCIUM CARB/MAGNESIUM 324 MG
325 TABLET ORAL DAILY
Qty: 0 | Refills: 0 | Status: DISCONTINUED | OUTPATIENT
Start: 2018-07-19 | End: 2018-07-24

## 2018-07-19 RX ORDER — PIPERACILLIN AND TAZOBACTAM 4; .5 G/20ML; G/20ML
3.38 INJECTION, POWDER, LYOPHILIZED, FOR SOLUTION INTRAVENOUS ONCE
Qty: 0 | Refills: 0 | Status: COMPLETED | OUTPATIENT
Start: 2018-07-19 | End: 2018-07-19

## 2018-07-19 RX ORDER — SODIUM CHLORIDE 9 MG/ML
1000 INJECTION INTRAMUSCULAR; INTRAVENOUS; SUBCUTANEOUS ONCE
Qty: 0 | Refills: 0 | Status: COMPLETED | OUTPATIENT
Start: 2018-07-19 | End: 2018-07-19

## 2018-07-19 RX ORDER — ATORVASTATIN CALCIUM 80 MG/1
40 TABLET, FILM COATED ORAL AT BEDTIME
Qty: 0 | Refills: 0 | Status: DISCONTINUED | OUTPATIENT
Start: 2018-07-19 | End: 2018-07-24

## 2018-07-19 RX ORDER — TAMSULOSIN HYDROCHLORIDE 0.4 MG/1
1 CAPSULE ORAL
Qty: 0 | Refills: 0 | COMMUNITY

## 2018-07-19 RX ORDER — TAMSULOSIN HYDROCHLORIDE 0.4 MG/1
0.4 CAPSULE ORAL
Qty: 0 | Refills: 0 | Status: DISCONTINUED | OUTPATIENT
Start: 2018-07-19 | End: 2018-07-24

## 2018-07-19 RX ORDER — NITROGLYCERIN 6.5 MG
1 CAPSULE, EXTENDED RELEASE ORAL
Qty: 0 | Refills: 0 | COMMUNITY

## 2018-07-19 RX ORDER — SERTRALINE 25 MG/1
100 TABLET, FILM COATED ORAL DAILY
Qty: 0 | Refills: 0 | Status: DISCONTINUED | OUTPATIENT
Start: 2018-07-19 | End: 2018-07-24

## 2018-07-19 RX ORDER — VANCOMYCIN HCL 1 G
1750 VIAL (EA) INTRAVENOUS EVERY 12 HOURS
Qty: 0 | Refills: 0 | Status: DISCONTINUED | OUTPATIENT
Start: 2018-07-19 | End: 2018-07-20

## 2018-07-19 RX ORDER — LEVOTHYROXINE SODIUM 125 MCG
75 TABLET ORAL DAILY
Qty: 0 | Refills: 0 | Status: DISCONTINUED | OUTPATIENT
Start: 2018-07-19 | End: 2018-07-24

## 2018-07-19 RX ORDER — FUROSEMIDE 40 MG
40 TABLET ORAL ONCE
Qty: 0 | Refills: 0 | Status: COMPLETED | OUTPATIENT
Start: 2018-07-19 | End: 2018-07-20

## 2018-07-19 RX ORDER — ENOXAPARIN SODIUM 100 MG/ML
40 INJECTION SUBCUTANEOUS EVERY 24 HOURS
Qty: 0 | Refills: 0 | Status: DISCONTINUED | OUTPATIENT
Start: 2018-07-19 | End: 2018-07-24

## 2018-07-19 RX ORDER — VANCOMYCIN HCL 1 G
1250 VIAL (EA) INTRAVENOUS EVERY 12 HOURS
Qty: 0 | Refills: 0 | Status: DISCONTINUED | OUTPATIENT
Start: 2018-07-19 | End: 2018-07-19

## 2018-07-19 RX ORDER — LOSARTAN POTASSIUM 100 MG/1
25 TABLET, FILM COATED ORAL DAILY
Qty: 0 | Refills: 0 | Status: DISCONTINUED | OUTPATIENT
Start: 2018-07-19 | End: 2018-07-23

## 2018-07-19 RX ORDER — VANCOMYCIN HCL 1 G
1000 VIAL (EA) INTRAVENOUS ONCE
Qty: 0 | Refills: 0 | Status: COMPLETED | OUTPATIENT
Start: 2018-07-19 | End: 2018-07-20

## 2018-07-19 RX ORDER — FUROSEMIDE 40 MG
1 TABLET ORAL
Qty: 0 | Refills: 0 | COMMUNITY

## 2018-07-19 RX ORDER — LIRAGLUTIDE 6 MG/ML
1.2 INJECTION SUBCUTANEOUS
Qty: 0 | Refills: 0 | COMMUNITY

## 2018-07-19 RX ORDER — NITROGLYCERIN 6.5 MG
0.4 CAPSULE, EXTENDED RELEASE ORAL DAILY
Qty: 0 | Refills: 0 | Status: DISCONTINUED | OUTPATIENT
Start: 2018-07-19 | End: 2018-07-24

## 2018-07-19 RX ADMIN — SODIUM CHLORIDE 2000 MILLILITER(S): 9 INJECTION INTRAMUSCULAR; INTRAVENOUS; SUBCUTANEOUS at 21:39

## 2018-07-19 RX ADMIN — PIPERACILLIN AND TAZOBACTAM 200 GRAM(S): 4; .5 INJECTION, POWDER, LYOPHILIZED, FOR SOLUTION INTRAVENOUS at 21:39

## 2018-07-19 NOTE — H&P ADULT - NSHPSOCIALHISTORY_GEN_ALL_CORE
Lives with wife.   Ambulates with walker.   Tobacco: denies. As per chart review, patient is a former smoker. Smoked <1 pack/day for 15-20 years. Quit 44 years ago.   Alcohol: Denies  Recreational drugs: denies

## 2018-07-19 NOTE — H&P ADULT - PROBLEM SELECTOR PLAN 1
Admit to [facility]  - continue Vancomycin  - follow up blood cultures  - Wound care following- Dr. Arreola. Appreciate recs  - Admit to [facility]  - continue Vancomycin  - follow up blood cultures  - follow up vanc trough  - Wound care following- Dr. Arreola. Appreciate recs

## 2018-07-19 NOTE — ED ADULT NURSE NOTE - OBJECTIVE STATEMENT
77 year old male presents to the ED with c/o of right leg cellulitis. A+O x 4. sent from wound care for right leg swelling, redness, weeping edema, and cellulitis radiating from the ankle to the right knee. Denies pain at this time. Ataxic. Right foot wound noted and left heel ulcer noted. Pt states culture swabs collected. afberile. denies sob, chest pain, or back pain.

## 2018-07-19 NOTE — ED ADULT NURSE NOTE - SPO2 (%)
Patient would like to discuss his PSA result. He reviewed it online and it said to follow up with his urologist. Patient states he does not have a urologist so he would like to discuss with doctor.   98

## 2018-07-19 NOTE — H&P ADULT - ASSESSMENT
77M w/pmh of CAD s/p CABG, HLD, Hypothyroid, MI (1995), Prostate Ca s/p radiation seeds, T2DM with peripheral neuropathy, Venous stasis dermatitis of b/l lower extremities sent in from wound care (Dr. Arreola) for RLE cellulitis requiring IV abx. 77M w/pmh of CAD s/p CABG, HLD, HTN, Hypothyroid, MI (1995), Prostate Ca s/p radiation seeds, T2DM with peripheral neuropathy, Venous stasis dermatitis of b/l lower extremities sent in from wound care (Dr. Arreola) for RLE cellulitis requiring IV abx.

## 2018-07-19 NOTE — H&P ADULT - NSHPREVIEWOFSYSTEMS_GEN_ALL_CORE
Constitutional: denies fever, chills, diaphoresis   HEENT: denies blurry vision, difficulty hearing  Respiratory: +GORDON, + cough, denies SOB, sputum production, wheezing, hemoptysis  Cardiovascular: denies CP, palpitations  Gastrointestinal: +constipation, denies nausea, vomiting, diarrhea, abdominal pain, melena, hematochezia   Genitourinary: denies dysuria, frequency, urgency, hematuria   Skin: + venous stasis of b/l lower ext, + abrasions on left leg, +healed left heel ulcer  Musculoskeletal: +OA of b/l knees, RLE pain upon ambulation  Neurologic: +b/l lower ext peripheral neuropathy  Psychiatric: denies feeling anxious, depressed

## 2018-07-19 NOTE — H&P ADULT - PROBLEM SELECTOR PLAN 5
Unclear etiology. Pt denies etoh use  - follow up liver ultrasound  - Trend liver enzymes  - Avoid hepatotoxic medications Unclear etiology. Pt denies etoh use  - follow up liver ultrasound  - Trend liver enzymes  - Avoid hepatotoxic medications  - Consider GI consult Unclear etiology. Consider BARAJAS due to elevated BMI. Pt denies etoh use  - follow up liver ultrasound  - follow up cmp  - Avoid hepatotoxic medications  - Consider GI consult

## 2018-07-19 NOTE — ED PROVIDER NOTE - CARE PLAN
Principal Discharge DX:	Cellulitis  Secondary Diagnosis:	Type 2 diabetes mellitus without complication  Secondary Diagnosis:	Diabetes mellitus due to underlying condition with microalbuminuria, without long-term current use of insulin

## 2018-07-19 NOTE — ED PROVIDER NOTE - SKIN, MLM
b/l lower extremities with large alginate dressings,  + edema b/l,  pt wont remove dressings at present

## 2018-07-19 NOTE — ED PROVIDER NOTE - CONSTITUTIONAL, MLM
normal... chronically ill, well nourished, awake, alert, oriented to person, place, time/situation and in no apparent distress.

## 2018-07-19 NOTE — ED ADULT TRIAGE NOTE - CHIEF COMPLAINT QUOTE
wounds bilat legs...cellulitis right foot extending to right knee w/ weeping edema....wound care done & c/s of drainage done in wound care  sent by wound care

## 2018-07-19 NOTE — H&P ADULT - PROBLEM SELECTOR PLAN 10
IMPROVE VTE Individual Risk Assessment          RISK                                                          Points  [  ] Previous VTE                                                3  [  ] Thrombophilia                                             2  [  ] Lower limb paralysis                                   2        (unable to hold up >15 seconds)    [  ] Current Cancer                                             2         (within 6 months)  [  ] Immobilization > 24 hrs                              1  [  ] ICU/CCU stay > 24 hours                             1  [x  ] Age > 60                                                         1    IMPROVE VTE Score: 1  Padua score: 6  DVT ppx: Lovenox 40mg qd

## 2018-07-19 NOTE — ED ADULT NURSE NOTE - PSH
S/P appendectomy    S/P CABG x 3  saphenous vein harvested from LLE, performed in March 1996 at [facility] by Dr. Mcgraw

## 2018-07-19 NOTE — H&P ADULT - PROBLEM SELECTOR PLAN 3
Likely 2/2 T2DM vs. current infection vs. dehydration  - s/p 1L normal saline  - CXR prelim read showed pleural effusion  - Avoid nephrotoxic medications Cr. 1.5. As per chart review, patient may be at baseline.   - s/p 1L normal saline  - CXR prelim read showed pleural effusion  - Avoid nephrotoxic medications Cr. 1.5. As per chart review, patient may be at baseline.   - s/p 1L normal saline  - CXR prelim read showed pleural effusion  - Avoid nephrotoxic medications  - monitor bmp vs CKD?  Cr. 1.5. As per chart review, patient may be at baseline.   - s/p 1L normal saline  - CXR prelim read showed pleural effusion  - Avoid nephrotoxic medications  - monitor bmp

## 2018-07-19 NOTE — ED PROVIDER NOTE - OBJECTIVE STATEMENT
Pt is a 76 yo male with hx of dm and neuropathy. pt being treated at wound care for left heel ulcer. pt states he has had wounds/abrasions on both legs after recent fall. pt today at wound care and dr alexander felt legs becoming cellulitic and require iv antibiotics.  pt sent to er.  pt denies f/c.  pt wrapped at wound care with alginate dressings and refuses to remove.  + cough.  no cp or other co

## 2018-07-19 NOTE — ED PROVIDER NOTE - PSH
S/P appendectomy    S/P CABG x 3  saphenous vein harvested from LLE, performed in March 1996 at [facility] by Dr. Mcgraw

## 2018-07-19 NOTE — H&P ADULT - PSH
S/P appendectomy    S/P CABG x 3  saphenous vein harvested from LLE, performed in March 1996 at [facility] by Dr. Mcgraw

## 2018-07-19 NOTE — H&P ADULT - HISTORY OF PRESENT ILLNESS
77M w/pmh of CAD s/p CABG, HLD, Hypothyroid, MI (1995), Prostate Ca s/p radiation seeds, T2DM with peripheral neuropathy, Venous stasis dermatitis of b/l lower extremities sent in from wound care (Dr. Arreola) for worsening of RLE ulcers. Patient states that he was previously seeing wound care for a left heel ulcer and receiving hyperbaric therapy tx. Over this past weekend, he fell and got multiple abrasions on his RLE which have become superficial ulcers. He has had falls in the past due to osteoarthritis in b/l knees which causes his knees to buckle and him to fall. Patient was seeing Dr. Arreola today when it was noted that the ulcers were worsening and pt was sent in for IV abx. Pt states that the ulcers are superficial and not actively bleeding. He has been getting treated with alginate and wound dressing changes. Denies fevers/chills, n/v, chest pain, palpitations, sob, abdominal pain, RLE pain. Of note, patient was recommended to see vascular surgery for workup. As per chart review, 5/18 bilateral MARY ANNE/PVR studies showed no hemodynamically significant lower extremity arterial disease.    In the ED, patient's vitals were: T97.6F, HR 97, /78, RR 16 and SpO2 98% on room air. Significant labs include: WBC 11.13, neutrophils 80.5, BUN/Cr 51/1.5, Glucose 154, T.bili 1.3, AST 92, , Trops neg x1. Serum pro-BNP 7683. Patient received Vancomycin x1, Zosyn x1, 1L normal saline, Blood cultures x2. 77M w/pmh of CAD s/p CABG, HLD, Hypothyroid, MI (1995), Prostate Ca s/p radiation seeds, T2DM with peripheral neuropathy, Venous stasis dermatitis of b/l lower extremities sent in from wound care (Dr. Arreola) for worsening of RLE ulcers. Patient states that he was previously seeing wound care for a left heel ulcer and receiving hyperbaric therapy tx. Over this past weekend, he fell and got multiple abrasions on his RLE which have become superficial ulcers. He has had falls in the past due to osteoarthritis in b/l knees which causes his knees to buckle and him to fall. Patient was seeing Dr. Arreola today when it was noted that the ulcers were worsening and pt was sent in for IV abx. Pt states that the ulcers are superficial and not actively bleeding. He has been getting treated with alginate and wound dressing changes. Denies fevers/chills, n/v, chest pain, palpitations, sob, abdominal pain, RLE pain. Of note, patient was recommended to see vascular surgery for workup. As per chart review, 5/18 bilateral MARY ANNE/PVR studies showed no hemodynamically significant lower extremity arterial disease.    In the ED, patient's vitals were: T97.6F, HR 97, /78, RR 16 and SpO2 98% on room air. Significant labs include: WBC 11.13, neutrophils 80.5, BUN/Cr 51/1.5, Glucose 154, T.bili 1.3, AST 92, , Trops neg x1. Serum pro-BNP 7683. Patient received Vancomycin x1, Zosyn x1, 1L normal saline, Blood cultures x2.      Pt requested that dressings not be removed as he just had dressings changed today with alginate placed. 77M w/pmh of CAD s/p CABG, HLD, HTN, Hypothyroid, MI (1995), Prostate Ca s/p radiation seeds, T2DM with peripheral neuropathy, Venous stasis dermatitis of b/l lower extremities sent in from wound care (Dr. Arreola) for worsening of RLE ulcers. Patient states that he was previously seeing wound care for a left heel ulcer and receiving hyperbaric therapy tx. Over this past weekend, he fell and got multiple abrasions on his RLE which have become superficial ulcers. He has had falls in the past due to osteoarthritis in b/l knees which causes his knees to buckle and him to fall. Patient was seeing Dr. Arreola today when it was noted that the ulcers were worsening and pt was sent in for IV abx. Pt states that the ulcers are superficial and not actively bleeding. He has been getting treated with alginate and wound dressing changes. Denies fevers/chills, n/v, chest pain, palpitations, sob, abdominal pain, RLE pain. Of note, patient was recommended to see vascular surgery for workup. As per chart review, 5/18 bilateral MARY ANNE/PVR studies showed no hemodynamically significant lower extremity arterial disease.    In the ED, patient's vitals were: T97.6F, HR 97, /78, RR 16 and SpO2 98% on room air. Significant labs include: WBC 11.13, neutrophils 80.5, BUN/Cr 51/1.5, Glucose 154, T.bili 1.3, AST 92, , Trops neg x1. Serum pro-BNP 7683. Patient received Vancomycin x1, Zosyn x1, 1L normal saline, Blood cultures x2.      Pt requested that dressings not be removed as he just had dressings changed today with alginate placed.

## 2018-07-19 NOTE — H&P ADULT - PROBLEM SELECTOR PLAN 2
Controlled  - Hold PO anti-hyperglycemic meds  - accuchecks  - follow up HgA1c  - Pt takes Treciba 16 units. Would start Lantus 12 units at bedtime  - Start low dose sliding scale

## 2018-07-19 NOTE — ED PROVIDER NOTE - SECONDARY DIAGNOSIS.
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

## 2018-07-19 NOTE — H&P ADULT - NSHPPHYSICALEXAM_GEN_ALL_CORE
Physical Exam:  General: Well developed, well nourished, NAD  HEENT: NCAT, PERRLA, EOMI bl, moist mucous membranes   Neck: Supple, nontender, no mass  Neurology: A&Ox3, nonfocal, CN II-XII grossly intact, sensation intact  Respiratory: +RLL and LLL wheezing, +dry cough, no rhonchi, no rales  CV: RRR, +S1/S2, no murmurs, rubs or gallops  Abdominal: obese abdomen, soft, nontender, nondistended, no guarding, no rebound  Extremities: +b/l venous stasis, + b/l lower extremities wrapped with wound dressing. +superficial ulcer visible on LLE with alginate dressing, no active bleeding. Pt requested that dressings not be removed as he just had dressings changed today with alginate placed.   MSK: +able to move all extremities spontaneously  Skin: as above Physical Exam:  General: Well developed, well nourished, NAD  HEENT: NCAT, PERRLA, EOMI bl, moist mucous membranes   Neck: Supple, nontender, no mass  Neurology: A&Ox3, nonfocal, CN II-XII grossly intact, sensation intact  Respiratory: +RLL and LLL wheezing, +dry cough, no rhonchi, no rales  CV: RRR, +S1/S2, no murmurs, rubs or gallops  Abdominal: obese abdomen, soft, nontender, nondistended, no guarding, no rebound  Extremities: +b/l venous stasis, + b/l lower extremities wrapped with wound dressing. +superficial ulcer visible on LLE with alginate dressing, no active bleeding. Pt requested that dressings not be removed as he just had dressings changed today with alginate placed.  2+ pitting pretib edema Xochitl neg  MSK: +able to move all extremities spontaneously  Skin: as above

## 2018-07-19 NOTE — ED PROVIDER NOTE - MEDICAL DECISION MAKING DETAILS
pt sent from wound care for cellulitis. pt just wrapped by wound care and sent for admit, labs, cultures, abx, admit

## 2018-07-20 DIAGNOSIS — I83.019 VARICOSE VEINS OF RIGHT LOWER EXTREMITY WITH ULCER OF UNSPECIFIED SITE: ICD-10-CM

## 2018-07-20 LAB
ALBUMIN SERPL ELPH-MCNC: 2.7 G/DL — LOW (ref 3.3–5)
ALP SERPL-CCNC: 282 U/L — HIGH (ref 40–120)
ALT FLD-CCNC: 86 U/L — HIGH (ref 12–78)
ANION GAP SERPL CALC-SCNC: 8 MMOL/L — SIGNIFICANT CHANGE UP (ref 5–17)
AST SERPL-CCNC: 60 U/L — HIGH (ref 15–37)
BASOPHILS # BLD AUTO: 0.02 K/UL — SIGNIFICANT CHANGE UP (ref 0–0.2)
BASOPHILS NFR BLD AUTO: 0.2 % — SIGNIFICANT CHANGE UP (ref 0–2)
BILIRUB SERPL-MCNC: 1.2 MG/DL — SIGNIFICANT CHANGE UP (ref 0.2–1.2)
BUN SERPL-MCNC: 48 MG/DL — HIGH (ref 7–23)
CALCIUM SERPL-MCNC: 8.1 MG/DL — LOW (ref 8.5–10.1)
CHLORIDE SERPL-SCNC: 108 MMOL/L — SIGNIFICANT CHANGE UP (ref 96–108)
CO2 SERPL-SCNC: 25 MMOL/L — SIGNIFICANT CHANGE UP (ref 22–31)
CREAT SERPL-MCNC: 1.5 MG/DL — HIGH (ref 0.5–1.3)
EOSINOPHIL # BLD AUTO: 0.15 K/UL — SIGNIFICANT CHANGE UP (ref 0–0.5)
EOSINOPHIL NFR BLD AUTO: 1.6 % — SIGNIFICANT CHANGE UP (ref 0–6)
GLUCOSE SERPL-MCNC: 214 MG/DL — HIGH (ref 70–99)
HBA1C BLD-MCNC: 8.3 % — HIGH (ref 4–5.6)
HCT VFR BLD CALC: 40 % — SIGNIFICANT CHANGE UP (ref 39–50)
HGB BLD-MCNC: 13 G/DL — SIGNIFICANT CHANGE UP (ref 13–17)
IMM GRANULOCYTES NFR BLD AUTO: 1.4 % — SIGNIFICANT CHANGE UP (ref 0–1.5)
LYMPHOCYTES # BLD AUTO: 1.17 K/UL — SIGNIFICANT CHANGE UP (ref 1–3.3)
LYMPHOCYTES # BLD AUTO: 12.5 % — LOW (ref 13–44)
MCHC RBC-ENTMCNC: 27.4 PG — SIGNIFICANT CHANGE UP (ref 27–34)
MCHC RBC-ENTMCNC: 32.5 GM/DL — SIGNIFICANT CHANGE UP (ref 32–36)
MCV RBC AUTO: 84.2 FL — SIGNIFICANT CHANGE UP (ref 80–100)
MONOCYTES # BLD AUTO: 0.79 K/UL — SIGNIFICANT CHANGE UP (ref 0–0.9)
MONOCYTES NFR BLD AUTO: 8.4 % — SIGNIFICANT CHANGE UP (ref 2–14)
NEUTROPHILS # BLD AUTO: 7.12 K/UL — SIGNIFICANT CHANGE UP (ref 1.8–7.4)
NEUTROPHILS NFR BLD AUTO: 75.9 % — SIGNIFICANT CHANGE UP (ref 43–77)
PLATELET # BLD AUTO: 198 K/UL — SIGNIFICANT CHANGE UP (ref 150–400)
POTASSIUM SERPL-MCNC: 4.2 MMOL/L — SIGNIFICANT CHANGE UP (ref 3.5–5.3)
POTASSIUM SERPL-SCNC: 4.2 MMOL/L — SIGNIFICANT CHANGE UP (ref 3.5–5.3)
PROT SERPL-MCNC: 6.4 G/DL — SIGNIFICANT CHANGE UP (ref 6–8.3)
RBC # BLD: 4.75 M/UL — SIGNIFICANT CHANGE UP (ref 4.2–5.8)
RBC # FLD: 15.2 % — HIGH (ref 10.3–14.5)
SODIUM SERPL-SCNC: 141 MMOL/L — SIGNIFICANT CHANGE UP (ref 135–145)
WBC # BLD: 9.38 K/UL — SIGNIFICANT CHANGE UP (ref 3.8–10.5)
WBC # FLD AUTO: 9.38 K/UL — SIGNIFICANT CHANGE UP (ref 3.8–10.5)

## 2018-07-20 PROCEDURE — 99233 SBSQ HOSP IP/OBS HIGH 50: CPT

## 2018-07-20 PROCEDURE — 93010 ELECTROCARDIOGRAM REPORT: CPT

## 2018-07-20 PROCEDURE — 99223 1ST HOSP IP/OBS HIGH 75: CPT

## 2018-07-20 PROCEDURE — 76705 ECHO EXAM OF ABDOMEN: CPT | Mod: 26

## 2018-07-20 RX ORDER — GLUCAGON INJECTION, SOLUTION 0.5 MG/.1ML
1 INJECTION, SOLUTION SUBCUTANEOUS ONCE
Qty: 0 | Refills: 0 | Status: DISCONTINUED | OUTPATIENT
Start: 2018-07-20 | End: 2018-07-24

## 2018-07-20 RX ORDER — FUROSEMIDE 40 MG
40 TABLET ORAL DAILY
Qty: 0 | Refills: 0 | Status: DISCONTINUED | OUTPATIENT
Start: 2018-07-20 | End: 2018-07-23

## 2018-07-20 RX ORDER — DEXTROSE 50 % IN WATER 50 %
15 SYRINGE (ML) INTRAVENOUS ONCE
Qty: 0 | Refills: 0 | Status: DISCONTINUED | OUTPATIENT
Start: 2018-07-20 | End: 2018-07-24

## 2018-07-20 RX ORDER — PIPERACILLIN AND TAZOBACTAM 4; .5 G/20ML; G/20ML
3.38 INJECTION, POWDER, LYOPHILIZED, FOR SOLUTION INTRAVENOUS EVERY 8 HOURS
Qty: 0 | Refills: 0 | Status: DISCONTINUED | OUTPATIENT
Start: 2018-07-20 | End: 2018-07-24

## 2018-07-20 RX ORDER — SODIUM CHLORIDE 9 MG/ML
1000 INJECTION, SOLUTION INTRAVENOUS
Qty: 0 | Refills: 0 | Status: DISCONTINUED | OUTPATIENT
Start: 2018-07-20 | End: 2018-07-24

## 2018-07-20 RX ORDER — DEXTROSE 50 % IN WATER 50 %
25 SYRINGE (ML) INTRAVENOUS ONCE
Qty: 0 | Refills: 0 | Status: DISCONTINUED | OUTPATIENT
Start: 2018-07-20 | End: 2018-07-24

## 2018-07-20 RX ORDER — DEXTROSE 50 % IN WATER 50 %
12.5 SYRINGE (ML) INTRAVENOUS ONCE
Qty: 0 | Refills: 0 | Status: DISCONTINUED | OUTPATIENT
Start: 2018-07-20 | End: 2018-07-24

## 2018-07-20 RX ORDER — POLYETHYLENE GLYCOL 3350 17 G/17G
17 POWDER, FOR SOLUTION ORAL DAILY
Qty: 0 | Refills: 0 | Status: DISCONTINUED | OUTPATIENT
Start: 2018-07-20 | End: 2018-07-24

## 2018-07-20 RX ORDER — INSULIN LISPRO 100/ML
VIAL (ML) SUBCUTANEOUS AT BEDTIME
Qty: 0 | Refills: 0 | Status: DISCONTINUED | OUTPATIENT
Start: 2018-07-20 | End: 2018-07-24

## 2018-07-20 RX ORDER — INSULIN LISPRO 100/ML
VIAL (ML) SUBCUTANEOUS
Qty: 0 | Refills: 0 | Status: DISCONTINUED | OUTPATIENT
Start: 2018-07-20 | End: 2018-07-24

## 2018-07-20 RX ORDER — INSULIN GLARGINE 100 [IU]/ML
12 INJECTION, SOLUTION SUBCUTANEOUS AT BEDTIME
Qty: 0 | Refills: 0 | Status: DISCONTINUED | OUTPATIENT
Start: 2018-07-20 | End: 2018-07-24

## 2018-07-20 RX ADMIN — Medication 40 MILLIGRAM(S): at 12:42

## 2018-07-20 RX ADMIN — ATORVASTATIN CALCIUM 40 MILLIGRAM(S): 80 TABLET, FILM COATED ORAL at 21:47

## 2018-07-20 RX ADMIN — Medication 40 MILLIGRAM(S): at 00:37

## 2018-07-20 RX ADMIN — SERTRALINE 100 MILLIGRAM(S): 25 TABLET, FILM COATED ORAL at 12:42

## 2018-07-20 RX ADMIN — TAMSULOSIN HYDROCHLORIDE 0.4 MILLIGRAM(S): 0.4 CAPSULE ORAL at 06:29

## 2018-07-20 RX ADMIN — Medication 75 MICROGRAM(S): at 06:29

## 2018-07-20 RX ADMIN — Medication 1: at 17:14

## 2018-07-20 RX ADMIN — Medication 325 MILLIGRAM(S): at 12:42

## 2018-07-20 RX ADMIN — Medication 250 MILLIGRAM(S): at 17:18

## 2018-07-20 RX ADMIN — LOSARTAN POTASSIUM 25 MILLIGRAM(S): 100 TABLET, FILM COATED ORAL at 06:29

## 2018-07-20 RX ADMIN — ENOXAPARIN SODIUM 40 MILLIGRAM(S): 100 INJECTION SUBCUTANEOUS at 06:29

## 2018-07-20 RX ADMIN — TAMSULOSIN HYDROCHLORIDE 0.4 MILLIGRAM(S): 0.4 CAPSULE ORAL at 17:15

## 2018-07-20 RX ADMIN — Medication 1: at 08:30

## 2018-07-20 RX ADMIN — INSULIN GLARGINE 12 UNIT(S): 100 INJECTION, SOLUTION SUBCUTANEOUS at 21:47

## 2018-07-20 RX ADMIN — Medication 250 MILLIGRAM(S): at 05:02

## 2018-07-20 NOTE — PATIENT PROFILE ADULT. - PSH
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 Mcgraw

## 2018-07-20 NOTE — PROGRESS NOTE ADULT - SUBJECTIVE AND OBJECTIVE BOX
Patient is a 77y old  Male who presents with a chief complaint of cellulitis right LE (20 Jul 2018 01:46)      INTERVAL HPI/OVERNIGHT EVENTS: Patient seen and examined at bedside. No events overnight. Patient offers no complaints.     MEDICATIONS  (STANDING):  aspirin enteric coated 325 milliGRAM(s) Oral daily  atorvastatin 40 milliGRAM(s) Oral at bedtime  dextrose 5%. 1000 milliLiter(s) (50 mL/Hr) IV Continuous <Continuous>  dextrose 50% Injectable 12.5 Gram(s) IV Push once  dextrose 50% Injectable 25 Gram(s) IV Push once  dextrose 50% Injectable 25 Gram(s) IV Push once  enoxaparin Injectable 40 milliGRAM(s) SubCutaneous every 24 hours  furosemide   Injectable 40 milliGRAM(s) IV Push daily  insulin glargine Injectable (LANTUS) 12 Unit(s) SubCutaneous at bedtime  insulin lispro (HumaLOG) corrective regimen sliding scale   SubCutaneous three times a day before meals  insulin lispro (HumaLOG) corrective regimen sliding scale   SubCutaneous at bedtime  levothyroxine 75 MICROGram(s) Oral daily  losartan 25 milliGRAM(s) Oral daily  sertraline 100 milliGRAM(s) Oral daily  tamsulosin 0.4 milliGRAM(s) Oral two times a day  vancomycin  IVPB 1750 milliGRAM(s) IV Intermittent every 12 hours    MEDICATIONS  (PRN):  dextrose 40% Gel 15 Gram(s) Oral once PRN Blood Glucose LESS THAN 70 milliGRAM(s)/deciliter  glucagon  Injectable 1 milliGRAM(s) IntraMuscular once PRN Glucose LESS THAN 70 milligrams/deciliter  nitroglycerin     SubLingual 0.4 milliGRAM(s) SubLingual daily PRN Chest Pain  polyethylene glycol 3350 17 Gram(s) Oral daily PRN Constipation      Allergies    No Known Allergies    Intolerances        REVIEW OF SYSTEMS:  CONSTITUTIONAL: No fever or chills  HEENT:  No headache, +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)  RESPIRATORY: + cough chronic, No wheezing, or shortness of breath  CARDIOVASCULAR: No chest pain, palpitations, +Chronic leg swelling  GASTROINTESTINAL: No abd pain, nausea, vomiting, or diarrhea  GENITOURINARY: No dysuria, frequency, or hematuria  NEUROLOGICAL: no focal weakness or dizziness  MUSCULOSKELETAL: no myalgias , no new joint pains    Vital Signs Last 24 Hrs  T(C): 36.3 (20 Jul 2018 05:39), Max: 36.7 (20 Jul 2018 01:21)  T(F): 97.4 (20 Jul 2018 05:39), Max: 98 (20 Jul 2018 01:21)  HR: 97 (20 Jul 2018 05:39) (79 - 97)  BP: 125/81 (20 Jul 2018 05:39) (124/80 - 134/78)  BP(mean): --  RR: 16 (20 Jul 2018 05:39) (16 - 18)  SpO2: 97% (20 Jul 2018 05:39) (96% - 98%)    PHYSICAL EXAM:  GENERAL: patient appears well, no acute distress, appropriate, pleasant  EYES: sclera clear, no exudates  ENMT: oropharynx clear without erythema, no exudates, moist mucous membranes  NECK: supple, soft no LAD  LUNGS:  crackles bl lower lobes, decreased BS on L lower lobe , no wheezing or rhonchi appreciated  HEART: soft S1/S2, regular rate and rhythm, no murmurs noted, no lower extremity edema  GASTROINTESTINAL: abdomen is soft, nontender, nondistended, normoactive bowel sounds, no palpable masses  INTEGUMENT: good skin turgor, no lesions noted  MUSCULOSKELETAL: no clubbing or cyanosis, no obvious deformity, Right lower leg cellulitis and wound covered, dry clean intact, erythema observed up to right patella  NEUROLOGIC: awake, alert, oriented x3, good muscle tone in 4 extremities,   HEME/LYMPH:, no obvious ecchymosis or petechiae         LABS:                        13.0   9.38  )-----------( 198      ( 20 Jul 2018 06:22 )             40.0     CBC Full  -  ( 20 Jul 2018 06:22 )  WBC Count : 9.38 K/uL  Hemoglobin : 13.0 g/dL  Hematocrit : 40.0 %  Platelet Count - Automated : 198 K/uL  Mean Cell Volume : 84.2 fl  Mean Cell Hemoglobin : 27.4 pg  Mean Cell Hemoglobin Concentration : 32.5 gm/dL  Auto Neutrophil # : 7.12 K/uL  Auto Lymphocyte # : 1.17 K/uL  Auto Monocyte # : 0.79 K/uL  Auto Eosinophil # : 0.15 K/uL  Auto Basophil # : 0.02 K/uL  Auto Neutrophil % : 75.9 %  Auto Lymphocyte % : 12.5 %  Auto Monocyte % : 8.4 %  Auto Eosinophil % : 1.6 %  Auto Basophil % : 0.2 %    20 Jul 2018 06:22    141    |  108    |  48     ----------------------------<  214    4.2     |  25     |  1.50     Ca    8.1        20 Jul 2018 06:22    TPro  6.4    /  Alb  2.7    /  TBili  1.2    /  DBili  x      /  AST  60     /  ALT  86     /  AlkPhos  282    20 Jul 2018 06:22    PT/INR - ( 19 Jul 2018 20:35 )   PT: 15.3 sec;   INR: 1.39 ratio             CAPILLARY BLOOD GLUCOSE      POCT Blood Glucose.: 136 mg/dL (20 Jul 2018 11:53)  POCT Blood Glucose.: 194 mg/dL (20 Jul 2018 08:18)  POCT Blood Glucose.: 170 mg/dL (19 Jul 2018 17:46)          RADIOLOGY & ADDITIONAL TESTS:    Personally reviewed.     Consultant(s) Notes Reviewed:  [x] YES  [ ] NO Patient is a 77y old  Male who presents with a chief complaint of cellulitis right LE (20 Jul 2018 01:46)      INTERVAL HPI/OVERNIGHT EVENTS: Patient seen and examined at bedside. No events overnight. Patient offers no complaints.     MEDICATIONS  (STANDING):  aspirin enteric coated 325 milliGRAM(s) Oral daily  atorvastatin 40 milliGRAM(s) Oral at bedtime  dextrose 5%. 1000 milliLiter(s) (50 mL/Hr) IV Continuous <Continuous>  dextrose 50% Injectable 12.5 Gram(s) IV Push once  dextrose 50% Injectable 25 Gram(s) IV Push once  dextrose 50% Injectable 25 Gram(s) IV Push once  enoxaparin Injectable 40 milliGRAM(s) SubCutaneous every 24 hours  furosemide   Injectable 40 milliGRAM(s) IV Push daily  insulin glargine Injectable (LANTUS) 12 Unit(s) SubCutaneous at bedtime  insulin lispro (HumaLOG) corrective regimen sliding scale   SubCutaneous three times a day before meals  insulin lispro (HumaLOG) corrective regimen sliding scale   SubCutaneous at bedtime  levothyroxine 75 MICROGram(s) Oral daily  losartan 25 milliGRAM(s) Oral daily  sertraline 100 milliGRAM(s) Oral daily  tamsulosin 0.4 milliGRAM(s) Oral two times a day  vancomycin  IVPB 1750 milliGRAM(s) IV Intermittent every 12 hours    MEDICATIONS  (PRN):  dextrose 40% Gel 15 Gram(s) Oral once PRN Blood Glucose LESS THAN 70 milliGRAM(s)/deciliter  glucagon  Injectable 1 milliGRAM(s) IntraMuscular once PRN Glucose LESS THAN 70 milligrams/deciliter  nitroglycerin     SubLingual 0.4 milliGRAM(s) SubLingual daily PRN Chest Pain  polyethylene glycol 3350 17 Gram(s) Oral daily PRN Constipation      Allergies    No Known Allergies    Intolerances        REVIEW OF SYSTEMS:  CONSTITUTIONAL: No fever or chills  HEENT:  No headache, +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)  RESPIRATORY: + cough chronic, No wheezing, or shortness of breath  CARDIOVASCULAR: No chest pain, palpitations, +Chronic leg swelling  GASTROINTESTINAL: No abd pain, nausea, vomiting, or diarrhea  GENITOURINARY: No dysuria, frequency, or hematuria  NEUROLOGICAL: no focal weakness or dizziness  MUSCULOSKELETAL: no myalgias , no new joint pains    Vital Signs Last 24 Hrs  T(C): 36.3 (20 Jul 2018 05:39), Max: 36.7 (20 Jul 2018 01:21)  T(F): 97.4 (20 Jul 2018 05:39), Max: 98 (20 Jul 2018 01:21)  HR: 97 (20 Jul 2018 05:39) (79 - 97)  BP: 125/81 (20 Jul 2018 05:39) (124/80 - 134/78)  BP(mean): --  RR: 16 (20 Jul 2018 05:39) (16 - 18)  SpO2: 97% (20 Jul 2018 05:39) (96% - 98%)    PHYSICAL EXAM:  GENERAL: patient appears well, no acute distress, appropriate, pleasant  EYES: sclera clear, no exudates  ENMT: oropharynx clear without erythema, no exudates, moist mucous membranes  NECK: supple, soft no LAD  LUNGS:  crackles bl lower lobes, decreased BS on L lower lobe , no wheezing or rhonchi appreciated  HEART: soft S1/S2, regular rate and rhythm, no murmurs noted, no lower extremity edema  GASTROINTESTINAL: abdomen is soft, nontender, nondistended, normoactive bowel sounds, no palpable masses  INTEGUMENT: good skin turgor, no lesions noted  MUSCULOSKELETAL: no clubbing or cyanosis, no obvious deformity, Right lower leg cellulitis and wound covered, dry clean intact, erythema observed up to right patella,   NEUROLOGIC: awake, alert, oriented x3, good muscle tone in 4 extremities,   HEME/LYMPH:, no obvious ecchymosis or petechiae         LABS:                        13.0   9.38  )-----------( 198      ( 20 Jul 2018 06:22 )             40.0     CBC Full  -  ( 20 Jul 2018 06:22 )  WBC Count : 9.38 K/uL  Hemoglobin : 13.0 g/dL  Hematocrit : 40.0 %  Platelet Count - Automated : 198 K/uL  Mean Cell Volume : 84.2 fl  Mean Cell Hemoglobin : 27.4 pg  Mean Cell Hemoglobin Concentration : 32.5 gm/dL  Auto Neutrophil # : 7.12 K/uL  Auto Lymphocyte # : 1.17 K/uL  Auto Monocyte # : 0.79 K/uL  Auto Eosinophil # : 0.15 K/uL  Auto Basophil # : 0.02 K/uL  Auto Neutrophil % : 75.9 %  Auto Lymphocyte % : 12.5 %  Auto Monocyte % : 8.4 %  Auto Eosinophil % : 1.6 %  Auto Basophil % : 0.2 %    20 Jul 2018 06:22    141    |  108    |  48     ----------------------------<  214    4.2     |  25     |  1.50     Ca    8.1        20 Jul 2018 06:22    TPro  6.4    /  Alb  2.7    /  TBili  1.2    /  DBili  x      /  AST  60     /  ALT  86     /  AlkPhos  282    20 Jul 2018 06:22    PT/INR - ( 19 Jul 2018 20:35 )   PT: 15.3 sec;   INR: 1.39 ratio             CAPILLARY BLOOD GLUCOSE      POCT Blood Glucose.: 136 mg/dL (20 Jul 2018 11:53)  POCT Blood Glucose.: 194 mg/dL (20 Jul 2018 08:18)  POCT Blood Glucose.: 170 mg/dL (19 Jul 2018 17:46)          RADIOLOGY & ADDITIONAL TESTS:    Personally reviewed.     Consultant(s) Notes Reviewed:  [x] YES  [ ] NO Patient is a 77y old  Male who presents with a chief complaint of cellulitis right LE (20 Jul 2018 01:46)      INTERVAL HPI/OVERNIGHT EVENTS: Patient seen and examined at bedside. No events overnight. Patient offers no complaints.     MEDICATIONS  (STANDING):  aspirin enteric coated 325 milliGRAM(s) Oral daily  atorvastatin 40 milliGRAM(s) Oral at bedtime  dextrose 5%. 1000 milliLiter(s) (50 mL/Hr) IV Continuous <Continuous>  dextrose 50% Injectable 12.5 Gram(s) IV Push once  dextrose 50% Injectable 25 Gram(s) IV Push once  dextrose 50% Injectable 25 Gram(s) IV Push once  enoxaparin Injectable 40 milliGRAM(s) SubCutaneous every 24 hours  furosemide   Injectable 40 milliGRAM(s) IV Push daily  insulin glargine Injectable (LANTUS) 12 Unit(s) SubCutaneous at bedtime  insulin lispro (HumaLOG) corrective regimen sliding scale   SubCutaneous three times a day before meals  insulin lispro (HumaLOG) corrective regimen sliding scale   SubCutaneous at bedtime  levothyroxine 75 MICROGram(s) Oral daily  losartan 25 milliGRAM(s) Oral daily  sertraline 100 milliGRAM(s) Oral daily  tamsulosin 0.4 milliGRAM(s) Oral two times a day  vancomycin  IVPB 1750 milliGRAM(s) IV Intermittent every 12 hours    MEDICATIONS  (PRN):  dextrose 40% Gel 15 Gram(s) Oral once PRN Blood Glucose LESS THAN 70 milliGRAM(s)/deciliter  glucagon  Injectable 1 milliGRAM(s) IntraMuscular once PRN Glucose LESS THAN 70 milligrams/deciliter  nitroglycerin     SubLingual 0.4 milliGRAM(s) SubLingual daily PRN Chest Pain  polyethylene glycol 3350 17 Gram(s) Oral daily PRN Constipation      Allergies    No Known Allergies    Intolerances        REVIEW OF SYSTEMS:  CONSTITUTIONAL: No fever or chills  HEENT:  No headache, +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)  RESPIRATORY: + cough chronic, No wheezing, or shortness of breath  CARDIOVASCULAR: No chest pain, palpitations, +Chronic leg swelling  GASTROINTESTINAL: No abd pain, nausea, vomiting, or diarrhea  GENITOURINARY: No dysuria, frequency, or hematuria  NEUROLOGICAL: no focal weakness or dizziness  MUSCULOSKELETAL: no myalgias , no new joint pains    Vital Signs Last 24 Hrs  T(C): 36.3 (20 Jul 2018 05:39), Max: 36.7 (20 Jul 2018 01:21)  T(F): 97.4 (20 Jul 2018 05:39), Max: 98 (20 Jul 2018 01:21)  HR: 97 (20 Jul 2018 05:39) (79 - 97)  BP: 125/81 (20 Jul 2018 05:39) (124/80 - 134/78)  BP(mean): --  RR: 16 (20 Jul 2018 05:39) (16 - 18)  SpO2: 97% (20 Jul 2018 05:39) (96% - 98%)    PHYSICAL EXAM:  GENERAL: patient appears well, no acute distress, appropriate, pleasant  EYES: sclera clear, no exudates  ENMT: oropharynx clear without erythema, no exudates, moist mucous membranes  NECK: supple, soft no LAD  LUNGS:  crackles bl lower lobes, decreased BS on L lower lobe , no wheezing or rhonchi appreciated  HEART: soft S1/S2, regular rate and rhythm, no murmurs noted, no lower extremity edema  GASTROINTESTINAL: abdomen is soft, nontender, nondistended, normoactive bowel sounds, no palpable masses  INTEGUMENT: good skin turgor, no lesions noted  MUSCULOSKELETAL: no clubbing or cyanosis, no obvious deformity, Right lower leg cellulitis and wound covered, dry clean intact, erythema observed up to right patella,   NEUROLOGIC: awake, alert, oriented x3, good muscle tone in 4 extremities,   HEME/LYMPH:, no obvious ecchymosis or petechiae         LABS:                        13.0   9.38  )-----------( 198      ( 20 Jul 2018 06:22 )             40.0     CBC Full  -  ( 20 Jul 2018 06:22 )  WBC Count : 9.38 K/uL  Hemoglobin : 13.0 g/dL  Hematocrit : 40.0 %  Platelet Count - Automated : 198 K/uL  Mean Cell Volume : 84.2 fl  Mean Cell Hemoglobin : 27.4 pg  Mean Cell Hemoglobin Concentration : 32.5 gm/dL  Auto Neutrophil # : 7.12 K/uL  Auto Lymphocyte # : 1.17 K/uL  Auto Monocyte # : 0.79 K/uL  Auto Eosinophil # : 0.15 K/uL  Auto Basophil # : 0.02 K/uL  Auto Neutrophil % : 75.9 %  Auto Lymphocyte % : 12.5 %  Auto Monocyte % : 8.4 %  Auto Eosinophil % : 1.6 %  Auto Basophil % : 0.2 %    20 Jul 2018 06:22    141    |  108    |  48     ----------------------------<  214    4.2     |  25     |  1.50     Ca    8.1        20 Jul 2018 06:22    TPro  6.4    /  Alb  2.7    /  TBili  1.2    /  DBili  x      /  AST  60     /  ALT  86     /  AlkPhos  282    20 Jul 2018 06:22    PT/INR - ( 19 Jul 2018 20:35 )   PT: 15.3 sec;   INR: 1.39 ratio             CAPILLARY BLOOD GLUCOSE      POCT Blood Glucose.: 136 mg/dL (20 Jul 2018 11:53)  POCT Blood Glucose.: 194 mg/dL (20 Jul 2018 08:18)  POCT Blood Glucose.: 170 mg/dL (19 Jul 2018 17:46)          RADIOLOGY & ADDITIONAL TESTS:  < from: Xray Chest 1 View AP/PA (07.19.18 @ 20:46) >  EXAM:  XR CHEST AP OR PA 1V                            PROCEDURE DATE:  07/19/2018          INTERPRETATION:  CLINICAL STATEMENT: Chest Pain.    TECHNIQUE: AP view of the chest.    COMPARISON: 3/13/2017    FINDINGS/  IMPRESSION:  Sternotomy wires again noted.  Increased interstitial lung markings. Atelectasis left lung base.   Nonspecific mild opacity right lung base. Follow-up recommended.    No significant pleural effusion.    Chronic right rib deformities.    Heart size cannot be accuratelyassessed in this projection.    DAMON SANDERS M.D., ATTENDING RADIOLOGIST  This document has been electronically signed. Jul 20 2018  6:35AM    < end of copied text >        Personally reviewed.     Consultant(s) Notes Reviewed:  [x] YES  [ ] NO

## 2018-07-20 NOTE — CONSULT NOTE ADULT - ATTENDING COMMENTS
Seen/examined at bedside. Agree with above.  Sees Dr. Steven Goldberg on outside.  Mild overload on exam. Start Lasix 40 IV daily  Treatment for cellulitis  Repeat EKG to confirm SR  Check echocardiogram.  Will follow with you

## 2018-07-20 NOTE — CONSULT NOTE ADULT - SUBJECTIVE AND OBJECTIVE BOX
[facility]         Sami Lott, Joaquin, Lilian, Brenda, Jose Roberto, Vinod        [phone] (office)    CHIEF COMPLAINT: Patient is a 77y old  Male who presents with a chief complaint of cellulitis right LE (20 Jul 2018 01:46)      HPI:  77M w/pmh of CAD s/p CABG, HLD, HTN, Hypothyroid, MI (1995), Prostate Ca s/p radiation seeds, T2DM with peripheral neuropathy, Venous stasis dermatitis of b/l lower extremities sent in from wound care (Dr. Arreola) for worsening of RLE ulcers. Patient states that he was previously seeing wound care for a left heel ulcer and receiving hyperbaric therapy tx. Over this past weekend, he fell and got multiple abrasions on his RLE which have become superficial ulcers. He has had falls in the past due to osteoarthritis in b/l knees which causes his knees to buckle and him to fall. Patient was seeing Dr. Arreola today when it was noted that the ulcers were worsening and pt was sent in for IV abx. Pt states that the ulcers are superficial and not actively bleeding. He has been getting treated with alginate and wound dressing changes. Denies fevers/chills, n/v, chest pain, palpitations, sob, abdominal pain, RLE pain. Of note, patient was recommended to see vascular surgery for workup. As per chart review, 5/18 bilateral MARY ANNE/PVR studies showed no hemodynamically significant lower extremity arterial disease.    In the ED, patient's vitals were: T97.6F, HR 97, /78, RR 16 and SpO2 98% on room air. Significant labs include: WBC 11.13, neutrophils 80.5, BUN/Cr 51/1.5, Glucose 154, T.bili 1.3, AST 92, , Trops neg x1. Serum pro-BNP 7683. Patient received Vancomycin x1, Zosyn x1, 1L normal saline, Blood cultures x2.     Patient denies chest pain ,SOB, dizziness, palpitations, GORDON, orthopnea      PAST MEDICAL & SURGICAL HISTORY:  Venous stasis dermatitis of both lower extremities  Hyperlipidemia  Type 2 diabetes mellitus without complication  Hypothyroidism  Bladder stones: s/p extraction  Prostate cancer: s/p brachytherapy  Myocardial infarction: 1995  CAD (coronary artery disease)  S/P appendectomy  S/P CABG x 3: saphenous vein harvested from LLE, performed in March 1996 at [facility] by Dr. Mcgraw      SOCIAL HISTORY: No active tobacco, alcohol or illicit drug use    FAMILY HISTORY:  No pertinent family history in first degree relatives   No pertinent family history of CAD    Outpatient medications:    MEDICATIONS  (STANDING):  aspirin enteric coated 325 milliGRAM(s) Oral daily  atorvastatin 40 milliGRAM(s) Oral at bedtime  dextrose 5%. 1000 milliLiter(s) (50 mL/Hr) IV Continuous <Continuous>  dextrose 50% Injectable 12.5 Gram(s) IV Push once  dextrose 50% Injectable 25 Gram(s) IV Push once  dextrose 50% Injectable 25 Gram(s) IV Push once  enoxaparin Injectable 40 milliGRAM(s) SubCutaneous every 24 hours  insulin glargine Injectable (LANTUS) 12 Unit(s) SubCutaneous at bedtime  insulin lispro (HumaLOG) corrective regimen sliding scale   SubCutaneous three times a day before meals  insulin lispro (HumaLOG) corrective regimen sliding scale   SubCutaneous at bedtime  levothyroxine 75 MICROGram(s) Oral daily  losartan 25 milliGRAM(s) Oral daily  sertraline 100 milliGRAM(s) Oral daily  tamsulosin 0.4 milliGRAM(s) Oral two times a day  vancomycin  IVPB 1750 milliGRAM(s) IV Intermittent every 12 hours    MEDICATIONS  (PRN):  dextrose 40% Gel 15 Gram(s) Oral once PRN Blood Glucose LESS THAN 70 milliGRAM(s)/deciliter  glucagon  Injectable 1 milliGRAM(s) IntraMuscular once PRN Glucose LESS THAN 70 milligrams/deciliter  nitroglycerin     SubLingual 0.4 milliGRAM(s) SubLingual daily PRN Chest Pain  polyethylene glycol 3350 17 Gram(s) Oral daily PRN Constipation      Allergies    No Known Allergies    Intolerances        REVIEW OF SYSTEMS: Is negative for eye, ENT, GI, , allergic, dermatologic, musculoskeletal and neurologic, except as described above.    VITAL SIGNS:   Vital Signs Last 24 Hrs  T(C): 36.3 (20 Jul 2018 05:39), Max: 36.7 (20 Jul 2018 01:21)  T(F): 97.4 (20 Jul 2018 05:39), Max: 98 (20 Jul 2018 01:21)  HR: 97 (20 Jul 2018 05:39) (79 - 97)  BP: 125/81 (20 Jul 2018 05:39) (124/80 - 134/78)  BP(mean): --  RR: 16 (20 Jul 2018 05:39) (16 - 18)  SpO2: 97% (20 Jul 2018 05:39) (96% - 98%)    I&O's Summary      PHYSICAL EXAM:    General: Well developed, well nourished, NAD  HEENT: NCAT, PERRLA, EOMI bl, moist mucous membranes   Neck: Supple, nontender, no mass  Neurology: A&Ox3, nonfocal, CN II-XII grossly intact, sensation intact  Respiratory: +RLL and LLL coarse breath sounds,, no rhonchi, no rales  CV: RRR, +S1/S2, no murmurs, rubs or gallops  Abdominal: obese abdomen, soft, nontender, nondistended, no guarding, no rebound  Extremities: +b/l venous stasis, + b/l lower extremities wrapped with wound dressing. +superficial ulcer visible on LLE with alginate dressing, no active bleeding.   MSK: +able to move all extremities spontaneously  Skin: as above    LABS: All Labs Reviewed:                        13.0   9.38  )-----------( 198      ( 20 Jul 2018 06:22 )             40.0                         14.2   11.13 )-----------( 190      ( 19 Jul 2018 20:35 )             43.5     20 Jul 2018 06:22    141    |  108    |  48     ----------------------------<  214    4.2     |  25     |  1.50   19 Jul 2018 20:35    140    |  107    |  51     ----------------------------<  154    4.8     |  25     |  1.50     Ca    8.1        20 Jul 2018 06:22  Ca    8.4        19 Jul 2018 20:35    TPro  6.4    /  Alb  2.7    /  TBili  1.2    /  DBili  x      /  AST  60     /  ALT  86     /  AlkPhos  282    20 Jul 2018 06:22  TPro  7.1    /  Alb  3.0    /  TBili  1.3    /  DBili  x      /  AST  92     /  ALT  104    /  AlkPhos  334    19 Jul 2018 20:35    PT/INR - ( 19 Jul 2018 20:35 )   PT: 15.3 sec;   INR: 1.39 ratio           CARDIAC MARKERS ( 19 Jul 2018 20:35 )  .016 ng/mL / x     / 234 U/L / x     / 4.8 ng/mL      Blood Culture:   07-19 @ 20:35  Pro Bnp [id number]        RADIOLOGY: < from: Xray Chest 1 View AP/PA (07.19.18 @ 20:46) >  EXAM:  XR CHEST AP OR PA 1V                            PROCEDURE DATE:  07/19/2018          INTERPRETATION:  CLINICAL STATEMENT: Chest Pain.    TECHNIQUE: AP view of the chest.    COMPARISON: 3/13/2017    FINDINGS/  IMPRESSION:  Sternotomy wires again noted.    Increased interstitial lung markings. Atelectasis left lung base.   Nonspecific mild opacity right lung base. Follow-up recommended.    No significant pleural effusion.    Chronic right rib deformities.    Heart size cannot be accuratelyassessed in this projection.                  DAMON SANDERS M.D., ATTENDING RADIOLOGIST  This document has been electronically signed. Jul 20 2018  6:35AM    < end of copied text >      EKG: wide QRS with PVCs, RBBB, similar to prior [facility]         Sami Lott, Joaquin, Lilian, Brenda, Jose Roberto, Vinod        [phone] (office)    CHIEF COMPLAINT: Patient is a 77y old  Male who presents with a chief complaint of cellulitis right LE (20 Jul 2018 01:46)      HPI:  77M w/pmh of CAD s/p CABG, HLD, HTN, Hypothyroid, MI (1995), Prostate Ca s/p radiation seeds, T2DM with peripheral neuropathy, Venous stasis dermatitis of b/l lower extremities sent in from wound care (Dr. Arreola) for worsening of RLE ulcers. Patient states that he was previously seeing wound care for a left heel ulcer and receiving hyperbaric therapy tx. Over this past weekend, he fell and got multiple abrasions on his RLE which have become superficial ulcers. He has had falls in the past due to osteoarthritis in b/l knees which causes his knees to buckle and him to fall. Patient was seeing Dr. Arreola today when it was noted that the ulcers were worsening and pt was sent in for IV abx. Pt states that the ulcers are superficial and not actively bleeding. He has been getting treated with alginate and wound dressing changes. Denies fevers/chills, n/v, chest pain, palpitations, sob, abdominal pain, RLE pain. Of note, patient was recommended to see vascular surgery for workup. As per chart review, 5/18 bilateral MARY ANNE/PVR studies showed no hemodynamically significant lower extremity arterial disease.    In the ED, patient's vitals were: T97.6F, HR 97, /78, RR 16 and SpO2 98% on room air. Significant labs include: WBC 11.13, neutrophils 80.5, BUN/Cr 51/1.5, Glucose 154, T.bili 1.3, AST 92, , Trops neg x1. Serum pro-BNP 7683. Patient received Vancomycin x1, Zosyn x1, 1L normal saline, Blood cultures x2.     Patient denies chest pain ,SOB, dizziness, palpitations, GORDON, orthopnea.     Patient follows with Dr. S. Goldberg for cardiology.       PAST MEDICAL & SURGICAL HISTORY:  Venous stasis dermatitis of both lower extremities  Hyperlipidemia  Type 2 diabetes mellitus without complication  Hypothyroidism  Bladder stones: s/p extraction  Prostate cancer: s/p brachytherapy  Myocardial infarction: 1995  CAD (coronary artery disease)  S/P appendectomy  S/P CABG x 3: saphenous vein harvested from LLE, performed in March 1996 at [facility] by Dr. Mcgraw      SOCIAL HISTORY: No active tobacco, alcohol or illicit drug use    FAMILY HISTORY:  No pertinent family history in first degree relatives   No pertinent family history of CAD    Outpatient medications:    MEDICATIONS  (STANDING):  aspirin enteric coated 325 milliGRAM(s) Oral daily  atorvastatin 40 milliGRAM(s) Oral at bedtime  dextrose 5%. 1000 milliLiter(s) (50 mL/Hr) IV Continuous <Continuous>  dextrose 50% Injectable 12.5 Gram(s) IV Push once  dextrose 50% Injectable 25 Gram(s) IV Push once  dextrose 50% Injectable 25 Gram(s) IV Push once  enoxaparin Injectable 40 milliGRAM(s) SubCutaneous every 24 hours  insulin glargine Injectable (LANTUS) 12 Unit(s) SubCutaneous at bedtime  insulin lispro (HumaLOG) corrective regimen sliding scale   SubCutaneous three times a day before meals  insulin lispro (HumaLOG) corrective regimen sliding scale   SubCutaneous at bedtime  levothyroxine 75 MICROGram(s) Oral daily  losartan 25 milliGRAM(s) Oral daily  sertraline 100 milliGRAM(s) Oral daily  tamsulosin 0.4 milliGRAM(s) Oral two times a day  vancomycin  IVPB 1750 milliGRAM(s) IV Intermittent every 12 hours    MEDICATIONS  (PRN):  dextrose 40% Gel 15 Gram(s) Oral once PRN Blood Glucose LESS THAN 70 milliGRAM(s)/deciliter  glucagon  Injectable 1 milliGRAM(s) IntraMuscular once PRN Glucose LESS THAN 70 milligrams/deciliter  nitroglycerin     SubLingual 0.4 milliGRAM(s) SubLingual daily PRN Chest Pain  polyethylene glycol 3350 17 Gram(s) Oral daily PRN Constipation      Allergies    No Known Allergies    Intolerances        REVIEW OF SYSTEMS: Is negative for eye, ENT, GI, , allergic, dermatologic, musculoskeletal and neurologic, except as described above.    VITAL SIGNS:   Vital Signs Last 24 Hrs  T(C): 36.3 (20 Jul 2018 05:39), Max: 36.7 (20 Jul 2018 01:21)  T(F): 97.4 (20 Jul 2018 05:39), Max: 98 (20 Jul 2018 01:21)  HR: 97 (20 Jul 2018 05:39) (79 - 97)  BP: 125/81 (20 Jul 2018 05:39) (124/80 - 134/78)  BP(mean): --  RR: 16 (20 Jul 2018 05:39) (16 - 18)  SpO2: 97% (20 Jul 2018 05:39) (96% - 98%)    I&O's Summary      PHYSICAL EXAM:    General: Well developed, well nourished, NAD  HEENT: NCAT, PERRLA, EOMI bl, moist mucous membranes   Neck: Supple, nontender, no mass  Neurology: A&Ox3, nonfocal, CN II-XII grossly intact, sensation intact  Respiratory: +RLL and LLL coarse breath sounds,+ cough, no rhonchi, no rales  CV: RRR, +S1/S2, no murmurs, rubs or gallops  Abdominal: obese abdomen, soft, nontender, nondistended, no guarding, no rebound  Extremities: +b/l venous stasis, + b/l lower extremities wrapped with wound dressing. +superficial ulcer visible on LLE with alginate dressing, no active bleeding.   MSK: +able to move all extremities spontaneously  Skin: as above    LABS: All Labs Reviewed:                        13.0   9.38  )-----------( 198      ( 20 Jul 2018 06:22 )             40.0                         14.2   11.13 )-----------( 190      ( 19 Jul 2018 20:35 )             43.5     20 Jul 2018 06:22    141    |  108    |  48     ----------------------------<  214    4.2     |  25     |  1.50   19 Jul 2018 20:35    140    |  107    |  51     ----------------------------<  154    4.8     |  25     |  1.50     Ca    8.1        20 Jul 2018 06:22  Ca    8.4        19 Jul 2018 20:35    TPro  6.4    /  Alb  2.7    /  TBili  1.2    /  DBili  x      /  AST  60     /  ALT  86     /  AlkPhos  282    20 Jul 2018 06:22  TPro  7.1    /  Alb  3.0    /  TBili  1.3    /  DBili  x      /  AST  92     /  ALT  104    /  AlkPhos  334    19 Jul 2018 20:35    PT/INR - ( 19 Jul 2018 20:35 )   PT: 15.3 sec;   INR: 1.39 ratio           CARDIAC MARKERS ( 19 Jul 2018 20:35 )  .016 ng/mL / x     / 234 U/L / x     / 4.8 ng/mL      Blood Culture:   07-19 @ 20:35  Pro Bnp [id number]        RADIOLOGY: < from: Xray Chest 1 View AP/PA (07.19.18 @ 20:46) >  EXAM:  XR CHEST AP OR PA 1V                            PROCEDURE DATE:  07/19/2018          INTERPRETATION:  CLINICAL STATEMENT: Chest Pain.    TECHNIQUE: AP view of the chest.    COMPARISON: 3/13/2017    FINDINGS/  IMPRESSION:  Sternotomy wires again noted.    Increased interstitial lung markings. Atelectasis left lung base.   Nonspecific mild opacity right lung base. Follow-up recommended.    No significant pleural effusion.    Chronic right rib deformities.    Heart size cannot be accuratelyassessed in this projection.                  DAMON SANDERS M.D., ATTENDING RADIOLOGIST  This document has been electronically signed. Jul 20 2018  6:35AM    < end of copied text >      EKG:  PVCs, RBBB, inferior infarct similar to prior

## 2018-07-20 NOTE — PROGRESS NOTE ADULT - PROBLEM SELECTOR PLAN 3
Prelim read on CXR showed increased congestion on L side  - No documented hx of CHF. Elevated pro-bnp and GORDON  - Per Cardio, Continue Lasix 40mg IV daily, echo ordered ,  repeat EKG ordered, to confirm no afib  Pt saturating well on room air.     - Check echo  - Cardio consulted- [facility] Prelim read on CXR showed increased congestion on L side  - No documented hx of CHF. Elevated pro-bnp and GORDON  - Per Cardio, Continue Lasix 40mg IV daily  -f/u echo  - repeat EKG ordered, to confirm sinus rhythm  Pt saturating well on room air 97%, denies orthopnea, SOB  - Cardio consulted ([facility])

## 2018-07-20 NOTE — CONSULT NOTE ADULT - SUBJECTIVE AND OBJECTIVE BOX
HPI:  77M w/pmh of CAD s/p CABG, HLD, HTN, Hypothyroid, MI (1995), Prostate Ca s/p radiation   seeds, T2DM with peripheral neuropathy, Venous stasis dermatitis of b/l lower extremities   sent in from wound care (Dr. Arreola) for worsening of RLE ulcers with cellulitis Denies fevers  chills, n/v, chest pain, palpitations, sob, abdominal pain, RLE pain.     Infectious Disease consult was called to evaluate pt and for antibiotic management.    Past Medical & Surgical Hx:  PAST MEDICAL & SURGICAL HISTORY:  Venous stasis dermatitis of both lower extremities  Hyperlipidemia  Type 2 diabetes mellitus without complication  Hypothyroidism  Bladder stones: s/p extraction  Prostate cancer: s/p brachytherapy  Myocardial infarction: 1995  CAD (coronary artery disease)  S/P appendectomy  S/P CABG x 3: saphenous vein harvested from LLE, performed in March 1996 at [facility] by Dr. Mcgraw      Social History--  EtOH: denies   Tobacco: denies   Drug Use: denies       FAMILY HISTORY:  No pertinent family history in first degree relatives      Allergies  No Known Allergies    Intolerances        Home/ Out patient  Medications :    Current Inpatient Medications :    ANTIBIOTICS:   vancomycin  IVPB 1750 milliGRAM(s) IV Intermittent every 12 hours      OTHER RELEVANT MEDICATIONS :  aspirin enteric coated 325 milliGRAM(s) Oral daily  atorvastatin 40 milliGRAM(s) Oral at bedtime  dextrose 40% Gel 15 Gram(s) Oral once PRN  dextrose 5%. 1000 milliLiter(s) IV Continuous <Continuous>  dextrose 50% Injectable 12.5 Gram(s) IV Push once  dextrose 50% Injectable 25 Gram(s) IV Push once  dextrose 50% Injectable 25 Gram(s) IV Push once  enoxaparin Injectable 40 milliGRAM(s) SubCutaneous every 24 hours  furosemide   Injectable 40 milliGRAM(s) IV Push daily  glucagon  Injectable 1 milliGRAM(s) IntraMuscular once PRN  insulin glargine Injectable (LANTUS) 12 Unit(s) SubCutaneous at bedtime  insulin lispro (HumaLOG) corrective regimen sliding scale   SubCutaneous three times a day before meals  insulin lispro (HumaLOG) corrective regimen sliding scale   SubCutaneous at bedtime  levothyroxine 75 MICROGram(s) Oral daily  losartan 25 milliGRAM(s) Oral daily  nitroglycerin     SubLingual 0.4 milliGRAM(s) SubLingual daily PRN  polyethylene glycol 3350 17 Gram(s) Oral daily PRN  sertraline 100 milliGRAM(s) Oral daily  tamsulosin 0.4 milliGRAM(s) Oral two times a day    ROS:  CONSTITUTIONAL:  Negative fever or chills, feels well, good appetite  EYES:  Negative  blurry vision or double vision  CARDIOVASCULAR:  Negative for chest pain or palpitations  RESPIRATORY:  Negative for cough, wheezing, or SOB   GASTROINTESTINAL:  Negative for nausea, vomiting, diarrhea, constipation, or abdominal pain  GENITOURINARY:  Negative frequency, urgency , dysuria or hematuria   NEUROLOGIC:  No headache, confusion, dizziness, lightheadedness  All other systems were reviewed and are negative      Physical Exam:  Vital Signs Last 24 Hrs  T(C): 36.6 (20 Jul 2018 20:53), Max: 36.7 (20 Jul 2018 01:21)  T(F): 97.8 (20 Jul 2018 20:53), Max: 98 (20 Jul 2018 01:21)  HR: 98 (20 Jul 2018 20:53) (79 - 101)  BP: 106/69 (20 Jul 2018 20:53) (106/69 - 129/82)  BP(mean): --  RR: 17 (20 Jul 2018 20:53) (16 - 18)  SpO2: 98% (20 Jul 2018 20:53) (96% - 98%)      General: no acute distress obese male  Eyes: sclera anicteric, pupils equal and reactive to light  ENMT: buccal mucosa moist, pharynx not injected  Neck: supple, trachea midline  Lungs: clear, no wheeze/rhonchi  Cardiovascular: regular rate and rhythm, S1 S2  Abdomen: soft, nontender, no organomegaly present, bowel sounds normal  Neurological:  alert and oriented x3, Cranial Nerves II-XII grossly intact  Skin:no increased ecchymosis/petechiae/purpura  Lymph Nodes: no palpable cervical/supraclavicular lymph nodes enlargements  Extremities: right Leg drsg c/d/i Pt did not want drg removed    Labs:                         13.0   9.38  )-----------( 198      ( 20 Jul 2018 06:22 )             40.0   07-20    141  |  108  |  48<H>  ----------------------------<  214<H>  4.2   |  25  |  1.50<H>    Ca    8.1<L>      20 Jul 2018 06:22    TPro  6.4  /  Alb  2.7<L>  /  TBili  1.2  /  DBili  x   /  AST  60<H>  /  ALT  86<H>  /  AlkPhos  282<H>  07-20      RECENT CULTURES:    Culture - Other (collected 19 Jul 2018 23:00)  Source: .Other Right Leg- Swab  Preliminary Report (20 Jul 2018 17:33):    Moderate Staphylococcus aureus    Few Gram Negative Rods    Culture - Blood (collected 19 Jul 2018 22:58)  Source: .Blood Blood-Peripheral  Preliminary Report (20 Jul 2018 23:02):    No growth to date.    Culture - Blood (collected 19 Jul 2018 22:58)  Source: .Blood Blood-Peripheral  Preliminary Report (20 Jul 2018 23:02):    No growth to date.    RADIOLOGY & ADDITIONAL STUDIES:    EXAM:  XR CHEST AP OR PA 1V                            PROCEDURE DATE:  07/19/2018          INTERPRETATION:  CLINICAL STATEMENT: Chest Pain.    TECHNIQUE: AP view of the chest.    COMPARISON: 3/13/2017    FINDINGS/  IMPRESSION:  Sternotomy wires again noted.    Increased interstitial lung markings. Atelectasis left lung base.   Nonspecific mild opacity right lung base. Follow-up recommended.    No significant pleural effusion.    Chronic right rib deformities.    Heart size cannot be accuratelyassessed in this projection.    Assessment :   77M w/pmh of CAD s/p CABG, HLD, HTN, Hypothyroid, MI (1995), Prostate Ca s/p radiation   seeds, T2DM with peripheral neuropathy, Venous stasis dermatitis of b/l lower extremities   admitted with right leg cellulitis sec infected venous ulcers  Lymphadema with venous insufficiency   CKD      Plan :   Cont Zosyn  Dc Vancomycin  Fu cultures  Elevate leg when possible  Wound care to follow      Continue with present regime .  Approptiate use of antibiotics and adverse effects reviewed.      I have discussed the above plan of care with patient/family in detail. They expressed understanding of the treatment plan . Risks, benefits and alternatives discussed in detail. I have asked if they have any questions or concerns and appropriately addressed them to the best of my ability .      > 45 minutes spent in direct patient care reviewing  the notes, lab data/ imaging , discussion with multidisciplinary team. All questions were addressed and answered to the best of my capacity .    Thank you for allowing me to participate in the care of your patient .      Sanchez Ochoa MD  Infectious Disease  [phone]

## 2018-07-20 NOTE — CONSULT NOTE ADULT - ASSESSMENT
77M w/pmh of CAD s/p CABG, HLD, HTN, Hypothyroid, MI (1995), Prostate Ca s/p radiation seeds, T2DM with peripheral neuropathy, Venous stasis dermatitis of b/l lower extremities sent in from wound care (Dr. Arreola) for worsening of RLE ulcers. We are now consulted for left sided pleural effusion.    -EKG shows no acute ischemic changes. troponin negative x1  - patient with history of CAD, s/p CABG, continue ASA, losartan, statin    -no known arrythmia   - Patient denies GORDON, orthopnea, BNP 7863, f/u echo to assess ventricular function   - 77M w/pmh of CAD s/p CABG, HLD, HTN, Hypothyroid, MI (1995), Prostate Ca s/p radiation seeds, T2DM with peripheral neuropathy, Venous stasis dermatitis of b/l lower extremities sent in from wound care (Dr. Arreola) for worsening of RLE ulcers. We are now consulted for left sided pleural effusion.    -EKG shows no acute ischemic changes. troponin negative x1  - patient with history of CAD, s/p CABG, continue ASA, losartan, statin    -no known arrythmia, rhythm irregular on EKG, would do serial EKGs   - Patient denies GORDON, orthopnea, BNP 7863, f/u echo to assess ventricular function   - continue Lasix 40mg IV daily 77M w/pmh of CAD s/p CABG, HLD, HTN, Hypothyroid, MI (1995), Prostate Ca s/p radiation seeds, T2DM with peripheral neuropathy, Venous stasis dermatitis of b/l lower extremities sent in from wound care (Dr. Arreola) for worsening of RLE ulcers. We are now consulted for left sided pleural effusion and possible overload.     -EKG shows no acute ischemic changes. Troponin negative x1  - patient with history of CAD, s/p CABG, continue ASA, losartan, statin    -no known arrythmia, no obvious P waves. Please repeat ekg to confirm absence of atrial fibrillation.  - Patient denies GORDON, orthopnea, BNP 7863, f/u echo to assess ventricular function   - continue Lasix 40mg IV daily for now  - Watch electrolytes and creatinine.

## 2018-07-20 NOTE — PROGRESS NOTE ADULT - ASSESSMENT
77M w/pmh of CAD s/p CABG, HLD, HTN, Hypothyroid, MI (1995), Prostate Ca s/p radiation seeds, T2DM with peripheral neuropathy, Venous stasis dermatitis of b/l lower extremities sent in from wound care (Dr. Arreola) for RLE cellulitis requiring IV abx. 77M w/pmh of CAD s/p CABG, HLD, HTN, Hypothyroid, MI (1995), Prostate Ca s/p radiation seeds, T2DM with peripheral neuropathy, Venous stasis dermatitis of b/l lower extremities sent in from wound care (Dr. Arreola) for RLE cellulitis requiring IV abx. Pleural effusion on L. echo today.

## 2018-07-20 NOTE — PROGRESS NOTE ADULT - PROBLEM SELECTOR PLAN 1
- continue Vancomycin  - follow up blood cultures  - follow up vanc trough  - Wound care following- Dr. Arreola. Appreciate recs

## 2018-07-20 NOTE — PROGRESS NOTE ADULT - SUBJECTIVE AND OBJECTIVE BOX
Chief Complaint:  Cellulitis right leg    HPI:  The patient is well-known to the wound care center where he is treated for venous stasis ulcers. He presented on 7/19/18 with massive cellulitis as well as multiple new ulcerations with weeping. He was, therefore, brought to the ER for admission.    PAST MEDICAL & SURGICAL HISTORY:  Venous stasis dermatitis of both lower extremities  Hyperlipidemia  Type 2 diabetes mellitus without complication  Hypothyroidism  Bladder stones: s/p extraction  Prostate cancer: s/p brachytherapy  Myocardial infarction: 1995  CAD (coronary artery disease)  S/P appendectomy  S/P CABG x 3: saphenous vein harvested from LLE, performed in March 1996 at [facility] by Dr. Mcgraw      Allergies    No Known Allergies    Intolerances        MEDICATIONS  (STANDING):  aspirin enteric coated 325 milliGRAM(s) Oral daily  atorvastatin 40 milliGRAM(s) Oral at bedtime  dextrose 5%. 1000 milliLiter(s) (50 mL/Hr) IV Continuous <Continuous>  dextrose 50% Injectable 12.5 Gram(s) IV Push once  dextrose 50% Injectable 25 Gram(s) IV Push once  dextrose 50% Injectable 25 Gram(s) IV Push once  enoxaparin Injectable 40 milliGRAM(s) SubCutaneous every 24 hours  furosemide   Injectable 40 milliGRAM(s) IV Push daily  insulin glargine Injectable (LANTUS) 12 Unit(s) SubCutaneous at bedtime  insulin lispro (HumaLOG) corrective regimen sliding scale   SubCutaneous three times a day before meals  insulin lispro (HumaLOG) corrective regimen sliding scale   SubCutaneous at bedtime  levothyroxine 75 MICROGram(s) Oral daily  losartan 25 milliGRAM(s) Oral daily  sertraline 100 milliGRAM(s) Oral daily  tamsulosin 0.4 milliGRAM(s) Oral two times a day  vancomycin  IVPB 1750 milliGRAM(s) IV Intermittent every 12 hours    MEDICATIONS  (PRN):  dextrose 40% Gel 15 Gram(s) Oral once PRN Blood Glucose LESS THAN 70 milliGRAM(s)/deciliter  glucagon  Injectable 1 milliGRAM(s) IntraMuscular once PRN Glucose LESS THAN 70 milligrams/deciliter  nitroglycerin     SubLingual 0.4 milliGRAM(s) SubLingual daily PRN Chest Pain  polyethylene glycol 3350 17 Gram(s) Oral daily PRN Constipation      FAMILY HISTORY:  No pertinent family history in first degree relatives      ROS:  CONSTITUTIONAL: No fever, weight loss, or fatigue  NECK: No pain or stiffness  RESPIRATORY: No cough, wheezing, chills or hemoptysis; No shortness of breath  CARDIOVASCULAR: No chest pain, palpitations, dizziness, or leg swelling  GASTROINTESTINAL: No abdominal or epigastric pain. No nausea, vomiting, or hematemesis; No diarrhea or constipation. No melena or hematochezia.  GENITOURINARY: No dysuria, frequency, hematuria, or incontinence  NEUROLOGICAL: No headaches, memory loss, loss of strength, numbness, or tremors  SKIN: Stasis ulcers of legs  ENDOCRINE: No heat or cold intolerance; No hair loss  MUSCULOSKELETAL: No joint pain or swelling; No muscle, back, or extremity pain  HEME/LYMPH: No easy bruising, or bleeding gums    PHYSICAL EXAM-    Height (cm): 177.8 (07-19 @ 19:07)  Weight (kg): 127 (07-19 @ 19:07)  BMI (kg/m2): 40.2 (07-19 @ 19:07)  Vital Signs Last 24 Hrs  T(C): 36.3 (20 Jul 2018 12:45), Max: 36.7 (20 Jul 2018 01:21)  T(F): 97.4 (20 Jul 2018 12:45), Max: 98 (20 Jul 2018 01:21)  HR: 101 (20 Jul 2018 12:45) (79 - 101)  BP: 111/74 (20 Jul 2018 12:45) (111/74 - 134/78)  BP(mean): --  RR: 17 (20 Jul 2018 12:45) (16 - 18)  SpO2: 97% (20 Jul 2018 12:45) (96% - 98%)        Skin: Right lower leg contains multiple ulcers along with multiple ruptured blisters with loss of epithelium, exposure of dermis, and extensive weeping of edema fluid. Both legs and feet are edematous, but the right is worse than the left. There is extensive skin maceration. There is erythema and warmth of the skin of the right lower extremity extending from the toes to the knee. There is no sign of acute ischemia. There is hyperpigmentation of the skin and varicose veins of both lower extremities.                             13.0   9.38  )-----------( 198      ( 20 Jul 2018 06:22 )             40.0     07-20    141  |  108  |  48<H>  ----------------------------<  214<H>  4.2   |  25  |  1.50<H>    Ca    8.1<L>      20 Jul 2018 06:22    TPro  6.4  /  Alb  2.7<L>  /  TBili  1.2  /  DBili  x   /  AST  60<H>  /  ALT  86<H>  /  AlkPhos  282<H>  07-20      Radiology

## 2018-07-20 NOTE — PHYSICAL THERAPY INITIAL EVALUATION ADULT - ADDITIONAL COMMENTS
Pt lives with his spouse in a house with steps to enter and steps to bedroom. pt has been unable to negotiate stairs to bedroom and has been sleeping in the den. Pt ambulates independently with Pt lives with his spouse in a house with steps to enter and steps to bedroom. pt has been unable to negotiate stairs to bedroom and has been sleeping in the den. Pt ambulates independently with RW and was independent with ADLs

## 2018-07-21 ENCOUNTER — TRANSCRIPTION ENCOUNTER (OUTPATIENT)
Age: 78
End: 2018-07-21

## 2018-07-21 DIAGNOSIS — I83.218 VARICOSE VEINS OF RIGHT LOWER EXTREMITY WITH BOTH ULCER OF OTHER PART OF LOWER EXTREMITY AND INFLAMMATION: ICD-10-CM

## 2018-07-21 DIAGNOSIS — I83.12 VARICOSE VEINS OF LEFT LOWER EXTREMITY WITH INFLAMMATION: ICD-10-CM

## 2018-07-21 DIAGNOSIS — Z92.3 PERSONAL HISTORY OF IRRADIATION: ICD-10-CM

## 2018-07-21 DIAGNOSIS — Z82.49 FAMILY HISTORY OF ISCHEMIC HEART DISEASE AND OTHER DISEASES OF THE CIRCULATORY SYSTEM: ICD-10-CM

## 2018-07-21 DIAGNOSIS — Z80.0 FAMILY HISTORY OF MALIGNANT NEOPLASM OF DIGESTIVE ORGANS: ICD-10-CM

## 2018-07-21 DIAGNOSIS — Z95.1 PRESENCE OF AORTOCORONARY BYPASS GRAFT: ICD-10-CM

## 2018-07-21 DIAGNOSIS — Z85.46 PERSONAL HISTORY OF MALIGNANT NEOPLASM OF PROSTATE: ICD-10-CM

## 2018-07-21 DIAGNOSIS — E03.9 HYPOTHYROIDISM, UNSPECIFIED: ICD-10-CM

## 2018-07-21 DIAGNOSIS — Z83.3 FAMILY HISTORY OF DIABETES MELLITUS: ICD-10-CM

## 2018-07-21 DIAGNOSIS — E11.40 TYPE 2 DIABETES MELLITUS WITH DIABETIC NEUROPATHY, UNSPECIFIED: ICD-10-CM

## 2018-07-21 DIAGNOSIS — E11.51 TYPE 2 DIABETES MELLITUS WITH DIABETIC PERIPHERAL ANGIOPATHY WITHOUT GANGRENE: ICD-10-CM

## 2018-07-21 DIAGNOSIS — Z79.899 OTHER LONG TERM (CURRENT) DRUG THERAPY: ICD-10-CM

## 2018-07-21 DIAGNOSIS — I10 ESSENTIAL (PRIMARY) HYPERTENSION: ICD-10-CM

## 2018-07-21 DIAGNOSIS — E78.00 PURE HYPERCHOLESTEROLEMIA, UNSPECIFIED: ICD-10-CM

## 2018-07-21 DIAGNOSIS — L03.115 CELLULITIS OF RIGHT LOWER LIMB: ICD-10-CM

## 2018-07-21 DIAGNOSIS — M17.0 BILATERAL PRIMARY OSTEOARTHRITIS OF KNEE: ICD-10-CM

## 2018-07-21 DIAGNOSIS — I25.2 OLD MYOCARDIAL INFARCTION: ICD-10-CM

## 2018-07-21 DIAGNOSIS — L97.822 NON-PRESSURE CHRONIC ULCER OF OTHER PART OF LEFT LOWER LEG WITH FAT LAYER EXPOSED: ICD-10-CM

## 2018-07-21 DIAGNOSIS — I83.228 VARICOSE VEINS OF LEFT LOWER EXTREMITY WITH BOTH ULCER OF OTHER PART OF LOWER EXTREMITY AND INFLAMMATION: ICD-10-CM

## 2018-07-21 DIAGNOSIS — I83.11 VARICOSE VEINS OF RIGHT LOWER EXTREMITY WITH INFLAMMATION: ICD-10-CM

## 2018-07-21 DIAGNOSIS — I25.810 ATHEROSCLEROSIS OF CORONARY ARTERY BYPASS GRAFT(S) WITHOUT ANGINA PECTORIS: ICD-10-CM

## 2018-07-21 DIAGNOSIS — L97.812 NON-PRESSURE CHRONIC ULCER OF OTHER PART OF RIGHT LOWER LEG WITH FAT LAYER EXPOSED: ICD-10-CM

## 2018-07-21 DIAGNOSIS — L97.422 NON-PRESSURE CHRONIC ULCER OF LEFT HEEL AND MIDFOOT WITH FAT LAYER EXPOSED: ICD-10-CM

## 2018-07-21 DIAGNOSIS — Z87.891 PERSONAL HISTORY OF NICOTINE DEPENDENCE: ICD-10-CM

## 2018-07-21 DIAGNOSIS — E66.8 OTHER OBESITY: ICD-10-CM

## 2018-07-21 DIAGNOSIS — Z83.6 FAMILY HISTORY OF OTHER DISEASES OF THE RESPIRATORY SYSTEM: ICD-10-CM

## 2018-07-21 DIAGNOSIS — E11.621 TYPE 2 DIABETES MELLITUS WITH FOOT ULCER: ICD-10-CM

## 2018-07-21 DIAGNOSIS — Z90.49 ACQUIRED ABSENCE OF OTHER SPECIFIED PARTS OF DIGESTIVE TRACT: ICD-10-CM

## 2018-07-21 LAB
-  AMPICILLIN/SULBACTAM: SIGNIFICANT CHANGE UP
-  CEFAZOLIN: SIGNIFICANT CHANGE UP
-  CLINDAMYCIN: SIGNIFICANT CHANGE UP
-  ERYTHROMYCIN: SIGNIFICANT CHANGE UP
-  GENTAMICIN: SIGNIFICANT CHANGE UP
-  OXACILLIN: SIGNIFICANT CHANGE UP
-  PENICILLIN: SIGNIFICANT CHANGE UP
-  RIFAMPIN: SIGNIFICANT CHANGE UP
-  TETRACYCLINE: SIGNIFICANT CHANGE UP
-  TRIMETHOPRIM/SULFAMETHOXAZOLE: SIGNIFICANT CHANGE UP
-  VANCOMYCIN: SIGNIFICANT CHANGE UP
ANION GAP SERPL CALC-SCNC: 12 MMOL/L — SIGNIFICANT CHANGE UP (ref 5–17)
BUN SERPL-MCNC: 45 MG/DL — HIGH (ref 7–23)
CALCIUM SERPL-MCNC: 8.1 MG/DL — LOW (ref 8.5–10.1)
CHLORIDE SERPL-SCNC: 108 MMOL/L — SIGNIFICANT CHANGE UP (ref 96–108)
CO2 SERPL-SCNC: 22 MMOL/L — SIGNIFICANT CHANGE UP (ref 22–31)
CREAT SERPL-MCNC: 1.5 MG/DL — HIGH (ref 0.5–1.3)
CULTURE RESULTS: SIGNIFICANT CHANGE UP
GLUCOSE SERPL-MCNC: 137 MG/DL — HIGH (ref 70–99)
HCT VFR BLD CALC: 41.4 % — SIGNIFICANT CHANGE UP (ref 39–50)
HGB BLD-MCNC: 13.5 G/DL — SIGNIFICANT CHANGE UP (ref 13–17)
MCHC RBC-ENTMCNC: 27.4 PG — SIGNIFICANT CHANGE UP (ref 27–34)
MCHC RBC-ENTMCNC: 32.6 GM/DL — SIGNIFICANT CHANGE UP (ref 32–36)
MCV RBC AUTO: 84.1 FL — SIGNIFICANT CHANGE UP (ref 80–100)
METHOD TYPE: SIGNIFICANT CHANGE UP
NRBC # BLD: 0 /100 WBCS — SIGNIFICANT CHANGE UP (ref 0–0)
ORGANISM # SPEC MICROSCOPIC CNT: SIGNIFICANT CHANGE UP
ORGANISM # SPEC MICROSCOPIC CNT: SIGNIFICANT CHANGE UP
PLATELET # BLD AUTO: 209 K/UL — SIGNIFICANT CHANGE UP (ref 150–400)
POTASSIUM SERPL-MCNC: 4.1 MMOL/L — SIGNIFICANT CHANGE UP (ref 3.5–5.3)
POTASSIUM SERPL-SCNC: 4.1 MMOL/L — SIGNIFICANT CHANGE UP (ref 3.5–5.3)
RBC # BLD: 4.92 M/UL — SIGNIFICANT CHANGE UP (ref 4.2–5.8)
RBC # FLD: 15.6 % — HIGH (ref 10.3–14.5)
SODIUM SERPL-SCNC: 142 MMOL/L — SIGNIFICANT CHANGE UP (ref 135–145)
SPECIMEN SOURCE: SIGNIFICANT CHANGE UP
WBC # BLD: 7.16 K/UL — SIGNIFICANT CHANGE UP (ref 3.8–10.5)
WBC # FLD AUTO: 7.16 K/UL — SIGNIFICANT CHANGE UP (ref 3.8–10.5)

## 2018-07-21 PROCEDURE — 99233 SBSQ HOSP IP/OBS HIGH 50: CPT

## 2018-07-21 PROCEDURE — 93010 ELECTROCARDIOGRAM REPORT: CPT

## 2018-07-21 RX ADMIN — PIPERACILLIN AND TAZOBACTAM 25 GRAM(S): 4; .5 INJECTION, POWDER, LYOPHILIZED, FOR SOLUTION INTRAVENOUS at 14:32

## 2018-07-21 RX ADMIN — PIPERACILLIN AND TAZOBACTAM 25 GRAM(S): 4; .5 INJECTION, POWDER, LYOPHILIZED, FOR SOLUTION INTRAVENOUS at 22:33

## 2018-07-21 RX ADMIN — Medication 1: at 17:24

## 2018-07-21 RX ADMIN — Medication 75 MICROGRAM(S): at 06:28

## 2018-07-21 RX ADMIN — ENOXAPARIN SODIUM 40 MILLIGRAM(S): 100 INJECTION SUBCUTANEOUS at 06:29

## 2018-07-21 RX ADMIN — TAMSULOSIN HYDROCHLORIDE 0.4 MILLIGRAM(S): 0.4 CAPSULE ORAL at 06:28

## 2018-07-21 RX ADMIN — TAMSULOSIN HYDROCHLORIDE 0.4 MILLIGRAM(S): 0.4 CAPSULE ORAL at 17:24

## 2018-07-21 RX ADMIN — Medication 40 MILLIGRAM(S): at 06:28

## 2018-07-21 RX ADMIN — PIPERACILLIN AND TAZOBACTAM 25 GRAM(S): 4; .5 INJECTION, POWDER, LYOPHILIZED, FOR SOLUTION INTRAVENOUS at 06:28

## 2018-07-21 RX ADMIN — ATORVASTATIN CALCIUM 40 MILLIGRAM(S): 80 TABLET, FILM COATED ORAL at 22:33

## 2018-07-21 RX ADMIN — Medication 325 MILLIGRAM(S): at 13:36

## 2018-07-21 RX ADMIN — SERTRALINE 100 MILLIGRAM(S): 25 TABLET, FILM COATED ORAL at 13:36

## 2018-07-21 RX ADMIN — INSULIN GLARGINE 12 UNIT(S): 100 INJECTION, SOLUTION SUBCUTANEOUS at 22:33

## 2018-07-21 RX ADMIN — LOSARTAN POTASSIUM 25 MILLIGRAM(S): 100 TABLET, FILM COATED ORAL at 06:28

## 2018-07-21 NOTE — PROGRESS NOTE ADULT - PROBLEM SELECTOR PLAN 4
likely CKD, creatinine unchanged  - Avoid nephrotoxic medications - maintaining on ACEI, however - this appears appropraite since creatinine has stabilized   - add daily weights and strict i/o's - will monitor while receiving IV lasix

## 2018-07-21 NOTE — PROGRESS NOTE ADULT - ASSESSMENT
77M w/pmh of CAD s/p CABG, HLD, HTN, Hypothyroid, MI (1995), Prostate Ca s/p radiation seeds, T2DM with peripheral neuropathy, Venous stasis dermatitis of b/l lower extremities sent in from wound care (Dr. Arreola) for RLE cellulitis requiring IV abx. Pleural effusion on L. echo today.

## 2018-07-21 NOTE — DISCHARGE NOTE ADULT - PATIENT PORTAL LINK FT
You can access the [email or web address] Patient Portal, offered by [facility], by registering with the following website: [email or web address]

## 2018-07-21 NOTE — DISCHARGE NOTE ADULT - MEDICATION SUMMARY - MEDICATIONS TO CHANGE
I will SWITCH the dose or number of times a day I take the medications listed below when I get home from the hospital:    potassium citrate 10 mEq oral tablet, extended release  -- 1 tab(s) by mouth 3 times a day

## 2018-07-21 NOTE — PROGRESS NOTE ADULT - SUBJECTIVE AND OBJECTIVE BOX
ELLE BLAKELY is a 77yMale , patient examined and chart reviewed.     INTERVAL HPI/ OVERNIGHT EVENTS:   No events, Afebrile.    PAST MEDICAL & SURGICAL HISTORY:  Venous stasis dermatitis of both lower extremities  Hyperlipidemia  Type 2 diabetes mellitus without complication  Hypothyroidism  Bladder stones: s/p extraction  Prostate cancer: s/p brachytherapy  Myocardial infarction: 1995  CAD (coronary artery disease)  S/P appendectomy  S/P CABG x 3: saphenous vein harvested from LLE, performed in March 1996 at [facility] by Dr. Mcgraw    For details regarding the patient's social history, family history, and other miscellaneous elements, please refer the initial infectious diseases consultation and/or the admitting history and physical examination for this admission.      ROS:  CONSTITUTIONAL:  Negative fever or chills  EYES:  Negative  blurry vision or double vision  CARDIOVASCULAR:  Negative for chest pain or palpitations  RESPIRATORY:  Negative for cough, wheezing, or SOB   GASTROINTESTINAL:  Negative for nausea, vomiting, diarrhea, constipation, or abdominal pain  GENITOURINARY:  Negative frequency, urgency or dysuria  NEUROLOGIC:  No headache, confusion, dizziness, lightheadedness  All other systems were reviewed and are negative     NKDA    Current inpatient medications :    ANTIBIOTICS/RELEVANT:  piperacillin/tazobactam IVPB. 3.375 Gram(s) IV Intermittent every 8 hours      aspirin enteric coated 325 milliGRAM(s) Oral daily  atorvastatin 40 milliGRAM(s) Oral at bedtime  dextrose 40% Gel 15 Gram(s) Oral once PRN  dextrose 5%. 1000 milliLiter(s) IV Continuous <Continuous>  dextrose 50% Injectable 12.5 Gram(s) IV Push once  dextrose 50% Injectable 25 Gram(s) IV Push once  dextrose 50% Injectable 25 Gram(s) IV Push once  enoxaparin Injectable 40 milliGRAM(s) SubCutaneous every 24 hours  furosemide   Injectable 40 milliGRAM(s) IV Push daily  glucagon  Injectable 1 milliGRAM(s) IntraMuscular once PRN  insulin glargine Injectable (LANTUS) 12 Unit(s) SubCutaneous at bedtime  insulin lispro (HumaLOG) corrective regimen sliding scale   SubCutaneous three times a day before meals  insulin lispro (HumaLOG) corrective regimen sliding scale   SubCutaneous at bedtime  levothyroxine 75 MICROGram(s) Oral daily  losartan 25 milliGRAM(s) Oral daily  nitroglycerin     SubLingual 0.4 milliGRAM(s) SubLingual daily PRN  polyethylene glycol 3350 17 Gram(s) Oral daily PRN  sertraline 100 milliGRAM(s) Oral daily  tamsulosin 0.4 milliGRAM(s) Oral two times a day      Objective:    07-20 @ 07:01  -  07-21 @ 07:00  --------------------------------------------------------  IN: 100 mL / OUT: 0 mL / NET: 100 mL      T(C): 36.7 (07-21-18 @ 20:10), Max: 36.7 (07-21-18 @ 05:21)  HR: 106 (07-21-18 @ 20:10) (98 - 118)  BP: 111/70 (07-21-18 @ 20:10) (106/67 - 113/77)  RR: 18 (07-21-18 @ 20:10) (16 - 18)  SpO2: 99% (07-21-18 @ 20:10) (97% - 99%)  Wt(kg): --      Physical Exam:  General: no acute distress  Eyes: sclera anicteric, pupils equal and reactive to light  ENMT: buccal mucosa moist, pharynx not injected  Neck: supple, trachea midline  Lungs: clear, no wheeze/rhonchi  Cardiovascular: regular rate and rhythm, S1 S2  Abdomen: soft, nontender, no organomegaly present, bowel sounds normal  Neurological: alert and oriented x3, Cranial Nerves II-XII grossly intact  Skin: no increased ecchymosis/petechiae/purpura  Lymph Nodes: no palpable cervical/supraclavicular lymph nodes enlargements  Extremities: Right LE drsg c/d/i  Decreased erythema    LABS:                          13.5   7.16  )-----------( 209      ( 21 Jul 2018 09:12 )             41.4       07-21    142  |  108  |  45<H>  ----------------------------<  137<H>  4.1   |  22  |  1.50<H>    Ca    8.1<L>      21 Jul 2018 09:12    TPro  6.4  /  Alb  2.7<L>  /  TBili  1.2  /  DBili  x   /  AST  60<H>  /  ALT  86<H>  /  AlkPhos  282<H>  07-20    MICROBIOLOGY:    Culture - Other (collected 19 Jul 2018 23:00)  Source: .Other Right Leg- Swab  Final Report (21 Jul 2018 20:32):    Moderate Staphylococcus aureus    Few Mixed gram negative rods  Organism: Staphylococcus aureus (21 Jul 2018 20:32)  Organism: Staphylococcus aureus (21 Jul 2018 20:32)      -  Ampicillin/Sulbactam: S <=8/4      -  Cefazolin: S <=4      -  Clindamycin: S <=0.25      -  Erythromycin: S <=0.25      -  Gentamicin: S <=1 Should not be used as monotherapy      -  Oxacillin: S <=0.25      -  Penicillin: R 8      -  RIF- Rifampin: S <=1 Should not be used as monotherapy      -  Tetra/Doxy: S <=1      -  Trimethoprim/Sulfamethoxazole: S <=0.5/9.5      -  Vancomycin: S 2      Method Type: YESSY    Culture - Blood (collected 19 Jul 2018 22:58)  Source: .Blood Blood-Peripheral  Preliminary Report (20 Jul 2018 23:02):    No growth to date.    Culture - Blood (collected 19 Jul 2018 22:58)  Source: .Blood Blood-Peripheral  Preliminary Report (20 Jul 2018 23:02):    No growth to date.    RADIOLOGY & ADDITIONAL STUDIES:          Assessment :  77M w/pmh of CAD s/p CABG, HLD, HTN, Hypothyroid, MI (1995), Prostate Ca s/p radiation   seeds, T2DM with peripheral neuropathy, Venous stasis dermatitis of b/l lower extremities   admitted with right leg cellulitis sec infected venous ulcers  Wound cultures prelim noted  Lymphadema with venous insufficiency   CKD    Plan :   Cont Zosyn  Fu final cultures  Elevate leg when possible  Wound care to follow      Continue with present regiment.  Appropriate use of antibiotics and adverse effects reviewed.      I have discussed the above plan of care with patient/ family in detail. They expressed understanding of the  treatment plan . Risks, benefits and alternatives discussed in detail. I have asked if they have any questions or concerns and appropriately addressed them to the best of my ability .    > 35 minutes were spent in direct patient care reviewing notes, medications ,labs data/ imaging , discussion with multidisciplinary team.    Thank you for allowing me to participate in care of your patient .    Sanchez Ochoa MD  Infectious Disease  [phone]

## 2018-07-21 NOTE — PROGRESS NOTE ADULT - SUBJECTIVE AND OBJECTIVE BOX
[facility] - Sami Lott, Joaquin, Lilian, Brenda, Vinod Cid  Office Number:  [phone]    Patient resting comfortably in bed in NAD.  Laying flat with no respiratory distress.  No complaints of chest pain, dyspnea, palpitations, PND, or orthopnea.  Leg remains red, and wrapped    ROS: negative unless otherwise mentioned.    Telemetry:  not on tele    MEDICATIONS  (STANDING):  aspirin enteric coated 325 milliGRAM(s) Oral daily  atorvastatin 40 milliGRAM(s) Oral at bedtime  dextrose 5%. 1000 milliLiter(s) (50 mL/Hr) IV Continuous <Continuous>  dextrose 50% Injectable 12.5 Gram(s) IV Push once  dextrose 50% Injectable 25 Gram(s) IV Push once  dextrose 50% Injectable 25 Gram(s) IV Push once  enoxaparin Injectable 40 milliGRAM(s) SubCutaneous every 24 hours  furosemide   Injectable 40 milliGRAM(s) IV Push daily  insulin glargine Injectable (LANTUS) 12 Unit(s) SubCutaneous at bedtime  insulin lispro (HumaLOG) corrective regimen sliding scale   SubCutaneous three times a day before meals  insulin lispro (HumaLOG) corrective regimen sliding scale   SubCutaneous at bedtime  levothyroxine 75 MICROGram(s) Oral daily  losartan 25 milliGRAM(s) Oral daily  piperacillin/tazobactam IVPB. 3.375 Gram(s) IV Intermittent every 8 hours  sertraline 100 milliGRAM(s) Oral daily  tamsulosin 0.4 milliGRAM(s) Oral two times a day    MEDICATIONS  (PRN):  dextrose 40% Gel 15 Gram(s) Oral once PRN Blood Glucose LESS THAN 70 milliGRAM(s)/deciliter  glucagon  Injectable 1 milliGRAM(s) IntraMuscular once PRN Glucose LESS THAN 70 milligrams/deciliter  nitroglycerin     SubLingual 0.4 milliGRAM(s) SubLingual daily PRN Chest Pain  polyethylene glycol 3350 17 Gram(s) Oral daily PRN Constipation      Allergies    No Known Allergies    Intolerances        Vital Signs Last 24 Hrs  T(C): 36.7 (21 Jul 2018 05:21), Max: 36.7 (21 Jul 2018 05:21)  T(F): 98 (21 Jul 2018 05:21), Max: 98 (21 Jul 2018 05:21)  HR: 98 (21 Jul 2018 05:21) (98 - 101)  BP: 113/77 (21 Jul 2018 05:21) (106/69 - 113/77)  BP(mean): --  RR: 16 (21 Jul 2018 05:21) (16 - 17)  SpO2: 98% (21 Jul 2018 05:21) (97% - 98%)    I&O's Summary    20 Jul 2018 07:01  -  21 Jul 2018 07:00  --------------------------------------------------------  IN: 100 mL / OUT: 0 mL / NET: 100 mL        ON EXAM:    General: Well developed, well nourished, NAD  HEENT: NCAT, PERRLA, EOMI bl, moist mucous membranes   Neck: Supple, nontender, no mass  Neurology: A&Ox3, nonfocal, CN II-XII grossly intact, sensation intact  Respiratory: +RLL and LLL coarse breath sounds,+ cough, no rhonchi, no rales  CV: RRR, +S1/S2, no murmurs, rubs or gallops  Abdominal: obese abdomen, soft, nontender, nondistended, no guarding, no rebound  Extremities: +b/l venous stasis, + b/l lower extremities wrapped with wound dressing. +superficial ulcer visible on LLE with alginate dressing, no active bleeding.   MSK: +able to move all extremities spontaneously    LABS: All Labs Reviewed:                        13.5   7.16  )-----------( 209      ( 21 Jul 2018 09:12 )             41.4                         13.0   9.38  )-----------( 198      ( 20 Jul 2018 06:22 )             40.0                         14.2   11.13 )-----------( 190      ( 19 Jul 2018 20:35 )             43.5     21 Jul 2018 09:12    142    |  108    |  45     ----------------------------<  137    4.1     |  22     |  1.50   20 Jul 2018 06:22    141    |  108    |  48     ----------------------------<  214    4.2     |  25     |  1.50   19 Jul 2018 20:35    140    |  107    |  51     ----------------------------<  154    4.8     |  25     |  1.50     Ca    8.1        21 Jul 2018 09:12  Ca    8.1        20 Jul 2018 06:22  Ca    8.4        19 Jul 2018 20:35    TPro  6.4    /  Alb  2.7    /  TBili  1.2    /  DBili  x      /  AST  60     /  ALT  86     /  AlkPhos  282    20 Jul 2018 06:22  TPro  7.1    /  Alb  3.0    /  TBili  1.3    /  DBili  x      /  AST  92     /  ALT  104    /  AlkPhos  334    19 Jul 2018 20:35    PT/INR - ( 19 Jul 2018 20:35 )   PT: 15.3 sec;   INR: 1.39 ratio           CARDIAC MARKERS ( 19 Jul 2018 20:35 )  .016 ng/mL / x     / 234 U/L / x     / 4.8 ng/mL      Blood Culture: Organism --  Gram Stain Blood -- Gram Stain --  Specimen Source .Other Right Leg- Swab  Culture-Blood --    Organism --  Gram Stain Blood -- Gram Stain --  Specimen Source .Blood Blood-Peripheral  Culture-Blood --      07-19 @ 20:35  Pro Bnp [id number]

## 2018-07-21 NOTE — DISCHARGE NOTE ADULT - CARE PLAN
Principal Discharge DX:	Cellulitis  Goal:	Resolution  Assessment and plan of treatment:	You received IV antibiotics for your cellilitis infection. Please continue oral Keflex 500 mg every 6 hours for 7 days (stop date 7/29/18)  Please follow up with wound care Dr Arreola  Secondary Diagnosis:	Pleural effusion  Assessment and plan of treatment:	Please continue lasix *******  Secondary Diagnosis:	YELITZA (acute kidney injury)  Secondary Diagnosis:	CAD (coronary artery disease)  Assessment and plan of treatment:	Continue aspirin as above  Secondary Diagnosis:	Hypertension  Secondary Diagnosis:	Hyperlipidemia  Assessment and plan of treatment:	Continue statin as above  Secondary Diagnosis:	Hypothyroidism  Assessment and plan of treatment:	Continue synthroid as above Principal Discharge DX:	Cellulitis  Goal:	Resolution  Assessment and plan of treatment:	You received IV antibiotics for your cellulitis infection. Please continue oral Keflex 500 mg every 6 hours for a total course of 7 days (stop date 7/29/18)  Please follow up with wound care Dr Arreola  Secondary Diagnosis:	Pleural effusion  Assessment and plan of treatment:	Please continue lasix *******  Secondary Diagnosis:	YELITZA (acute kidney injury)  Assessment and plan of treatment:	This was likely due to your medications (lasix). We reduced the dose of this medication which improved your kidney function.  Secondary Diagnosis:	CAD (coronary artery disease)  Assessment and plan of treatment:	Continue aspirin as above  Secondary Diagnosis:	Hypertension  Secondary Diagnosis:	Hyperlipidemia  Assessment and plan of treatment:	Continue statin as above  Secondary Diagnosis:	Hypothyroidism  Assessment and plan of treatment:	Continue synthroid as above Principal Discharge DX:	Cellulitis  Goal:	Resolution  Assessment and plan of treatment:	You received IV antibiotics for your cellulitis infection. Please continue oral Keflex 500 mg every 6 hours for a total course of 7 days (stop date 7/30/18)  Please follow up with wound care Dr Arreola  Secondary Diagnosis:	Pleural effusion  Assessment and plan of treatment:	Please continue lasix 40 MG daily  Secondary Diagnosis:	YELITZA (acute kidney injury)  Assessment and plan of treatment:	This was likely due to your medications (lasix). We reduced the dose of this medication which improved your kidney function.  Secondary Diagnosis:	CAD (coronary artery disease)  Assessment and plan of treatment:	Continue aspirin as above  Secondary Diagnosis:	Hypertension  Assessment and plan of treatment:	Please STOP losartan. CONTINUE metoprolol 12.5 mg twice a day  Secondary Diagnosis:	Hyperlipidemia  Assessment and plan of treatment:	Continue statin as above  Secondary Diagnosis:	Hypothyroidism  Assessment and plan of treatment:	Continue synthroid as above Principal Discharge DX:	Cellulitis  Goal:	Resolution  Assessment and plan of treatment:	You received IV antibiotics for your cellulitis infection. Please continue oral Keflex 500 mg every 6 hours for a total course of 7 days (stop date 7/30/18)  Please follow up with wound care Dr Arreola  Please follow up with primary care doctor 1 week after discharge from the hospital  Secondary Diagnosis:	Pleural effusion  Assessment and plan of treatment:	Please continue lasix 40 MG daily  Secondary Diagnosis:	YELITZA (acute kidney injury)  Assessment and plan of treatment:	This was likely due to your medications (lasix). We reduced the dose of this medication which improved your kidney function.  Secondary Diagnosis:	CAD (coronary artery disease)  Assessment and plan of treatment:	Continue aspirin as above  Secondary Diagnosis:	Hypertension  Assessment and plan of treatment:	Please STOP losartan. CONTINUE metoprolol 12.5 mg twice a day  Secondary Diagnosis:	Hyperlipidemia  Assessment and plan of treatment:	Continue statin as above  Secondary Diagnosis:	Hypothyroidism  Assessment and plan of treatment:	Continue synthroid as above Principal Discharge DX:	Cellulitis  Goal:	Resolution  Assessment and plan of treatment:	You received IV antibiotics for your cellulitis infection. Please continue oral Keflex 500 mg every 6 hours for a total course of 7 days (stop date 7/30/18)  Please follow up with wound care Dr Arreola  Please follow up with primary care doctor 1 week after discharge from the hospital  Secondary Diagnosis:	Pleural effusion  Assessment and plan of treatment:	Please continue lasix 40 MG daily  Secondary Diagnosis:	YELITZA (acute kidney injury)  Assessment and plan of treatment:	This was likely due to your medications (lasix). We reduced the dose of this medication which improved your kidney function.  Secondary Diagnosis:	CAD (coronary artery disease)  Assessment and plan of treatment:	Continue aspirin 325mg as above  Secondary Diagnosis:	Hypertension  Assessment and plan of treatment:	Please STOP losartan. CONTINUE metoprolol 12.5 mg twice a day  Secondary Diagnosis:	Hyperlipidemia  Assessment and plan of treatment:	Continue statin as above  Secondary Diagnosis:	Hypothyroidism  Assessment and plan of treatment:	Continue synthroid as above

## 2018-07-21 NOTE — DISCHARGE NOTE ADULT - SECONDARY DIAGNOSIS.
YELITZA (acute kidney injury) CAD (coronary artery disease) Hypertension Hyperlipidemia Hypothyroidism Pleural effusion

## 2018-07-21 NOTE — DISCHARGE NOTE ADULT - PLAN OF CARE
Continue aspirin as above Continue statin as above Continue synthroid as above Resolution You received IV antibiotics for your cellilitis infection. Please continue oral Keflex 500 mg every 6 hours for 7 days (stop date 7/29/18)  Please follow up with wound care Dr Arreola Please continue lasix ******* You received IV antibiotics for your cellulitis infection. Please continue oral Keflex 500 mg every 6 hours for a total course of 7 days (stop date 7/29/18)  Please follow up with wound care Dr Arreola This was likely due to your medications (lasix). We reduced the dose of this medication which improved your kidney function. You received IV antibiotics for your cellulitis infection. Please continue oral Keflex 500 mg every 6 hours for a total course of 7 days (stop date 7/30/18)  Please follow up with wound care Dr Arreola Please continue lasix 40 MG daily Please STOP losartan. CONTINUE metoprolol 12.5 mg twice a day You received IV antibiotics for your cellulitis infection. Please continue oral Keflex 500 mg every 6 hours for a total course of 7 days (stop date 7/30/18)  Please follow up with wound care Dr Arreola  Please follow up with primary care doctor 1 week after discharge from the hospital Continue aspirin 325mg as above

## 2018-07-21 NOTE — PROGRESS NOTE ADULT - PROBLEM SELECTOR PLAN 3
Prelim read on CXR showed increased congestion on L side  - No documented hx of CHF. Elevated pro-bnp and GORDON  - Per Cardio, Continue Lasix 40mg IV daily, will continue  - f/u echo  - repeat EKG ordered, to confirm sinus rhythm  - Pt saturating well on room air 97%, denies orthopnea, SOB  - Cardio consulted ([facility])

## 2018-07-21 NOTE — PROGRESS NOTE ADULT - ASSESSMENT
77M w/pmh of CAD s/p CABG, HLD, HTN, Hypothyroid, MI (1995), Prostate Ca s/p radiation seeds, T2DM with peripheral neuropathy, Venous stasis dermatitis of b/l lower extremities sent in from wound care (Dr. Arreola) for worsening of RLE ulcers. We are now consulted for left sided pleural effusion and possible overload.     -EKG shows no acute ischemic changes. Troponin negative x1  - patient with history of CAD, s/p CABG, continue ASA, losartan, statin    -no known arrythmia, no obvious P waves on EKG. Please repeat EKG again today to confirm absence of atrial fibrillation.  - Patient denies GORDON, orthopnea, BNP 7863, f/u echocardiogram to assess ventricular function   - continue Lasix 40mg IV daily for now  - Creatinine is at baseline  - Watch electrolytes and creatinine.  - He sees Dr. Goldberg as outpatient.

## 2018-07-21 NOTE — DISCHARGE NOTE ADULT - MEDICATION SUMMARY - MEDICATIONS TO TAKE
I will START or STAY ON the medications listed below when I get home from the hospital:    Aspirin Enteric Coated 325 mg oral delayed release tablet  -- 1 tab(s) by mouth once a day  -- Indication: For CAD (coronary artery disease)    Flomax 0.4 mg oral capsule  -- 1 cap(s) by mouth 2 times a day  -- Indication: For BPH    nitroglycerin 0.4 mg sublingual tablet  -- 1 tab(s) by mouth once a day, As Needed  -- Indication: For CAD (coronary artery disease)    sertraline 100 mg oral tablet  -- 1 tab(s) by mouth once a day  -- Indication: For Antidepressant    Tresiba FlexTouch 200 units/mL subcutaneous solution  -- 16 unit(s) subcutaneous once a day  -- Indication: For diabetes mellitus    metFORMIN 1000 mg oral tablet  --  by mouth once a day  -- Indication: For diabetes mellitus    Victoza 18 mg/3 mL subcutaneous solution  -- 1.8 milligram(s) subcutaneous once a day  -- Indication: For diabetes mellitus    simvastatin 80 mg oral tablet  -- 1 tab(s) by mouth once a day (at bedtime)  -- Indication: For Hyperlipidemia    metoprolol  -- 12.5 milligram(s) by mouth 2 times a day  -- Indication: For Hypertension    Keflex 500 mg oral capsule  -- 1 cap(s) by mouth every 6 hours, continue for 7 days   -- Indication: For Cellulitis. TO BE CONTINUED FOR 7 DAYS    furosemide 40 mg oral tablet  -- 1 tab(s) by mouth once a day  -- Indication: For swelling    Synthroid 75 mcg (0.075 mg) oral tablet  -- 1 tab(s) by mouth once a day  -- Indication: For Hypothyroidism    potassium citrate 10 mEq oral tablet, extended release  -- 1 tab(s) by mouth once a day  -- Indication: For Prophylactic measure

## 2018-07-21 NOTE — DISCHARGE NOTE ADULT - MEDICATION SUMMARY - MEDICATIONS TO STOP TAKING
I will STOP taking the medications listed below when I get home from the hospital:    Cozaar 25 mg oral tablet  -- 1 tab(s) by mouth once a day

## 2018-07-21 NOTE — PROGRESS NOTE ADULT - SUBJECTIVE AND OBJECTIVE BOX
Patient is a 77y old  Male who presents with a chief complaint of cellulitis right LE (20 Jul 2018 01:46)      HPI:  77M w/pmh of CAD s/p CABG, HLD, HTN, Hypothyroid, MI (1995), Prostate Ca s/p radiation seeds, T2DM with peripheral neuropathy, Venous stasis dermatitis of b/l lower extremities sent in from wound care (Dr. Arreola) for worsening of RLE ulcers. Patient states that he was previously seeing wound care for a left heel ulcer and receiving hyperbaric therapy tx. Over this past weekend, he fell and got multiple abrasions on his RLE which have become superficial ulcers. He has had falls in the past due to osteoarthritis in b/l knees which causes his knees to buckle and him to fall. Patient was seeing Dr. Arreola today when it was noted that the ulcers were worsening and pt was sent in for IV abx. Pt states that the ulcers are superficial and not actively bleeding. He has been getting treated with alginate and wound dressing changes. Denies fevers/chills, n/v, chest pain, palpitations, sob, abdominal pain, RLE pain. Of note, patient was recommended to see vascular surgery for workup. As per chart review, 5/18 bilateral MARY ANNE/PVR studies showed no hemodynamically significant lower extremity arterial disease.    In the ED, patient's vitals were: T97.6F, HR 97, /78, RR 16 and SpO2 98% on room air. Significant labs include: WBC 11.13, neutrophils 80.5, BUN/Cr 51/1.5, Glucose 154, T.bili 1.3, AST 92, , Trops neg x1. Serum pro-BNP 7683. Patient received Vancomycin x1, Zosyn x1, 1L normal saline, Blood cultures x2.      Pt requested that dressings not be removed as he just had dressings changed today with alginate placed. (19 Jul 2018 22:29)      INTERVAL HPI/OVERNIGHT EVENTS: Pt seen and evaluated at the bedside. No acute overnight events occurred. No complaints this morning. Still with wounds on bl LE and his legs still feel swollen but no other acute concerns. No SOB or CP. No fever/chills.       T(C): 36.5 (07-21-18 @ 13:33), Max: 36.7 (07-21-18 @ 05:21)  HR: 118 (07-21-18 @ 13:33) (98 - 118)  BP: 106/67 (07-21-18 @ 13:33) (106/67 - 113/77)  RR: 18 (07-21-18 @ 13:33) (16 - 18)  SpO2: 97% (07-21-18 @ 13:33) (97% - 98%)  Wt(kg): --  I&O's Summary    20 Jul 2018 07:01  -  21 Jul 2018 07:00  --------------------------------------------------------  IN: 100 mL / OUT: 0 mL / NET: 100 mL        REVIEW OF SYSTEMS:  CONSTITUTIONAL: denies fever, chills, fatigue, weakness  HEENT: denies blurred vision, sore throat  SKIN: as per HPI  CARDIOVASCULAR: denies chest pain, chest pressure, palpitations  RESPIRATORY: denies shortness of breath, sputum production  GASTROINTESTINAL: denies nausea, vomiting, diarrhea, abdominal pain  GENITOURINARY: denies dysuria, discharge  NEUROLOGICAL: denies numbness, headache, focal weakness  MUSCULOSKELETAL: denies new joint pain, muscle aches  HEMATOLOGIC: denies gross bleeding, bruising  LYMPHATICS: denies enlarged lymph nodes. admits b/l LE edema, unchanged from prior  PSYCHIATRIC: denies recent changes in anxiety, depression  ENDOCRINOLOGIC: denies sweating, cold or heat intolerance    PHYSICAL EXAM:  GENERAL: patient appears cofortable, no acute distress, conversational  EYES: sclera clear, no exudates  ENMT: oropharynx clear without erythema, no exudates, moist mucous membranes  NECK: supple, soft, no thyromegaly noted  LUNGS: good air entry bilaterally, clear to auscultation, symmetric breath sounds, no wheezing or rhonchi appreciated  HEART: soft S1/S2, regular rate and rhythm, no murmurs noted, no lower extremity edema  GASTROINTESTINAL: abdomen is obese, soft, nontender, nondistended, normoactive bowel sounds, no palpable masses  INTEGUMENT: good skin turgor, dressings on legs appear soaked through, no malodorous discharge noted  MUSCULOSKELETAL: no clubbing or cyanosis, no obvious deformity  NEUROLOGIC: awake, alert, good muscle tone in 4 extremities, no obvious sensory deficits  HEME/LYMPH: no palpable supraclavicular nodules, no obvious ecchymosis or petechiae     MEDICATIONS  (STANDING):  aspirin enteric coated 325 milliGRAM(s) Oral daily  atorvastatin 40 milliGRAM(s) Oral at bedtime  dextrose 5%. 1000 milliLiter(s) (50 mL/Hr) IV Continuous <Continuous>  dextrose 50% Injectable 12.5 Gram(s) IV Push once  dextrose 50% Injectable 25 Gram(s) IV Push once  dextrose 50% Injectable 25 Gram(s) IV Push once  enoxaparin Injectable 40 milliGRAM(s) SubCutaneous every 24 hours  furosemide   Injectable 40 milliGRAM(s) IV Push daily  insulin glargine Injectable (LANTUS) 12 Unit(s) SubCutaneous at bedtime  insulin lispro (HumaLOG) corrective regimen sliding scale   SubCutaneous three times a day before meals  insulin lispro (HumaLOG) corrective regimen sliding scale   SubCutaneous at bedtime  levothyroxine 75 MICROGram(s) Oral daily  losartan 25 milliGRAM(s) Oral daily  piperacillin/tazobactam IVPB. 3.375 Gram(s) IV Intermittent every 8 hours  sertraline 100 milliGRAM(s) Oral daily  tamsulosin 0.4 milliGRAM(s) Oral two times a day    MEDICATIONS  (PRN):  dextrose 40% Gel 15 Gram(s) Oral once PRN Blood Glucose LESS THAN 70 milliGRAM(s)/deciliter  glucagon  Injectable 1 milliGRAM(s) IntraMuscular once PRN Glucose LESS THAN 70 milligrams/deciliter  nitroglycerin     SubLingual 0.4 milliGRAM(s) SubLingual daily PRN Chest Pain  polyethylene glycol 3350 17 Gram(s) Oral daily PRN Constipation      LABS:                        13.5   7.16  )-----------( 209      ( 21 Jul 2018 09:12 )             41.4     07-21    142  |  108  |  45<H>  ----------------------------<  137<H>  4.1   |  22  |  1.50<H>    Ca    8.1<L>      21 Jul 2018 09:12    TPro  6.4  /  Alb  2.7<L>  /  TBili  1.2  /  DBili  x   /  AST  60<H>  /  ALT  86<H>  /  AlkPhos  282<H>  07-20    PT/INR - ( 19 Jul 2018 20:35 )   PT: 15.3 sec;   INR: 1.39 ratio             CAPILLARY BLOOD GLUCOSE      POCT Blood Glucose.: 184 mg/dL (21 Jul 2018 11:55)  POCT Blood Glucose.: 130 mg/dL (21 Jul 2018 08:07)  POCT Blood Glucose.: 187 mg/dL (20 Jul 2018 21:45)  POCT Blood Glucose.: 155 mg/dL (20 Jul 2018 17:08)      07-19 @ 23:00   Moderate Staphylococcus aureus  Few Gram Negative Rods  --  --  07-19 @ 22:58   No growth to date.  --  --          RADIOLOGY & ADDITIONAL TESTS:    Imaging Personally Reviewed:

## 2018-07-21 NOTE — PROGRESS NOTE ADULT - PROBLEM SELECTOR PLAN 1
- on zosyn only (few staph aureus, gram neg rods from wound)  - follow up blood cultures (NGTD as of 7/21/18)  - d/c'd vanc trough  - Wound care following- Dr. Arreola. Appreciate recs  - ID following, recs appreciated

## 2018-07-21 NOTE — DISCHARGE NOTE ADULT - CARE PROVIDER_API CALL
Goldberg, Steven M (MD), Cardiovascular Disease; Internal Medicine  [address] [place] [address]  Phone: [phone]  Fax: [phone]    Mal Arreola), [facility]  [address] [place] [address]  Phone: [phone]  Fax: [phone]    Vijay Brock), Internal Medicine  [address], [place] [id number]  Phone: [phone]  Fax: [phone]

## 2018-07-21 NOTE — DISCHARGE NOTE ADULT - ADDITIONAL INSTRUCTIONS
Please follow up with wound care Dr Arreola Please follow up with wound care Dr. Arreola Please follow up with wound care Dr. Arreola  Please follow up with your cardiologist Dr. Goldberg within 1 week of discharge.

## 2018-07-22 LAB
ALBUMIN SERPL ELPH-MCNC: 2.6 G/DL — LOW (ref 3.3–5)
ALP SERPL-CCNC: 245 U/L — HIGH (ref 40–120)
ALT FLD-CCNC: 57 U/L — SIGNIFICANT CHANGE UP (ref 12–78)
ANION GAP SERPL CALC-SCNC: 10 MMOL/L — SIGNIFICANT CHANGE UP (ref 5–17)
AST SERPL-CCNC: 30 U/L — SIGNIFICANT CHANGE UP (ref 15–37)
BILIRUB DIRECT SERPL-MCNC: 0.4 MG/DL — HIGH (ref 0.05–0.2)
BILIRUB INDIRECT FLD-MCNC: 0.7 MG/DL — SIGNIFICANT CHANGE UP (ref 0.2–1)
BILIRUB SERPL-MCNC: 1.1 MG/DL — SIGNIFICANT CHANGE UP (ref 0.2–1.2)
BUN SERPL-MCNC: 41 MG/DL — HIGH (ref 7–23)
CALCIUM SERPL-MCNC: 8.3 MG/DL — LOW (ref 8.5–10.1)
CHLORIDE SERPL-SCNC: 107 MMOL/L — SIGNIFICANT CHANGE UP (ref 96–108)
CO2 SERPL-SCNC: 23 MMOL/L — SIGNIFICANT CHANGE UP (ref 22–31)
CREAT SERPL-MCNC: 1.6 MG/DL — HIGH (ref 0.5–1.3)
GLUCOSE SERPL-MCNC: 167 MG/DL — HIGH (ref 70–99)
HCT VFR BLD CALC: 41.5 % — SIGNIFICANT CHANGE UP (ref 39–50)
HGB BLD-MCNC: 13.6 G/DL — SIGNIFICANT CHANGE UP (ref 13–17)
MCHC RBC-ENTMCNC: 27.7 PG — SIGNIFICANT CHANGE UP (ref 27–34)
MCHC RBC-ENTMCNC: 32.8 GM/DL — SIGNIFICANT CHANGE UP (ref 32–36)
MCV RBC AUTO: 84.5 FL — SIGNIFICANT CHANGE UP (ref 80–100)
NRBC # BLD: 0 /100 WBCS — SIGNIFICANT CHANGE UP (ref 0–0)
PLATELET # BLD AUTO: 212 K/UL — SIGNIFICANT CHANGE UP (ref 150–400)
POTASSIUM SERPL-MCNC: 4.3 MMOL/L — SIGNIFICANT CHANGE UP (ref 3.5–5.3)
POTASSIUM SERPL-SCNC: 4.3 MMOL/L — SIGNIFICANT CHANGE UP (ref 3.5–5.3)
PROT SERPL-MCNC: 6.6 G/DL — SIGNIFICANT CHANGE UP (ref 6–8.3)
RBC # BLD: 4.91 M/UL — SIGNIFICANT CHANGE UP (ref 4.2–5.8)
RBC # FLD: 15.1 % — HIGH (ref 10.3–14.5)
SODIUM SERPL-SCNC: 140 MMOL/L — SIGNIFICANT CHANGE UP (ref 135–145)
WBC # BLD: 9.22 K/UL — SIGNIFICANT CHANGE UP (ref 3.8–10.5)
WBC # FLD AUTO: 9.22 K/UL — SIGNIFICANT CHANGE UP (ref 3.8–10.5)

## 2018-07-22 PROCEDURE — 93306 TTE W/DOPPLER COMPLETE: CPT | Mod: 26

## 2018-07-22 PROCEDURE — 99233 SBSQ HOSP IP/OBS HIGH 50: CPT

## 2018-07-22 PROCEDURE — 93010 ELECTROCARDIOGRAM REPORT: CPT

## 2018-07-22 RX ORDER — SENNA PLUS 8.6 MG/1
2 TABLET ORAL AT BEDTIME
Qty: 0 | Refills: 0 | Status: DISCONTINUED | OUTPATIENT
Start: 2018-07-22 | End: 2018-07-24

## 2018-07-22 RX ORDER — METOPROLOL TARTRATE 50 MG
12.5 TABLET ORAL
Qty: 0 | Refills: 0 | Status: DISCONTINUED | OUTPATIENT
Start: 2018-07-22 | End: 2018-07-24

## 2018-07-22 RX ORDER — DOCUSATE SODIUM 100 MG
100 CAPSULE ORAL
Qty: 0 | Refills: 0 | Status: DISCONTINUED | OUTPATIENT
Start: 2018-07-22 | End: 2018-07-24

## 2018-07-22 RX ADMIN — Medication 40 MILLIGRAM(S): at 05:44

## 2018-07-22 RX ADMIN — INSULIN GLARGINE 12 UNIT(S): 100 INJECTION, SOLUTION SUBCUTANEOUS at 23:04

## 2018-07-22 RX ADMIN — Medication 325 MILLIGRAM(S): at 12:36

## 2018-07-22 RX ADMIN — PIPERACILLIN AND TAZOBACTAM 25 GRAM(S): 4; .5 INJECTION, POWDER, LYOPHILIZED, FOR SOLUTION INTRAVENOUS at 23:00

## 2018-07-22 RX ADMIN — Medication 3: at 17:19

## 2018-07-22 RX ADMIN — SERTRALINE 100 MILLIGRAM(S): 25 TABLET, FILM COATED ORAL at 12:36

## 2018-07-22 RX ADMIN — Medication 12.5 MILLIGRAM(S): at 17:19

## 2018-07-22 RX ADMIN — PIPERACILLIN AND TAZOBACTAM 25 GRAM(S): 4; .5 INJECTION, POWDER, LYOPHILIZED, FOR SOLUTION INTRAVENOUS at 14:45

## 2018-07-22 RX ADMIN — LOSARTAN POTASSIUM 25 MILLIGRAM(S): 100 TABLET, FILM COATED ORAL at 05:45

## 2018-07-22 RX ADMIN — Medication 1: at 09:41

## 2018-07-22 RX ADMIN — PIPERACILLIN AND TAZOBACTAM 25 GRAM(S): 4; .5 INJECTION, POWDER, LYOPHILIZED, FOR SOLUTION INTRAVENOUS at 05:44

## 2018-07-22 RX ADMIN — Medication 2: at 12:36

## 2018-07-22 RX ADMIN — ATORVASTATIN CALCIUM 40 MILLIGRAM(S): 80 TABLET, FILM COATED ORAL at 23:04

## 2018-07-22 RX ADMIN — Medication 100 MILLIGRAM(S): at 17:19

## 2018-07-22 RX ADMIN — Medication 75 MICROGRAM(S): at 05:45

## 2018-07-22 RX ADMIN — ENOXAPARIN SODIUM 40 MILLIGRAM(S): 100 INJECTION SUBCUTANEOUS at 05:45

## 2018-07-22 RX ADMIN — TAMSULOSIN HYDROCHLORIDE 0.4 MILLIGRAM(S): 0.4 CAPSULE ORAL at 17:22

## 2018-07-22 RX ADMIN — TAMSULOSIN HYDROCHLORIDE 0.4 MILLIGRAM(S): 0.4 CAPSULE ORAL at 05:45

## 2018-07-22 RX ADMIN — SENNA PLUS 2 TABLET(S): 8.6 TABLET ORAL at 23:04

## 2018-07-22 NOTE — PROGRESS NOTE ADULT - SUBJECTIVE AND OBJECTIVE BOX
[facility] - Sami Lott, Joaquin, Lilian, Brenda, Vinod Cid  Office Number:  [phone]    Patient resting comfortably in bed in NAD.  Laying flat with no respiratory distress.  No complaints of chest pain, dyspnea, palpitations, PND, or orthopnea. Still with LE edema    ROS: negative unless otherwise mentioned.    Telemetry:  not on tele    MEDICATIONS  (STANDING):  aspirin enteric coated 325 milliGRAM(s) Oral daily  atorvastatin 40 milliGRAM(s) Oral at bedtime  dextrose 5%. 1000 milliLiter(s) (50 mL/Hr) IV Continuous <Continuous>  dextrose 50% Injectable 12.5 Gram(s) IV Push once  dextrose 50% Injectable 25 Gram(s) IV Push once  dextrose 50% Injectable 25 Gram(s) IV Push once  docusate sodium 100 milliGRAM(s) Oral two times a day  enoxaparin Injectable 40 milliGRAM(s) SubCutaneous every 24 hours  furosemide   Injectable 40 milliGRAM(s) IV Push daily  insulin glargine Injectable (LANTUS) 12 Unit(s) SubCutaneous at bedtime  insulin lispro (HumaLOG) corrective regimen sliding scale   SubCutaneous three times a day before meals  insulin lispro (HumaLOG) corrective regimen sliding scale   SubCutaneous at bedtime  levothyroxine 75 MICROGram(s) Oral daily  losartan 25 milliGRAM(s) Oral daily  metoprolol tartrate 12.5 milliGRAM(s) Oral two times a day  piperacillin/tazobactam IVPB. 3.375 Gram(s) IV Intermittent every 8 hours  senna 2 Tablet(s) Oral at bedtime  sertraline 100 milliGRAM(s) Oral daily  tamsulosin 0.4 milliGRAM(s) Oral two times a day    MEDICATIONS  (PRN):  dextrose 40% Gel 15 Gram(s) Oral once PRN Blood Glucose LESS THAN 70 milliGRAM(s)/deciliter  glucagon  Injectable 1 milliGRAM(s) IntraMuscular once PRN Glucose LESS THAN 70 milligrams/deciliter  nitroglycerin     SubLingual 0.4 milliGRAM(s) SubLingual daily PRN Chest Pain  polyethylene glycol 3350 17 Gram(s) Oral daily PRN Constipation      Allergies    No Known Allergies    Intolerances        Vital Signs Last 24 Hrs  T(C): 36.4 (22 Jul 2018 04:10), Max: 36.7 (21 Jul 2018 20:10)  T(F): 97.6 (22 Jul 2018 04:10), Max: 98 (21 Jul 2018 20:10)  HR: 97 (22 Jul 2018 04:10) (97 - 118)  BP: 121/79 (22 Jul 2018 04:10) (106/67 - 121/79)  BP(mean): --  RR: 18 (22 Jul 2018 04:10) (18 - 18)  SpO2: 98% (22 Jul 2018 04:10) (97% - 99%)    I&O's Summary    21 Jul 2018 07:01  -  22 Jul 2018 07:00  --------------------------------------------------------  IN: 860 mL / OUT: 0 mL / NET: 860 mL    22 Jul 2018 07:01  -  22 Jul 2018 12:25  --------------------------------------------------------  IN: 0 mL / OUT: 700 mL / NET: -700 mL        ON EXAM:    General: Well developed, well nourished, NAD  HEENT: NCAT, PERRLA, EOMI bl, moist mucous membranes   Neck: Supple, nontender, no mass  Neurology: A&Ox3, nonfocal, CN II-XII grossly intact, sensation intact  Respiratory: +RLL and LLL coarse breath sounds,+ cough, no rhonchi, no rales  CV: RRR, +S1/S2, no murmurs, rubs or gallops  Abdominal: obese abdomen, soft, nontender, nondistended, no guarding, no rebound  Extremities: +b/l venous stasis, + b/l lower extremities wrapped with wound dressing. +superficial ulcer visible on LLE with alginate dressing, no active bleeding; + edema  MSK: +able to move all extremities spontaneously      LABS: All Labs Reviewed:                        13.6   9.22  )-----------( 212      ( 22 Jul 2018 07:36 )             41.5                         13.5   7.16  )-----------( 209      ( 21 Jul 2018 09:12 )             41.4                         13.0   9.38  )-----------( 198      ( 20 Jul 2018 06:22 )             40.0     22 Jul 2018 07:36    140    |  107    |  41     ----------------------------<  167    4.3     |  23     |  1.60   21 Jul 2018 09:12    142    |  108    |  45     ----------------------------<  137    4.1     |  22     |  1.50   20 Jul 2018 06:22    141    |  108    |  48     ----------------------------<  214    4.2     |  25     |  1.50     Ca    8.3        22 Jul 2018 07:36  Ca    8.1        21 Jul 2018 09:12  Ca    8.1        20 Jul 2018 06:22    TPro  6.6    /  Alb  2.6    /  TBili  1.1    /  DBili  .40    /  AST  30     /  ALT  57     /  AlkPhos  245    22 Jul 2018 07:36  TPro  6.4    /  Alb  2.7    /  TBili  1.2    /  DBili  x      /  AST  60     /  ALT  86     /  AlkPhos  282    20 Jul 2018 06:22  TPro  7.1    /  Alb  3.0    /  TBili  1.3    /  DBili  x      /  AST  92     /  ALT  104    /  AlkPhos  334    19 Jul 2018 20:35          Blood Culture: Organism Staphylococcus aureus  Gram Stain Blood -- Gram Stain --  Specimen Source .Other Right Leg- Swab  Culture-Blood --    Organism --  Gram Stain Blood -- Gram Stain --  Specimen Source .Blood Blood-Peripheral  Culture-Blood --      07-19 @ 20:35  Pro Bnp [id number]

## 2018-07-22 NOTE — PROGRESS NOTE ADULT - SUBJECTIVE AND OBJECTIVE BOX
ELLE BLAKELY is a 77yMale , patient examined and chart reviewed.     INTERVAL HPI/ OVERNIGHT EVENTS:   Doing well. Afebrile.  No events.    PAST MEDICAL & SURGICAL HISTORY:  Venous stasis dermatitis of both lower extremities  Hyperlipidemia  Type 2 diabetes mellitus without complication  Hypothyroidism  Bladder stones: s/p extraction  Prostate cancer: s/p brachytherapy  Myocardial infarction: 1995  CAD (coronary artery disease)  S/P appendectomy  S/P CABG x 3: saphenous vein harvested from LLE, performed in March 1996 at [facility] by Dr. Mcgraw      For details regarding the patient's social history, family history, and other miscellaneous elements, please refer the initial infectious diseases consultation and/or the admitting history and physical examination for this admission.      ROS:  CONSTITUTIONAL:  Negative fever or chills, feels well, good appetite  EYES:  Negative  blurry vision or double vision  CARDIOVASCULAR:  Negative for chest pain or palpitations  RESPIRATORY:  Negative for cough, wheezing, or SOB   GASTROINTESTINAL:  Negative for nausea, vomiting, diarrhea, constipation, or abdominal pain  GENITOURINARY:  Negative frequency, urgency or dysuria  NEUROLOGIC:  No headache, confusion, dizziness, lightheadedness  All other systems were reviewed and are negative     Current inpatient medications :    ANTIBIOTICS/RELEVANT:  piperacillin/tazobactam IVPB. 3.375 Gram(s) IV Intermittent every 8 hours    aspirin enteric coated 325 milliGRAM(s) Oral daily  atorvastatin 40 milliGRAM(s) Oral at bedtime  dextrose 40% Gel 15 Gram(s) Oral once PRN  dextrose 5%. 1000 milliLiter(s) IV Continuous <Continuous>  dextrose 50% Injectable 12.5 Gram(s) IV Push once  dextrose 50% Injectable 25 Gram(s) IV Push once  dextrose 50% Injectable 25 Gram(s) IV Push once  docusate sodium 100 milliGRAM(s) Oral two times a day  enoxaparin Injectable 40 milliGRAM(s) SubCutaneous every 24 hours  furosemide   Injectable 40 milliGRAM(s) IV Push daily  glucagon  Injectable 1 milliGRAM(s) IntraMuscular once PRN  insulin glargine Injectable (LANTUS) 12 Unit(s) SubCutaneous at bedtime  insulin lispro (HumaLOG) corrective regimen sliding scale   SubCutaneous three times a day before meals  insulin lispro (HumaLOG) corrective regimen sliding scale   SubCutaneous at bedtime  levothyroxine 75 MICROGram(s) Oral daily  losartan 25 milliGRAM(s) Oral daily  metoprolol tartrate 12.5 milliGRAM(s) Oral two times a day  nitroglycerin     SubLingual 0.4 milliGRAM(s) SubLingual daily PRN  polyethylene glycol 3350 17 Gram(s) Oral daily PRN  senna 2 Tablet(s) Oral at bedtime  sertraline 100 milliGRAM(s) Oral daily  tamsulosin 0.4 milliGRAM(s) Oral two times a day      Objective:    07-21 @ 07:01  -  07-22 @ 07:00  --------------------------------------------------------  IN: 860 mL / OUT: 0 mL / NET: 860 mL    07-22 @ 07:01  -  07-22 @ 22:25  --------------------------------------------------------  IN: 0 mL / OUT: 700 mL / NET: -700 mL      T(C): 36.6 (07-22-18 @ 20:31), Max: 36.6 (07-22-18 @ 20:31)  HR: 99 (07-22-18 @ 20:31) (92 - 99)  BP: 104/68 (07-22-18 @ 20:31) (104/68 - 121/79)  RR: 18 (07-22-18 @ 20:31) (18 - 18)  SpO2: 95% (07-22-18 @ 20:31) (95% - 98%)  Wt(kg): --      Physical Exam:  General: well developed well nourished, in no acute distress  Eyes: sclera anicteric, pupils equal and reactive to light  ENMT: buccal mucosa moist, pharynx not injected  Neck: supple, trachea midline  Lungs: clear, no wheeze/rhonchi  Cardiovascular: regular rate and rhythm, S1 S2  Abdomen: soft, nontender, no organomegaly present, bowel sounds normal  Neurological: alert and oriented x3, Cranial Nerves II-XII grossly intact  Skin: no increased ecchymosis/petechiae/purpura  Lymph Nodes: no palpable cervical/supraclavicular lymph nodes enlargements  Extremities: Right LE drsg c/d/i   erythema and swelling resolving      LABS:                          13.6   9.22  )-----------( 212      ( 22 Jul 2018 07:36 )             41.5       07-22    140  |  107  |  41<H>  ----------------------------<  167<H>  4.3   |  23  |  1.60<H>    Ca    8.3<L>      22 Jul 2018 07:36    TPro  6.6  /  Alb  2.6<L>  /  TBili  1.1  /  DBili  .40<H>  /  AST  30  /  ALT  57  /  AlkPhos  245<H>  07-22      MICROBIOLOGY:  Culture - Other (07.19.18 @ 23:00)    -  Gentamicin: S <=1 Should not be used as monotherapy    -  Oxacillin: S <=0.25    -  Penicillin: R 8    -  Tetra/Doxy: S <=1    -  RIF- Rifampin: S <=1 Should not be used as monotherapy    -  Trimethoprim/Sulfamethoxazole: S <=0.5/9.5    -  Vancomycin: S 2    -  Ampicillin/Sulbactam: S <=8/4    -  Cefazolin: S <=4    -  Clindamycin: S <=0.25    -  Erythromycin: S <=0.25    Specimen Source: .Other Right Leg- Swab    Culture Results:   Moderate Staphylococcus aureus  Few Mixed gram negative rods    Organism Identification: Staphylococcus aureus    Organism: Staphylococcus aureus    Method Type: YESSY    Assessment :  77M w/pmh of CAD s/p CABG, HLD, HTN, Hypothyroid, MI (1995), Prostate Ca s/p radiation   seeds, T2DM with peripheral neuropathy, Venous stasis dermatitis of b/l lower extremities   admitted with right leg cellulitis sec infected venous ulcers  Wound cultures prelim noted  Lymphadema with venous insufficiency   CKD    Plan :   Cont Zosyn  Can change to po keflex 500mg po q6h x 7 days on dc  Elevate leg when possible  Wound care to follow  Fu wound care outpt closely    Continue with present regiment.  Appropriate use of antibiotics and adverse effects reviewed.      I have discussed the above plan of care with patient/ family in detail. They expressed understanding of the  treatment plan . Risks, benefits and alternatives discussed in detail. I have asked if they have any questions or concerns and appropriately addressed them to the best of my ability .    > 35 minutes were spent in direct patient care reviewing notes, medications ,labs data/ imaging , discussion with multidisciplinary team.    Thank you for allowing me to participate in care of your patient .    Sanchez Ochoa MD  Infectious Disease  [phone]

## 2018-07-22 NOTE — PROGRESS NOTE ADULT - PROBLEM SELECTOR PLAN 1
- on zosyn (moderate staph aureus, few gram neg rods from wound), no noted leukocytosis  - follow up blood cultures (NGTD as of 7/22/18)  - ID/wound care following - recs appreciated

## 2018-07-22 NOTE — PROGRESS NOTE ADULT - PROBLEM SELECTOR PLAN 4
likely CKD, creatinine unchanged  - Avoid nephrotoxic medications - maintaining on ACEI, however - this appears appropraite since creatinine has stabilized   - continue daily weights and strict i/o's - will monitor while receiving IV lasix likely CKD, creatinine unchanged  - Avoid nephrotoxic medications - maintaining on ACEI - this appears appropraite since creatinine has stabilized will monitor daily  - continue daily weights and strict i/o's - will monitor while receiving IV lasix

## 2018-07-22 NOTE — PROGRESS NOTE ADULT - SUBJECTIVE AND OBJECTIVE BOX
Patient is a 77y old  Male who presents with a chief complaint of cellulitis right LE (21 Jul 2018 15:22)      HPI:  77M w/pmh of CAD s/p CABG, HLD, HTN, Hypothyroid, MI (1995), Prostate Ca s/p radiation seeds, T2DM with peripheral neuropathy, Venous stasis dermatitis of b/l lower extremities sent in from wound care (Dr. Arreola) for worsening of RLE ulcers. Patient states that he was previously seeing wound care for a left heel ulcer and receiving hyperbaric therapy tx. Over this past weekend, he fell and got multiple abrasions on his RLE which have become superficial ulcers. He has had falls in the past due to osteoarthritis in b/l knees which causes his knees to buckle and him to fall. Patient was seeing Dr. Arreola today when it was noted that the ulcers were worsening and pt was sent in for IV abx. Pt states that the ulcers are superficial and not actively bleeding. He has been getting treated with alginate and wound dressing changes. Denies fevers/chills, n/v, chest pain, palpitations, sob, abdominal pain, RLE pain. Of note, patient was recommended to see vascular surgery for workup. As per chart review, 5/18 bilateral MARY ANNE/PVR studies showed no hemodynamically significant lower extremity arterial disease.    In the ED, patient's vitals were: T97.6F, HR 97, /78, RR 16 and SpO2 98% on room air. Significant labs include: WBC 11.13, neutrophils 80.5, BUN/Cr 51/1.5, Glucose 154, T.bili 1.3, AST 92, , Trops neg x1. Serum pro-BNP 7683. Patient received Vancomycin x1, Zosyn x1, 1L normal saline, Blood cultures x2.      Pt requested that dressings not be removed as he just had dressings changed today with alginate placed. (19 Jul 2018 22:29)      INTERVAL HPI/OVERNIGHT EVENTS: Pt seen and evaluated at the bedside. No acute overnight events occurred. Persistently tachycardic. Pt offers no complaints. No palpitations. No lightheadedness. Feeling well overall. Ulcers are painless, still w/ mild drainage on bandages      T(C): 36.5 (07-22-18 @ 14:08), Max: 36.7 (07-21-18 @ 20:10)  HR: 92 (07-22-18 @ 14:08) (92 - 106)  BP: 117/71 (07-22-18 @ 14:08) (111/70 - 121/79)  RR: 18 (07-22-18 @ 14:08) (18 - 18)  SpO2: 96% (07-22-18 @ 14:08) (96% - 99%)  Wt(kg): --  I&O's Summary    21 Jul 2018 07:01  -  22 Jul 2018 07:00  --------------------------------------------------------  IN: 860 mL / OUT: 0 mL / NET: 860 mL    22 Jul 2018 07:01  -  22 Jul 2018 15:52  --------------------------------------------------------  IN: 0 mL / OUT: 700 mL / NET: -700 mL      REVIEW OF SYSTEMS:  CONSTITUTIONAL: denies fever, chills, fatigue, weakness  HEENT: denies blurred vision, sore throat  SKIN: as per HPI  CARDIOVASCULAR: denies chest pain, chest pressure, palpitations  RESPIRATORY: denies shortness of breath, sputum production  GASTROINTESTINAL: denies nausea, vomiting, diarrhea, abdominal pain  GENITOURINARY: denies dysuria, discharge  NEUROLOGICAL: denies numbness, headache, focal weakness  MUSCULOSKELETAL: denies new joint pain, muscle aches  HEMATOLOGIC: denies gross bleeding, bruising  LYMPHATICS: denies enlarged lymph nodes. admits b/l LE edema, unchanged from prior  PSYCHIATRIC: denies recent changes in anxiety, depression  ENDOCRINOLOGIC: denies sweating, cold or heat intolerance    PHYSICAL EXAM:  GENERAL: patient appears cofortable, no acute distress, conversational  EYES: sclera clear, no exudates  ENMT: oropharynx clear without erythema, no exudates, moist mucous membranes  NECK: supple, soft, no thyromegaly noted  LUNGS: good air entry bilaterally, clear to auscultation, symmetric breath sounds, no wheezing or rhonchi appreciated  HEART: soft S1/S2, regular rate and rhythm, no murmurs noted, no lower extremity edema  GASTROINTESTINAL: abdomen is obese, soft, nontender, nondistended, normoactive bowel sounds, no palpable masses  INTEGUMENT: good skin turgor, dressings on legs appear soaked through, no malodorous discharge noted  MUSCULOSKELETAL: no clubbing or cyanosis, no obvious deformity  NEUROLOGIC: awake, alert, good muscle tone in 4 extremities, no obvious sensory deficits  HEME/LYMPH: no palpable supraclavicular nodules, no obvious ecchymosis or petechiae         MEDICATIONS  (STANDING):  aspirin enteric coated 325 milliGRAM(s) Oral daily  atorvastatin 40 milliGRAM(s) Oral at bedtime  dextrose 5%. 1000 milliLiter(s) (50 mL/Hr) IV Continuous <Continuous>  dextrose 50% Injectable 12.5 Gram(s) IV Push once  dextrose 50% Injectable 25 Gram(s) IV Push once  dextrose 50% Injectable 25 Gram(s) IV Push once  docusate sodium 100 milliGRAM(s) Oral two times a day  enoxaparin Injectable 40 milliGRAM(s) SubCutaneous every 24 hours  furosemide   Injectable 40 milliGRAM(s) IV Push daily  insulin glargine Injectable (LANTUS) 12 Unit(s) SubCutaneous at bedtime  insulin lispro (HumaLOG) corrective regimen sliding scale   SubCutaneous three times a day before meals  insulin lispro (HumaLOG) corrective regimen sliding scale   SubCutaneous at bedtime  levothyroxine 75 MICROGram(s) Oral daily  losartan 25 milliGRAM(s) Oral daily  metoprolol tartrate 12.5 milliGRAM(s) Oral two times a day  piperacillin/tazobactam IVPB. 3.375 Gram(s) IV Intermittent every 8 hours  senna 2 Tablet(s) Oral at bedtime  sertraline 100 milliGRAM(s) Oral daily  tamsulosin 0.4 milliGRAM(s) Oral two times a day    MEDICATIONS  (PRN):  dextrose 40% Gel 15 Gram(s) Oral once PRN Blood Glucose LESS THAN 70 milliGRAM(s)/deciliter  glucagon  Injectable 1 milliGRAM(s) IntraMuscular once PRN Glucose LESS THAN 70 milligrams/deciliter  nitroglycerin     SubLingual 0.4 milliGRAM(s) SubLingual daily PRN Chest Pain  polyethylene glycol 3350 17 Gram(s) Oral daily PRN Constipation      LABS:                        13.6   9.22  )-----------( 212      ( 22 Jul 2018 07:36 )             41.5     07-22    140  |  107  |  41<H>  ----------------------------<  167<H>  4.3   |  23  |  1.60<H>    Ca    8.3<L>      22 Jul 2018 07:36    TPro  6.6  /  Alb  2.6<L>  /  TBili  1.1  /  DBili  .40<H>  /  AST  30  /  ALT  57  /  AlkPhos  245<H>  07-22        CAPILLARY BLOOD GLUCOSE      POCT Blood Glucose.: 247 mg/dL (22 Jul 2018 11:52)  POCT Blood Glucose.: 177 mg/dL (22 Jul 2018 09:34)  POCT Blood Glucose.: 178 mg/dL (21 Jul 2018 21:52)  POCT Blood Glucose.: 176 mg/dL (21 Jul 2018 17:10)      07-19 @ 23:00   Moderate Staphylococcus aureus  Few Mixed gram negative rods  --  Staphylococcus aureus  07-19 @ 22:58   No growth to date.  --  --          RADIOLOGY & ADDITIONAL TESTS:    Imaging Personally Reviewed:

## 2018-07-22 NOTE — PROGRESS NOTE ADULT - ASSESSMENT
77M w/pmh of CAD s/p CABG, HLD, HTN, Hypothyroid, MI (1995), Prostate Ca s/p radiation seeds, T2DM with peripheral neuropathy, Venous stasis dermatitis of b/l lower extremities sent in from wound care (Dr. Arreola) for RLE cellulitis requiring IV abx. Pleural effusion on L. echo

## 2018-07-22 NOTE — PROGRESS NOTE ADULT - PROBLEM SELECTOR PLAN 3
Prelim read on CXR showed increased congestion on L side  - No documented hx of CHF. Elevated pro-bnp and GORDON  - Per Cardio, Continue Lasix 40mg IV daily, will continue  - f/u echo - still pending  - repeat EKG ordered, to confirm sinus rhythm - ? afib ? no discernible P waves  - Pt saturating well on room air 97%, denies orthopnea, SOB  - Cardio consulted ([facility]) - No documented hx of CHF. Elevated pro-bnp and GORDON  - Per Cardio, Continue Lasix 40mg IV daily, will continue  - f/u echo - still pending  - added metoprolol 12.5mg bid today for persistent tachycardia  - repeat EKG ordered, to confirm sinus rhythm - ? afib ? no discernible P waves  - Pt saturating well on room air 97%, denies orthopnea, SOB  - spoke w/ cardio - No documented hx of CHF. Elevated pro-bnp and GORDON  - Per Cardio, Continue Lasix 40mg IV daily, will continue but if creatinine worsens tomorrow will switch to 40mg po q daily  - f/u echo - still pending  - added metoprolol 12.5mg bid today for persistent tachycardia  - repeat EKG ordered, to confirm sinus rhythm - ? afib ? no discernible P waves  - Pt saturating well on room air 97%, denies orthopnea, SOB  - spoke w/ cardio

## 2018-07-22 NOTE — PROGRESS NOTE ADULT - ASSESSMENT
77M w/pmh of CAD s/p CABG, HLD, HTN, Hypothyroid, MI (1995), Prostate Ca s/p radiation seeds, T2DM with peripheral neuropathy, Venous stasis dermatitis of b/l lower extremities sent in from wound care (Dr. Arreola) for worsening of RLE ulcers. We are now consulted for left sided pleural effusion and possible overload.     -EKG shows no acute ischemic changes. Troponin negative x1  - patient with history of CAD, s/p CABG, continue ASA, losartan, statin    -no known history of AF. On EKGs, there is no obvious P waves.  - The EKG from yesterday does appear to be irregular without P waves, suggesting possible AF. Today's is more regular, suggesting a junctional tachycardia or sinus tachycardia with 1st av block.  - Of note, he was on coumadin in the distant past around the time of his CABG, though unclear why.  - He had a subdural at some point, after a fall, so systemic a/c is a difficult decision. He is at an elevated CVA risk. For now, continue with . I discussed the risks and benefits of being on anticoagulation with him in detail.  - Start lopressor 25 mg po bid.  - Patient denies GORDON, orthopnea, BNP 7863, f/u echocardiogram to assess ventricular function   - continue Lasix 40mg IV daily for now  - Creatinine is at baseline  - Watch electrolytes and creatinine.  - He sees Dr. Goldberg as outpatient.

## 2018-07-23 LAB
ANION GAP SERPL CALC-SCNC: 8 MMOL/L — SIGNIFICANT CHANGE UP (ref 5–17)
BUN SERPL-MCNC: 45 MG/DL — HIGH (ref 7–23)
CALCIUM SERPL-MCNC: 8.3 MG/DL — LOW (ref 8.5–10.1)
CHLORIDE SERPL-SCNC: 107 MMOL/L — SIGNIFICANT CHANGE UP (ref 96–108)
CO2 SERPL-SCNC: 27 MMOL/L — SIGNIFICANT CHANGE UP (ref 22–31)
CREAT SERPL-MCNC: 1.9 MG/DL — HIGH (ref 0.5–1.3)
GLUCOSE SERPL-MCNC: 159 MG/DL — HIGH (ref 70–99)
HCT VFR BLD CALC: 39.6 % — SIGNIFICANT CHANGE UP (ref 39–50)
HGB BLD-MCNC: 13 G/DL — SIGNIFICANT CHANGE UP (ref 13–17)
MAGNESIUM SERPL-MCNC: 2.2 MG/DL — SIGNIFICANT CHANGE UP (ref 1.6–2.6)
MCHC RBC-ENTMCNC: 27.9 PG — SIGNIFICANT CHANGE UP (ref 27–34)
MCHC RBC-ENTMCNC: 32.8 GM/DL — SIGNIFICANT CHANGE UP (ref 32–36)
MCV RBC AUTO: 85 FL — SIGNIFICANT CHANGE UP (ref 80–100)
NRBC # BLD: 0 /100 WBCS — SIGNIFICANT CHANGE UP (ref 0–0)
PLATELET # BLD AUTO: 228 K/UL — SIGNIFICANT CHANGE UP (ref 150–400)
POTASSIUM SERPL-MCNC: 4.1 MMOL/L — SIGNIFICANT CHANGE UP (ref 3.5–5.3)
POTASSIUM SERPL-SCNC: 4.1 MMOL/L — SIGNIFICANT CHANGE UP (ref 3.5–5.3)
RBC # BLD: 4.66 M/UL — SIGNIFICANT CHANGE UP (ref 4.2–5.8)
RBC # FLD: 15.3 % — HIGH (ref 10.3–14.5)
SODIUM SERPL-SCNC: 142 MMOL/L — SIGNIFICANT CHANGE UP (ref 135–145)
WBC # BLD: 9.68 K/UL — SIGNIFICANT CHANGE UP (ref 3.8–10.5)
WBC # FLD AUTO: 9.68 K/UL — SIGNIFICANT CHANGE UP (ref 3.8–10.5)

## 2018-07-23 PROCEDURE — 99233 SBSQ HOSP IP/OBS HIGH 50: CPT | Mod: GC

## 2018-07-23 PROCEDURE — 99232 SBSQ HOSP IP/OBS MODERATE 35: CPT

## 2018-07-23 RX ADMIN — Medication 75 MICROGRAM(S): at 06:34

## 2018-07-23 RX ADMIN — Medication 1: at 08:23

## 2018-07-23 RX ADMIN — ATORVASTATIN CALCIUM 40 MILLIGRAM(S): 80 TABLET, FILM COATED ORAL at 21:35

## 2018-07-23 RX ADMIN — SERTRALINE 100 MILLIGRAM(S): 25 TABLET, FILM COATED ORAL at 12:49

## 2018-07-23 RX ADMIN — Medication 325 MILLIGRAM(S): at 12:49

## 2018-07-23 RX ADMIN — ENOXAPARIN SODIUM 40 MILLIGRAM(S): 100 INJECTION SUBCUTANEOUS at 06:33

## 2018-07-23 RX ADMIN — TAMSULOSIN HYDROCHLORIDE 0.4 MILLIGRAM(S): 0.4 CAPSULE ORAL at 18:28

## 2018-07-23 RX ADMIN — Medication 100 MILLIGRAM(S): at 06:34

## 2018-07-23 RX ADMIN — TAMSULOSIN HYDROCHLORIDE 0.4 MILLIGRAM(S): 0.4 CAPSULE ORAL at 06:34

## 2018-07-23 RX ADMIN — Medication 1: at 11:38

## 2018-07-23 RX ADMIN — PIPERACILLIN AND TAZOBACTAM 25 GRAM(S): 4; .5 INJECTION, POWDER, LYOPHILIZED, FOR SOLUTION INTRAVENOUS at 21:36

## 2018-07-23 RX ADMIN — PIPERACILLIN AND TAZOBACTAM 25 GRAM(S): 4; .5 INJECTION, POWDER, LYOPHILIZED, FOR SOLUTION INTRAVENOUS at 14:00

## 2018-07-23 RX ADMIN — Medication 100 MILLIGRAM(S): at 18:28

## 2018-07-23 RX ADMIN — SENNA PLUS 2 TABLET(S): 8.6 TABLET ORAL at 21:35

## 2018-07-23 RX ADMIN — INSULIN GLARGINE 12 UNIT(S): 100 INJECTION, SOLUTION SUBCUTANEOUS at 21:36

## 2018-07-23 RX ADMIN — Medication 12.5 MILLIGRAM(S): at 18:27

## 2018-07-23 NOTE — PROGRESS NOTE ADULT - PROBLEM SELECTOR PLAN 6
Unclear etiology. Consider BARAJAS due to elevated BMI. Pt denies etoh use (but has h/o use)  - abd sono: no biliary dilatation, homogenous hepatomegaly, as long as LFT's improve/stabilize, would suggest outpt f/u w/ GI  - Avoid hepatotoxic medications
Unclear etiology. Consider BARAJAS due to elevated BMI. Pt denies etoh use (but has h/o use)  - abd sono: no biliary dilatation, homogenous hepatomegaly, as long as LFT's improve/stabilize, would suggest outpt f/u w/ GI  - Avoid hepatotoxic medications
Unclear etiology. Consider BARAJAS due to elevated BMI. Pt denies etoh use  - follow up liver ultrasound  - follow up cmp  - Avoid hepatotoxic medications  - Consider GI consult
Unclear etiology. Consider BARAJAS due to elevated BMI. Pt denies etoh use (but has h/o use)  - abd sono: no biliary dilatation, homogenous hepatomegaly, as long as LFT's improve/stabilize, would suggest outpt f/u w/ GI  - Avoid hepatotoxic medications

## 2018-07-23 NOTE — PROGRESS NOTE ADULT - PROBLEM SELECTOR PROBLEM 5
Venous stasis dermatitis of both lower extremities

## 2018-07-23 NOTE — PROGRESS NOTE ADULT - SUBJECTIVE AND OBJECTIVE BOX
Patient is a 77y old  Male who presents with a chief complaint of cellulitis right LE (21 Jul 2018 15:22)      INTERVAL HPI/OVERNIGHT EVENTS:        MEDICATIONS  (STANDING):  aspirin enteric coated 325 milliGRAM(s) Oral daily  atorvastatin 40 milliGRAM(s) Oral at bedtime  dextrose 5%. 1000 milliLiter(s) (50 mL/Hr) IV Continuous <Continuous>  dextrose 50% Injectable 12.5 Gram(s) IV Push once  dextrose 50% Injectable 25 Gram(s) IV Push once  dextrose 50% Injectable 25 Gram(s) IV Push once  docusate sodium 100 milliGRAM(s) Oral two times a day  enoxaparin Injectable 40 milliGRAM(s) SubCutaneous every 24 hours  insulin glargine Injectable (LANTUS) 12 Unit(s) SubCutaneous at bedtime  insulin lispro (HumaLOG) corrective regimen sliding scale   SubCutaneous three times a day before meals  insulin lispro (HumaLOG) corrective regimen sliding scale   SubCutaneous at bedtime  levothyroxine 75 MICROGram(s) Oral daily  losartan 25 milliGRAM(s) Oral daily  metoprolol tartrate 12.5 milliGRAM(s) Oral two times a day  piperacillin/tazobactam IVPB. 3.375 Gram(s) IV Intermittent every 8 hours  senna 2 Tablet(s) Oral at bedtime  sertraline 100 milliGRAM(s) Oral daily  tamsulosin 0.4 milliGRAM(s) Oral two times a day    MEDICATIONS  (PRN):  dextrose 40% Gel 15 Gram(s) Oral once PRN Blood Glucose LESS THAN 70 milliGRAM(s)/deciliter  glucagon  Injectable 1 milliGRAM(s) IntraMuscular once PRN Glucose LESS THAN 70 milligrams/deciliter  nitroglycerin     SubLingual 0.4 milliGRAM(s) SubLingual daily PRN Chest Pain  polyethylene glycol 3350 17 Gram(s) Oral daily PRN Constipation      Allergies  No Known Allergies        REVIEW OF SYSTEMS:            Vital Signs Last 24 Hrs  T(C): 37 (23 Jul 2018 05:01), Max: 37 (23 Jul 2018 05:01)  T(F): 98.6 (23 Jul 2018 05:01), Max: 98.6 (23 Jul 2018 05:01)  HR: 98 (23 Jul 2018 05:01) (92 - 99)  BP: 106/78 (23 Jul 2018 06:38) (104/68 - 117/71)  BP(mean): --  RR: 17 (23 Jul 2018 05:01) (17 - 18)  SpO2: 95% (23 Jul 2018 05:01) (95% - 96%)    PHYSICAL EXAM:              LABS:                        13.0   9.68  )-----------( 228      ( 23 Jul 2018 06:49 )             39.6     CBC Full  -  ( 23 Jul 2018 06:49 )  WBC Count : 9.68 K/uL  Hemoglobin : 13.0 g/dL  Hematocrit : 39.6 %  Platelet Count - Automated : 228 K/uL  Mean Cell Volume : 85.0 fl  Mean Cell Hemoglobin : 27.9 pg  Mean Cell Hemoglobin Concentration : 32.8 gm/dL      23 Jul 2018 06:49    142    |  107    |  45     ----------------------------<  159    4.1     |  27     |  1.90     Ca    8.3        23 Jul 2018 06:49  Mg     2.2       23 Jul 2018 06:49          CAPILLARY BLOOD GLUCOSE      POCT Blood Glucose.: 160 mg/dL (23 Jul 2018 08:15)  POCT Blood Glucose.: 237 mg/dL (22 Jul 2018 21:38)  POCT Blood Glucose.: 256 mg/dL (22 Jul 2018 17:06)  POCT Blood Glucose.: 247 mg/dL (22 Jul 2018 11:52)  POCT Blood Glucose.: 177 mg/dL (22 Jul 2018 09:34)        Culture - Other (collected 07-19-18 @ 23:00)  Source: .Other Right Leg- Swab  Final Report (07-21-18 @ 20:32):    Moderate Staphylococcus aureus    Few Mixed gram negative rods  Organism: Staphylococcus aureus (07-21-18 @ 20:32)  Organism: Staphylococcus aureus (07-21-18 @ 20:32)      -  Ampicillin/Sulbactam: S <=8/4      -  Cefazolin: S <=4      -  Clindamycin: S <=0.25      -  Erythromycin: S <=0.25      -  Gentamicin: S <=1 Should not be used as monotherapy      -  Oxacillin: S <=0.25      -  Penicillin: R 8      -  RIF- Rifampin: S <=1 Should not be used as monotherapy      -  Tetra/Doxy: S <=1      -  Trimethoprim/Sulfamethoxazole: S <=0.5/9.5      -  Vancomycin: S 2      Method Type: YESSY    Culture - Blood (collected 07-19-18 @ 22:58)  Source: .Blood Blood-Peripheral  Preliminary Report (07-20-18 @ 23:02):    No growth to date.    Culture - Blood (collected 07-19-18 @ 22:58)  Source: .Blood Blood-Peripheral  Preliminary Report (07-20-18 @ 23:02):    No growth to date.        RADIOLOGY & ADDITIONAL TESTS:        Personally reviewed.     Consultant(s) Notes Reviewed:  [x] YES  [ ] NO Patient is a 77y old  Male who presents with a chief complaint of cellulitis right LE (21 Jul 2018 15:22)      INTERVAL HPI/OVERNIGHT EVENTS: Patient offers no complaints. Feels well. Would like to be discharged tomorrow.        MEDICATIONS  (STANDING):  aspirin enteric coated 325 milliGRAM(s) Oral daily  atorvastatin 40 milliGRAM(s) Oral at bedtime  dextrose 5%. 1000 milliLiter(s) (50 mL/Hr) IV Continuous <Continuous>  dextrose 50% Injectable 12.5 Gram(s) IV Push once  dextrose 50% Injectable 25 Gram(s) IV Push once  dextrose 50% Injectable 25 Gram(s) IV Push once  docusate sodium 100 milliGRAM(s) Oral two times a day  enoxaparin Injectable 40 milliGRAM(s) SubCutaneous every 24 hours  insulin glargine Injectable (LANTUS) 12 Unit(s) SubCutaneous at bedtime  insulin lispro (HumaLOG) corrective regimen sliding scale   SubCutaneous three times a day before meals  insulin lispro (HumaLOG) corrective regimen sliding scale   SubCutaneous at bedtime  levothyroxine 75 MICROGram(s) Oral daily  losartan 25 milliGRAM(s) Oral daily  metoprolol tartrate 12.5 milliGRAM(s) Oral two times a day  piperacillin/tazobactam IVPB. 3.375 Gram(s) IV Intermittent every 8 hours  senna 2 Tablet(s) Oral at bedtime  sertraline 100 milliGRAM(s) Oral daily  tamsulosin 0.4 milliGRAM(s) Oral two times a day    MEDICATIONS  (PRN):  dextrose 40% Gel 15 Gram(s) Oral once PRN Blood Glucose LESS THAN 70 milliGRAM(s)/deciliter  glucagon  Injectable 1 milliGRAM(s) IntraMuscular once PRN Glucose LESS THAN 70 milligrams/deciliter  nitroglycerin     SubLingual 0.4 milliGRAM(s) SubLingual daily PRN Chest Pain  polyethylene glycol 3350 17 Gram(s) Oral daily PRN Constipation      Allergies  No Known Allergies        REVIEW OF SYSTEMS:  CONSTITUTIONAL: denies fever, chills, fatigue, weakness  HEENT: denies blurred vision, swallowing difficulty  SKIN: denies new lesions, rash  CARDIOVASCULAR: denies chest pain, chest pressure, palpitations  RESPIRATORY: denies shortness of breath, sputum production  GASTROINTESTINAL: denies nausea, vomiting, diarrhea, abdominal pain  GENITOURINARY: denies dysuria, discharge  NEUROLOGICAL: denies numbness, headache, focal weakness  MUSCULOSKELETAL: denies new joint pain, muscle aches  HEMATOLOGIC: denies gross bleeding, bruising  LYMPHATICS: denies enlarged lymph nodes, extremity swelling          Vital Signs Last 24 Hrs  T(C): 37 (23 Jul 2018 05:01), Max: 37 (23 Jul 2018 05:01)  T(F): 98.6 (23 Jul 2018 05:01), Max: 98.6 (23 Jul 2018 05:01)  HR: 98 (23 Jul 2018 05:01) (92 - 99)  BP: 106/78 (23 Jul 2018 06:38) (104/68 - 117/71)  BP(mean): --  RR: 17 (23 Jul 2018 05:01) (17 - 18)  SpO2: 95% (23 Jul 2018 05:01) (95% - 96%)    PHYSICAL EXAM:  GENERAL: patient appears well, no acute distress, appropriate, pleasant  EYES: sclera clear, no exudates  ENMT: , moist mucous membranes  LUNGS: good air entry bilaterally, sl decreased breath sounds on the right lower lobe, symmetric breath sounds, no wheezing or rhonchi appreciated  HEART: S1/S2, regular rate and rhythm, no murmurs noted b/l chronic LE edema,   GASTROINTESTINAL: abdomen is soft, nontender, nondistended, normoactive bowel sounds,  INTEGUMENT: good skin turgor, no lesions noted, bandaged LE bilaterally. Right LE weeping, bandage last changed on 7/21/18  MUSCULOSKELETAL: no clubbing or cyanosis, no obvious deformity  NEUROLOGIC: awake, alert, oriented x3, good muscle tone in 4 extremities, no obvious sensory deficits              LABS:                        13.0   9.68  )-----------( 228      ( 23 Jul 2018 06:49 )             39.6     CBC Full  -  ( 23 Jul 2018 06:49 )  WBC Count : 9.68 K/uL  Hemoglobin : 13.0 g/dL  Hematocrit : 39.6 %  Platelet Count - Automated : 228 K/uL  Mean Cell Volume : 85.0 fl  Mean Cell Hemoglobin : 27.9 pg  Mean Cell Hemoglobin Concentration : 32.8 gm/dL      23 Jul 2018 06:49    142    |  107    |  45     ----------------------------<  159    4.1     |  27     |  1.90     Ca    8.3        23 Jul 2018 06:49  Mg     2.2       23 Jul 2018 06:49          CAPILLARY BLOOD GLUCOSE      POCT Blood Glucose.: 160 mg/dL (23 Jul 2018 08:15)  POCT Blood Glucose.: 237 mg/dL (22 Jul 2018 21:38)  POCT Blood Glucose.: 256 mg/dL (22 Jul 2018 17:06)  POCT Blood Glucose.: 247 mg/dL (22 Jul 2018 11:52)  POCT Blood Glucose.: 177 mg/dL (22 Jul 2018 09:34)        Culture - Other (collected 07-19-18 @ 23:00)  Source: .Other Right Leg- Swab  Final Report (07-21-18 @ 20:32):    Moderate Staphylococcus aureus    Few Mixed gram negative rods  Organism: Staphylococcus aureus (07-21-18 @ 20:32)  Organism: Staphylococcus aureus (07-21-18 @ 20:32)      -  Ampicillin/Sulbactam: S <=8/4      -  Cefazolin: S <=4      -  Clindamycin: S <=0.25      -  Erythromycin: S <=0.25      -  Gentamicin: S <=1 Should not be used as monotherapy      -  Oxacillin: S <=0.25      -  Penicillin: R 8      -  RIF- Rifampin: S <=1 Should not be used as monotherapy      -  Tetra/Doxy: S <=1      -  Trimethoprim/Sulfamethoxazole: S <=0.5/9.5      -  Vancomycin: S 2      Method Type: YESSY    Culture - Blood (collected 07-19-18 @ 22:58)  Source: .Blood Blood-Peripheral  Preliminary Report (07-20-18 @ 23:02):    No growth to date.    Culture - Blood (collected 07-19-18 @ 22:58)  Source: .Blood Blood-Peripheral  Preliminary Report (07-20-18 @ 23:02):    No growth to date.        RADIOLOGY & ADDITIONAL TESTS:        Personally reviewed.     Consultant(s) Notes Reviewed:  [x] YES  [ ] NO

## 2018-07-23 NOTE — PROGRESS NOTE ADULT - PROBLEM SELECTOR PLAN 2
Controlled  - Hold PO anti-hyperglycemic meds  - accuchecks  - HgA1c 8.3 down from 10.7 in 3/2017  - Pt takes Treciba 16 units. Would continue w/ Lantus 12 units at bedtime - fingersticks at inpatient goal  - on low dose sliding scale
Controlled  - Hold PO anti-hyperglycemic meds  - accuchecks  - follow up HgA1c  - Pt takes Treciba 16 units. Would start Lantus 12 units at bedtime  - Start low dose sliding scale
Silver alginate dressings with elevation of legs.
Controlled  - Hold PO anti-hyperglycemic meds  - accuchecks  - HgA1c 8.3 down from 10.7 in 3/2017  - Pt takes Treciba 16 units. Would continue w/ Lantus 12 units at bedtime - fingersticks at inpatient goal  - on low dose sliding scale
Controlled  - Hold PO anti-hyperglycemic meds  - accuchecks  - HgA1c 8.3 down from 10.7 in 3/2017  - Pt takes Treciba 16 units. Would continue w/ Lantus 12 units at bedtime - fingersticks at inpatient goal  - on low dose sliding scale

## 2018-07-23 NOTE — PROGRESS NOTE ADULT - SUBJECTIVE AND OBJECTIVE BOX
ELLE BLAKELY is a 77yMale , patient examined and chart reviewed.     INTERVAL HPI/ OVERNIGHT EVENTS:   Feels well. Afebrile.  Wife at bedside.    PAST MEDICAL & SURGICAL HISTORY:  Venous stasis dermatitis of both lower extremities  Hyperlipidemia  Type 2 diabetes mellitus without complication  Hypothyroidism  Bladder stones: s/p extraction  Prostate cancer: s/p brachytherapy  Myocardial infarction: 1995  CAD (coronary artery disease)  S/P appendectomy  S/P CABG x 3: saphenous vein harvested from LLE, performed in March 1996 at [facility] by Dr. Mcgraw    For details regarding the patient's social history, family history, and other miscellaneous elements, please refer the initial infectious diseases consultation and/or the admitting history and physical examination for this admission.     ROS:  CONSTITUTIONAL:  Negative fever or chills  EYES:  Negative  blurry vision or double vision  CARDIOVASCULAR:  Negative for chest pain or palpitations  RESPIRATORY:  Negative for cough, wheezing, or SOB   GASTROINTESTINAL:  Negative for nausea, vomiting, diarrhea, constipation, or abdominal pain  GENITOURINARY:  Negative frequency, urgency or dysuria  NEUROLOGIC:  No headache, confusion, dizziness, lightheadedness  All other systems were reviewed and are negative         Current inpatient medications :    ANTIBIOTICS/RELEVANT:  piperacillin/tazobactam IVPB. 3.375 Gram(s) IV Intermittent every 8 hours      aspirin enteric coated 325 milliGRAM(s) Oral daily  atorvastatin 40 milliGRAM(s) Oral at bedtime  dextrose 40% Gel 15 Gram(s) Oral once PRN  dextrose 5%. 1000 milliLiter(s) IV Continuous <Continuous>  dextrose 50% Injectable 12.5 Gram(s) IV Push once  dextrose 50% Injectable 25 Gram(s) IV Push once  dextrose 50% Injectable 25 Gram(s) IV Push once  docusate sodium 100 milliGRAM(s) Oral two times a day  enoxaparin Injectable 40 milliGRAM(s) SubCutaneous every 24 hours  glucagon  Injectable 1 milliGRAM(s) IntraMuscular once PRN  insulin glargine Injectable (LANTUS) 12 Unit(s) SubCutaneous at bedtime  insulin lispro (HumaLOG) corrective regimen sliding scale   SubCutaneous three times a day before meals  insulin lispro (HumaLOG) corrective regimen sliding scale   SubCutaneous at bedtime  levothyroxine 75 MICROGram(s) Oral daily  metoprolol tartrate 12.5 milliGRAM(s) Oral two times a day  nitroglycerin     SubLingual 0.4 milliGRAM(s) SubLingual daily PRN  polyethylene glycol 3350 17 Gram(s) Oral daily PRN  senna 2 Tablet(s) Oral at bedtime  sertraline 100 milliGRAM(s) Oral daily  tamsulosin 0.4 milliGRAM(s) Oral two times a day      Objective:    07-22 @ 07:01  -  07-23 @ 07:00  --------------------------------------------------------  IN: 860 mL / OUT: 700 mL / NET: 160 mL      T(C): 36.7 (07-23-18 @ 14:05), Max: 37 (07-23-18 @ 05:01)  HR: 101 (07-23-18 @ 14:05) (96 - 101)  BP: 107/67 (07-23-18 @ 14:05) (104/68 - 110/64)  RR: 17 (07-23-18 @ 14:05) (17 - 18)  SpO2: 96% (07-23-18 @ 14:05) (95% - 96%)  Wt(kg): --      Physical Exam:  General: well developed well nourished, in no acute distress  Eyes: sclera anicteric, pupils equal and reactive to light  ENMT: buccal mucosa moist, pharynx not injected  Neck: supple, trachea midline  Lungs: clear, no wheeze/rhonchi  Cardiovascular: regular rate and rhythm, S1 S2  Abdomen: soft, nontender, no organomegaly present, bowel sounds normal  Neurological: alert and oriented x3, Cranial Nerves II-XII grossly intact  Skin: no increased ecchymosis/petechiae/purpura  Lymph Nodes: no palpable cervical/supraclavicular lymph nodes enlargements  Extremities: G[facility] LE drsg c/d/i  Right leg decreased swelling and erythema      LABS:                          13.0   9.68  )-----------( 228      ( 23 Jul 2018 06:49 )             39.6       07-23    142  |  107  |  45<H>  ----------------------------<  159<H>  4.1   |  27  |  1.90<H>    Ca    8.3<L>      23 Jul 2018 06:49  Mg     2.2     07-23    TPro  6.6  /  Alb  2.6<L>  /  TBili  1.1  /  DBili  .40<H>  /  AST  30  /  ALT  57  /  AlkPhos  245<H>  07-22      MICROBIOLOGY:  Culture - Other (07.19.18 @ 23:00)    -  Gentamicin: S <=1 Should not be used as monotherapy    -  Oxacillin: S <=0.25    -  Penicillin: R 8    -  Tetra/Doxy: S <=1    -  RIF- Rifampin: S <=1 Should not be used as monotherapy    -  Trimethoprim/Sulfamethoxazole: S <=0.5/9.5    -  Vancomycin: S 2    -  Ampicillin/Sulbactam: S <=8/4    -  Cefazolin: S <=4    -  Clindamycin: S <=0.25    -  Erythromycin: S <=0.25    Specimen Source: .Other Right Leg- Swab    Culture Results:   Moderate Staphylococcus aureus  Few Mixed gram negative rods    Organism Identification: Staphylococcus aureus    Organism: Staphylococcus aureus    Method Type: YESSY    Assessment :  77M w/pmh of CAD s/p CABG, HLD, HTN, Hypothyroid, MI (1995), Prostate Ca s/p radiation   seeds, T2DM with peripheral neuropathy, Venous stasis dermatitis of b/l lower extremities   admitted with right leg cellulitis sec infected venous ulcers resolving nicely  Wound cultures noted  Lymphadema with venous insufficiency   CKD  Overall improving    Plan :   On Zosyn  Can change to po keflex 500mg po q6h x 7 days on dc  Elevate leg when possible  Wound care to follow  Fu wound care outpt closely  Pt requesting to go to rehab    D/w Dr MICHELL Padilla      Continue with present regiment.  Appropriate use of antibiotics and adverse effects reviewed.      I have discussed the above plan of care with patient/ family in detail. They expressed understanding of the  treatment plan . Risks, benefits and alternatives discussed in detail. I have asked if they have any questions or concerns and appropriately addressed them to the best of my ability .    > 35 minutes were spent in direct patient care reviewing notes, medications ,labs data/ imaging , discussion with multidisciplinary team.    Thank you for allowing me to participate in care of your patient .    Sanchez Ochoa MD  Infectious Disease  [phone]

## 2018-07-23 NOTE — PROGRESS NOTE ADULT - PROBLEM SELECTOR PLAN 1
- on zosyn (moderate staph aureus, few gram neg rods from wound), no noted leukocytosis  - follow up blood cultures (NGTD as of 7/22/18)  - ID/wound care following - recs appreciated - (moderate staph aureus, few gram neg rods from wound), no noted leukocytosis  - Per ID , continue Zosyn   - switch to  po keflex 500mg po q6h x 7 days on dc  - follow up blood cultures (NGTD as of 7/22/18)  - ID/wound care following - recs appreciated - (moderate staph aureus, few gram neg rods from wound), no noted leukocytosis  - Per ID , continue Zosyn   - switch to  po keflex 500mg po q6h x 7 days on discharge  - follow up blood cultures (NGTD as of 7/22/18)  - ID/wound care following - recs appreciated

## 2018-07-23 NOTE — PROGRESS NOTE ADULT - PROBLEM SELECTOR PROBLEM 2
Type 2 diabetes mellitus with peripheral neuropathy
Type 2 diabetes mellitus with peripheral neuropathy
Venous stasis ulcers of both lower extremities
Type 2 diabetes mellitus with peripheral neuropathy
Type 2 diabetes mellitus with peripheral neuropathy

## 2018-07-23 NOTE — PROGRESS NOTE ADULT - SUBJECTIVE AND OBJECTIVE BOX
[facility] -- Sami Lott, Joaquin, Lilian, Jose Roberto Gutierrez Savella  Office # [phone]      Follow Up:  CAD, abn ekg    Subjective/Observations: Patient seen and examined. Events noted. Resting comfortably in bed. No complaints of chest pain, dyspnea, or palpitations reported. No signs of orthopnea or PND.       REVIEW OF SYSTEMS: All other review of systems is negative unless indicated above    PAST MEDICAL & SURGICAL HISTORY:  Venous stasis dermatitis of both lower extremities  Hyperlipidemia  Type 2 diabetes mellitus without complication  Hypothyroidism  Bladder stones: s/p extraction  Prostate cancer: s/p brachytherapy  Myocardial infarction: 1995  CAD (coronary artery disease)  S/P appendectomy  S/P CABG x 3: saphenous vein harvested from LLE, performed in March 1996 at [facility] by Dr. Mcgraw      MEDICATIONS  (STANDING):  aspirin enteric coated 325 milliGRAM(s) Oral daily  atorvastatin 40 milliGRAM(s) Oral at bedtime  dextrose 5%. 1000 milliLiter(s) (50 mL/Hr) IV Continuous <Continuous>  dextrose 50% Injectable 12.5 Gram(s) IV Push once  dextrose 50% Injectable 25 Gram(s) IV Push once  dextrose 50% Injectable 25 Gram(s) IV Push once  docusate sodium 100 milliGRAM(s) Oral two times a day  enoxaparin Injectable 40 milliGRAM(s) SubCutaneous every 24 hours  insulin glargine Injectable (LANTUS) 12 Unit(s) SubCutaneous at bedtime  insulin lispro (HumaLOG) corrective regimen sliding scale   SubCutaneous three times a day before meals  insulin lispro (HumaLOG) corrective regimen sliding scale   SubCutaneous at bedtime  levothyroxine 75 MICROGram(s) Oral daily  metoprolol tartrate 12.5 milliGRAM(s) Oral two times a day  piperacillin/tazobactam IVPB. 3.375 Gram(s) IV Intermittent every 8 hours  senna 2 Tablet(s) Oral at bedtime  sertraline 100 milliGRAM(s) Oral daily  tamsulosin 0.4 milliGRAM(s) Oral two times a day    MEDICATIONS  (PRN):  dextrose 40% Gel 15 Gram(s) Oral once PRN Blood Glucose LESS THAN 70 milliGRAM(s)/deciliter  glucagon  Injectable 1 milliGRAM(s) IntraMuscular once PRN Glucose LESS THAN 70 milligrams/deciliter  nitroglycerin     SubLingual 0.4 milliGRAM(s) SubLingual daily PRN Chest Pain  polyethylene glycol 3350 17 Gram(s) Oral daily PRN Constipation      Allergies    No Known Allergies    Intolerances            Vital Signs Last 24 Hrs  T(C): 36.7 (23 Jul 2018 14:05), Max: 37 (23 Jul 2018 05:01)  T(F): 98.1 (23 Jul 2018 14:05), Max: 98.6 (23 Jul 2018 05:01)  HR: 101 (23 Jul 2018 14:05) (96 - 101)  BP: 107/67 (23 Jul 2018 14:05) (104/68 - 110/64)  BP(mean): --  RR: 17 (23 Jul 2018 14:05) (17 - 18)  SpO2: 96% (23 Jul 2018 14:05) (95% - 96%)    I&O's Summary    22 Jul 2018 07:01  -  23 Jul 2018 07:00  --------------------------------------------------------  IN: 860 mL / OUT: 700 mL / NET: 160 mL          PHYSICAL EXAM:     Constitutional: NAD, awake and alert, well-developed  HEENT: Moist Mucous Membranes, Anicteric  Pulmonary: Decreased breath sounds b/l. No rales, crackles or wheeze appreciated.   Cardiovascular: Regular, S1 and S2, 2/6 Sm  Gastrointestinal: Bowel Sounds present, soft, nontender.   Lymph: lower ext edema   Skin: b/l lower ext dressing  Psych:  Mood & affect appropriate    LABS: All Labs Reviewed:                        13.0   9.68  )-----------( 228      ( 23 Jul 2018 06:49 )             39.6                         13.6   9.22  )-----------( 212      ( 22 Jul 2018 07:36 )             41.5                         13.5   7.16  )-----------( 209      ( 21 Jul 2018 09:12 )             41.4     23 Jul 2018 06:49    142    |  107    |  45     ----------------------------<  159    4.1     |  27     |  1.90   22 Jul 2018 07:36    140    |  107    |  41     ----------------------------<  167    4.3     |  23     |  1.60   21 Jul 2018 09:12    142    |  108    |  45     ----------------------------<  137    4.1     |  22     |  1.50     Ca    8.3        23 Jul 2018 06:49  Ca    8.3        22 Jul 2018 07:36  Ca    8.1        21 Jul 2018 09:12  Mg     2.2       23 Jul 2018 06:49    TPro  6.6    /  Alb  2.6    /  TBili  1.1    /  DBili  .40    /  AST  30     /  ALT  57     /  AlkPhos  245    22 Jul 2018 07:36

## 2018-07-23 NOTE — PROGRESS NOTE ADULT - PROBLEM SELECTOR PLAN 10
IMPROVE VTE Score: 1  Padua score: 6  DVT ppx: Lovenox 40mg qd    Problem 11:  Hx of Subdural hematoma s/p Felix hole hematoma evacuation - residual speech defect, slow
IMPROVE VTE Score: 1  Padua score: 6  DVT ppx: Lovenox 40mg qd    Problem 11:  Hx of Subdural hematoma s/p Felix hole hematoma evacuation - residual speech defect, slow    Problem 12: Tachycardia   Per cardio, Afib v. Junctional Tachycardia vs. Sinus Tac wi. AV block  continue ASA 325mg daily, no A/c at this time, Hx of Subdural
IMPROVE VTE Score: 1  Padua score: 6  DVT ppx: Lovenox 40mg qd    Problem 11:  Hx of Subdural hematoma s/p Felix hole hematoma evacuation - residual speech defect, slow
IMPROVE VTE Score: 1  Padua score: 6  DVT ppx: Lovenox 40mg qd    Problem 11:  Hx of Subdural hematoma s/p Felix hole hematoma evacuation - residual speech defect, slow

## 2018-07-23 NOTE — PROGRESS NOTE ADULT - PROBLEM SELECTOR PLAN 3
- No documented hx of CHF. Elevated pro-bnp and GORDON  - Per Cardio, Continue Lasix 40mg IV daily, will continue but if creatinine worsens tomorrow will switch to 40mg po q daily  - f/u echo - still pending  - added metoprolol 12.5mg bid today for persistent tachycardia  - repeat EKG ordered, to confirm sinus rhythm - ? afib ? no discernible P waves  - Pt saturating well on room air 97%, denies orthopnea, SOB  - spoke w/ cardio - No documented hx of CHF. Elevated pro-bnp and GORDON  - Per Cardio, - LVEF 40-45%  - added metoprolol 12.5mg bid for persistent tachycardia and to assess underlying rhythm, will REPEAT EKG TOMORROW  - pt saturating well on room air 97%, denies orthopnea, SOB

## 2018-07-23 NOTE — PROGRESS NOTE ADULT - PROBLEM SELECTOR PLAN 7
s/p CABG  - continue asa 325mg qd  - nitroglycerin prn
s/p CABG  - continue asa 325mg qd  - NG prn

## 2018-07-23 NOTE — PROGRESS NOTE ADULT - PROBLEM SELECTOR PROBLEM 4
YELITZA (acute kidney injury)

## 2018-07-23 NOTE — PROGRESS NOTE ADULT - ASSESSMENT
77M w/pmh of CAD s/p CABG, HLD, HTN, Hypothyroid, MI (1995), Prostate Ca s/p radiation seeds, T2DM with peripheral neuropathy, Venous stasis dermatitis of b/l lower extremities sent in from wound care (Dr. Arreola) for RLE cellulitis requiring IV abx. Pleural effusion on L. echo 77M w/pmh of CAD s/p CABG, HLD, HTN, Hypothyroid, MI (1995), Prostate Ca s/p radiation seeds, T2DM with peripheral neuropathy, Venous stasis dermatitis of b/l lower extremities sent in from wound care (Dr. Arreola) for RLE cellulitis requiring IV abx. Pleural effusion on L. echo.

## 2018-07-23 NOTE — PROGRESS NOTE ADULT - PROBLEM SELECTOR PLAN 5
Plan as above   - Follow up wound care recs
Plan as above   - Follow up wound care recs
Plan as above #1  - Follow up wound care recs
Plan as above   - Follow up wound care recs

## 2018-07-23 NOTE — PROGRESS NOTE ADULT - ASSESSMENT
77M w/pmh of CAD s/p CABG, HLD, HTN, Hypothyroid, MI (1995), Prostate Ca s/p radiation seeds, T2DM with peripheral neuropathy, Venous stasis dermatitis of b/l lower extremities sent in from wound care (Dr. Arreola) for worsening of RLE ulcers. We are now consulted for left sided pleural effusion and possible overload.     -EKG shows no acute ischemic changes.   - patient with history of CAD, s/p CABG, continue ASA, losartan, statin    -no known history of AF. On EKGs, there is no obvious P waves.  - Some EKGs suggestive of AF. Yesterdays is more regular, suggesting a junctional tachycardia or sinus tachycardia with 1st av block.  - Of note, he was on coumadin in the distant past around the time of his CABG, though unclear why.  - He had a subdural at some point, after a fall, so systemic a/c is a difficult decision. He is at an elevated CVA risk. For now, continue with . I discussed the risks and benefits of being on anticoagulation with him in detail. For now will stay with ASA  - Cont lopressor 12.5 mg po bid.  - Patient denies GORDON, orthopnea, BNP 7863, f/u echocardiogram to assess ventricular function   - Cr increasing. I would hold Lasix and monitor.   - Watch electrolytes and creatinine.  - He sees Dr. Goldberg as outpatient who he will follow up with upon dc 77M w/pmh of CAD s/p CABG, HLD, HTN, Hypothyroid, MI (1995), Prostate Ca s/p radiation seeds, T2DM with peripheral neuropathy, Venous stasis dermatitis of b/l lower extremities sent in from wound care (Dr. Arreola) for worsening of RLE ulcers. We are now consulted for left sided pleural effusion and possible overload.     -EKG shows no acute ischemic changes.   - patient with history of CAD, s/p CABG, continue ASA, losartan, statin    -no known history of AF. On EKGs, there is no obvious P waves.  - Some EKGs suggestive of AF. Yesterdays is more regular, suggesting a junctional tachycardia or sinus tachycardia with 1st av block.  - Of note, he was on coumadin in the distant past around the time of his CABG, though unclear why.  - He had a subdural at some point, after a fall, so systemic a/c is a difficult decision. He is at an elevated CVA risk. For now, continue with . I discussed the risks and benefits of being on anticoagulation with him in detail. For now will stay with ASA  - Cont lopressor 12.5 mg po bid.  - Awaiting echo   - Cr increasing. I would hold Lasix and monitor. Appears better compensated from volume standpoint.   - Watch electrolytes and creatinine.  - He sees Dr. Goldberg as outpatient who he will follow up with upon dc

## 2018-07-23 NOTE — PROGRESS NOTE ADULT - PROBLEM SELECTOR PLAN 4
likely CKD, creatinine unchanged  - Avoid nephrotoxic medications - maintaining on ACEI - this appears appropraite since creatinine has stabilized will monitor daily  - continue daily weights and strict i/o's - will monitor while receiving IV lasix initial Cr 1.5 now 1.9, YELITZA on likely CKD  - Avoid nephrotoxic medications - maintaining on ACEI - this appears appropraite since creatinine has stabilized will monitor daily  - continue daily weights and strict i/o's  - holding IV lasix per Cardio initial Cr 1.5 now 1.9, YELITZA on likely CKD  - Avoid nephrotoxic medications   - continue daily weights and strict i/o's  - holding lasix for now - (already received dose tomorrow, monitor renal indices closely)

## 2018-07-23 NOTE — PROGRESS NOTE ADULT - PROBLEM SELECTOR PLAN 9
Continue statin  - monitor liver enzymes as they are elevated

## 2018-07-23 NOTE — PROGRESS NOTE ADULT - ATTENDING COMMENTS
The admission plan was discussed with the patient in detail. The patient's questions and concerns were addressed to the best of my ability. The patient is in agreement with the plan detailed above. The patient demonstrated adequate understanding of the plan and the counseling which I have provided.
The tx plan was discussed with the patient in detail. The patient's questions and concerns were addressed to the best of my ability. The patient is in agreement with the plan detailed above. The patient demonstrated adequate understanding of the plan and the counseling which I have provided.
Plan as above
I personally conducted a physical examination of the patient. I personally gathered the patient's history. I edited the above listed findings which were prepared by the listed resident physician. I personally discussed the plan of care with the patient. The questions and concerns were addressed to the best of my ability. The patient is in agreement with the listed treatment plan.     A/P:   - worsening renal indices today but this is not critical change from chronic baseline. hold diuretics tonight. hold ARB. switch to po abx tomorrow. d/c planning to [facility]. f/u outpt in 1 week for BMP to monitor renal indices. will d/c home on ARB tomorrow if creatinine improves as pt will beneift from long term acei/arb use

## 2018-07-24 VITALS
SYSTOLIC BLOOD PRESSURE: 105 MMHG | HEART RATE: 98 BPM | RESPIRATION RATE: 16 BRPM | TEMPERATURE: 98 F | OXYGEN SATURATION: 98 % | DIASTOLIC BLOOD PRESSURE: 71 MMHG

## 2018-07-24 LAB
ANION GAP SERPL CALC-SCNC: 9 MMOL/L — SIGNIFICANT CHANGE UP (ref 5–17)
BUN SERPL-MCNC: 49 MG/DL — HIGH (ref 7–23)
CALCIUM SERPL-MCNC: 8.3 MG/DL — LOW (ref 8.5–10.1)
CHLORIDE SERPL-SCNC: 109 MMOL/L — HIGH (ref 96–108)
CO2 SERPL-SCNC: 24 MMOL/L — SIGNIFICANT CHANGE UP (ref 22–31)
CREAT SERPL-MCNC: 1.9 MG/DL — HIGH (ref 0.5–1.3)
CULTURE RESULTS: SIGNIFICANT CHANGE UP
CULTURE RESULTS: SIGNIFICANT CHANGE UP
GLUCOSE SERPL-MCNC: 172 MG/DL — HIGH (ref 70–99)
HCT VFR BLD CALC: 38.4 % — LOW (ref 39–50)
HGB BLD-MCNC: 12.8 G/DL — LOW (ref 13–17)
MAGNESIUM SERPL-MCNC: 2.3 MG/DL — SIGNIFICANT CHANGE UP (ref 1.6–2.6)
MCHC RBC-ENTMCNC: 27.9 PG — SIGNIFICANT CHANGE UP (ref 27–34)
MCHC RBC-ENTMCNC: 33.3 GM/DL — SIGNIFICANT CHANGE UP (ref 32–36)
MCV RBC AUTO: 83.7 FL — SIGNIFICANT CHANGE UP (ref 80–100)
NRBC # BLD: 0 /100 WBCS — SIGNIFICANT CHANGE UP (ref 0–0)
PLATELET # BLD AUTO: 222 K/UL — SIGNIFICANT CHANGE UP (ref 150–400)
POTASSIUM SERPL-MCNC: 4.2 MMOL/L — SIGNIFICANT CHANGE UP (ref 3.5–5.3)
POTASSIUM SERPL-SCNC: 4.2 MMOL/L — SIGNIFICANT CHANGE UP (ref 3.5–5.3)
RBC # BLD: 4.59 M/UL — SIGNIFICANT CHANGE UP (ref 4.2–5.8)
RBC # FLD: 15.3 % — HIGH (ref 10.3–14.5)
SODIUM SERPL-SCNC: 142 MMOL/L — SIGNIFICANT CHANGE UP (ref 135–145)
SPECIMEN SOURCE: SIGNIFICANT CHANGE UP
SPECIMEN SOURCE: SIGNIFICANT CHANGE UP
WBC # BLD: 10.09 K/UL — SIGNIFICANT CHANGE UP (ref 3.8–10.5)
WBC # FLD AUTO: 10.09 K/UL — SIGNIFICANT CHANGE UP (ref 3.8–10.5)

## 2018-07-24 PROCEDURE — 99239 HOSP IP/OBS DSCHRG MGMT >30: CPT

## 2018-07-24 PROCEDURE — 99232 SBSQ HOSP IP/OBS MODERATE 35: CPT

## 2018-07-24 RX ORDER — FUROSEMIDE 40 MG
1 TABLET ORAL
Qty: 0 | Refills: 0 | COMMUNITY

## 2018-07-24 RX ORDER — LOSARTAN POTASSIUM 100 MG/1
1 TABLET, FILM COATED ORAL
Qty: 0 | Refills: 0 | COMMUNITY

## 2018-07-24 RX ORDER — METOPROLOL TARTRATE 50 MG
12.5 TABLET ORAL
Qty: 0 | Refills: 0 | COMMUNITY
Start: 2018-07-24

## 2018-07-24 RX ORDER — POTASSIUM CITRATE MONOHYDRATE 100 %
1 POWDER (GRAM) MISCELLANEOUS
Qty: 0 | Refills: 0 | COMMUNITY

## 2018-07-24 RX ORDER — CEPHALEXIN 500 MG
1 CAPSULE ORAL
Qty: 0 | Refills: 0 | COMMUNITY
Start: 2018-07-24

## 2018-07-24 RX ORDER — CEPHALEXIN 500 MG
500 CAPSULE ORAL EVERY 6 HOURS
Qty: 0 | Refills: 0 | Status: DISCONTINUED | OUTPATIENT
Start: 2018-07-24 | End: 2018-07-24

## 2018-07-24 RX ADMIN — Medication 75 MICROGRAM(S): at 06:56

## 2018-07-24 RX ADMIN — ENOXAPARIN SODIUM 40 MILLIGRAM(S): 100 INJECTION SUBCUTANEOUS at 06:56

## 2018-07-24 RX ADMIN — Medication 325 MILLIGRAM(S): at 11:47

## 2018-07-24 RX ADMIN — Medication 2: at 11:47

## 2018-07-24 RX ADMIN — PIPERACILLIN AND TAZOBACTAM 25 GRAM(S): 4; .5 INJECTION, POWDER, LYOPHILIZED, FOR SOLUTION INTRAVENOUS at 06:56

## 2018-07-24 RX ADMIN — Medication 100 MILLIGRAM(S): at 06:56

## 2018-07-24 RX ADMIN — Medication 500 MILLIGRAM(S): at 11:47

## 2018-07-24 RX ADMIN — SERTRALINE 100 MILLIGRAM(S): 25 TABLET, FILM COATED ORAL at 11:47

## 2018-07-24 RX ADMIN — TAMSULOSIN HYDROCHLORIDE 0.4 MILLIGRAM(S): 0.4 CAPSULE ORAL at 06:56

## 2018-07-24 RX ADMIN — Medication 12.5 MILLIGRAM(S): at 06:56

## 2018-07-24 NOTE — PROGRESS NOTE ADULT - PROVIDER SPECIALTY LIST ADULT
Cardiology
Hospitalist
Infectious Disease
Wound Care
Cardiology
Hospitalist

## 2018-07-24 NOTE — PROGRESS NOTE ADULT - ASSESSMENT
77M w/pmh of CAD s/p CABG, HLD, HTN, Hypothyroid, MI (1995), Prostate Ca s/p radiation seeds, T2DM with peripheral neuropathy, Venous stasis dermatitis of b/l lower extremities sent in from wound care (Dr. Arreola) for worsening of RLE ulcers. We are now consulted for left sided pleural effusion and possible overload.    -no acute ischemia  - patient with history of CAD, s/p CABG  -continue ASA, losartan, statin    -no known history of AF,  some EKGs suggestive of AF.    - Of note, he was on coumadin in the distant past around the time of his CABG, though unclear why.  - He had a subdural at some point, after a fall, so systemic a/c is a difficult decision. He is at an elevated CVA risk.   - the risks and benefits of being on anticoagulation versus asa alone were discussed with him in detail yesterday and again today. For now he prefers to stay with ASA, and acknowledges the cva risk  - hr is not well controlled.  would raise dose of lopressor to 25 mg po bid.  - Cr increasing. I would hold Lasix and monitor. Appears better compensated from volume standpoint.   - Watch electrolytes and creatinine.  - He sees Dr. Goldberg as outpatient who he will follow up with upon dc

## 2018-07-24 NOTE — PROGRESS NOTE ADULT - SUBJECTIVE AND OBJECTIVE BOX
[facility]    Sami Lott, Joaquin, Lilian, Brenda, Jose Roberto, Vinod      [phone]    CHIEF COMPLAINT: Patient is a 77y old  Male who presents with a chief complaint of cellulitis right LE (21 Jul 2018 15:22)      Follow Up: cad, vol ol, cellulitis, af    Interim history:  The patient reports no new symptoms.  Denies chest discomfort and shortness of breath.  No abdominal pain.  No new neurologic symptoms.      MEDICATIONS  (STANDING):  aspirin enteric coated 325 milliGRAM(s) Oral daily  atorvastatin 40 milliGRAM(s) Oral at bedtime  cephalexin 500 milliGRAM(s) Oral every 6 hours  dextrose 5%. 1000 milliLiter(s) (50 mL/Hr) IV Continuous <Continuous>  dextrose 50% Injectable 12.5 Gram(s) IV Push once  dextrose 50% Injectable 25 Gram(s) IV Push once  dextrose 50% Injectable 25 Gram(s) IV Push once  docusate sodium 100 milliGRAM(s) Oral two times a day  enoxaparin Injectable 40 milliGRAM(s) SubCutaneous every 24 hours  insulin glargine Injectable (LANTUS) 12 Unit(s) SubCutaneous at bedtime  insulin lispro (HumaLOG) corrective regimen sliding scale   SubCutaneous three times a day before meals  insulin lispro (HumaLOG) corrective regimen sliding scale   SubCutaneous at bedtime  levothyroxine 75 MICROGram(s) Oral daily  metoprolol tartrate 12.5 milliGRAM(s) Oral two times a day  senna 2 Tablet(s) Oral at bedtime  sertraline 100 milliGRAM(s) Oral daily  tamsulosin 0.4 milliGRAM(s) Oral two times a day    MEDICATIONS  (PRN):  dextrose 40% Gel 15 Gram(s) Oral once PRN Blood Glucose LESS THAN 70 milliGRAM(s)/deciliter  glucagon  Injectable 1 milliGRAM(s) IntraMuscular once PRN Glucose LESS THAN 70 milligrams/deciliter  nitroglycerin     SubLingual 0.4 milliGRAM(s) SubLingual daily PRN Chest Pain  polyethylene glycol 3350 17 Gram(s) Oral daily PRN Constipation      REVIEW OF SYSTEMS:  eye, ent, GI, , allergic, dermatologic, musculoskeletal and neurologic are negative except as described above    Vital Signs Last 24 Hrs  T(C): 36.7 (24 Jul 2018 05:39), Max: 36.7 (23 Jul 2018 14:05)  T(F): 98 (24 Jul 2018 05:39), Max: 98.1 (23 Jul 2018 14:05)  HR: 98 (24 Jul 2018 05:39) (93 - 101)  BP: 105/71 (24 Jul 2018 05:39) (104/69 - 107/67)  BP(mean): --  RR: 16 (24 Jul 2018 05:39) (16 - 17)  SpO2: 98% (24 Jul 2018 05:39) (92% - 98%)    I&O's Summary      Telemetry past 24h:     PHYSICAL EXAM:    Constitutional: well-nourished, well-developed, NAD   HEENT:  MMM, sclerae anicteric, conjunctivae clear, no oral cyanosis.  Pulmonary: Non-labored, breath sounds are clear bilaterally, No wheezing, rales or rhonchi  Cardiovascular: Regular, S1 and S2.  tachy ~110, no murmur.  No rubs, gallops or clicks  Gastrointestinal: Bowel Sounds present, soft, nontender.   Lymph: ace bandages amol  Neurological: Alert, no focal deficits  Skin: No rashes.  Psych:  Mood & affect appropriate    LABS: All Labs Reviewed:                        12.8   10.09 )-----------( 222      ( 24 Jul 2018 07:37 )             38.4                         13.0   9.68  )-----------( 228      ( 23 Jul 2018 06:49 )             39.6                         13.6   9.22  )-----------( 212      ( 22 Jul 2018 07:36 )             41.5     24 Jul 2018 07:37    142    |  109    |  49     ----------------------------<  172    4.2     |  24     |  1.90   23 Jul 2018 06:49    142    |  107    |  45     ----------------------------<  159    4.1     |  27     |  1.90   22 Jul 2018 07:36    140    |  107    |  41     ----------------------------<  167    4.3     |  23     |  1.60     Ca    8.3        24 Jul 2018 07:37  Ca    8.3        23 Jul 2018 06:49  Ca    8.3        22 Jul 2018 07:36  Mg     2.3       24 Jul 2018 07:37  Mg     2.2       23 Jul 2018 06:49    TPro  6.6    /  Alb  2.6    /  TBili  1.1    /  DBili  .40    /  AST  30     /  ALT  57     /  AlkPhos  245    22 Jul 2018 07:36          Blood Culture: Organism Staphylococcus aureus  Gram Stain Blood -- Gram Stain --  Specimen Source .Other Right Leg- Swab  Culture-Blood --    Organism --  Gram Stain Blood -- Gram Stain --  Specimen Source .Blood Blood-Peripheral  Culture-Blood --            RADIOLOGY:    EKG:    Echo:    < from: TTE Echo Doppler w/o Cont (07.22.18 @ 09:07) >     EXAM:  ECHO TTE W/O CON COMP W/DOPPLR         PROCEDURE DATE:  07/22/2018        INTERPRETATION:  INDICATION: pleural effusion  Referring M.D.:Chin  Blood Pressure 121/79        Weight (kg) :127     Height (cm):177       BSA (sq m): 2.4  Technician: RM    Dimensions:    LA 5.6       Normal Values: 2.0 - 4.0 cm    Ao 3.0        Normal Values: 2.0 - 3.8 cm  SEPTUM 0.9       Normal Values: 0.6 - 1.2 cm  PWT 0.9       Normal Values: 0.6 - 1.1 cm  LVIDd 6.6         Normal Values: 3.0 - 5.6 cm  LVIDs 4.9         Normal Values: 1.8 - 4.0 cm      OBSERVATIONS:  Technically difficult study  Mitral Valve: Calcified mitral annulus with normally opening leaflets.   Mild mitral regurgitation.  Aortic Valve/Aorta: Calcified trileaflet aortic valve decreased visual   opening. Insufficient interrogation of the aortic valve in order to give   an area.  Tricuspid Valve: Normal tricuspid valve. Trace tricuspid regurgitation.  Pulmonic Valve: The pulmonic valve is not well visualized. Probably   normal.  Left Atrium: Moderate enlargement  Right Atrium: Normal  Left Ventricle: The endocardium is not well-visualized. There appears to   be mild to moderate left ventricular systolic dysfunction. The septum,   inferior wall, inferolateral walls on limited evaluation appear to be   hypokinetic. The left ventricular assessment was limited by the presence   of significant ectopy/questionable A. fib. The EF is approximately 40-45%.  Right Ventricle: Normal right ventricular size and function.  Pericardium/Pleura: No pericardial effusion noted. Right-sided pleural   effusion noted  Pulmonary/RV Pressure: Insufficient tricuspid regurgitation Doppler in   order to estimate the right ventricular systolic pressure  LV Diastolic Function: Indeterminant    Conclusion: Technically difficult and limited study. Moderate left atrial   enlargement. Mild to moderate left ventricular systolic dysfunction on   limited evaluation. EF is 40-45%. Right-sided pleural effusion                  JANETTE MCINTOSH M.D., ATTENDING CARDIOLOGIST  This document has been electronically signed. Jul 23 2018  5:18PM                < end of copied text >

## 2018-08-22 NOTE — DISCHARGE NOTE ADULT - HOSPITAL COURSE
HPI:  77M w/pmh of CAD s/p CABG, HLD, HTN, Hypothyroid, MI (1995), Prostate Ca s/p radiation seeds, T2DM with peripheral neuropathy, Venous stasis dermatitis of b/l lower extremities sent in from wound care (Dr. Arreola) for worsening of RLE ulcers. Patient states that he was previously seeing wound care for a left heel ulcer and receiving hyperbaric therapy tx. Over this past weekend, he fell and got multiple abrasions on his RLE which have become superficial ulcers. He has had falls in the past due to osteoarthritis in b/l knees which causes his knees to buckle and him to fall. Patient was seeing Dr. Arreola today when it was noted that the ulcers were worsening and pt was sent in for IV abx. Pt states that the ulcers are superficial and not actively bleeding. He has been getting treated with alginate and wound dressing changes. Denies fevers/chills, n/v, chest pain, palpitations, sob, abdominal pain, RLE pain. Of note, patient was recommended to see vascular surgery for workup. As per chart review, 5/18 bilateral MARY ANNE/PVR studies showed no hemodynamically significant lower extremity arterial disease.    In the ED, patient's vitals were: T97.6F, HR 97, /78, RR 16 and SpO2 98% on room air. Significant labs include: WBC 11.13, neutrophils 80.5, BUN/Cr 51/1.5, Glucose 154, T.bili 1.3, AST 92, , Trops neg x1. Serum pro-BNP 7683. Patient received Vancomycin x1, Zosyn x1, 1L normal saline, Blood cultures x2.      Pt requested that dressings not be removed as he just had dressings changed today with alginate placed. (19 Jul 2018 22:29)      Patient admitted w/ LE edema and venous stasis ulcers. Started on IV lasix, monitored renal indices. Diuresed well. Started on vanco/zosyn and vanco was d/c'd after wound cultures resulted gram neg rods w/ few staph aureus. Leukocystosis resolved. Echo done: report pending 77M w/pmh of CAD s/p CABG, HLD, HTN, Hypothyroid, MI (1995), Prostate Ca s/p radiation seeds, T2DM with peripheral neuropathy, Venous stasis dermatitis of b/l lower extremities sent in from wound care (Dr. Arreola) for worsening of RLE ulcers. Patient states that he was previously seeing wound care for a left heel ulcer and receiving hyperbaric therapy tx. Over this past weekend, he fell and got multiple abrasions on his RLE which have become superficial ulcers. He has had falls in the past due to osteoarthritis in b/l knees which causes his knees to buckle and him to fall. Patient was seeing Dr. Arreola today when it was noted that the ulcers were worsening and pt was sent in for IV abx. Pt states that the ulcers are superficial and not actively bleeding. He has been getting treated with alginate and wound dressing changes. Denies fevers/chills, n/v, chest pain, palpitations, sob, abdominal pain, RLE pain. Of note, patient was recommended to see vascular surgery for workup. As per chart review, 5/18 bilateral MARY ANNE/PVR studies showed no hemodynamically significant lower extremity arterial disease.    In the ED, patient's vitals were: T97.6F, HR 97, /78, RR 16 and SpO2 98% on room air. Significant labs include: WBC 11.13, neutrophils 80.5, BUN/Cr 51/1.5, Glucose 154, T.bili 1.3, AST 92, , Trops neg x1. Serum pro-BNP 7683. Patient received Vancomycin x1, Zosyn x1, 1L normal saline, Blood cultures x2.      Pt requested that dressings not be removed as he just had dressings changed today with alginate placed. (19 Jul 2018 22:29)      Patient admitted w/ LE edema and venous stasis ulcers. Started on IV lasix, monitored renal indices. Diuresed well. Started on vanco/zosyn and vanco was d/c'd after wound cultures resulted gram neg rods w/ few staph aureus. Leukocystosis resolved. Echo done: report pending 77M w/pmh of CAD s/p CABG, HLD, HTN, Hypothyroid, MI (1995), Prostate Ca s/p radiation seeds, T2DM with peripheral neuropathy, Venous stasis dermatitis of b/l lower extremities sent in from wound care (Dr. Arreola) for worsening of RLE ulcers. Patient states that he was previously seeing wound care for a left heel ulcer and receiving hyperbaric therapy tx. Over this past weekend prior to admission, he fell and got multiple abrasions on his RLE which have become superficial ulcers. He has had falls in the past due to osteoarthritis in b/l knees which causes his knees to buckle and him to fall. Patient was seeing Dr. Arreola day of admission when it was noted that the ulcers were worsening and pt was sent in for IV abx. Pt states that the ulcers are superficial and not actively bleeding. He has been getting treated with alginate and wound dressing changes. Denied fevers/chills, n/v, chest pain, palpitations, sob, abdominal pain, RLE pain.  As per chart review, 5/18 bilateral MARY ANNE/PVR studies showed no hemodynamically significant lower extremity arterial disease.In the ED, patient's vitals were: T97.6F, HR 97, /78, RR 16 and SpO2 98% on room air. Significant labs include: WBC 11.13, neutrophils 80.5, BUN/Cr 51/1.5, Glucose 154, T.bili 1.3, AST 92, , Trops neg x1. Serum pro-BNP 7683. Patient received Vancomycin x1, Zosyn x1, 1L normal saline, Blood cultures x2. ID Dr Ochoa consulted. Patient admitted w/ LE edema and venous stasis ulcers. Patient also had decreased breath sounds and cardio Dr Cid consulted to r/o CHF. Started on IV lasix, monitored renal indices. Diuresed well. Started on vanco/zosyn and vanco was d/c'd after wound cultures resulted gram neg rods w/ few staph aureus. Leukocystosis resolved. Echo done: report Technically difficult and limited study. Moderate left atrial enlargement. Mild to moderate left ventricular systolic dysfunction on limited evaluation. EF is 40-45%. Right-sided pleural effusion. Patient seen by PT and [facility] recommended. Patient continued to improve and was stable for dc to [facility]. Unna Boot Text: An Unna boot was placed to help immobilize the limb and facilitate more rapid healing. 77M w/pmh of CAD s/p CABG, HLD, HTN, Hypothyroid, MI (1995), Prostate Ca s/p radiation seeds, T2DM with peripheral neuropathy, Venous stasis dermatitis of b/l lower extremities sent in from wound care (Dr. Arreola) for worsening of RLE ulcers. Patient states that he was previously seeing wound care for a left heel ulcer and receiving hyperbaric therapy tx. Over this past weekend prior to admission, he fell and got multiple abrasions on his RLE which have become superficial ulcers. He has had falls in the past due to osteoarthritis in b/l knees which causes his knees to buckle and him to fall. Patient was seeing Dr. Arreola day of admission when it was noted that the ulcers were worsening and pt was sent in for IV abx. Pt states that the ulcers are superficial and not actively bleeding. He has been getting treated with alginate and wound dressing changes. Denied fevers/chills, n/v, chest pain, palpitations, sob, abdominal pain, RLE pain.  As per chart review, 5/18 bilateral MARY ANNE/PVR studies showed no hemodynamically significant lower extremity arterial disease.    In the ED, patient's vitals were: T97.6F, HR 97, /78, RR 16 and SpO2 98% on room air. Significant labs include: WBC 11.13, neutrophils 80.5, BUN/Cr 51/1.5, Glucose 154, T.bili 1.3, AST 92, , Trops neg x1. Serum pro-BNP 7683. Patient received Vancomycin x1, Zosyn x1, 1L normal saline, Blood cultures x2. ID Dr Ochoa consulted. Patient admitted w/ LE edema and venous stasis ulcers. Patient also had decreased breath sounds and cardio Dr Cid consulted to r/o CHF. Started on IV lasix, monitored renal indices. Diuresed well. Started on vanco/zosyn and vanco was d/c'd after wound cultures resulted gram neg rods w/ few staph aureus. Leukocytosis resolved. Patient was noted to have YELITZA. Lasix and losartan were d/c'd due to YELITZA.  Echo done: report Technically difficult and limited study. Moderate left atrial enlargement. Mild to moderate left ventricular systolic dysfunction on limited evaluation. EF is 40-45%. Small Right-sided pleural effusion noted. Patient seen by PT and [facility] recommended. Patient continued to improve and was stable for dc to [facility].    Vitals  Vital Signs Last 24 Hrs  T(C): 36.7 (24 Jul 2018 05:39), Max: 36.7 (23 Jul 2018 14:05)  T(F): 98 (24 Jul 2018 05:39), Max: 98.1 (23 Jul 2018 14:05)  HR: 98 (24 Jul 2018 05:39) (93 - 101)  BP: 105/71 (24 Jul 2018 05:39) (104/69 - 107/67)  BP(mean): --  RR: 16 (24 Jul 2018 05:39) (16 - 17)  SpO2: 98% (24 Jul 2018 05:39) (92% - 98%)    Physical Exam  GENERAL: patient appears well, no acute distress, appropriate, pleasant  EYES: sclera clear, no exudates  ENMT: moist mucous membranes  LUNGS:  crackles in right lower lobe,  no wheezing or rhonchi appreciated  HEART: S1/S2, regular rate and rhythm, no murmurs noted, b/l chronic LE edema  GASTROINTESTINAL: abdomen is soft, nontender, nondistended, normoactive bowel sounds,  INTEGUMENT: good skin turgor, no lesions noted, bilateral LE bandaged, dry, clean, intact, no weeping  MUSCULOSKELETAL: no clubbing or cyanosis, no obvious deformity  NEUROLOGIC: awake, alert, oriented x3, good muscle tone in 4 extremities, no obvious sensory deficits 77M w/pmh of CAD s/p CABG, HLD, HTN, Hypothyroid, MI (1995), Prostate Ca s/p radiation seeds, T2DM with peripheral neuropathy, Venous stasis dermatitis of b/l lower extremities sent in from wound care (Dr. Arreola) for worsening of RLE ulcers. Patient states that he was previously seeing wound care for a left heel ulcer and receiving hyperbaric therapy tx. Over this past weekend prior to admission, he fell and got multiple abrasions on his RLE which have become superficial ulcers. He has had falls in the past due to osteoarthritis in b/l knees which causes his knees to buckle and him to fall. Patient was seeing Dr. Arreola day of admission when it was noted that the ulcers were worsening and pt was sent in for IV abx. Pt states that the ulcers are superficial and not actively bleeding. He has been getting treated with alginate and wound dressing changes. Denied fevers/chills, n/v, chest pain, palpitations, sob, abdominal pain, RLE pain.  As per chart review, 5/18 bilateral MARY ANNE/PVR studies showed no hemodynamically significant lower extremity arterial disease.    The total amount of time spent reviewing the hospital notes, laboratory values, imaging findings, assessing/counseling the patient, discussing with consultant physicians, social work, nursing staff took 70 minutes    On day of discharge, I gave sign out to the accepting physician at rehab facility. Discussed the hospital course and informed him regarding creatinine and he will f/u    In the ED, patient's vitals were: T97.6F, HR 97, /78, RR 16 and SpO2 98% on room air. Significant labs include: WBC 11.13, neutrophils 80.5, BUN/Cr 51/1.5, Glucose 154, T.bili 1.3, AST 92, , Trops neg x1. Serum pro-BNP 7683. Patient received Vancomycin x1, Zosyn x1, 1L normal saline, Blood cultures x2. ID Dr Ochoa consulted. Patient admitted w/ LE edema and venous stasis ulcers. Patient also had decreased breath sounds and cardio Dr Cid consulted to r/o CHF. Started on IV lasix, monitored renal indices. Diuresed well. Started on vanco/zosyn and vanco was d/c'd after wound cultures resulted gram neg rods w/ few staph aureus. Leukocytosis resolved. Patient was noted to have YELITZA. Lasix and losartan were d/c'd due to YELITZA.  Echo done: report Technically difficult and limited study. Moderate left atrial enlargement. Mild to moderate left ventricular systolic dysfunction on limited evaluation. EF is 40-45%. Small Right-sided pleural effusion noted. Patient seen by PT and [facility] recommended. Patient continued to improve and was stable for dc to [facility].    Vitals  Vital Signs Last 24 Hrs  T(C): 36.7 (24 Jul 2018 05:39), Max: 36.7 (23 Jul 2018 14:05)  T(F): 98 (24 Jul 2018 05:39), Max: 98.1 (23 Jul 2018 14:05)  HR: 98 (24 Jul 2018 05:39) (93 - 101)  BP: 105/71 (24 Jul 2018 05:39) (104/69 - 107/67)  BP(mean): --  RR: 16 (24 Jul 2018 05:39) (16 - 17)  SpO2: 98% (24 Jul 2018 05:39) (92% - 98%)    Physical Exam  GENERAL: patient appears well, no acute distress, appropriate, pleasant  EYES: sclera clear, no exudates  ENMT: moist mucous membranes  LUNGS:  crackles in right lower lobe,  no wheezing or rhonchi appreciated  HEART: S1/S2, regular rate and rhythm, no murmurs noted, b/l chronic LE edema  GASTROINTESTINAL: abdomen is soft, nontender, nondistended, normoactive bowel sounds,  INTEGUMENT: good skin turgor, no lesions noted, bilateral LE bandaged, dry, clean, intact, no weeping  MUSCULOSKELETAL: no clubbing or cyanosis, no obvious deformity  NEUROLOGIC: awake, alert, oriented x3, good muscle tone in 4 extremities, no obvious sensory deficits        Final d/c dx:  1- venous stasis ulcers w/ superficial cellulitis  2- acute on chronic systolic chf associated w/ pleural effusions  3- yelitza on CKD, unstageable but likely at least stage 3  4- LE edema  5- other comorbidities as discussed on daily progress notes

## 2018-08-30 ENCOUNTER — OUTPATIENT (OUTPATIENT)
Dept: OUTPATIENT SERVICES | Facility: HOSPITAL | Age: 78
LOS: 1 days | Discharge: HOME | End: 2018-08-30
Payer: MEDICARE

## 2018-08-30 DIAGNOSIS — L03.115 CELLULITIS OF RIGHT LOWER LIMB: ICD-10-CM

## 2018-08-30 DIAGNOSIS — Z95.1 PRESENCE OF AORTOCORONARY BYPASS GRAFT: Chronic | ICD-10-CM

## 2018-08-30 DIAGNOSIS — Z90.49 ACQUIRED ABSENCE OF OTHER SPECIFIED PARTS OF DIGESTIVE TRACT: Chronic | ICD-10-CM

## 2018-08-30 PROCEDURE — 82962 GLUCOSE BLOOD TEST: CPT

## 2018-08-30 PROCEDURE — G0463: CPT

## 2018-08-30 PROCEDURE — 99213 OFFICE O/P EST LOW 20 MIN: CPT

## 2018-09-03 DIAGNOSIS — E11.621 TYPE 2 DIABETES MELLITUS WITH FOOT ULCER: ICD-10-CM

## 2018-09-03 DIAGNOSIS — E66.8 OTHER OBESITY: ICD-10-CM

## 2018-09-03 DIAGNOSIS — I25.810 ATHEROSCLEROSIS OF CORONARY ARTERY BYPASS GRAFT(S) WITHOUT ANGINA PECTORIS: ICD-10-CM

## 2018-09-03 DIAGNOSIS — Z83.3 FAMILY HISTORY OF DIABETES MELLITUS: ICD-10-CM

## 2018-09-03 DIAGNOSIS — I83.228 VARICOSE VEINS OF LEFT LOWER EXTREMITY WITH BOTH ULCER OF OTHER PART OF LOWER EXTREMITY AND INFLAMMATION: ICD-10-CM

## 2018-09-03 DIAGNOSIS — E11.51 TYPE 2 DIABETES MELLITUS WITH DIABETIC PERIPHERAL ANGIOPATHY WITHOUT GANGRENE: ICD-10-CM

## 2018-09-03 DIAGNOSIS — Z83.6 FAMILY HISTORY OF OTHER DISEASES OF THE RESPIRATORY SYSTEM: ICD-10-CM

## 2018-09-03 DIAGNOSIS — E03.9 HYPOTHYROIDISM, UNSPECIFIED: ICD-10-CM

## 2018-09-03 DIAGNOSIS — L97.812 NON-PRESSURE CHRONIC ULCER OF OTHER PART OF RIGHT LOWER LEG WITH FAT LAYER EXPOSED: ICD-10-CM

## 2018-09-03 DIAGNOSIS — L84 CORNS AND CALLOSITIES: ICD-10-CM

## 2018-09-03 DIAGNOSIS — M17.0 BILATERAL PRIMARY OSTEOARTHRITIS OF KNEE: ICD-10-CM

## 2018-09-03 DIAGNOSIS — Z92.3 PERSONAL HISTORY OF IRRADIATION: ICD-10-CM

## 2018-09-03 DIAGNOSIS — I10 ESSENTIAL (PRIMARY) HYPERTENSION: ICD-10-CM

## 2018-09-03 DIAGNOSIS — E78.00 PURE HYPERCHOLESTEROLEMIA, UNSPECIFIED: ICD-10-CM

## 2018-09-03 DIAGNOSIS — I83.218 VARICOSE VEINS OF RIGHT LOWER EXTREMITY WITH BOTH ULCER OF OTHER PART OF LOWER EXTREMITY AND INFLAMMATION: ICD-10-CM

## 2018-09-03 DIAGNOSIS — Z85.46 PERSONAL HISTORY OF MALIGNANT NEOPLASM OF PROSTATE: ICD-10-CM

## 2018-09-03 DIAGNOSIS — I25.2 OLD MYOCARDIAL INFARCTION: ICD-10-CM

## 2018-09-03 DIAGNOSIS — E11.40 TYPE 2 DIABETES MELLITUS WITH DIABETIC NEUROPATHY, UNSPECIFIED: ICD-10-CM

## 2018-09-03 DIAGNOSIS — Z79.899 OTHER LONG TERM (CURRENT) DRUG THERAPY: ICD-10-CM

## 2018-09-03 DIAGNOSIS — Z87.891 PERSONAL HISTORY OF NICOTINE DEPENDENCE: ICD-10-CM

## 2018-09-03 DIAGNOSIS — L97.822 NON-PRESSURE CHRONIC ULCER OF OTHER PART OF LEFT LOWER LEG WITH FAT LAYER EXPOSED: ICD-10-CM

## 2018-09-03 DIAGNOSIS — L97.422 NON-PRESSURE CHRONIC ULCER OF LEFT HEEL AND MIDFOOT WITH FAT LAYER EXPOSED: ICD-10-CM

## 2018-09-03 DIAGNOSIS — Z82.49 FAMILY HISTORY OF ISCHEMIC HEART DISEASE AND OTHER DISEASES OF THE CIRCULATORY SYSTEM: ICD-10-CM

## 2018-09-03 DIAGNOSIS — I83.12 VARICOSE VEINS OF LEFT LOWER EXTREMITY WITH INFLAMMATION: ICD-10-CM

## 2018-09-03 DIAGNOSIS — Z90.49 ACQUIRED ABSENCE OF OTHER SPECIFIED PARTS OF DIGESTIVE TRACT: ICD-10-CM

## 2018-09-03 DIAGNOSIS — Z80.0 FAMILY HISTORY OF MALIGNANT NEOPLASM OF DIGESTIVE ORGANS: ICD-10-CM

## 2018-09-03 DIAGNOSIS — Z95.1 PRESENCE OF AORTOCORONARY BYPASS GRAFT: ICD-10-CM

## 2018-09-03 DIAGNOSIS — I83.11 VARICOSE VEINS OF RIGHT LOWER EXTREMITY WITH INFLAMMATION: ICD-10-CM

## 2018-09-14 ENCOUNTER — OUTPATIENT (OUTPATIENT)
Dept: OUTPATIENT SERVICES | Facility: HOSPITAL | Age: 78
LOS: 1 days | Discharge: HOME | End: 2018-09-14
Payer: MEDICARE

## 2018-09-14 DIAGNOSIS — I83.218 VARICOSE VEINS OF RIGHT LOWER EXTREMITY WITH BOTH ULCER OF OTHER PART OF LOWER EXTREMITY AND INFLAMMATION: ICD-10-CM

## 2018-09-14 DIAGNOSIS — Z90.49 ACQUIRED ABSENCE OF OTHER SPECIFIED PARTS OF DIGESTIVE TRACT: Chronic | ICD-10-CM

## 2018-09-14 DIAGNOSIS — Z95.1 PRESENCE OF AORTOCORONARY BYPASS GRAFT: Chronic | ICD-10-CM

## 2018-09-14 PROCEDURE — G0463: CPT

## 2018-09-14 PROCEDURE — 82962 GLUCOSE BLOOD TEST: CPT

## 2018-09-15 DIAGNOSIS — Z90.49 ACQUIRED ABSENCE OF OTHER SPECIFIED PARTS OF DIGESTIVE TRACT: ICD-10-CM

## 2018-09-15 DIAGNOSIS — E78.00 PURE HYPERCHOLESTEROLEMIA, UNSPECIFIED: ICD-10-CM

## 2018-09-15 DIAGNOSIS — I25.810 ATHEROSCLEROSIS OF CORONARY ARTERY BYPASS GRAFT(S) WITHOUT ANGINA PECTORIS: ICD-10-CM

## 2018-09-15 DIAGNOSIS — Z92.3 PERSONAL HISTORY OF IRRADIATION: ICD-10-CM

## 2018-09-15 DIAGNOSIS — E66.8 OTHER OBESITY: ICD-10-CM

## 2018-09-15 DIAGNOSIS — Z82.49 FAMILY HISTORY OF ISCHEMIC HEART DISEASE AND OTHER DISEASES OF THE CIRCULATORY SYSTEM: ICD-10-CM

## 2018-09-15 DIAGNOSIS — E03.9 HYPOTHYROIDISM, UNSPECIFIED: ICD-10-CM

## 2018-09-15 DIAGNOSIS — Z87.891 PERSONAL HISTORY OF NICOTINE DEPENDENCE: ICD-10-CM

## 2018-09-15 DIAGNOSIS — E11.40 TYPE 2 DIABETES MELLITUS WITH DIABETIC NEUROPATHY, UNSPECIFIED: ICD-10-CM

## 2018-09-15 DIAGNOSIS — I83.218 VARICOSE VEINS OF RIGHT LOWER EXTREMITY WITH BOTH ULCER OF OTHER PART OF LOWER EXTREMITY AND INFLAMMATION: ICD-10-CM

## 2018-09-15 DIAGNOSIS — E11.621 TYPE 2 DIABETES MELLITUS WITH FOOT ULCER: ICD-10-CM

## 2018-09-15 DIAGNOSIS — Z95.1 PRESENCE OF AORTOCORONARY BYPASS GRAFT: ICD-10-CM

## 2018-09-15 DIAGNOSIS — Z83.3 FAMILY HISTORY OF DIABETES MELLITUS: ICD-10-CM

## 2018-09-15 DIAGNOSIS — I10 ESSENTIAL (PRIMARY) HYPERTENSION: ICD-10-CM

## 2018-09-15 DIAGNOSIS — L97.422 NON-PRESSURE CHRONIC ULCER OF LEFT HEEL AND MIDFOOT WITH FAT LAYER EXPOSED: ICD-10-CM

## 2018-09-15 DIAGNOSIS — Z79.899 OTHER LONG TERM (CURRENT) DRUG THERAPY: ICD-10-CM

## 2018-09-15 DIAGNOSIS — M17.0 BILATERAL PRIMARY OSTEOARTHRITIS OF KNEE: ICD-10-CM

## 2018-09-15 DIAGNOSIS — E11.51 TYPE 2 DIABETES MELLITUS WITH DIABETIC PERIPHERAL ANGIOPATHY WITHOUT GANGRENE: ICD-10-CM

## 2018-09-15 DIAGNOSIS — L97.812 NON-PRESSURE CHRONIC ULCER OF OTHER PART OF RIGHT LOWER LEG WITH FAT LAYER EXPOSED: ICD-10-CM

## 2018-09-15 DIAGNOSIS — Z80.0 FAMILY HISTORY OF MALIGNANT NEOPLASM OF DIGESTIVE ORGANS: ICD-10-CM

## 2018-09-15 DIAGNOSIS — I25.2 OLD MYOCARDIAL INFARCTION: ICD-10-CM

## 2018-09-15 DIAGNOSIS — Z83.6 FAMILY HISTORY OF OTHER DISEASES OF THE RESPIRATORY SYSTEM: ICD-10-CM

## 2018-09-15 DIAGNOSIS — I83.12 VARICOSE VEINS OF LEFT LOWER EXTREMITY WITH INFLAMMATION: ICD-10-CM

## 2018-09-27 ENCOUNTER — OUTPATIENT (OUTPATIENT)
Dept: OUTPATIENT SERVICES | Facility: HOSPITAL | Age: 78
LOS: 1 days | Discharge: HOME | End: 2018-09-27
Payer: MEDICARE

## 2018-09-27 DIAGNOSIS — I83.218 VARICOSE VEINS OF RIGHT LOWER EXTREMITY WITH BOTH ULCER OF OTHER PART OF LOWER EXTREMITY AND INFLAMMATION: ICD-10-CM

## 2018-09-27 DIAGNOSIS — Z90.49 ACQUIRED ABSENCE OF OTHER SPECIFIED PARTS OF DIGESTIVE TRACT: Chronic | ICD-10-CM

## 2018-09-27 DIAGNOSIS — Z95.1 PRESENCE OF AORTOCORONARY BYPASS GRAFT: Chronic | ICD-10-CM

## 2018-09-27 PROCEDURE — G0463: CPT

## 2018-09-27 PROCEDURE — 82962 GLUCOSE BLOOD TEST: CPT

## 2018-09-27 PROCEDURE — 99214 OFFICE O/P EST MOD 30 MIN: CPT

## 2018-09-29 DIAGNOSIS — E11.40 TYPE 2 DIABETES MELLITUS WITH DIABETIC NEUROPATHY, UNSPECIFIED: ICD-10-CM

## 2018-09-29 DIAGNOSIS — E11.621 TYPE 2 DIABETES MELLITUS WITH FOOT ULCER: ICD-10-CM

## 2018-09-29 DIAGNOSIS — L84 CORNS AND CALLOSITIES: ICD-10-CM

## 2018-09-29 DIAGNOSIS — I25.810 ATHEROSCLEROSIS OF CORONARY ARTERY BYPASS GRAFT(S) WITHOUT ANGINA PECTORIS: ICD-10-CM

## 2018-09-29 DIAGNOSIS — L97.821 NON-PRESSURE CHRONIC ULCER OF OTHER PART OF LEFT LOWER LEG LIMITED TO BREAKDOWN OF SKIN: ICD-10-CM

## 2018-09-29 DIAGNOSIS — Z82.49 FAMILY HISTORY OF ISCHEMIC HEART DISEASE AND OTHER DISEASES OF THE CIRCULATORY SYSTEM: ICD-10-CM

## 2018-09-29 DIAGNOSIS — Z79.899 OTHER LONG TERM (CURRENT) DRUG THERAPY: ICD-10-CM

## 2018-09-29 DIAGNOSIS — Z90.49 ACQUIRED ABSENCE OF OTHER SPECIFIED PARTS OF DIGESTIVE TRACT: ICD-10-CM

## 2018-09-29 DIAGNOSIS — Z83.6 FAMILY HISTORY OF OTHER DISEASES OF THE RESPIRATORY SYSTEM: ICD-10-CM

## 2018-09-29 DIAGNOSIS — L97.812 NON-PRESSURE CHRONIC ULCER OF OTHER PART OF RIGHT LOWER LEG WITH FAT LAYER EXPOSED: ICD-10-CM

## 2018-09-29 DIAGNOSIS — E66.8 OTHER OBESITY: ICD-10-CM

## 2018-09-29 DIAGNOSIS — Z92.3 PERSONAL HISTORY OF IRRADIATION: ICD-10-CM

## 2018-09-29 DIAGNOSIS — S90.122A CONTUSION OF LEFT LESSER TOE(S) WITHOUT DAMAGE TO NAIL, INITIAL ENCOUNTER: ICD-10-CM

## 2018-09-29 DIAGNOSIS — I25.2 OLD MYOCARDIAL INFARCTION: ICD-10-CM

## 2018-09-29 DIAGNOSIS — M17.0 BILATERAL PRIMARY OSTEOARTHRITIS OF KNEE: ICD-10-CM

## 2018-09-29 DIAGNOSIS — Z80.0 FAMILY HISTORY OF MALIGNANT NEOPLASM OF DIGESTIVE ORGANS: ICD-10-CM

## 2018-09-29 DIAGNOSIS — E11.51 TYPE 2 DIABETES MELLITUS WITH DIABETIC PERIPHERAL ANGIOPATHY WITHOUT GANGRENE: ICD-10-CM

## 2018-09-29 DIAGNOSIS — Z95.1 PRESENCE OF AORTOCORONARY BYPASS GRAFT: ICD-10-CM

## 2018-09-29 DIAGNOSIS — I83.12 VARICOSE VEINS OF LEFT LOWER EXTREMITY WITH INFLAMMATION: ICD-10-CM

## 2018-09-29 DIAGNOSIS — I83.218 VARICOSE VEINS OF RIGHT LOWER EXTREMITY WITH BOTH ULCER OF OTHER PART OF LOWER EXTREMITY AND INFLAMMATION: ICD-10-CM

## 2018-09-29 DIAGNOSIS — E78.00 PURE HYPERCHOLESTEROLEMIA, UNSPECIFIED: ICD-10-CM

## 2018-09-29 DIAGNOSIS — Z87.891 PERSONAL HISTORY OF NICOTINE DEPENDENCE: ICD-10-CM
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
## 2018-09-29 DIAGNOSIS — E03.9 HYPOTHYROIDISM, UNSPECIFIED: ICD-10-CM
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
## 2018-09-29 DIAGNOSIS — I83.228 VARICOSE VEINS OF LEFT LOWER EXTREMITY WITH BOTH ULCER OF OTHER PART OF LOWER EXTREMITY AND INFLAMMATION: ICD-10-CM
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
## 2018-09-29 DIAGNOSIS — Z83.3 FAMILY HISTORY OF DIABETES MELLITUS: ICD-10-CM

## 2018-09-29 DIAGNOSIS — I10 ESSENTIAL (PRIMARY) HYPERTENSION: ICD-10-CM

## 2018-10-12 ENCOUNTER — OUTPATIENT (OUTPATIENT)
Dept: OUTPATIENT SERVICES | Facility: HOSPITAL | Age: 78
LOS: 1 days | Discharge: HOME | End: 2018-10-12
Payer: MEDICARE

## 2018-10-12 DIAGNOSIS — I83.218 VARICOSE VEINS OF RIGHT LOWER EXTREMITY WITH BOTH ULCER OF OTHER PART OF LOWER EXTREMITY AND INFLAMMATION: ICD-10-CM

## 2018-10-12 DIAGNOSIS — Z90.49 ACQUIRED ABSENCE OF OTHER SPECIFIED PARTS OF DIGESTIVE TRACT: Chronic | ICD-10-CM

## 2018-10-12 DIAGNOSIS — Z95.1 PRESENCE OF AORTOCORONARY BYPASS GRAFT: Chronic | ICD-10-CM

## 2018-10-12 DIAGNOSIS — E11.621 TYPE 2 DIABETES MELLITUS WITH FOOT ULCER: ICD-10-CM

## 2018-10-12 PROCEDURE — G0463: CPT

## 2018-10-13 DIAGNOSIS — Z85.46 PERSONAL HISTORY OF MALIGNANT NEOPLASM OF PROSTATE: ICD-10-CM

## 2018-10-13 DIAGNOSIS — Z83.6 FAMILY HISTORY OF OTHER DISEASES OF THE RESPIRATORY SYSTEM: ICD-10-CM

## 2018-10-13 DIAGNOSIS — I83.218 VARICOSE VEINS OF RIGHT LOWER EXTREMITY WITH BOTH ULCER OF OTHER PART OF LOWER EXTREMITY AND INFLAMMATION: ICD-10-CM

## 2018-10-13 DIAGNOSIS — I83.228 VARICOSE VEINS OF LEFT LOWER EXTREMITY WITH BOTH ULCER OF OTHER PART OF LOWER EXTREMITY AND INFLAMMATION: ICD-10-CM

## 2018-10-13 DIAGNOSIS — L97.821 NON-PRESSURE CHRONIC ULCER OF OTHER PART OF LEFT LOWER LEG LIMITED TO BREAKDOWN OF SKIN: ICD-10-CM

## 2018-10-13 DIAGNOSIS — Z87.891 PERSONAL HISTORY OF NICOTINE DEPENDENCE: ICD-10-CM

## 2018-10-13 DIAGNOSIS — I10 ESSENTIAL (PRIMARY) HYPERTENSION: ICD-10-CM

## 2018-10-13 DIAGNOSIS — Z83.3 FAMILY HISTORY OF DIABETES MELLITUS: ICD-10-CM

## 2018-10-13 DIAGNOSIS — I25.810 ATHEROSCLEROSIS OF CORONARY ARTERY BYPASS GRAFT(S) WITHOUT ANGINA PECTORIS: ICD-10-CM

## 2018-10-13 DIAGNOSIS — I25.2 OLD MYOCARDIAL INFARCTION: ICD-10-CM

## 2018-10-13 DIAGNOSIS — E78.00 PURE HYPERCHOLESTEROLEMIA, UNSPECIFIED: ICD-10-CM

## 2018-10-13 DIAGNOSIS — E11.621 TYPE 2 DIABETES MELLITUS WITH FOOT ULCER: ICD-10-CM

## 2018-10-13 DIAGNOSIS — Z79.899 OTHER LONG TERM (CURRENT) DRUG THERAPY: ICD-10-CM

## 2018-10-13 DIAGNOSIS — Z92.3 PERSONAL HISTORY OF IRRADIATION: ICD-10-CM

## 2018-10-13 DIAGNOSIS — L97.812 NON-PRESSURE CHRONIC ULCER OF OTHER PART OF RIGHT LOWER LEG WITH FAT LAYER EXPOSED: ICD-10-CM

## 2018-10-13 DIAGNOSIS — Z90.49 ACQUIRED ABSENCE OF OTHER SPECIFIED PARTS OF DIGESTIVE TRACT: ICD-10-CM

## 2018-10-13 DIAGNOSIS — M17.0 BILATERAL PRIMARY OSTEOARTHRITIS OF KNEE: ICD-10-CM

## 2018-10-13 DIAGNOSIS — L84 CORNS AND CALLOSITIES: ICD-10-CM

## 2018-10-13 DIAGNOSIS — I83.12 VARICOSE VEINS OF LEFT LOWER EXTREMITY WITH INFLAMMATION: ICD-10-CM

## 2018-10-13 DIAGNOSIS — E66.8 OTHER OBESITY: ICD-10-CM

## 2018-10-13 DIAGNOSIS — Z95.1 PRESENCE OF AORTOCORONARY BYPASS GRAFT: ICD-10-CM

## 2018-10-13 DIAGNOSIS — E11.40 TYPE 2 DIABETES MELLITUS WITH DIABETIC NEUROPATHY, UNSPECIFIED: ICD-10-CM

## 2018-10-13 DIAGNOSIS — Z82.49 FAMILY HISTORY OF ISCHEMIC HEART DISEASE AND OTHER DISEASES OF THE CIRCULATORY SYSTEM: ICD-10-CM

## 2018-10-13 DIAGNOSIS — Z80.0 FAMILY HISTORY OF MALIGNANT NEOPLASM OF DIGESTIVE ORGANS: ICD-10-CM

## 2018-10-13 DIAGNOSIS — E03.9 HYPOTHYROIDISM, UNSPECIFIED: ICD-10-CM

## 2018-10-13 DIAGNOSIS — E11.51 TYPE 2 DIABETES MELLITUS WITH DIABETIC PERIPHERAL ANGIOPATHY WITHOUT GANGRENE: ICD-10-CM

## 2018-10-13 DIAGNOSIS — L97.422 NON-PRESSURE CHRONIC ULCER OF LEFT HEEL AND MIDFOOT WITH FAT LAYER EXPOSED: ICD-10-CM

## 2018-10-24 PROCEDURE — 83605 ASSAY OF LACTIC ACID: CPT

## 2018-10-24 PROCEDURE — 87040 BLOOD CULTURE FOR BACTERIA: CPT

## 2018-10-24 PROCEDURE — 99285 EMERGENCY DEPT VISIT HI MDM: CPT | Mod: 25

## 2018-10-24 PROCEDURE — 80048 BASIC METABOLIC PNL TOTAL CA: CPT

## 2018-10-24 PROCEDURE — 84484 ASSAY OF TROPONIN QUANT: CPT

## 2018-10-24 PROCEDURE — 96365 THER/PROPH/DIAG IV INF INIT: CPT

## 2018-10-24 PROCEDURE — G0463: CPT

## 2018-10-24 PROCEDURE — 36415 COLL VENOUS BLD VENIPUNCTURE: CPT

## 2018-10-24 PROCEDURE — 71045 X-RAY EXAM CHEST 1 VIEW: CPT

## 2018-10-24 PROCEDURE — 17250 CHEM CAUT OF GRANLTJ TISSUE: CPT

## 2018-10-24 PROCEDURE — 93306 TTE W/DOPPLER COMPLETE: CPT

## 2018-10-24 PROCEDURE — 80053 COMPREHEN METABOLIC PANEL: CPT

## 2018-10-24 PROCEDURE — 97530 THERAPEUTIC ACTIVITIES: CPT

## 2018-10-24 PROCEDURE — 85610 PROTHROMBIN TIME: CPT

## 2018-10-24 PROCEDURE — 82962 GLUCOSE BLOOD TEST: CPT

## 2018-10-24 PROCEDURE — 97161 PT EVAL LOW COMPLEX 20 MIN: CPT

## 2018-10-24 PROCEDURE — 83880 ASSAY OF NATRIURETIC PEPTIDE: CPT

## 2018-10-24 PROCEDURE — 82550 ASSAY OF CK (CPK): CPT

## 2018-10-24 PROCEDURE — 76705 ECHO EXAM OF ABDOMEN: CPT

## 2018-10-24 PROCEDURE — 87186 SC STD MICRODIL/AGAR DIL: CPT

## 2018-10-24 PROCEDURE — 83036 HEMOGLOBIN GLYCOSYLATED A1C: CPT

## 2018-10-24 PROCEDURE — 85027 COMPLETE CBC AUTOMATED: CPT

## 2018-10-24 PROCEDURE — 97116 GAIT TRAINING THERAPY: CPT

## 2018-10-24 PROCEDURE — 93005 ELECTROCARDIOGRAM TRACING: CPT

## 2018-10-24 PROCEDURE — 80076 HEPATIC FUNCTION PANEL: CPT

## 2018-10-24 PROCEDURE — 87070 CULTURE OTHR SPECIMN AEROBIC: CPT

## 2018-10-24 PROCEDURE — 82553 CREATINE MB FRACTION: CPT

## 2018-10-24 PROCEDURE — 83735 ASSAY OF MAGNESIUM: CPT

## 2018-10-25 ENCOUNTER — OUTPATIENT (OUTPATIENT)
Dept: OUTPATIENT SERVICES | Facility: HOSPITAL | Age: 78
LOS: 1 days | Discharge: HOME | End: 2018-10-25
Payer: MEDICARE

## 2018-10-25 DIAGNOSIS — Z95.1 PRESENCE OF AORTOCORONARY BYPASS GRAFT: Chronic | ICD-10-CM

## 2018-10-25 DIAGNOSIS — Z90.49 ACQUIRED ABSENCE OF OTHER SPECIFIED PARTS OF DIGESTIVE TRACT: Chronic | ICD-10-CM

## 2018-10-25 DIAGNOSIS — I83.218 VARICOSE VEINS OF RIGHT LOWER EXTREMITY WITH BOTH ULCER OF OTHER PART OF LOWER EXTREMITY AND INFLAMMATION: ICD-10-CM

## 2018-10-25 PROCEDURE — G0463: CPT

## 2018-10-25 PROCEDURE — 82962 GLUCOSE BLOOD TEST: CPT

## 2018-10-27 DIAGNOSIS — E78.00 PURE HYPERCHOLESTEROLEMIA, UNSPECIFIED: ICD-10-CM

## 2018-10-27 DIAGNOSIS — E03.9 HYPOTHYROIDISM, UNSPECIFIED: ICD-10-CM

## 2018-10-27 DIAGNOSIS — Z80.0 FAMILY HISTORY OF MALIGNANT NEOPLASM OF DIGESTIVE ORGANS: ICD-10-CM

## 2018-10-27 DIAGNOSIS — E11.51 TYPE 2 DIABETES MELLITUS WITH DIABETIC PERIPHERAL ANGIOPATHY WITHOUT GANGRENE: ICD-10-CM

## 2018-10-27 DIAGNOSIS — L97.422 NON-PRESSURE CHRONIC ULCER OF LEFT HEEL AND MIDFOOT WITH FAT LAYER EXPOSED: ICD-10-CM

## 2018-10-27 DIAGNOSIS — E11.621 TYPE 2 DIABETES MELLITUS WITH FOOT ULCER: ICD-10-CM

## 2018-10-27 DIAGNOSIS — Z87.891 PERSONAL HISTORY OF NICOTINE DEPENDENCE: ICD-10-CM

## 2018-10-27 DIAGNOSIS — I10 ESSENTIAL (PRIMARY) HYPERTENSION: ICD-10-CM

## 2018-10-27 DIAGNOSIS — I25.2 OLD MYOCARDIAL INFARCTION: ICD-10-CM

## 2018-10-27 DIAGNOSIS — Z90.49 ACQUIRED ABSENCE OF OTHER SPECIFIED PARTS OF DIGESTIVE TRACT: ICD-10-CM

## 2018-10-27 DIAGNOSIS — I25.810 ATHEROSCLEROSIS OF CORONARY ARTERY BYPASS GRAFT(S) WITHOUT ANGINA PECTORIS: ICD-10-CM

## 2018-10-27 DIAGNOSIS — Z82.49 FAMILY HISTORY OF ISCHEMIC HEART DISEASE AND OTHER DISEASES OF THE CIRCULATORY SYSTEM: ICD-10-CM

## 2018-10-27 DIAGNOSIS — E66.8 OTHER OBESITY: ICD-10-CM

## 2018-10-27 DIAGNOSIS — M17.0 BILATERAL PRIMARY OSTEOARTHRITIS OF KNEE: ICD-10-CM

## 2018-10-27 DIAGNOSIS — I83.218 VARICOSE VEINS OF RIGHT LOWER EXTREMITY WITH BOTH ULCER OF OTHER PART OF LOWER EXTREMITY AND INFLAMMATION: ICD-10-CM

## 2018-10-27 DIAGNOSIS — L97.821 NON-PRESSURE CHRONIC ULCER OF OTHER PART OF LEFT LOWER LEG LIMITED TO BREAKDOWN OF SKIN: ICD-10-CM

## 2018-10-27 DIAGNOSIS — Z83.6 FAMILY HISTORY OF OTHER DISEASES OF THE RESPIRATORY SYSTEM: ICD-10-CM

## 2018-10-27 DIAGNOSIS — L97.812 NON-PRESSURE CHRONIC ULCER OF OTHER PART OF RIGHT LOWER LEG WITH FAT LAYER EXPOSED: ICD-10-CM

## 2018-10-27 DIAGNOSIS — I83.228 VARICOSE VEINS OF LEFT LOWER EXTREMITY WITH BOTH ULCER OF OTHER PART OF LOWER EXTREMITY AND INFLAMMATION: ICD-10-CM

## 2018-10-27 DIAGNOSIS — Z83.3 FAMILY HISTORY OF DIABETES MELLITUS: ICD-10-CM

## 2018-10-27 DIAGNOSIS — E11.40 TYPE 2 DIABETES MELLITUS WITH DIABETIC NEUROPATHY, UNSPECIFIED: ICD-10-CM

## 2018-10-27 DIAGNOSIS — Z95.1 PRESENCE OF AORTOCORONARY BYPASS GRAFT: ICD-10-CM

## 2018-10-27 DIAGNOSIS — Z92.3 PERSONAL HISTORY OF IRRADIATION: ICD-10-CM

## 2018-10-27 DIAGNOSIS — Z79.899 OTHER LONG TERM (CURRENT) DRUG THERAPY: ICD-10-CM

## 2018-11-09 ENCOUNTER — OUTPATIENT (OUTPATIENT)
Dept: OUTPATIENT SERVICES | Facility: HOSPITAL | Age: 78
LOS: 1 days | Discharge: HOME | End: 2018-11-09
Payer: MEDICARE

## 2018-11-09 DIAGNOSIS — Z95.1 PRESENCE OF AORTOCORONARY BYPASS GRAFT: Chronic | ICD-10-CM

## 2018-11-09 DIAGNOSIS — I83.218 VARICOSE VEINS OF RIGHT LOWER EXTREMITY WITH BOTH ULCER OF OTHER PART OF LOWER EXTREMITY AND INFLAMMATION: ICD-10-CM

## 2018-11-09 DIAGNOSIS — Z90.49 ACQUIRED ABSENCE OF OTHER SPECIFIED PARTS OF DIGESTIVE TRACT: Chronic | ICD-10-CM

## 2018-11-09 PROCEDURE — 82962 GLUCOSE BLOOD TEST: CPT

## 2018-11-09 PROCEDURE — G0463: CPT

## 2018-11-11 DIAGNOSIS — E11.621 TYPE 2 DIABETES MELLITUS WITH FOOT ULCER: ICD-10-CM

## 2018-11-11 DIAGNOSIS — M17.0 BILATERAL PRIMARY OSTEOARTHRITIS OF KNEE: ICD-10-CM

## 2018-11-11 DIAGNOSIS — I83.228 VARICOSE VEINS OF LEFT LOWER EXTREMITY WITH BOTH ULCER OF OTHER PART OF LOWER EXTREMITY AND INFLAMMATION: ICD-10-CM

## 2018-11-11 DIAGNOSIS — E11.51 TYPE 2 DIABETES MELLITUS WITH DIABETIC PERIPHERAL ANGIOPATHY WITHOUT GANGRENE: ICD-10-CM

## 2018-11-11 DIAGNOSIS — E66.8 OTHER OBESITY: ICD-10-CM

## 2018-11-11 DIAGNOSIS — L97.821 NON-PRESSURE CHRONIC ULCER OF OTHER PART OF LEFT LOWER LEG LIMITED TO BREAKDOWN OF SKIN: ICD-10-CM

## 2018-11-11 DIAGNOSIS — I83.218 VARICOSE VEINS OF RIGHT LOWER EXTREMITY WITH BOTH ULCER OF OTHER PART OF LOWER EXTREMITY AND INFLAMMATION: ICD-10-CM

## 2018-11-11 DIAGNOSIS — Z95.1 PRESENCE OF AORTOCORONARY BYPASS GRAFT: ICD-10-CM

## 2018-11-11 DIAGNOSIS — Z80.0 FAMILY HISTORY OF MALIGNANT NEOPLASM OF DIGESTIVE ORGANS: ICD-10-CM

## 2018-11-11 DIAGNOSIS — E11.40 TYPE 2 DIABETES MELLITUS WITH DIABETIC NEUROPATHY, UNSPECIFIED: ICD-10-CM

## 2018-11-11 DIAGNOSIS — Z87.891 PERSONAL HISTORY OF NICOTINE DEPENDENCE: ICD-10-CM

## 2018-11-11 DIAGNOSIS — Z82.49 FAMILY HISTORY OF ISCHEMIC HEART DISEASE AND OTHER DISEASES OF THE CIRCULATORY SYSTEM: ICD-10-CM

## 2018-11-11 DIAGNOSIS — Z83.3 FAMILY HISTORY OF DIABETES MELLITUS: ICD-10-CM

## 2018-11-11 DIAGNOSIS — L97.812 NON-PRESSURE CHRONIC ULCER OF OTHER PART OF RIGHT LOWER LEG WITH FAT LAYER EXPOSED: ICD-10-CM

## 2018-11-11 DIAGNOSIS — I25.810 ATHEROSCLEROSIS OF CORONARY ARTERY BYPASS GRAFT(S) WITHOUT ANGINA PECTORIS: ICD-10-CM

## 2018-11-11 DIAGNOSIS — Z92.3 PERSONAL HISTORY OF IRRADIATION: ICD-10-CM

## 2018-11-11 DIAGNOSIS — E03.9 HYPOTHYROIDISM, UNSPECIFIED: ICD-10-CM

## 2018-11-11 DIAGNOSIS — Z79.899 OTHER LONG TERM (CURRENT) DRUG THERAPY: ICD-10-CM

## 2018-11-11 DIAGNOSIS — Z83.6 FAMILY HISTORY OF OTHER DISEASES OF THE RESPIRATORY SYSTEM: ICD-10-CM

## 2018-11-11 DIAGNOSIS — Z90.49 ACQUIRED ABSENCE OF OTHER SPECIFIED PARTS OF DIGESTIVE TRACT: ICD-10-CM

## 2018-11-11 DIAGNOSIS — I10 ESSENTIAL (PRIMARY) HYPERTENSION: ICD-10-CM

## 2018-11-11 DIAGNOSIS — E78.00 PURE HYPERCHOLESTEROLEMIA, UNSPECIFIED: ICD-10-CM

## 2018-11-11 DIAGNOSIS — I25.2 OLD MYOCARDIAL INFARCTION: ICD-10-CM

## 2018-11-30 ENCOUNTER — INPATIENT (INPATIENT)
Facility: HOSPITAL | Age: 78
LOS: 8 days | DRG: 551 | End: 2018-12-09
Attending: NEUROLOGICAL SURGERY | Admitting: INTERNAL MEDICINE
Payer: MEDICARE

## 2018-11-30 VITALS
WEIGHT: 250 LBS | RESPIRATION RATE: 16 BRPM | SYSTOLIC BLOOD PRESSURE: 135 MMHG | HEIGHT: 70 IN | OXYGEN SATURATION: 94 % | DIASTOLIC BLOOD PRESSURE: 85 MMHG | TEMPERATURE: 98 F | HEART RATE: 64 BPM

## 2018-11-30 DIAGNOSIS — Z90.49 ACQUIRED ABSENCE OF OTHER SPECIFIED PARTS OF DIGESTIVE TRACT: Chronic | ICD-10-CM

## 2018-11-30 DIAGNOSIS — Z95.1 PRESENCE OF AORTOCORONARY BYPASS GRAFT: Chronic | ICD-10-CM

## 2018-11-30 PROCEDURE — 72131 CT LUMBAR SPINE W/O DYE: CPT | Mod: 26

## 2018-11-30 RX ORDER — SERTRALINE 25 MG/1
1 TABLET, FILM COATED ORAL
Qty: 0 | Refills: 0 | COMMUNITY

## 2018-11-30 RX ORDER — LEVOTHYROXINE SODIUM 125 MCG
1 TABLET ORAL
Qty: 0 | Refills: 0 | COMMUNITY

## 2018-11-30 RX ORDER — INSULIN DEGLUDEC 100 U/ML
16 INJECTION, SOLUTION SUBCUTANEOUS
Qty: 0 | Refills: 0 | COMMUNITY

## 2018-11-30 RX ORDER — LIRAGLUTIDE 6 MG/ML
1.8 INJECTION SUBCUTANEOUS
Qty: 0 | Refills: 0 | COMMUNITY

## 2018-11-30 RX ORDER — TAMSULOSIN HYDROCHLORIDE 0.4 MG/1
1 CAPSULE ORAL
Qty: 0 | Refills: 0 | COMMUNITY

## 2018-11-30 RX ORDER — POTASSIUM CITRATE MONOHYDRATE 100 %
1 POWDER (GRAM) MISCELLANEOUS
Qty: 0 | Refills: 0 | COMMUNITY

## 2018-11-30 RX ORDER — NITROGLYCERIN 6.5 MG
1 CAPSULE, EXTENDED RELEASE ORAL
Qty: 0 | Refills: 0 | COMMUNITY

## 2018-11-30 RX ORDER — METFORMIN HYDROCHLORIDE 850 MG/1
0 TABLET ORAL
Qty: 0 | Refills: 0 | COMMUNITY

## 2018-11-30 RX ORDER — SIMVASTATIN 20 MG/1
1 TABLET, FILM COATED ORAL
Qty: 0 | Refills: 0 | COMMUNITY

## 2018-11-30 RX ORDER — ASPIRIN/CALCIUM CARB/MAGNESIUM 324 MG
1 TABLET ORAL
Qty: 0 | Refills: 0 | COMMUNITY

## 2018-11-30 RX ORDER — FUROSEMIDE 40 MG
1 TABLET ORAL
Qty: 0 | Refills: 0 | COMMUNITY

## 2018-11-30 NOTE — ED PROVIDER NOTE - NS ED ROS FT
no weight change, no fever or chills  no recent travel, no recent abox, no sick contacts  no recent change in medications  no rash, no bruises  no visual changes no eye discharge  no cough cold or congestion,   no sob, no chest pain  no orthopnea, no pnd  no abd pain, no n/v/d  no hematuria, no change in urinary habits  no joint pain, no deformity  + back pain  no headache, no paresthesia

## 2018-11-30 NOTE — ED ADULT NURSE NOTE - PMH
Bladder stones  s/p extraction  CAD (coronary artery disease)    Hyperlipidemia    Hypothyroidism    Myocardial infarction  1995  Prostate cancer  s/p brachytherapy  Type 2 diabetes mellitus without complication    Venous stasis dermatitis of both lower extremities Bladder stones  s/p extraction  CAD (coronary artery disease)    Diabetic ulcer of both lower extremities    Hyperlipidemia    Hypothyroidism    Myocardial infarction  1995  Prostate cancer  s/p brachytherapy  Type 2 diabetes mellitus without complication    Venous stasis dermatitis of both lower extremities

## 2018-11-30 NOTE — ED PROVIDER NOTE - CARE PLAN
Principal Discharge DX:	Fluid overload  Secondary Diagnosis:	Fracture of vertebra, thoracic  Secondary Diagnosis:	Fracture of vertebra, lumbar  Secondary Diagnosis:	Leg ulcer, unspecified laterality, limited to breakdown of skin

## 2018-11-30 NOTE — ED PROVIDER NOTE - MEDICAL DECISION MAKING DETAILS
79 y/o M with fall 1 week ago on pain medication presents with increased pain. Plan - CT spine, pain control, re-evaluate

## 2018-11-30 NOTE — ED PROVIDER NOTE - PSH
S/P appendectomy    S/P CABG x 3  saphenous vein harvested from LLE, performed in March 1996 at [facility] by Dr. Mcgraw

## 2018-11-30 NOTE — ED ADULT NURSE NOTE - PSH
S/P appendectomy    S/P CABG x 3  saphenous vein harvested from LLE, performed in March 1996 at [facility] by Dr. Mcgraw

## 2018-11-30 NOTE — ED PROVIDER NOTE - PHYSICAL EXAMINATION
Constitutional : Appears comfortably, talking in full sentences, speech is marked   Head :NC AT , no swelling  Eyes :eomi, no swelling, icteric   Mouth :mm dry, poor dentition  Neck : supple, trachea in midline  Chest :decreased breath sounds bilaterally, symm chest expansion, no distress  Heart :S1 S2 distant  Abdomen :abd soft, non tender, large girth, groin erythema R more than L  Musc/Skel :ext no swelling, no deformity, R lower extremity proximal tib/fib 4 in x 4in large granulating ulcer with dressing, L proximal tib-fib small ulcerations with dry dressing   distal pulses thready, not appreciated.  L2-L4 TTP.   Neuro  :AAO 3 no focal deficits  Skin: + 1 pitting edema of abdomen bannus

## 2018-11-30 NOTE — ED PROVIDER NOTE - SECONDARY DIAGNOSIS.
Fracture of vertebra, thoracic Fracture of vertebra, lumbar Leg ulcer, unspecified laterality, limited to breakdown of skin

## 2018-12-01 DIAGNOSIS — C61 MALIGNANT NEOPLASM OF PROSTATE: ICD-10-CM

## 2018-12-01 DIAGNOSIS — S22.009A UNSPECIFIED FRACTURE OF UNSPECIFIED THORACIC VERTEBRA, INITIAL ENCOUNTER FOR CLOSED FRACTURE: ICD-10-CM

## 2018-12-01 DIAGNOSIS — S32.009A UNSPECIFIED FRACTURE OF UNSPECIFIED LUMBAR VERTEBRA, INITIAL ENCOUNTER FOR CLOSED FRACTURE: ICD-10-CM

## 2018-12-01 DIAGNOSIS — E87.70 FLUID OVERLOAD, UNSPECIFIED: ICD-10-CM

## 2018-12-01 DIAGNOSIS — E11.622 TYPE 2 DIABETES MELLITUS WITH OTHER SKIN ULCER: ICD-10-CM

## 2018-12-01 DIAGNOSIS — E03.9 HYPOTHYROIDISM, UNSPECIFIED: ICD-10-CM

## 2018-12-01 DIAGNOSIS — N30.90 CYSTITIS, UNSPECIFIED WITHOUT HEMATURIA: ICD-10-CM

## 2018-12-01 DIAGNOSIS — J90 PLEURAL EFFUSION, NOT ELSEWHERE CLASSIFIED: ICD-10-CM

## 2018-12-01 DIAGNOSIS — E11.9 TYPE 2 DIABETES MELLITUS WITHOUT COMPLICATIONS: ICD-10-CM

## 2018-12-01 LAB
ALBUMIN SERPL ELPH-MCNC: 2.9 G/DL — LOW (ref 3.3–5)
ALP SERPL-CCNC: 168 U/L — HIGH (ref 30–120)
ALT FLD-CCNC: 22 U/L DA — SIGNIFICANT CHANGE UP (ref 10–60)
ANION GAP SERPL CALC-SCNC: 15 MMOL/L — SIGNIFICANT CHANGE UP (ref 5–17)
APPEARANCE UR: CLEAR — SIGNIFICANT CHANGE UP
APTT BLD: 34.9 SEC — SIGNIFICANT CHANGE UP (ref 27.5–36.3)
AST SERPL-CCNC: 33 U/L — SIGNIFICANT CHANGE UP (ref 10–40)
BASOPHILS # BLD AUTO: 0.03 K/UL — SIGNIFICANT CHANGE UP (ref 0–0.2)
BASOPHILS NFR BLD AUTO: 0.3 % — SIGNIFICANT CHANGE UP (ref 0–2)
BILIRUB SERPL-MCNC: 2.7 MG/DL — HIGH (ref 0.2–1.2)
BILIRUB UR-MCNC: ABNORMAL
BUN SERPL-MCNC: 55 MG/DL — HIGH (ref 7–23)
CALCIUM SERPL-MCNC: 9.1 MG/DL — SIGNIFICANT CHANGE UP (ref 8.4–10.5)
CHLORIDE SERPL-SCNC: 105 MMOL/L — SIGNIFICANT CHANGE UP (ref 96–108)
CO2 SERPL-SCNC: 22 MMOL/L — SIGNIFICANT CHANGE UP (ref 22–31)
COLOR SPEC: YELLOW — SIGNIFICANT CHANGE UP
CREAT SERPL-MCNC: 2.26 MG/DL — HIGH (ref 0.5–1.3)
CRP SERPL-MCNC: 6.71 MG/DL — HIGH (ref 0–0.4)
DIFF PNL FLD: ABNORMAL
EOSINOPHIL # BLD AUTO: 0.04 K/UL — SIGNIFICANT CHANGE UP (ref 0–0.5)
EOSINOPHIL NFR BLD AUTO: 0.3 % — SIGNIFICANT CHANGE UP (ref 0–6)
ERYTHROCYTE [SEDIMENTATION RATE] IN BLOOD: 13 MM/HR — HIGH (ref 0–9)
GLUCOSE BLDC GLUCOMTR-MCNC: 125 MG/DL — HIGH (ref 70–99)
GLUCOSE BLDC GLUCOMTR-MCNC: 127 MG/DL — HIGH (ref 70–99)
GLUCOSE BLDC GLUCOMTR-MCNC: 158 MG/DL — HIGH (ref 70–99)
GLUCOSE SERPL-MCNC: 107 MG/DL — HIGH (ref 70–99)
GLUCOSE UR QL: NEGATIVE MG/DL — SIGNIFICANT CHANGE UP
HCT VFR BLD CALC: 45.5 % — SIGNIFICANT CHANGE UP (ref 39–50)
HGB BLD-MCNC: 14.6 G/DL — SIGNIFICANT CHANGE UP (ref 13–17)
IMM GRANULOCYTES NFR BLD AUTO: 0.4 % — SIGNIFICANT CHANGE UP (ref 0–1.5)
INR BLD: 1.43 RATIO — HIGH (ref 0.88–1.16)
KETONES UR-MCNC: ABNORMAL
LEUKOCYTE ESTERASE UR-ACNC: ABNORMAL
LYMPHOCYTES # BLD AUTO: 0.91 K/UL — LOW (ref 1–3.3)
LYMPHOCYTES # BLD AUTO: 7.7 % — LOW (ref 13–44)
MAGNESIUM SERPL-MCNC: 2.1 MG/DL — SIGNIFICANT CHANGE UP (ref 1.6–2.6)
MCHC RBC-ENTMCNC: 29.3 PG — SIGNIFICANT CHANGE UP (ref 27–34)
MCHC RBC-ENTMCNC: 32.1 GM/DL — SIGNIFICANT CHANGE UP (ref 32–36)
MCV RBC AUTO: 91.2 FL — SIGNIFICANT CHANGE UP (ref 80–100)
MONOCYTES # BLD AUTO: 1.26 K/UL — HIGH (ref 0–0.9)
MONOCYTES NFR BLD AUTO: 10.6 % — SIGNIFICANT CHANGE UP (ref 2–14)
NEUTROPHILS # BLD AUTO: 9.56 K/UL — HIGH (ref 1.8–7.4)
NEUTROPHILS NFR BLD AUTO: 80.7 % — HIGH (ref 43–77)
NITRITE UR-MCNC: POSITIVE
NT-PROBNP SERPL-SCNC: HIGH PG/ML (ref 0–450)
PH UR: 5 — SIGNIFICANT CHANGE UP (ref 5–8)
PHOSPHATE SERPL-MCNC: 4.8 MG/DL — HIGH (ref 2.5–4.5)
PLATELET # BLD AUTO: 192 K/UL — SIGNIFICANT CHANGE UP (ref 150–400)
POTASSIUM SERPL-MCNC: 4.6 MMOL/L — SIGNIFICANT CHANGE UP (ref 3.5–5.3)
POTASSIUM SERPL-SCNC: 4.6 MMOL/L — SIGNIFICANT CHANGE UP (ref 3.5–5.3)
PROT SERPL-MCNC: 7 G/DL — SIGNIFICANT CHANGE UP (ref 6–8.3)
PROT UR-MCNC: 100 MG/DL
PROTHROM AB SERPL-ACNC: 15.8 SEC — HIGH (ref 10–12.9)
RBC # BLD: 4.99 M/UL — SIGNIFICANT CHANGE UP (ref 4.2–5.8)
RBC # FLD: 17.7 % — HIGH (ref 10.3–14.5)
SODIUM SERPL-SCNC: 142 MMOL/L — SIGNIFICANT CHANGE UP (ref 135–145)
SP GR SPEC: 1.02 — SIGNIFICANT CHANGE UP (ref 1.01–1.02)
TROPONIN I SERPL-MCNC: 0.04 NG/ML — SIGNIFICANT CHANGE UP (ref 0.02–0.06)
TSH SERPL-MCNC: 8.42 UIU/ML — HIGH (ref 0.27–4.2)
UROBILINOGEN FLD QL: 4 MG/DL
WBC # BLD: 11.85 K/UL — HIGH (ref 3.8–10.5)
WBC # FLD AUTO: 11.85 K/UL — HIGH (ref 3.8–10.5)

## 2018-12-01 PROCEDURE — 93010 ELECTROCARDIOGRAM REPORT: CPT

## 2018-12-01 PROCEDURE — 71250 CT THORAX DX C-: CPT | Mod: 26

## 2018-12-01 PROCEDURE — 71045 X-RAY EXAM CHEST 1 VIEW: CPT | Mod: 26

## 2018-12-01 RX ORDER — DEXTROSE 50 % IN WATER 50 %
15 SYRINGE (ML) INTRAVENOUS ONCE
Qty: 0 | Refills: 0 | Status: DISCONTINUED | OUTPATIENT
Start: 2018-12-01 | End: 2018-12-05

## 2018-12-01 RX ORDER — OXYCODONE AND ACETAMINOPHEN 5; 325 MG/1; MG/1
2 TABLET ORAL EVERY 4 HOURS
Qty: 0 | Refills: 0 | Status: DISCONTINUED | OUTPATIENT
Start: 2018-12-01 | End: 2018-12-01

## 2018-12-01 RX ORDER — CEFTRIAXONE 500 MG/1
1 INJECTION, POWDER, FOR SOLUTION INTRAMUSCULAR; INTRAVENOUS EVERY 24 HOURS
Qty: 0 | Refills: 0 | Status: DISCONTINUED | OUTPATIENT
Start: 2018-12-02 | End: 2018-12-03

## 2018-12-01 RX ORDER — ACETAMINOPHEN 500 MG
1000 TABLET ORAL THREE TIMES A DAY
Qty: 0 | Refills: 0 | Status: DISCONTINUED | OUTPATIENT
Start: 2018-12-01 | End: 2018-12-05

## 2018-12-01 RX ORDER — LEVOTHYROXINE SODIUM 125 MCG
1 TABLET ORAL
Qty: 0 | Refills: 0 | COMMUNITY

## 2018-12-01 RX ORDER — LEVOTHYROXINE SODIUM 125 MCG
88 TABLET ORAL DAILY
Qty: 0 | Refills: 0 | Status: DISCONTINUED | OUTPATIENT
Start: 2018-12-01 | End: 2018-12-01

## 2018-12-01 RX ORDER — TAMSULOSIN HYDROCHLORIDE 0.4 MG/1
0.4 CAPSULE ORAL EVERY 12 HOURS
Qty: 0 | Refills: 0 | Status: DISCONTINUED | OUTPATIENT
Start: 2018-12-01 | End: 2018-12-05

## 2018-12-01 RX ORDER — LEVOTHYROXINE SODIUM 125 MCG
100 TABLET ORAL DAILY
Qty: 0 | Refills: 0 | Status: DISCONTINUED | OUTPATIENT
Start: 2018-12-01 | End: 2018-12-05

## 2018-12-01 RX ORDER — CEFTRIAXONE 500 MG/1
1 INJECTION, POWDER, FOR SOLUTION INTRAMUSCULAR; INTRAVENOUS ONCE
Qty: 0 | Refills: 0 | Status: COMPLETED | OUTPATIENT
Start: 2018-12-01 | End: 2018-12-01

## 2018-12-01 RX ORDER — OXYCODONE HYDROCHLORIDE 5 MG/1
5 TABLET ORAL ONCE
Qty: 0 | Refills: 0 | Status: DISCONTINUED | OUTPATIENT
Start: 2018-12-01 | End: 2018-12-01

## 2018-12-01 RX ORDER — METOPROLOL TARTRATE 50 MG
12.5 TABLET ORAL
Qty: 0 | Refills: 0 | Status: DISCONTINUED | OUTPATIENT
Start: 2018-12-01 | End: 2018-12-05

## 2018-12-01 RX ORDER — GLUCAGON INJECTION, SOLUTION 0.5 MG/.1ML
1 INJECTION, SOLUTION SUBCUTANEOUS ONCE
Qty: 0 | Refills: 0 | Status: DISCONTINUED | OUTPATIENT
Start: 2018-12-01 | End: 2018-12-05

## 2018-12-01 RX ORDER — ATORVASTATIN CALCIUM 80 MG/1
40 TABLET, FILM COATED ORAL AT BEDTIME
Qty: 0 | Refills: 0 | Status: DISCONTINUED | OUTPATIENT
Start: 2018-12-01 | End: 2018-12-05

## 2018-12-01 RX ORDER — CEFTRIAXONE 500 MG/1
INJECTION, POWDER, FOR SOLUTION INTRAMUSCULAR; INTRAVENOUS
Qty: 0 | Refills: 0 | Status: DISCONTINUED | OUTPATIENT
Start: 2018-12-01 | End: 2018-12-03

## 2018-12-01 RX ORDER — INSULIN LISPRO 100/ML
VIAL (ML) SUBCUTANEOUS
Qty: 0 | Refills: 0 | Status: DISCONTINUED | OUTPATIENT
Start: 2018-12-01 | End: 2018-12-05

## 2018-12-01 RX ORDER — ASPIRIN/CALCIUM CARB/MAGNESIUM 324 MG
325 TABLET ORAL DAILY
Qty: 0 | Refills: 0 | Status: DISCONTINUED | OUTPATIENT
Start: 2018-12-01 | End: 2018-12-05

## 2018-12-01 RX ORDER — FUROSEMIDE 40 MG
0 TABLET ORAL
Qty: 0 | Refills: 0 | COMMUNITY

## 2018-12-01 RX ORDER — DEXTROSE 50 % IN WATER 50 %
25 SYRINGE (ML) INTRAVENOUS ONCE
Qty: 0 | Refills: 0 | Status: DISCONTINUED | OUTPATIENT
Start: 2018-12-01 | End: 2018-12-05

## 2018-12-01 RX ORDER — SERTRALINE 25 MG/1
100 TABLET, FILM COATED ORAL DAILY
Qty: 0 | Refills: 0 | Status: DISCONTINUED | OUTPATIENT
Start: 2018-12-01 | End: 2018-12-05

## 2018-12-01 RX ORDER — HEPARIN SODIUM 5000 [USP'U]/ML
5000 INJECTION INTRAVENOUS; SUBCUTANEOUS EVERY 12 HOURS
Qty: 0 | Refills: 0 | Status: DISCONTINUED | OUTPATIENT
Start: 2018-12-01 | End: 2018-12-05

## 2018-12-01 RX ORDER — DEXTROSE 50 % IN WATER 50 %
12.5 SYRINGE (ML) INTRAVENOUS ONCE
Qty: 0 | Refills: 0 | Status: DISCONTINUED | OUTPATIENT
Start: 2018-12-01 | End: 2018-12-05

## 2018-12-01 RX ORDER — FUROSEMIDE 40 MG
40 TABLET ORAL
Qty: 0 | Refills: 0 | Status: DISCONTINUED | OUTPATIENT
Start: 2018-12-01 | End: 2018-12-02

## 2018-12-01 RX ORDER — LOSARTAN POTASSIUM 100 MG/1
1 TABLET, FILM COATED ORAL
Qty: 0 | Refills: 0 | COMMUNITY

## 2018-12-01 RX ORDER — LOSARTAN POTASSIUM 100 MG/1
25 TABLET, FILM COATED ORAL DAILY
Qty: 0 | Refills: 0 | Status: DISCONTINUED | OUTPATIENT
Start: 2018-12-01 | End: 2018-12-02

## 2018-12-01 RX ORDER — FUROSEMIDE 40 MG
40 TABLET ORAL ONCE
Qty: 0 | Refills: 0 | Status: COMPLETED | OUTPATIENT
Start: 2018-12-01 | End: 2018-12-01

## 2018-12-01 RX ORDER — OXYCODONE AND ACETAMINOPHEN 5; 325 MG/1; MG/1
1 TABLET ORAL EVERY 4 HOURS
Qty: 0 | Refills: 0 | Status: DISCONTINUED | OUTPATIENT
Start: 2018-12-01 | End: 2018-12-01

## 2018-12-01 RX ORDER — SODIUM CHLORIDE 9 MG/ML
1000 INJECTION, SOLUTION INTRAVENOUS
Qty: 0 | Refills: 0 | Status: DISCONTINUED | OUTPATIENT
Start: 2018-12-01 | End: 2018-12-05

## 2018-12-01 RX ORDER — KETOROLAC TROMETHAMINE 30 MG/ML
30 SYRINGE (ML) INJECTION ONCE
Qty: 0 | Refills: 0 | Status: DISCONTINUED | OUTPATIENT
Start: 2018-12-01 | End: 2018-12-01

## 2018-12-01 RX ORDER — OXYCODONE HYDROCHLORIDE 5 MG/1
5 TABLET ORAL EVERY 4 HOURS
Qty: 0 | Refills: 0 | Status: DISCONTINUED | OUTPATIENT
Start: 2018-12-01 | End: 2018-12-03

## 2018-12-01 RX ORDER — CYCLOBENZAPRINE HYDROCHLORIDE 10 MG/1
10 TABLET, FILM COATED ORAL ONCE
Qty: 0 | Refills: 0 | Status: COMPLETED | OUTPATIENT
Start: 2018-12-01 | End: 2018-12-01

## 2018-12-01 RX ADMIN — ATORVASTATIN CALCIUM 40 MILLIGRAM(S): 80 TABLET, FILM COATED ORAL at 22:07

## 2018-12-01 RX ADMIN — OXYCODONE AND ACETAMINOPHEN 2 TABLET(S): 5; 325 TABLET ORAL at 15:46

## 2018-12-01 RX ADMIN — CEFTRIAXONE 100 GRAM(S): 500 INJECTION, POWDER, FOR SOLUTION INTRAMUSCULAR; INTRAVENOUS at 14:10

## 2018-12-01 RX ADMIN — Medication 30 MILLIGRAM(S): at 00:21

## 2018-12-01 RX ADMIN — Medication 325 MILLIGRAM(S): at 10:32

## 2018-12-01 RX ADMIN — Medication 40 MILLIGRAM(S): at 04:38

## 2018-12-01 RX ADMIN — Medication 2: at 12:57

## 2018-12-01 RX ADMIN — Medication 30 MILLIGRAM(S): at 00:20

## 2018-12-01 RX ADMIN — Medication 88 MICROGRAM(S): at 10:32

## 2018-12-01 RX ADMIN — HEPARIN SODIUM 5000 UNIT(S): 5000 INJECTION INTRAVENOUS; SUBCUTANEOUS at 17:47

## 2018-12-01 RX ADMIN — TAMSULOSIN HYDROCHLORIDE 0.4 MILLIGRAM(S): 0.4 CAPSULE ORAL at 17:47

## 2018-12-01 RX ADMIN — CYCLOBENZAPRINE HYDROCHLORIDE 10 MILLIGRAM(S): 10 TABLET, FILM COATED ORAL at 00:18

## 2018-12-01 RX ADMIN — Medication 40 MILLIGRAM(S): at 17:47

## 2018-12-01 RX ADMIN — OXYCODONE HYDROCHLORIDE 5 MILLIGRAM(S): 5 TABLET ORAL at 04:38

## 2018-12-01 RX ADMIN — SERTRALINE 100 MILLIGRAM(S): 25 TABLET, FILM COATED ORAL at 10:31

## 2018-12-01 RX ADMIN — LOSARTAN POTASSIUM 25 MILLIGRAM(S): 100 TABLET, FILM COATED ORAL at 10:32

## 2018-12-01 RX ADMIN — Medication 12.5 MILLIGRAM(S): at 17:48

## 2018-12-01 NOTE — PHYSICAL THERAPY INITIAL EVALUATION ADULT - PERTINENT HX OF CURRENT PROBLEM, REHAB EVAL
9 y/o M pt w/ PMHx CAD, MI, HTN, HLD, DM, venous stasis dermatitis, bladder stones, prostate CA, hypothyroidism presents to the ED c/o lower back pain s/p slip and fall backwards on 11/22. Reports he fell landing on his back resulting in lower back pain, saw orthopedist 2-3 days ago, Rxd 5mg oxycodone-acetaminophen. Also XRs completed at that time which revealed arthritis, no fx.

## 2018-12-01 NOTE — CONSULT NOTE ADULT - SUBJECTIVE AND OBJECTIVE BOX
INCOMPLETE  NOTE:   Pt seen and evaluated. Full note to follow.   CONSULTATION & DOCUMENTATION IN PROGRESS      Patient is a 78y old  Male who presents with a chief complaint of back pain (01 Dec 2018 09:44)      HPI:  77 y/o M pt w/ PMHx CAD, MI, HTN, HLD, DM, venous stasis dermatitis, bladder stones, prostate CA, hypothyroidism presents to the ED c/o lower back pain s/p slip and fall backwards on . Reports he fell landing on his back resulting in lower back pain, saw orthopedist 2-3 days ago, Rxd 5mg oxycodone-acetaminophen. Also XRs completed at that time which revealed arthritis, no fx. He endorses that the medication is not relieving his pain. Pt denies bladder or bowel dysfunction, fever, chills or any other complaints at this time. (01 Dec 2018 06:56)      PAST MEDICAL & SURGICAL HISTORY:  Diabetic ulcer of both lower extremities  Venous stasis dermatitis of both lower extremities  Hyperlipidemia  Type 2 diabetes mellitus without complication  Hypothyroidism  Bladder stones: s/p extraction  Prostate cancer: s/p brachytherapy  Myocardial infarction:   CAD (coronary artery disease)  S/P appendectomy  S/P CABG x 3: saphenous vein harvested from LLE, performed in 1996 at [facility] by Dr. Mcgraw      HEALTH ISSUES - PROBLEM Dx:          FAMILY HISTORY:  No pertinent family history in first degree relatives        [SOCIAL HISTORY: ]     smoking:       EtOH:       illicit drugs:       occupation:       marital status:       Other:       [ALLERGIES/INTOLERANCES:]  Allergies    No Known Allergies    Intolerances          [MEDICATIONS]  MEDICATIONS  (STANDING):  aspirin enteric coated 325 milliGRAM(s) Oral daily  atorvastatin 40 milliGRAM(s) Oral at bedtime  dextrose 5%. 1000 milliLiter(s) (50 mL/Hr) IV Continuous <Continuous>  dextrose 50% Injectable 12.5 Gram(s) IV Push once  dextrose 50% Injectable 25 Gram(s) IV Push once  dextrose 50% Injectable 25 Gram(s) IV Push once  furosemide   Injectable 40 milliGRAM(s) IV Push two times a day  heparin  Injectable 5000 Unit(s) SubCutaneous every 12 hours  insulin lispro (HumaLOG) corrective regimen sliding scale   SubCutaneous three times a day before meals  levothyroxine 88 MICROGram(s) Oral daily  losartan 25 milliGRAM(s) Oral daily  metoprolol tartrate 12.5 milliGRAM(s) Oral two times a day  sertraline 100 milliGRAM(s) Oral daily  tamsulosin 0.4 milliGRAM(s) Oral every 12 hours    MEDICATIONS  (PRN):  dextrose 40% Gel 15 Gram(s) Oral once PRN Blood Glucose LESS THAN 70 milliGRAM(s)/deciliter  glucagon  Injectable 1 milliGRAM(s) IntraMuscular once PRN Glucose LESS THAN 70 milligrams/deciliter  oxyCODONE    5 mG/acetaminophen 325 mG 1 Tablet(s) Oral every 4 hours PRN Moderate Pain (4 - 6)  oxyCODONE    5 mG/acetaminophen 325 mG 2 Tablet(s) Oral every 4 hours PRN Severe Pain (7 - 10)        [REVIEW OF SYSTEMS: ]  CONSTITUTIONAL: normal, no fever, no shakes, no chills   EYES: No eye pain, no visual disturbances, no discharge  ENMT:  no discharge  NECK: No pain, no stiffness  BREASTS: No pain, no masses, no nipple discharge  RESPIRATORY: No cough, no wheezing, no chills, no hemoptysis; No shortness of breath  CARDIOVASCULAR: No chest pain, no palpitations, no dizziness, no leg swelling  GASTROINTESTINAL: No abdominal or epigastric pain. No nausea, no vomiting, no hematemesis; No diarrhea , no constipation. No melena, no hematochezia.  GENITOURINARY: No dysuria, no frequency, no hematuria, no incontinence  NEUROLOGICAL: No headaches, no memory loss, no loss of strength, no numbness, no tremors  SKIN: No itching, no burning, no rashes, no lesions   LYMPH NODES: No enlarged glands  ENDOCRINE: No heat or cold intolerance; No hair loss  MUSCULOSKELETAL: No joint pain or swelling; No muscle, no back, no extremity pain  PSYCHIATRIC: No depression, no anxiety, no mood swings, no difficulty sleeping  HEME/LYMPH: No easy bruising, no bleeding gums    [VITALS SIGNS 24hrs]  Vital Signs Last 24 Hrs  T(C): 36.4 (01 Dec 2018 07:17), Max: 36.9 (01 Dec 2018 05:37)  T(F): 97.6 (01 Dec 2018 07:17), Max: 98.4 (01 Dec 2018 05:37)  HR: 91 (01 Dec 2018 07:17) (64 - 98)  BP: 106/77 (01 Dec 2018 07:17) (98/65 - 138/90)  BP(mean): --  RR: 17 (01 Dec 2018 07:17) (16 - 17)  SpO2: 97% (01 Dec 2018 07:17) (94% - 97%)    [PHYSICAL EXAM]  General: adult in NAD,  WN,  WD.  HEENT: clear oropharynx, anicteric sclera, pink conjunctivae.  Neck: supple, no masses.  CV: normal S1S2, no murmur, no rubs, no gallops.  Lungs: clear to auscultation, no wheezes, no rales, no rhonchi.  Abdomen: soft, non-tender, non-distended, no hepatosplenomegaly, normal BS, no guarding.  Ext: no clubbing, no cyanosis, no edema.  Skin: no rashes,  no petechiae, no venous stasis changes.  Neuro: alert and oriented X3, no focal motor deficits.  LN: no SC ULICES.      [LABS:]                        14.6   11.85 )-----------( 192      ( 01 Dec 2018 01:51 )             45.5     CBC Full  -  ( 01 Dec 2018 01:51 )  WBC Count : 11.85 K/uL  Hemoglobin : 14.6 g/dL  Hematocrit : 45.5 %  Platelet Count - Automated : 192 K/uL  Mean Cell Volume : 91.2 fl  Mean Cell Hemoglobin : 29.3 pg  Mean Cell Hemoglobin Concentration : 32.1 gm/dL  Auto Neutrophil # : 9.56 K/uL  Auto Lymphocyte # : 0.91 K/uL  Auto Monocyte # : 1.26 K/uL  Auto Eosinophil # : 0.04 K/uL  Auto Basophil # : 0.03 K/uL  Auto Neutrophil % : 80.7 %  Auto Lymphocyte % : 7.7 %  Auto Monocyte % : 10.6 %  Auto Eosinophil % : 0.3 %  Auto Basophil % : 0.3 %    12    142  |  105  |  55<H>  ----------------------------<  107<H>  4.6   |  22  |  2.26<H>    Ca    9.1      01 Dec 2018 01:51  Phos  4.8     12-  Mg     2.1     12-    TPro  7.0  /  Alb  2.9<L>  /  TBili  2.7<H>  /  DBili  x   /  AST  33  /  ALT  22  /  AlkPhos  168<H>  12    PT/INR - ( 01 Dec 2018 01:51 )   PT: 15.8 sec;   INR: 1.43 ratio         PTT - ( 01 Dec 2018 01:51 )  PTT:34.9 sec  LIVER FUNCTIONS - ( 01 Dec 2018 01:51 )  Alb: 2.9 g/dL / Pro: 7.0 g/dL / ALK PHOS: 168 U/L / ALT: 22 U/L DA / AST: 33 U/L / GGT: x           CARDIAC MARKERS ( 01 Dec 2018 01:51 )  .042 ng/mL / x     / x     / x     / x          Urinalysis Basic - ( 01 Dec 2018 08:52 )    Color: Yellow / Appearance: Clear / S.020 / pH: x  Gluc: x / Ketone: Trace  / Bili: Small / Urobili: 4 mg/dL   Blood: x / Protein: 100 mg/dL / Nitrite: Positive   Leuk Esterase: Small / RBC: 0-2 /HPF / WBC 11-25   Sq Epi: x / Non Sq Epi: Few / Bacteria: Moderate        WBC  TREND (5 Days)  WBC Count: 11.85 K/uL ( @ 01:51)    HGB  TREND (5 Days)  Hemoglobin: 14.6 g/dL ( 01:51)    HCT  TREND (5 Days)  Hematocrit: 45.5 % ( @ :51)    PLT  TREND (5 Days)  Platelet Count - Automated: 192 K/uL ( @ 01:51)                        Myeloma Studies                        Microbiology        [PATHOLOGY]       [BLOOD SMEAR INTERPRETATION: ]  RBC:   WBC:   Plts:     [RADIOLOGY & ADDITIONAL STUDIES:]        *********************  [ASSESSMENT and  PLAN]  I have discussed the above plan of care with patient/family in detail. They expressed understanding of the treatment plan . Risks, benefits and alternatives discussed in detail. I have asked if they have any questions or concerns and appropriately addressed them; all questions answered to their satisfactions and in lay terms.     > xxxxxx minutes spent in direct patient care, examining and counseling patient,  reviewing  the notes, lab data/ imaging , discussion with multidisciplinary team. Patient is a 78y old  Male who presents with a chief complaint of back pain (01 Dec 2018 09:44)    HPI:  79 y/o M pt w/ PMHx CAD, MI, HTN, HLD, DM, venous stasis dermatitis, bladder stones, prostate CA, hypothyroidism presents to the ED c/o lower back pain s/p slip and fall backwards on . Reports he fell landing on his back resulting in lower back pain, saw orthopedist 2-3 days ago, Rxd 5mg oxycodone-acetaminophen. Also XRs completed at that time which revealed arthritis, no fx. He endorses that the medication is not relieving his pain. Pt denies bladder or bowel dysfunction, fever, chills or any other complaints at this time. (01 Dec 2018 06:56)    PMH early stage prostate cancer, low [place] score per pt, follows with Dr Talib Martinez. s/p brachytherapy. No XRT, no surgery. Last PSA <1 per pt.     onc asked to consult on pt for eval of fx, with hx prostate cancer, eval for likelihood of pathological fx.  State fell onto back 18 in kitchen since with pain.   hx fall in  with subdural hematoma, with subsequent speech impediment.         PAST MEDICAL & SURGICAL HISTORY:  Diabetic ulcer of both lower extremities  Venous stasis dermatitis of both lower extremities  Hyperlipidemia  Type 2 diabetes mellitus without complication  Hypothyroidism  Bladder stones: s/p extraction  Prostate cancer: s/p brachytherapy  Myocardial infarction:   CAD (coronary artery disease)  S/P appendectomy  S/P CABG x 3: saphenous vein harvested from LLE, performed in 1996 at [facility] by Dr. 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      marital status:       Other:       [ALLERGIES/INTOLERANCES:]  Allergies     No Known Allergies  Intolerances          [MEDICATIONS]  MEDICATIONS  (STANDING):  aspirin enteric coated 325 milliGRAM(s) Oral daily  atorvastatin 40 milliGRAM(s) Oral at bedtime  dextrose 5%. 1000 milliLiter(s) (50 mL/Hr) IV Continuous <Continuous>  dextrose 50% Injectable 12.5 Gram(s) IV Push once  dextrose 50% Injectable 25 Gram(s) IV Push once  dextrose 50% Injectable 25 Gram(s) IV Push once  furosemide   Injectable 40 milliGRAM(s) IV Push two times a day  heparin  Injectable 5000 Unit(s) SubCutaneous every 12 hours  insulin lispro (HumaLOG) corrective regimen sliding scale   SubCutaneous three times a day before meals  levothyroxine 88 MICROGram(s) Oral daily  losartan 25 milliGRAM(s) Oral daily  metoprolol tartrate 12.5 milliGRAM(s) Oral two times a day  sertraline 100 milliGRAM(s) Oral daily  tamsulosin 0.4 milliGRAM(s) Oral every 12 hours    MEDICATIONS  (PRN):  dextrose 40% Gel 15 Gram(s) Oral once PRN Blood Glucose LESS THAN 70 milliGRAM(s)/deciliter  glucagon  Injectable 1 milliGRAM(s) IntraMuscular once PRN Glucose LESS THAN 70 milligrams/deciliter  oxyCODONE    5 mG/acetaminophen 325 mG 1 Tablet(s) Oral every 4 hours PRN Moderate Pain (4 - 6)  oxyCODONE    5 mG/acetaminophen 325 mG 2 Tablet(s) Oral every 4 hours PRN Severe Pain (7 - 10)        [REVIEW OF SYSTEMS: ]  CONSTITUTIONAL: +back pain, no fever, no shakes, no chills   EYES: No eye pain, no visual disturbances, no discharge  ENMT:  no discharge  NECK: No pain, no stiffness  BREASTS: No pain, no masses, no nipple discharge  RESPIRATORY: No cough, no wheezing, no chills, no hemoptysis; No shortness of breath  CARDIOVASCULAR: No chest pain, no palpitations, no dizziness, no leg swelling  GASTROINTESTINAL: No abdominal or epigastric pain. No nausea, no vomiting, no hematemesis; No diarrhea , no constipation. No melena, no hematochezia.  GENITOURINARY: No dysuria, no frequency, no hematuria, no incontinence  NEUROLOGICAL: No headaches, no memory loss, no loss of strength, no numbness, no tremors  SKIN: No itching, no burning, no rashes, no lesions   LYMPH NODES: No enlarged glands  ENDOCRINE: No heat or cold intolerance; No hair loss  MUSCULOSKELETAL: No joint pain or swelling; No muscle, no back, no extremity pain  PSYCHIATRIC: No depression, no anxiety, no mood swings, no difficulty sleeping  HEME/LYMPH: No easy bruising, no bleeding gums    [VITALS SIGNS 24hrs]  Vital Signs Last 24 Hrs  T(C): 36.4 (01 Dec 2018 07:17), Max: 36.9 (01 Dec 2018 05:37)  T(F): 97.6 (01 Dec 2018 07:17), Max: 98.4 (01 Dec 2018 05:37)  HR: 91 (01 Dec 2018 07:17) (64 - 98)  BP: 106/77 (01 Dec 2018 07:17) (98/65 - 138/90)  BP(mean): --  RR: 17 (01 Dec 2018 07:17) (16 - 17)  SpO2: 97% (01 Dec 2018 07:17) (94% - 97%)    [PHYSICAL EXAM]  General: adult in NAD,  WN,  WD. chronically ill looking  HEENT: clear oropharynx, anicteric sclera, pink conjunctivae.  Neck: supple, no masses.  CV: normal S1S2, no murmur, no rubs, no gallops.  Lungs: clear to auscultation except dec BS L base, no wheezes, no rales, no rhonchi.  Abdomen: soft, non-tender, non-distended, no hepatosplenomegaly, normal BS, no guarding.  Ext: no clubbing, +cyanosis, +edema.  Skin: no rashes,  no petechiae, + venous stasis changes.  Neuro: alert and oriented X3, no focal motor deficits. slowing and hesistancy of speech.   LN: no SC ULICES.      [LABS:]                        14.6   11.85 )-----------( 192      ( 01 Dec 2018 01:51 )             45.5     CBC Full  -  ( 01 Dec 2018 01:51 )  WBC Count : 11.85 K/uL  Hemoglobin : 14.6 g/dL  Hematocrit : 45.5 %  Platelet Count - Automated : 192 K/uL  Mean Cell Volume : 91.2 fl  Mean Cell Hemoglobin : 29.3 pg  Mean Cell Hemoglobin Concentration : 32.1 gm/dL  Auto Neutrophil # : 9.56 K/uL  Auto Lymphocyte # : 0.91 K/uL  Auto Monocyte # : 1.26 K/uL  Auto Eosinophil # : 0.04 K/uL  Auto Basophil # : 0.03 K/uL  Auto Neutrophil % : 80.7 %  Auto Lymphocyte % : 7.7 %  Auto Monocyte % : 10.6 %  Auto Eosinophil % : 0.3 %  Auto Basophil % : 0.3 %        142  |  105  |  55<H>  ----------------------------<  107<H>  4.6   |  22  |  2.26<H>    Ca    9.1      01 Dec 2018 01:51  Phos  4.8       Mg     2.1         TPro  7.0  /  Alb  2.9<L>  /  TBili  2.7<H>  /  DBili  x   /  AST  33  /  ALT  22  /  AlkPhos  168<H>      PT/INR - ( 01 Dec 2018 01:51 )   PT: 15.8 sec;   INR: 1.43 ratio         PTT - ( 01 Dec 2018 01:51 )  PTT:34.9 sec  LIVER FUNCTIONS - ( 01 Dec 2018 01:51 )  Alb: 2.9 g/dL / Pro: 7.0 g/dL / ALK PHOS: 168 U/L / ALT: 22 U/L DA / AST: 33 U/L / GGT: x           CARDIAC MARKERS ( 01 Dec 2018 01:51 )  .042 ng/mL / x     / x     / x     / x          Urinalysis Basic - ( 01 Dec 2018 08:52 )    Color: Yellow / Appearance: Clear / S.020 / pH: x  Gluc: x / Ketone: Trace  / Bili: Small / Urobili: 4 mg/dL   Blood: x / Protein: 100 mg/dL / Nitrite: Positive   Leuk Esterase: Small / RBC: 0-2 /HPF / WBC 11-25   Sq Epi: x / Non Sq Epi: Few / Bacteria: Moderate        WBC  TREND (5 Days)  WBC Count: 11.85 K/uL ( @ 01:51)    HGB  TREND (5 Days)  Hemoglobin: 14.6 g/dL ( @ 01:51)    HCT  TREND (5 Days)  Hematocrit: 45.5 % ( @ 01:51)    PLT  TREND (5 Days)  Platelet Count - Automated: 192 K/uL ( @ 01:51)               [RADIOLOGY & ADDITIONAL STUDIES:]      < from: CT Chest No Cont (18 @ 09:39) >  EXAM:  CT CHEST                        PROCEDURE DATE:  2018    INTERPRETATION:  .  CLINICAL INFORMATION: History of prostate ca with back pain and fracture.   Now with pleural effusion. Evaluate for metastatic disease.    COMPARISON: Prior chest x-ray examination from earlier today. No prior   chest CT examinations are available for comparison. Prior CT examination   of the lumbar spine from 2018.    FINDINGS: Scattered nonspecific subcentimeter sized lymph nodes are noted   within the axillary areas, mediastinum, and hilar regions.    The heart size is borderline enlarged. The patient is status post median   sternotomy. The patient is status post CABG There are atherosclerotic   calcifications of the imaged coronary arteries, aorta, and branch   vessels. The imaged portions of the aorta are normal in caliber.    Small scattered layering secretions are notable within the trachea.   Otherwise, the central airways are patent. There are moderate-sized right   and trace left-sided pleural effusions with adjacent atelectasis.   Calcified granulomas are notable within the left lung base. Fluid is   notable within the left major fissure.    Mild liver cirrhosis is notable. Cholelithiasis is noted.     There is mild vague peripancreatic fat stranding. Correlate with lipase   levels to assess for pancreatitis.    There is nonspecific bilateral perinephric stranding. There is a small   amount of abdominal ascites. The other visualized upper abdominal organs   appear unremarkable.    Previously noted three column fracture at the level of L1 through the superior endplate is again noted. There is distraction of the fracture fragments in the craniocaudad direction measuring approximately 1 cm. An epidural hematoma at this level cannot be excluded. There is also redemonstration of a relatively nondisplaced linear obliquely aortic fracture through the anteroinferior endplate of T12 with anterior wedge compression.    Flowing marginal osteophytes are noted with relative preservation of the   disc spaces. Mild hypertrophy of the spinous processes are notable with   mild calcification/ossification of the nuchal ligament. No aggressive   osteoblastic lesions are notable.    Old right-sided rib fractures are noted    IMPRESSION:  1. Redemonstration of an unstable three column fracture through the level of L1. No gross epidural hematoma is visualized at this time, although evaluation is limited on CT modality. Recommend further evaluation with an MRI examination of the thoracic and lumbar spine, if there are no   clinical contraindications.   Redemonstration of a linear relatively nondisplaced fracture through the anteroinferior endplate of T12 with anterior wedging.    2. Osseous findings compatible with either DISH versus ankylosing spondylitis which predisposes the osseous structures to pathologic fractures.    3. No aggressive osteoblastic lesions to suggest metastatic disease.   Correlate with pending bone scan which is already ordered at the time of this report.    4. Moderate-sized right and trace left-sided pleural effusions with adjacent atelectasis.    5. Other findings, as discussed.    Dr. John Olivas discussed findings with Dr. Ashanti Turner on 2018 at   10:14 AM    JOHN OLIVAS M.D., ATTENDING RADIOLOGIST  This document has been electronically signed. Dec  1 2018 10:16AM  < end of copied text >          < from: CT Lumbar Spine No Cont (.30.18 @ 23:09) >  EXAM:  CT LUMBAR SPINE                        PROCEDURE DATE:  2018    INTERPRETATION:  CT of the lumbar spine dated 2018.  CLINICAL INFORMATION: Status post fall last week.    The study is correlated with a CT the abdomen and pelvis from 2016.    FINDINGS:  The bones are diffusely demineralized. There are 5 nonrib-bearing lumbar-type vertebral bodies. There are multilevel ventral bridging osteophytes. There is a wide fracture lucency through the superior   endplate of L1 which extends through the anterior and middle column to the posterior cortex.  There is distraction of the superior endplate of L1 relative to the L1 vertebral body of up to 1 cm. There is also a fracture lucency through the inferior endplate of T12 through the anterior column with anterior wedging of the T12 vertebral body. Fracture lucencies are also seen to involve the bilateral lamina and pedicles of T12 up to the spinous process.  There are multilevel degenerative changes   with multilevel disc bulges and facet and ligamentous hypertrophy contributing to multilevel segmental canal stenosis and neural foraminal stenosis which is difficult to evaluate secondary to beam hardening artifact. There is ankylosis of the sacroiliac joints.    Prostate radiation seeds are noted. There is circumferential thickening ofthe bladder walls. There is mild dependent edema in the presacral space. There are numerous mildly prominent periaortic lymph nodes. There is a moderate size right pleural effusion and small left pleural effusion with left-sided pleural thickening and calcification.    IMPRESSION:  Wide fracture lucency through the superior endplate of L1 which extends through the anterior and middle column to the posterior cortex with distraction of the superior endplate of L1 relative to the L1 vertebral body of up to 1 cm. Fracture lucency through the inferior endplate of T12 through the anterior column with anterior wedging of the T12 vertebral body. Fracture lucency is also seen to involve the bilateral lamina and pedicles of T12 up to the spinous process.    Multilevel degenerative changes and multilevel bridging ventral osteophytes with ankylosis of the sacroiliac joints.    Prostate radiation seeds. Mild nonspecific circumferential thickening of the bladder walls. Numerous mildly prominent periaortic lymph nodes.    Moderate-sized right pleural effusion and small left pleural effusion with left-sided pleural thickening and calcification.    COCO VALLES D.O.; ATTENDING RADIOLOGIST  This document has been electronically signed. Dec  1 2018 12:28AM  < end of copied text >

## 2018-12-01 NOTE — CONSULT NOTE ADULT - PROBLEM SELECTOR RECOMMENDATION 9
outpatient antidiabetes regimen on hold  cont humalog scale coverage alone  goal bg 100-180 in hosp setting  cont cons cho diet

## 2018-12-01 NOTE — H&P ADULT - PMH
Bladder stones  s/p extraction  CAD (coronary artery disease)    Diabetic ulcer of both lower extremities    Hyperlipidemia    Hypothyroidism    Myocardial infarction  1995  Prostate cancer  s/p brachytherapy  Type 2 diabetes mellitus without complication    Venous stasis dermatitis of both lower extremities

## 2018-12-01 NOTE — H&P ADULT - PROBLEM SELECTOR PLAN 1
ortho eval called and noted  pt eval called  pain control  bone scan  will order mri - discussed with radiology

## 2018-12-01 NOTE — CONSULT NOTE ADULT - SUBJECTIVE AND OBJECTIVE BOX
Patient is a 78y old  Male who presents with a chief complaint of back pain (01 Dec 2018 15:55)      Reason For Consult: dm2 uncontrolled    HPI:  77 y/o M pt w/ PMHx CAD, MI, HTN, HLD, DM, venous stasis dermatitis, bladder stones, prostate CA, hypothyroidism presents to the ED c/o lower back pain s/p slip and fall backwards on 11/22. Reports he fell landing on his back resulting in lower back pain, saw orthopedist 2-3 days ago, Rxd 5mg oxycodone-acetaminophen. Also XRs completed at that time which revealed arthritis, no fx. He endorses that the medication is not relieving his pain. Pt denies bladder or bowel dysfunction, fever, chills or any other complaints at this time. (01 Dec 2018 06:56)      PAST MEDICAL & SURGICAL HISTORY:  Diabetic ulcer of both lower extremities  Venous stasis dermatitis of both lower extremities  Hyperlipidemia  Type 2 diabetes mellitus without complication  Hypothyroidism  Bladder stones: s/p extraction  Prostate cancer: s/p brachytherapy  Myocardial infarction: 1995  CAD (coronary artery disease)  S/P appendectomy  S/P CABG x 3: saphenous vein harvested from LLE, performed in March 1996 at [facility] by Dr. Mcgraw      FAMILY HISTORY:  No pertinent family history in first degree relatives        Social History:    MEDICATIONS  (STANDING):  aspirin enteric coated 325 milliGRAM(s) Oral daily  atorvastatin 40 milliGRAM(s) Oral at bedtime  cefTRIAXone   IVPB      dextrose 5%. 1000 milliLiter(s) (50 mL/Hr) IV Continuous <Continuous>  dextrose 50% Injectable 12.5 Gram(s) IV Push once  dextrose 50% Injectable 25 Gram(s) IV Push once  dextrose 50% Injectable 25 Gram(s) IV Push once  furosemide   Injectable 40 milliGRAM(s) IV Push two times a day  heparin  Injectable 5000 Unit(s) SubCutaneous every 12 hours  insulin lispro (HumaLOG) corrective regimen sliding scale   SubCutaneous three times a day before meals  levothyroxine 100 MICROGram(s) Oral daily  losartan 25 milliGRAM(s) Oral daily  metoprolol tartrate 12.5 milliGRAM(s) Oral two times a day  sertraline 100 milliGRAM(s) Oral daily  tamsulosin 0.4 milliGRAM(s) Oral every 12 hours    MEDICATIONS  (PRN):  acetaminophen   Tablet .. 1000 milliGRAM(s) Oral three times a day PRN Temp greater or equal to 38C (100.4F), Mild Pain (1 - 3)  bisacodyl 5 milliGRAM(s) Oral every 12 hours PRN Constipation  dextrose 40% Gel 15 Gram(s) Oral once PRN Blood Glucose LESS THAN 70 milliGRAM(s)/deciliter  glucagon  Injectable 1 milliGRAM(s) IntraMuscular once PRN Glucose LESS THAN 70 milligrams/deciliter  oxyCODONE    IR 5 milliGRAM(s) Oral every 4 hours PRN moderate to severe pain        T(C): 36.5 (12-01-18 @ 17:50), Max: 36.9 (12-01-18 @ 05:37)  HR: 90 (12-01-18 @ 17:50) (64 - 98)  BP: 102/68 (12-01-18 @ 17:50) (98/65 - 138/90)  RR: 18 (12-01-18 @ 17:50) (16 - 18)  SpO2: 100% (12-01-18 @ 17:50) (94% - 100%)  Wt(kg): --    PHYSICAL EXAM:  GENERAL: NAD, well-groomed, well-developed  HEAD:  Atraumatic, Normocephalic  NECK: Supple, No JVD, Normal thyroid  CHEST/LUNG: Clear to percussion bilaterally; No rales, rhonchi, wheezing, or rubs  HEART: Regular rate and rhythm; No murmurs, rubs, or gallops  ABDOMEN: Soft, Nontender, Nondistended; Bowel sounds present  EXTREMITIES:  2+ Peripheral Pulses, No clubbing, cyanosis, or edema  SKIN: No rashes or lesions    CAPILLARY BLOOD GLUCOSE      POCT Blood Glucose.: 127 mg/dL (01 Dec 2018 16:47)  POCT Blood Glucose.: 158 mg/dL (01 Dec 2018 12:29)  POCT Blood Glucose.: 125 mg/dL (01 Dec 2018 07:35)                            14.6   11.85 )-----------( 192      ( 01 Dec 2018 01:51 )             45.5       CMP:  12-01 @ 01:51  SGPT 22  Albumin 2.9   Alk Phos 168   Anion Gap 15   SGOT 33   Total Bili 2.7   BUN 55   Calcium Total 9.1   CO2 22   Chloride 105   Creatinine 2.26   eGFR if AA 31   eGFR if non AA 27   Glucose 107   Potassium 4.6   Protein 7.0   Sodium 142      Thyroid Function Tests:  12-01 @ 11:41 TSH 8.42 FreeT4 -- T3 -- Anti TPO -- Anti Thyroglobulin Ab -- TSI --      Diabetes Tests:       Radiology:

## 2018-12-01 NOTE — CONSULT NOTE ADULT - SUBJECTIVE AND OBJECTIVE BOX
77 y/o M pt w/ PMHx CAD, MI, HTN, HLD, DM, venous stasis dermatitis, bladder stones, prostate CA, hypothyroidism presents c/o lower back pain s/p slip and fall backwards on 11/22. Reports he fell landing on his back resulting in lower back pain, saw orthopedist 2-3 days ago, Rxd 5mg oxycodone-acetaminophen. Also XRs completed at that time which revealed arthritis, no fx. Pain is fairly severe, sharp, and somewhat diffuse over the lumbar spine.  Pt denies bladder or bowel dysfunction, fever, chills or any other complaints at this time.    pmh: CADS, s/p MI, HTN, HLD, DM, veinous stasis, prostate CA, hypothyroidism  Meds: See list  All: NKDA    PE: Elderly male in pain but NAD  VSS  Back: Diffusely tender  Pain with ROM  Neuro: 5/5 distal motor intact  Sensation grossly intact  DTRs hyporeflexic but symmetric    CT scan L-spine: L1 compression fracture; possible pathologic fracture; no canal compromise.  Possible T12 compression fracture.    A/P L1 compression fracture; possible pathologic fracture (h/o prostate CA)  No canal compromise; no focal neuro deficits  Bone scan pending  Pain management (if meds do not control pain adequately, could also consider brace)  Could also consider spine consult for kyphoplasty  Recommend oncology and urology consults

## 2018-12-01 NOTE — H&P ADULT - ASSESSMENT
Js Wood, 77 y/o M pt w/ PMHx CAD (on asa), MI, HTN, HLD, DM, venous stasis dermatitis, bladder stones, prostate CA, hypothyroidism presents to the ED c/o lower back pain s/p slip and fall backwards on 11/22. Reports he fell landing on his back resulting in lower back pain, saw orthopedist 2-3 days ago, Rxd 5mg oxycodone-acetaminophen. Also XRs completed at that time which revealed arthritis, no fx. He endorses that the medication is not relieving his pain. Pt denies bladder or bowel dysfunction, fever, chills or any other complaints at this time. CT showed L1 distraction injury and bl T12 pedicle fx  - renal and hospitalist and cards and endo to be called, spine precautions,

## 2018-12-01 NOTE — CONSULT NOTE ADULT - ASSESSMENT
[ASSESSMENT and  PLAN]  77yo M with hx of recent fall and prior falls, with hx of rib fx, subdural hematoma. Present with back pain s/p fall 1wk prior.   CT scan with fx at L1 and T12.     hx of prostate cancer with radiation seed tx, with hx of PSA <1 on last testing.  Given hx, unlikely to have metastatic prostate cancer.   Prominent periaortic LN of uncertain significance. Await repeat PSA.     R>L effusion of unclear etiology. Likely CHF/volume overload.   CHF, PRo-BNP 00076.     Cirrhosis?  Mild coagulopathy, INR 1.4, PT 15.8s with cirrhosis on CT scan.   Tbili 2.7    Alb 2.9 decreased.     CKD/YELITZA with Cr 2.26. Baseline Cr 1.9 in 06/2018.   BL non-specific perinephric stranding.     comorbid DM, MI, HTN, HLD. PVD/venous stasisi, hypothyroid.     Suspect Raynauds as strong peripheral pulses.     RECOMMEND:   Check PSA to corroborate pt hx.   Contact Dr Talib Martinez to ascertain more information.   Orthopaedic eval, Dr Medellin. Await further recommendations    Pain control.   Urology consultation per ortho.      Await MRI spine.   DVT prophyalxis,   pending MRI.     Abx as warranted.     check Serum and urine RAIMUNDO.   Check HCV.   check quantitative immunoglobulins.     mgmt of HTN, YELITZA, hypothyroid, depression per medicine.     I have discussed the above plan of care with patient in detail. They expressed understanding of the treatment plan . Risks, benefits and alternatives discussed in detail. I have asked if they have any questions or concerns and appropriately addressed them; all questions answered to their satisfactions and in lay terms.

## 2018-12-01 NOTE — PHYSICAL THERAPY INITIAL EVALUATION ADULT - ADDITIONAL COMMENTS
Pt lives in split level home with 4-5 steps outside +HR and chair lift to bedrooms. Pt owns RW and rollator. Pt states he has fallen 2x this past month and multiple times within the year.

## 2018-12-01 NOTE — CONSULT NOTE ADULT - SUBJECTIVE AND OBJECTIVE BOX
Date/Time Patient Seen:  		  Referring MD:   Data Reviewed	       Patient is a 78y old  Male who presents with a chief complaint of back pain (01 Dec 2018 10:16)      Subjective/HPI  in bed  seen and examined  vs and meds reviewed  H and P reviewed  Heme onc eval noted  old records reviewed  ct chest reviewed  old TTE reviewed    pmh of CAD s/p CABG, HLD, HTN, Hypothyroid, MI (1995), Prostate Ca s/p radiation seeds, T2DM with peripheral neuropathy, Venous stasis dermatitis of b/l lower extremities     Patient is a 78y old  Male who presents with a chief complaint of back pain (01 Dec 2018 09:44)      HPI:  77 y/o M pt w/ PMHx CAD, MI, HTN, HLD, DM, venous stasis dermatitis, bladder stones, prostate CA, hypothyroidism presents to the ED c/o lower back pain s/p slip and fall backwards on 11/22. Reports he fell landing on his back resulting in lower back pain, saw orthopedist 2-3 days ago, Rxd 5mg oxycodone-acetaminophen. Also XRs completed at that time which revealed arthritis, no fx. He endorses that the medication is not relieving his pain. Pt denies bladder or bowel dysfunction, fever, chills or any other complaints at this time. (01 Dec 2018 06:56)         PAST MEDICAL & SURGICAL HISTORY:  Diabetic ulcer of both lower extremities  Venous stasis dermatitis of both lower extremities  Hyperlipidemia  Type 2 diabetes mellitus without complication  Hypothyroidism  Bladder stones: s/p extraction  Prostate cancer: s/p brachytherapy  Myocardial infarction: 1995  SDH (subdural hematoma)  CAD (coronary artery disease)  S/P appendectomy  S/P CABG x 3: saphenous vein harvested from LLE, performed in March 1996 at [facility] by Dr. Mcgraw        Medication list         MEDICATIONS  (STANDING):  aspirin enteric coated 325 milliGRAM(s) Oral daily  atorvastatin 40 milliGRAM(s) Oral at bedtime  cefTRIAXone   IVPB      dextrose 5%. 1000 milliLiter(s) (50 mL/Hr) IV Continuous <Continuous>  dextrose 50% Injectable 12.5 Gram(s) IV Push once  dextrose 50% Injectable 25 Gram(s) IV Push once  dextrose 50% Injectable 25 Gram(s) IV Push once  furosemide   Injectable 40 milliGRAM(s) IV Push two times a day  heparin  Injectable 5000 Unit(s) SubCutaneous every 12 hours  insulin lispro (HumaLOG) corrective regimen sliding scale   SubCutaneous three times a day before meals  levothyroxine 100 MICROGram(s) Oral daily  losartan 25 milliGRAM(s) Oral daily  metoprolol tartrate 12.5 milliGRAM(s) Oral two times a day  sertraline 100 milliGRAM(s) Oral daily  tamsulosin 0.4 milliGRAM(s) Oral every 12 hours    MEDICATIONS  (PRN):  dextrose 40% Gel 15 Gram(s) Oral once PRN Blood Glucose LESS THAN 70 milliGRAM(s)/deciliter  glucagon  Injectable 1 milliGRAM(s) IntraMuscular once PRN Glucose LESS THAN 70 milligrams/deciliter  oxyCODONE    5 mG/acetaminophen 325 mG 1 Tablet(s) Oral every 4 hours PRN Moderate Pain (4 - 6)  oxyCODONE    5 mG/acetaminophen 325 mG 2 Tablet(s) Oral every 4 hours PRN Severe Pain (7 - 10)         Vitals log        ICU Vital Signs Last 24 Hrs  T(C): 36.4 (01 Dec 2018 07:17), Max: 36.9 (01 Dec 2018 05:37)  T(F): 97.6 (01 Dec 2018 07:17), Max: 98.4 (01 Dec 2018 05:37)  HR: 91 (01 Dec 2018 07:17) (64 - 98)  BP: 106/77 (01 Dec 2018 07:17) (98/65 - 138/90)  BP(mean): --  ABP: --  ABP(mean): --  RR: 17 (01 Dec 2018 07:17) (16 - 17)  SpO2: 97% (01 Dec 2018 07:17) (94% - 97%)           Input and Output:  I&O's Detail      Lab Data                        14.6   11.85 )-----------( 192      ( 01 Dec 2018 01:51 )             45.5     12-01    142  |  105  |  55<H>  ----------------------------<  107<H>  4.6   |  22  |  2.26<H>    Ca    9.1      01 Dec 2018 01:51  Phos  4.8     12-01  Mg     2.1     12-01    TPro  7.0  /  Alb  2.9<L>  /  TBili  2.7<H>  /  DBili  x   /  AST  33  /  ALT  22  /  AlkPhos  168<H>  12-01      CARDIAC MARKERS ( 01 Dec 2018 01:51 )  .042 ng/mL / x     / x     / x     / x            Review of Systems	      Objective     Physical Examination  heart s1s2  lung dec BS  extr - stasis changes, pvd  cn grossly int  verbal        Pertinent Lab findings & Imaging      Tabor:  NO   Adequate UO     I&O's Detail           Discussed with:     Cultures:	        Radiology

## 2018-12-01 NOTE — H&P ADULT - PSH
S/P appendectomy    S/P CABG x 3  saphenous vein harvested from LLE, performed in March 1996 at [facility] by Dr. Mcgraw

## 2018-12-01 NOTE — H&P ADULT - ATTENDING COMMENTS
discussed with his gu dr russell in communitiy  discussed with radiology and heme  pulm to see  pt with multiple complex and critical medical issues

## 2018-12-01 NOTE — CONSULT NOTE ADULT - PROBLEM SELECTOR RECOMMENDATION 9
fall, back pain, ckd, shadia, pvd, neuropathy, dermatitis, frailty, prostate ca, anemia, HF - EF 40 pct  will plan for thoracentesis Monday with IR  cont diuresis  Oxycodone for pain, tylenol for pain, dulcolax PRN for bowel regimen  Ortho and Heme eval noted  assist with ADL  pall care eval for MOLST GOC discussion  nutritional discretion  cvs regimen  o2 support as needed to keep sat > 88 pct  will follow and monitor  imaging reviewed, CT chest and lumbar spine

## 2018-12-01 NOTE — H&P ADULT - PROBLEM SELECTOR PLAN 8
ct noted  echo from 7/2018 noted  iv lasix bid  pulm eval  may need ir guided thoracentisis, will dc with pulm

## 2018-12-01 NOTE — H&P ADULT - HISTORY OF PRESENT ILLNESS
77 y/o M pt w/ PMHx CAD, MI, HTN, HLD, DM, venous stasis dermatitis, bladder stones, prostate CA, hypothyroidism presents to the ED c/o lower back pain s/p slip and fall backwards on 11/22. Reports he fell landing on his back resulting in lower back pain, saw orthopedist 2-3 days ago, Rxd 5mg oxycodone-acetaminophen. Also XRs completed at that time which revealed arthritis, no fx. He endorses that the medication is not relieving his pain. Pt denies bladder or bowel dysfunction, fever, chills or any other complaints at this time. Js Wood, 79 y/o M pt w/ PMHx CAD (on asa), MI, HTN, HLD, DM, venous stasis dermatitis, bladder stones, prostate CA, hypothyroidism presents to the ED c/o lower back pain s/p slip and fall backwards on 11/22. Reports he fell landing on his back resulting in lower back pain, saw orthopedist 2-3 days ago, Rxd 5mg oxycodone-acetaminophen. Also XRs completed at that time which revealed arthritis, no fx. He endorses that the medication is not relieving his pain. Pt denies bladder or bowel dysfunction, fever, chills or any other complaints at this time. CT showed L1 distraction injury and bl T12 pedicle fx

## 2018-12-01 NOTE — H&P ADULT - PROBLEM SELECTOR PLAN 3
poorly controlled dm  endocrine eval  riss  for now  wound care consult  local home wound card for now

## 2018-12-02 DIAGNOSIS — N17.9 ACUTE KIDNEY FAILURE, UNSPECIFIED: ICD-10-CM

## 2018-12-02 LAB
ANION GAP SERPL CALC-SCNC: 10 MMOL/L — SIGNIFICANT CHANGE UP (ref 5–17)
BUN SERPL-MCNC: 66 MG/DL — HIGH (ref 7–23)
CALCIUM SERPL-MCNC: 8.4 MG/DL — SIGNIFICANT CHANGE UP (ref 8.4–10.5)
CHLORIDE SERPL-SCNC: 106 MMOL/L — SIGNIFICANT CHANGE UP (ref 96–108)
CK SERPL-CCNC: 175 U/L — SIGNIFICANT CHANGE UP (ref 39–308)
CO2 SERPL-SCNC: 26 MMOL/L — SIGNIFICANT CHANGE UP (ref 22–31)
CREAT SERPL-MCNC: 2.56 MG/DL — HIGH (ref 0.5–1.3)
CULTURE RESULTS: SIGNIFICANT CHANGE UP
GLUCOSE BLDC GLUCOMTR-MCNC: 110 MG/DL — HIGH (ref 70–99)
GLUCOSE BLDC GLUCOMTR-MCNC: 119 MG/DL — HIGH (ref 70–99)
GLUCOSE BLDC GLUCOMTR-MCNC: 126 MG/DL — HIGH (ref 70–99)
GLUCOSE BLDC GLUCOMTR-MCNC: 147 MG/DL — HIGH (ref 70–99)
GLUCOSE SERPL-MCNC: 122 MG/DL — HIGH (ref 70–99)
HBA1C BLD-MCNC: 5.8 % — HIGH (ref 4–5.6)
HCT VFR BLD CALC: 40.1 % — SIGNIFICANT CHANGE UP (ref 39–50)
HCV AB S/CO SERPL IA: 0.11 S/CO — SIGNIFICANT CHANGE UP
HCV AB SERPL-IMP: SIGNIFICANT CHANGE UP
HGB BLD-MCNC: 12.7 G/DL — LOW (ref 13–17)
INR BLD: 1.41 RATIO — HIGH (ref 0.88–1.16)
MCHC RBC-ENTMCNC: 28.7 PG — SIGNIFICANT CHANGE UP (ref 27–34)
MCHC RBC-ENTMCNC: 31.7 GM/DL — LOW (ref 32–36)
MCV RBC AUTO: 90.5 FL — SIGNIFICANT CHANGE UP (ref 80–100)
NRBC # BLD: 0 /100 WBCS — SIGNIFICANT CHANGE UP (ref 0–0)
PLATELET # BLD AUTO: 180 K/UL — SIGNIFICANT CHANGE UP (ref 150–400)
POTASSIUM SERPL-MCNC: 4.5 MMOL/L — SIGNIFICANT CHANGE UP (ref 3.5–5.3)
POTASSIUM SERPL-SCNC: 4.5 MMOL/L — SIGNIFICANT CHANGE UP (ref 3.5–5.3)
PROTHROM AB SERPL-ACNC: 15.5 SEC — HIGH (ref 10–12.9)
PSA FLD-MCNC: 0.03 NG/ML — SIGNIFICANT CHANGE UP (ref 0–4)
RBC # BLD: 4.43 M/UL — SIGNIFICANT CHANGE UP (ref 4.2–5.8)
RBC # FLD: 17.3 % — HIGH (ref 10.3–14.5)
SODIUM SERPL-SCNC: 142 MMOL/L — SIGNIFICANT CHANGE UP (ref 135–145)
SPECIMEN SOURCE: SIGNIFICANT CHANGE UP
WBC # BLD: 8.39 K/UL — SIGNIFICANT CHANGE UP (ref 3.8–10.5)
WBC # FLD AUTO: 8.39 K/UL — SIGNIFICANT CHANGE UP (ref 3.8–10.5)

## 2018-12-02 PROCEDURE — 76775 US EXAM ABDO BACK WALL LIM: CPT | Mod: 26

## 2018-12-02 RX ORDER — MORPHINE SULFATE 50 MG/1
1 CAPSULE, EXTENDED RELEASE ORAL ONCE
Qty: 0 | Refills: 0 | Status: DISCONTINUED | OUTPATIENT
Start: 2018-12-02 | End: 2018-12-02

## 2018-12-02 RX ORDER — FUROSEMIDE 40 MG
40 TABLET ORAL DAILY
Qty: 0 | Refills: 0 | Status: DISCONTINUED | OUTPATIENT
Start: 2018-12-02 | End: 2018-12-05

## 2018-12-02 RX ADMIN — HEPARIN SODIUM 5000 UNIT(S): 5000 INJECTION INTRAVENOUS; SUBCUTANEOUS at 18:25

## 2018-12-02 RX ADMIN — ATORVASTATIN CALCIUM 40 MILLIGRAM(S): 80 TABLET, FILM COATED ORAL at 21:17

## 2018-12-02 RX ADMIN — Medication 1000 MILLIGRAM(S): at 15:31

## 2018-12-02 RX ADMIN — Medication 100 MICROGRAM(S): at 06:21

## 2018-12-02 RX ADMIN — CEFTRIAXONE 100 GRAM(S): 500 INJECTION, POWDER, FOR SOLUTION INTRAMUSCULAR; INTRAVENOUS at 15:31

## 2018-12-02 RX ADMIN — MORPHINE SULFATE 1 MILLIGRAM(S): 50 CAPSULE, EXTENDED RELEASE ORAL at 18:25

## 2018-12-02 RX ADMIN — Medication 325 MILLIGRAM(S): at 10:25

## 2018-12-02 RX ADMIN — Medication 40 MILLIGRAM(S): at 06:08

## 2018-12-02 RX ADMIN — SERTRALINE 100 MILLIGRAM(S): 25 TABLET, FILM COATED ORAL at 10:25

## 2018-12-02 RX ADMIN — OXYCODONE HYDROCHLORIDE 5 MILLIGRAM(S): 5 TABLET ORAL at 15:32

## 2018-12-02 RX ADMIN — TAMSULOSIN HYDROCHLORIDE 0.4 MILLIGRAM(S): 0.4 CAPSULE ORAL at 18:25

## 2018-12-02 RX ADMIN — MORPHINE SULFATE 1 MILLIGRAM(S): 50 CAPSULE, EXTENDED RELEASE ORAL at 18:27

## 2018-12-02 RX ADMIN — TAMSULOSIN HYDROCHLORIDE 0.4 MILLIGRAM(S): 0.4 CAPSULE ORAL at 06:08

## 2018-12-02 RX ADMIN — HEPARIN SODIUM 5000 UNIT(S): 5000 INJECTION INTRAVENOUS; SUBCUTANEOUS at 06:08

## 2018-12-02 RX ADMIN — Medication 1000 MILLIGRAM(S): at 16:30

## 2018-12-02 RX ADMIN — OXYCODONE HYDROCHLORIDE 5 MILLIGRAM(S): 5 TABLET ORAL at 16:30

## 2018-12-02 RX ADMIN — OXYCODONE HYDROCHLORIDE 5 MILLIGRAM(S): 5 TABLET ORAL at 06:25

## 2018-12-02 NOTE — PROGRESS NOTE ADULT - ATTENDING COMMENTS
discussed with his gu dr russell in communitiy  discussed with radiology and heme  pt with multiple complex and critical medical issues  renal eval called

## 2018-12-02 NOTE — DIETITIAN INITIAL EVALUATION ADULT. - OTHER INFO
t referral received for pressure ulcer >2:  Hx DM2, hypothyroidism, SDH, CAD, diabetic ulcers b/l LE Pt found to have thoracic and lumbar fx and  PE: DM ulcers of LE's Thoracentesis scheduled for 12/3. Pt states he was in rehab in August 2018 and has lost about 30# since by decreasing portion sizes and carbohydrate rich food. Pt on DASH TLC /COns CHO diet. Pt states he never received diet educatio for DM. Briefly reviewed basic carb counting and balanced meals.HgbA1C 5.8 (noted low hemoglobin) Pt states his last A1C was 6.0. PTA pt was taking metformin and victoza. Pt c elevated BUN/Creat. D/C metformin as per Nephrologist.  Stressed importance for adequate protein to promote wound healing: Pt c DM ulcers to LE and L buttocks stage II, coccyx suspected DTI. Pt tolerating meals s trouble at this time. Pt receptive to education.

## 2018-12-02 NOTE — CONSULT NOTE ADULT - SUBJECTIVE AND OBJECTIVE BOX
HPI:  Patient is a 78y old  Male who is here for management of lower back pain post fall on . Found to be in YELITZA above prior baseline of 1.6-1.9. Renal US earlier today without hydro. He is unaware of CKD hx. Denies NSAIDs, difficulty voiding. Evidence of UTI on UA.   Home meds include low dose ARB, 40mg of Lasix, Metformin.           PAST MEDICAL & SURGICAL HISTORY:  Diabetic ulcer of both lower extremities  Venous stasis dermatitis of both lower extremities  Hyperlipidemia  Type 2 diabetes mellitus without complication  Hypothyroidism  Bladder stones: s/p extraction  Prostate cancer: s/p brachytherapy  Myocardial infarction:   CAD (coronary artery disease)  S/P appendectomy  S/P CABG x 3: saphenous vein harvested from LLE, performed in 1996 at [facility] by Dr. Lucien Dickey Hx: former smoker    FAMILY HISTORY:  No pertinent family history in first degree relatives      Allergies    No Known Allergies          MEDICATIONS  (STANDING):  aspirin enteric coated 325 milliGRAM(s) Oral daily  atorvastatin 40 milliGRAM(s) Oral at bedtime  cefTRIAXone   IVPB      cefTRIAXone   IVPB 1 Gram(s) IV Intermittent every 24 hours  dextrose 5%. 1000 milliLiter(s) (50 mL/Hr) IV Continuous <Continuous>  dextrose 50% Injectable 12.5 Gram(s) IV Push once  dextrose 50% Injectable 25 Gram(s) IV Push once  dextrose 50% Injectable 25 Gram(s) IV Push once  furosemide    Tablet 40 milliGRAM(s) Oral daily  heparin  Injectable 5000 Unit(s) SubCutaneous every 12 hours  insulin lispro (HumaLOG) corrective regimen sliding scale   SubCutaneous three times a day before meals  levothyroxine 100 MICROGram(s) Oral daily  losartan 25 milliGRAM(s) Oral daily  metoprolol tartrate 12.5 milliGRAM(s) Oral two times a day  sertraline 100 milliGRAM(s) Oral daily  tamsulosin 0.4 milliGRAM(s) Oral every 12 hours    MEDICATIONS  (PRN):  acetaminophen   Tablet .. 1000 milliGRAM(s) Oral three times a day PRN Temp greater or equal to 38C (100.4F), Mild Pain (1 - 3)  bisacodyl 5 milliGRAM(s) Oral every 12 hours PRN Constipation  dextrose 40% Gel 15 Gram(s) Oral once PRN Blood Glucose LESS THAN 70 milliGRAM(s)/deciliter  glucagon  Injectable 1 milliGRAM(s) IntraMuscular once PRN Glucose LESS THAN 70 milligrams/deciliter  oxyCODONE    IR 5 milliGRAM(s) Oral every 4 hours PRN moderate to severe pain      Daily     Daily     Drug Dosing Weight  Height (cm): 177.8 (2018 22:01)  Weight (kg): 113.4 (2018 22:01)  BMI (kg/m2): 35.9 (2018 22:01)  BSA (m2): 2.29 (2018 22:01)      REVIEW OF SYSTEMS:    Per HPI            I&O's Detail    01 Dec 2018 07:01  -  02 Dec 2018 07:00  --------------------------------------------------------  IN:  Total IN: 0 mL    OUT:    Voided: 600 mL  Total OUT: 600 mL    Total NET: -600 mL          12- @ 07:01  -   @ 07:00  --------------------------------------------------------  IN: 0 mL / OUT: 600 mL / NET: -600 mL        PHYSICAL EXAM:    GENERAL: NAD  HEAD:  Atraumatic, Normocephalic  EYES: EOMI, PERRLA, conjunctiva and sclera clear  ENMT: No tonsillar erythema, exudates, or enlargement; Moist mucous membranes, Good dentition, No lesions  NECK: Supple, No JVD, Normal thyroid  NERVOUS SYSTEM:  Alert & Oriented X3, Good concentration; Motor Strength 5/5 B/L upper and lower extremities; DTRs 2+ intact and symmetric  CHEST/LUNG: crackles  HEART: Regular rate and rhythm; No murmurs, rubs, or gallops  ABDOMEN: Soft, Nontender, Nondistended; Bowel sounds present  EXTREMITIES:  + edema  LYMPH: No lymphadenopathy noted  SKIN: No rashes or lesions    LABS:  CBC Full  -  ( 02 Dec 2018 05:58 )  WBC Count : 8.39 K/uL  Hemoglobin : 12.7 g/dL  Hematocrit : 40.1 %  Platelet Count - Automated : 180 K/uL  Mean Cell Volume : 90.5 fl  Mean Cell Hemoglobin : 28.7 pg  Mean Cell Hemoglobin Concentration : 31.7 gm/dL  Auto Neutrophil # : x  Auto Lymphocyte # : x  Auto Monocyte # : x  Auto Eosinophil # : x  Auto Basophil # : x  Auto Neutrophil % : x  Auto Lymphocyte % : x  Auto Monocyte % : x  Auto Eosinophil % : x  Auto Basophil % : x        142  |  106  |  66<H>  ----------------------------<  122<H>  4.5   |  26  |  2.56<H>    Ca    8.4      02 Dec 2018 05:58  Phos  4.8       Mg     2.1         TPro  7.0  /  Alb  2.9<L>  /  TBili  2.7<H>  /  DBili  x   /  AST  33  /  ALT  22  /  AlkPhos  168<H>      CAPILLARY BLOOD GLUCOSE      POCT Blood Glucose.: 110 mg/dL (02 Dec 2018 12:55)    PT/INR - ( 02 Dec 2018 05:58 )   PT: 15.5 sec;   INR: 1.41 ratio         PTT - ( 01 Dec 2018 01:51 )  PTT:34.9 sec  Urinalysis Basic - ( 01 Dec 2018 08:52 )    Color: Yellow / Appearance: Clear / S.020 / pH: x  Gluc: x / Ketone: Trace  / Bili: Small / Urobili: 4 mg/dL   Blood: x / Protein: 100 mg/dL / Nitrite: Positive   Leuk Esterase: Small / RBC: 0-2 /HPF / WBC 11-25   Sq Epi: x / Non Sq Epi: Few / Bacteria: Moderate      CARDIAC MARKERS ( 01 Dec 2018 01:51 )  .042 ng/mL / x     / x     / x     / x          Culture Results:   <10,000 CFU/ml  Normal Urogenital fito present ( @ 12:51)        Impression:  * YELITZA -- DDx; hemodynamic ATN, pigment induced ATN, septic ATN  * UTI  * Back pain  * Pleural eff    Recommendations:   * D/c Metformin  * D/c Cozaar  * Cont daily lasix  * Consider reducing beta-blocker  * Check CPK  * Avoid hypotension  * Cont Rocephin for UTI

## 2018-12-02 NOTE — PROGRESS NOTE ADULT - SUBJECTIVE AND OBJECTIVE BOX
[INTERVAL HX: ]  Patient seen and examined;  Chart reviewed and events noted;   same pain. slight better.  I called and spoke with Dr Talib Martinez.   Pt initially diagnosed to [place] 6 prostate cancer 11/2001 but bx showing tumo rin 1/3 cores. Had brachytx at [facility] 08/2002 with low PSA thereafter. Last PSA was 4yrs ago with levels <0.05. However since then had PSA done at other physicians offices< PMD, but pt had not gotten reports to urologist. Had been given labs referrals for testing but not performed.   I advised Dr Martinez repeat PSA this admission was 0.03.         MEDICATIONS  (STANDING):  aspirin enteric coated 325 milliGRAM(s) Oral daily  atorvastatin 40 milliGRAM(s) Oral at bedtime  cefTRIAXone   IVPB      cefTRIAXone   IVPB 1 Gram(s) IV Intermittent every 24 hours  dextrose 5%. 1000 milliLiter(s) (50 mL/Hr) IV Continuous <Continuous>  dextrose 50% Injectable 12.5 Gram(s) IV Push once  dextrose 50% Injectable 25 Gram(s) IV Push once  dextrose 50% Injectable 25 Gram(s) IV Push once  furosemide    Tablet 40 milliGRAM(s) Oral daily  heparin  Injectable 5000 Unit(s) SubCutaneous every 12 hours  insulin lispro (HumaLOG) corrective regimen sliding scale   SubCutaneous three times a day before meals  levothyroxine 100 MICROGram(s) Oral daily  losartan 25 milliGRAM(s) Oral daily  metoprolol tartrate 12.5 milliGRAM(s) Oral two times a day  sertraline 100 milliGRAM(s) Oral daily  tamsulosin 0.4 milliGRAM(s) Oral every 12 hours    MEDICATIONS  (PRN):  acetaminophen   Tablet .. 1000 milliGRAM(s) Oral three times a day PRN Temp greater or equal to 38C (100.4F), Mild Pain (1 - 3)  bisacodyl 5 milliGRAM(s) Oral every 12 hours PRN Constipation  dextrose 40% Gel 15 Gram(s) Oral once PRN Blood Glucose LESS THAN 70 milliGRAM(s)/deciliter  glucagon  Injectable 1 milliGRAM(s) IntraMuscular once PRN Glucose LESS THAN 70 milligrams/deciliter  oxyCODONE    IR 5 milliGRAM(s) Oral every 4 hours PRN moderate to severe pain      Vital Signs Last 24 Hrs  T(C): 36.6 (02 Dec 2018 07:30), Max: 36.6 (02 Dec 2018 07:30)  T(F): 97.8 (02 Dec 2018 07:30), Max: 97.8 (02 Dec 2018 07:30)  HR: 91 (02 Dec 2018 12:00) (89 - 94)  BP: 109/72 (02 Dec 2018 12:00) (91/59 - 116/74)  BP(mean): --  RR: 17 (02 Dec 2018 07:30) (17 - 18)  SpO2: 95% (02 Dec 2018 07:30) (95% - 100%)    [PHYSICAL EXAM]  General: adult in NAD,  WN,  WD.  HEENT: clear oropharynx, anicteric sclera, pink conjunctivae.  Neck: supple, no masses.  CV: normal S1S2, no murmur, no rubs, no gallops.  Lungs: clear to auscultation, no wheezes, no rales, no rhonchi.  Abdomen: soft, non-tender, non-distended, no hepatosplenomegaly, normal BS, no guarding.  Ext: no clubbing, no cyanosis, no edema.  Skin: no rashes,  no petechiae, no venous stasis changes.  Neuro: alert and oriented X3, no focal motor deficits.  LN: no ULICES.      [LABS:]                        12.7   8.39  )-----------( 180      ( 02 Dec 2018 05:58 )             40.1     12-02    142  |  106  |  66<H>  ----------------------------<  122<H>  4.5   |  26  |  2.56<H>    Ca    8.4      02 Dec 2018 05:58  Phos  4.8     12-01  Mg     2.1     12-01    TPro  7.0  /  Alb  2.9<L>  /  TBili  2.7<H>  /  DBili  x   /  AST  33  /  ALT  22  /  AlkPhos  168<H>  12-01    PT/INR - ( 02 Dec 2018 05:58 )   PT: 15.5 sec;   INR: 1.41 ratio         PTT - ( 01 Dec 2018 01:51 )  PTT:34.9 sec      [RADIOLOGY STUDIES:] [INTERVAL HX: ]  Patient seen and examined;  Chart reviewed and events noted;   same pain. slight better.  I called and spoke with Dr Talib Martinez.   Pt initially diagnosed to [place] 6 prostate cancer 11/2001 but bx showing tumo rin 1/3 cores. Had brachytx at [facility] 08/2002 with low PSA thereafter. Last PSA was 4yrs ago with levels <0.05. However since then had PSA done at other physicians offices< PMD, but pt had not gotten reports to urologist. Had been given labs referrals for testing but not performed.   I advised Dr Martinez repeat PSA this admission was 0.03.         MEDICATIONS  (STANDING):  aspirin enteric coated 325 milliGRAM(s) Oral daily  atorvastatin 40 milliGRAM(s) Oral at bedtime  cefTRIAXone   IVPB      cefTRIAXone   IVPB 1 Gram(s) IV Intermittent every 24 hours  dextrose 5%. 1000 milliLiter(s) (50 mL/Hr) IV Continuous <Continuous>  dextrose 50% Injectable 12.5 Gram(s) IV Push once  dextrose 50% Injectable 25 Gram(s) IV Push once  dextrose 50% Injectable 25 Gram(s) IV Push once  furosemide    Tablet 40 milliGRAM(s) Oral daily  heparin  Injectable 5000 Unit(s) SubCutaneous every 12 hours  insulin lispro (HumaLOG) corrective regimen sliding scale   SubCutaneous three times a day before meals  levothyroxine 100 MICROGram(s) Oral daily  losartan 25 milliGRAM(s) Oral daily  metoprolol tartrate 12.5 milliGRAM(s) Oral two times a day  sertraline 100 milliGRAM(s) Oral daily  tamsulosin 0.4 milliGRAM(s) Oral every 12 hours    MEDICATIONS  (PRN):  acetaminophen   Tablet .. 1000 milliGRAM(s) Oral three times a day PRN Temp greater or equal to 38C (100.4F), Mild Pain (1 - 3)  bisacodyl 5 milliGRAM(s) Oral every 12 hours PRN Constipation  dextrose 40% Gel 15 Gram(s) Oral once PRN Blood Glucose LESS THAN 70 milliGRAM(s)/deciliter  glucagon  Injectable 1 milliGRAM(s) IntraMuscular once PRN Glucose LESS THAN 70 milligrams/deciliter  oxyCODONE    IR 5 milliGRAM(s) Oral every 4 hours PRN moderate to severe pain      Vital Signs Last 24 Hrs  T(C): 36.6 (02 Dec 2018 07:30), Max: 36.6 (02 Dec 2018 07:30)  T(F): 97.8 (02 Dec 2018 07:30), Max: 97.8 (02 Dec 2018 07:30)  HR: 91 (02 Dec 2018 12:00) (89 - 94)  BP: 109/72 (02 Dec 2018 12:00) (91/59 - 116/74)  BP(mean): --  RR: 17 (02 Dec 2018 07:30) (17 - 18)  SpO2: 95% (02 Dec 2018 07:30) (95% - 100%)    [PHYSICAL EXAM]  General: adult in NAD,  WN,  WD.  HEENT: clear oropharynx, anicteric sclera, pink conjunctivae.  Neck: supple, no masses.  CV: normal S1S2, no murmur, no rubs, no gallops.  Lungs: clear to auscultation, no wheezes, no rales, no rhonchi.  Abdomen: soft, non-tender, non-distended, no hepatosplenomegaly, normal BS, no guarding.  Ext: no clubbing, no cyanosis, +edema., BL heel boots  Skin: no rashes,  no petechiae, + venous stasis changes.  Neuro: alert and oriented X3, no focal motor deficits.  LN: no ULICES.      [LABS:]                        12.7   8.39  )-----------( 180      ( 02 Dec 2018 05:58 )             40.1     12-02    142  |  106  |  66<H>  ----------------------------<  122<H>  4.5   |  26  |  2.56<H>    Ca    8.4      02 Dec 2018 05:58  Phos  4.8     12-01  Mg     2.1     12-01    TPro  7.0  /  Alb  2.9<L>  /  TBili  2.7<H>  /  DBili  x   /  AST  33  /  ALT  22  /  AlkPhos  168<H>  12-01    PT/INR - ( 02 Dec 2018 05:58 )   PT: 15.5 sec;   INR: 1.41 ratio         PTT - ( 01 Dec 2018 01:51 )  PTT:34.9 sec      [RADIOLOGY STUDIES:]

## 2018-12-02 NOTE — PROGRESS NOTE ADULT - SUBJECTIVE AND OBJECTIVE BOX
Date/Time Patient Seen:  		  Referring MD:   Data Reviewed	       Patient is a 78y old  Male who presents with a chief complaint of back pain (01 Dec 2018 18:58)  vs and meds reviewed        Subjective/HPI     PAST MEDICAL & SURGICAL HISTORY:  Diabetic ulcer of both lower extremities  Venous stasis dermatitis of both lower extremities  Hyperlipidemia  Type 2 diabetes mellitus without complication  Hypothyroidism  Bladder stones: s/p extraction  Prostate cancer: s/p brachytherapy  Myocardial infarction: 1995  SDH (subdural hematoma)  CAD (coronary artery disease)  S/P appendectomy  S/P CABG x 3: saphenous vein harvested from LLE, performed in March 1996 at [facility] by Dr. Mcgraw        Medication list         MEDICATIONS  (STANDING):  aspirin enteric coated 325 milliGRAM(s) Oral daily  atorvastatin 40 milliGRAM(s) Oral at bedtime  cefTRIAXone   IVPB      cefTRIAXone   IVPB 1 Gram(s) IV Intermittent every 24 hours  dextrose 5%. 1000 milliLiter(s) (50 mL/Hr) IV Continuous <Continuous>  dextrose 50% Injectable 12.5 Gram(s) IV Push once  dextrose 50% Injectable 25 Gram(s) IV Push once  dextrose 50% Injectable 25 Gram(s) IV Push once  furosemide   Injectable 40 milliGRAM(s) IV Push two times a day  heparin  Injectable 5000 Unit(s) SubCutaneous every 12 hours  insulin lispro (HumaLOG) corrective regimen sliding scale   SubCutaneous three times a day before meals  levothyroxine 100 MICROGram(s) Oral daily  losartan 25 milliGRAM(s) Oral daily  metoprolol tartrate 12.5 milliGRAM(s) Oral two times a day  sertraline 100 milliGRAM(s) Oral daily  tamsulosin 0.4 milliGRAM(s) Oral every 12 hours    MEDICATIONS  (PRN):  acetaminophen   Tablet .. 1000 milliGRAM(s) Oral three times a day PRN Temp greater or equal to 38C (100.4F), Mild Pain (1 - 3)  bisacodyl 5 milliGRAM(s) Oral every 12 hours PRN Constipation  dextrose 40% Gel 15 Gram(s) Oral once PRN Blood Glucose LESS THAN 70 milliGRAM(s)/deciliter  glucagon  Injectable 1 milliGRAM(s) IntraMuscular once PRN Glucose LESS THAN 70 milligrams/deciliter  oxyCODONE    IR 5 milliGRAM(s) Oral every 4 hours PRN moderate to severe pain         Vitals log        ICU Vital Signs Last 24 Hrs  T(C): 36.3 (02 Dec 2018 06:01), Max: 36.5 (01 Dec 2018 17:50)  T(F): 97.3 (02 Dec 2018 06:01), Max: 97.7 (01 Dec 2018 17:50)  HR: 94 (02 Dec 2018 06:13) (89 - 94)  BP: 116/74 (02 Dec 2018 06:13) (91/59 - 116/74)  BP(mean): --  ABP: --  ABP(mean): --  RR: 18 (02 Dec 2018 06:01) (18 - 18)  SpO2: 95% (02 Dec 2018 06:01) (95% - 100%)           Input and Output:  I&O's Detail    01 Dec 2018 07:01  -  02 Dec 2018 07:00  --------------------------------------------------------  IN:  Total IN: 0 mL    OUT:    Voided: 600 mL  Total OUT: 600 mL    Total NET: -600 mL          Lab Data                        12.7   8.39  )-----------( 180      ( 02 Dec 2018 05:58 )             40.1     12-02    142  |  106  |  66<H>  ----------------------------<  122<H>  4.5   |  26  |  2.56<H>    Ca    8.4      02 Dec 2018 05:58  Phos  4.8     12-01  Mg     2.1     12-01    TPro  7.0  /  Alb  2.9<L>  /  TBili  2.7<H>  /  DBili  x   /  AST  33  /  ALT  22  /  AlkPhos  168<H>  12-01      CARDIAC MARKERS ( 01 Dec 2018 01:51 )  .042 ng/mL / x     / x     / x     / x            Review of Systems	      Objective     Physical Examination    heart s1s2  lung dec BS      Pertinent Lab findings & Imaging      Sharona:  NO   Adequate UO     I&O's Detail    01 Dec 2018 07:01  -  02 Dec 2018 07:00  --------------------------------------------------------  IN:  Total IN: 0 mL    OUT:    Voided: 600 mL  Total OUT: 600 mL    Total NET: -600 mL               Discussed with:     Cultures:	        Radiology

## 2018-12-02 NOTE — PROGRESS NOTE ADULT - ASSESSMENT
[ASSESSMENT and  PLAN]  79yo M with hx of recent fall and prior falls, with hx of rib fx, subdural hematoma. Present with back pain s/p fall 1wk prior.   CT scan with fx at L1 and T12.     hx of prostate cancer with radiation seed tx, with hx of PSA <1 on last testing.  Given hx, unlikely to have metastatic prostate cancer.   Prominent periaortic LN of uncertain significance. Await repeat PSA.   Hx confirmed by urologist.   PSA low at 0.03.   Unlikely recurrent prostate cancer contributing to fx.     R>L effusion of unclear etiology. Likely CHF/volume overload.   CHF, PRo-BNP 33672.     Cirrhosis?  Mild coagulopathy, INR 1.4, PT 15.8s with cirrhosis on CT scan.   Tbili 2.7    Alb 2.9 decreased.     CKD/YELITZA with Cr 2.26. Baseline Cr 1.9 in 06/2018.   BL non-specific perinephric stranding.     comorbid DM, MI, HTN, HLD. PVD/venous stasisi, hypothyroid.     Suspect Raynauds as strong peripheral pulses.     RECOMMEND:   Check PSA to corroborate pt hx.   Spoke with  Dr Talib Martinez today, see above.   Orthopaedic eval, Dr Medellin. Await further recommendations    Pain control.   Urology consultation per ortho.      Await MRI spine.   DVT prophyalxis,   pending MRI.     Abx as warranted.     check Serum and urine RAIMUNDO.   Check HCV.   check quantitative immunoglobulins.     mgmt of HTN, YELITZA, hypothyroid, depression per medicine.     I have discussed the above plan of care with patient in detail. They expressed understanding of the treatment plan . Risks, benefits and alternatives discussed in detail. I have asked if they have any questions or concerns and appropriately addressed them; all questions answered to their satisfactions and in lay terms.

## 2018-12-02 NOTE — DIETITIAN INITIAL EVALUATION ADULT. - PERTINENT MEDS FT
rocpehin, lipitor, dulcolax, lasix, humalog CSS (moderate scale) synthyroid, oxycodone, zoloft, flomax

## 2018-12-02 NOTE — PROGRESS NOTE ADULT - PROBLEM SELECTOR PLAN 8
ct noted  echo from 7/2018 noted  for ir guided thoracentisis  change to oral lasix for bump in cr  pulm eval noted

## 2018-12-03 ENCOUNTER — OUTPATIENT (OUTPATIENT)
Dept: OUTPATIENT SERVICES | Facility: HOSPITAL | Age: 78
LOS: 1 days | End: 2018-12-03
Payer: COMMERCIAL

## 2018-12-03 ENCOUNTER — RESULT REVIEW (OUTPATIENT)
Age: 78
End: 2018-12-03

## 2018-12-03 DIAGNOSIS — Z95.1 PRESENCE OF AORTOCORONARY BYPASS GRAFT: Chronic | ICD-10-CM

## 2018-12-03 DIAGNOSIS — E87.70 FLUID OVERLOAD, UNSPECIFIED: ICD-10-CM

## 2018-12-03 DIAGNOSIS — Z90.49 ACQUIRED ABSENCE OF OTHER SPECIFIED PARTS OF DIGESTIVE TRACT: Chronic | ICD-10-CM

## 2018-12-03 PROBLEM — E11.622 TYPE 2 DIABETES MELLITUS WITH OTHER SKIN ULCER: Chronic | Status: ACTIVE | Noted: 2018-12-01

## 2018-12-03 LAB
ANION GAP SERPL CALC-SCNC: 9 MMOL/L — SIGNIFICANT CHANGE UP (ref 5–17)
B PERT IGG+IGM PNL SER: ABNORMAL
BUN SERPL-MCNC: 68 MG/DL — HIGH (ref 7–23)
CALCIUM SERPL-MCNC: 8.3 MG/DL — LOW (ref 8.4–10.5)
CHLORIDE SERPL-SCNC: 104 MMOL/L — SIGNIFICANT CHANGE UP (ref 96–108)
CK SERPL-CCNC: 156 U/L — SIGNIFICANT CHANGE UP (ref 39–308)
CO2 SERPL-SCNC: 27 MMOL/L — SIGNIFICANT CHANGE UP (ref 22–31)
COLOR FLD: YELLOW — SIGNIFICANT CHANGE UP
CREAT SERPL-MCNC: 2.39 MG/DL — HIGH (ref 0.5–1.3)
FLUID INTAKE SUBSTANCE CLASS: SIGNIFICANT CHANGE UP
FLUID SEGMENTED GRANULOCYTES: 50 % — SIGNIFICANT CHANGE UP
GLUCOSE BLDC GLUCOMTR-MCNC: 121 MG/DL — HIGH (ref 70–99)
GLUCOSE BLDC GLUCOMTR-MCNC: 148 MG/DL — HIGH (ref 70–99)
GLUCOSE BLDC GLUCOMTR-MCNC: 158 MG/DL — HIGH (ref 70–99)
GLUCOSE BLDC GLUCOMTR-MCNC: 161 MG/DL — HIGH (ref 70–99)
GLUCOSE SERPL-MCNC: 150 MG/DL — HIGH (ref 70–99)
GRAM STN FLD: SIGNIFICANT CHANGE UP
HCT VFR BLD CALC: 39.4 % — SIGNIFICANT CHANGE UP (ref 39–50)
HGB BLD-MCNC: 12.6 G/DL — LOW (ref 13–17)
IGA FLD-MCNC: 104 MG/DL — SIGNIFICANT CHANGE UP (ref 84–499)
IGG FLD-MCNC: 1283 MG/DL — SIGNIFICANT CHANGE UP (ref 610–1660)
IGM SERPL-MCNC: 62 MG/DL — SIGNIFICANT CHANGE UP (ref 35–242)
INTERPRETATION SERPL IFE-IMP: SIGNIFICANT CHANGE UP
KAPPA LC SER QL IFE: 4.62 MG/DL — HIGH (ref 0.33–1.94)
KAPPA/LAMBDA FREE LIGHT CHAIN RATIO, SERUM: 0.92 RATIO — SIGNIFICANT CHANGE UP (ref 0.26–1.65)
LAMBDA LC SER QL IFE: 5.01 MG/DL — HIGH (ref 0.57–2.63)
LDH SERPL L TO P-CCNC: 228 U/L — SIGNIFICANT CHANGE UP (ref 50–242)
LYMPHOCYTES # FLD: 6 % — SIGNIFICANT CHANGE UP
MCHC RBC-ENTMCNC: 29 PG — SIGNIFICANT CHANGE UP (ref 27–34)
MCHC RBC-ENTMCNC: 32 GM/DL — SIGNIFICANT CHANGE UP (ref 32–36)
MCV RBC AUTO: 90.8 FL — SIGNIFICANT CHANGE UP (ref 80–100)
MESOTHL CELL # FLD: 1 % — SIGNIFICANT CHANGE UP
MONOS+MACROS # FLD: 43 % — SIGNIFICANT CHANGE UP
NRBC # BLD: 0 /100 WBCS — SIGNIFICANT CHANGE UP (ref 0–0)
PH FLD: 7.45 — SIGNIFICANT CHANGE UP
PLATELET # BLD AUTO: 174 K/UL — SIGNIFICANT CHANGE UP (ref 150–400)
POTASSIUM SERPL-MCNC: 4.1 MMOL/L — SIGNIFICANT CHANGE UP (ref 3.5–5.3)
POTASSIUM SERPL-SCNC: 4.1 MMOL/L — SIGNIFICANT CHANGE UP (ref 3.5–5.3)
RBC # BLD: 4.34 M/UL — SIGNIFICANT CHANGE UP (ref 4.2–5.8)
RBC # FLD: 17.2 % — HIGH (ref 10.3–14.5)
RCV VOL RI: 3000 /UL — HIGH (ref 0–5)
SODIUM SERPL-SCNC: 140 MMOL/L — SIGNIFICANT CHANGE UP (ref 135–145)
SPECIMEN SOURCE FLD: SIGNIFICANT CHANGE UP
SPECIMEN SOURCE: SIGNIFICANT CHANGE UP
TOTAL NUCLEATED CELL COUNT, BODY FLUID: 302 /UL — HIGH (ref 0–5)
TUBE TYPE: SIGNIFICANT CHANGE UP
WBC # BLD: 6.69 K/UL — SIGNIFICANT CHANGE UP (ref 3.8–10.5)
WBC # FLD AUTO: 6.69 K/UL — SIGNIFICANT CHANGE UP (ref 3.8–10.5)

## 2018-12-03 PROCEDURE — 87206 SMEAR FLUORESCENT/ACID STAI: CPT

## 2018-12-03 PROCEDURE — 71045 X-RAY EXAM CHEST 1 VIEW: CPT

## 2018-12-03 PROCEDURE — 88108 CYTOPATH CONCENTRATE TECH: CPT | Mod: 26

## 2018-12-03 PROCEDURE — 71045 X-RAY EXAM CHEST 1 VIEW: CPT | Mod: 26

## 2018-12-03 PROCEDURE — 82945 GLUCOSE OTHER FLUID: CPT

## 2018-12-03 PROCEDURE — 88108 CYTOPATH CONCENTRATE TECH: CPT

## 2018-12-03 PROCEDURE — 32555 ASPIRATE PLEURA W/ IMAGING: CPT | Mod: RT

## 2018-12-03 PROCEDURE — 88305 TISSUE EXAM BY PATHOLOGIST: CPT

## 2018-12-03 PROCEDURE — 82042 OTHER SOURCE ALBUMIN QUAN EA: CPT

## 2018-12-03 PROCEDURE — 87116 MYCOBACTERIA CULTURE: CPT

## 2018-12-03 PROCEDURE — 87102 FUNGUS ISOLATION CULTURE: CPT

## 2018-12-03 PROCEDURE — 83615 LACTATE (LD) (LDH) ENZYME: CPT

## 2018-12-03 PROCEDURE — 32555 ASPIRATE PLEURA W/ IMAGING: CPT

## 2018-12-03 PROCEDURE — 87015 SPECIMEN INFECT AGNT CONCNTJ: CPT

## 2018-12-03 PROCEDURE — 88305 TISSUE EXAM BY PATHOLOGIST: CPT | Mod: 26

## 2018-12-03 PROCEDURE — 89051 BODY FLUID CELL COUNT: CPT

## 2018-12-03 PROCEDURE — 84157 ASSAY OF PROTEIN OTHER: CPT

## 2018-12-03 PROCEDURE — 83986 ASSAY PH BODY FLUID NOS: CPT

## 2018-12-03 RX ORDER — OXYCODONE HYDROCHLORIDE 5 MG/1
5 TABLET ORAL EVERY 4 HOURS
Qty: 0 | Refills: 0 | Status: DISCONTINUED | OUTPATIENT
Start: 2018-12-03 | End: 2018-12-05

## 2018-12-03 RX ORDER — OXYCODONE HYDROCHLORIDE 5 MG/1
10 TABLET ORAL EVERY 6 HOURS
Qty: 0 | Refills: 0 | Status: DISCONTINUED | OUTPATIENT
Start: 2018-12-03 | End: 2018-12-05

## 2018-12-03 RX ORDER — MORPHINE SULFATE 50 MG/1
2 CAPSULE, EXTENDED RELEASE ORAL EVERY 4 HOURS
Qty: 0 | Refills: 0 | Status: DISCONTINUED | OUTPATIENT
Start: 2018-12-03 | End: 2018-12-05

## 2018-12-03 RX ADMIN — MORPHINE SULFATE 2 MILLIGRAM(S): 50 CAPSULE, EXTENDED RELEASE ORAL at 12:12

## 2018-12-03 RX ADMIN — Medication 100 MICROGRAM(S): at 06:15

## 2018-12-03 RX ADMIN — HEPARIN SODIUM 5000 UNIT(S): 5000 INJECTION INTRAVENOUS; SUBCUTANEOUS at 18:30

## 2018-12-03 RX ADMIN — Medication 325 MILLIGRAM(S): at 09:18

## 2018-12-03 RX ADMIN — TAMSULOSIN HYDROCHLORIDE 0.4 MILLIGRAM(S): 0.4 CAPSULE ORAL at 06:15

## 2018-12-03 RX ADMIN — Medication 2: at 12:31

## 2018-12-03 RX ADMIN — Medication 12.5 MILLIGRAM(S): at 18:30

## 2018-12-03 RX ADMIN — ATORVASTATIN CALCIUM 40 MILLIGRAM(S): 80 TABLET, FILM COATED ORAL at 21:41

## 2018-12-03 RX ADMIN — OXYCODONE HYDROCHLORIDE 5 MILLIGRAM(S): 5 TABLET ORAL at 00:23

## 2018-12-03 RX ADMIN — MORPHINE SULFATE 2 MILLIGRAM(S): 50 CAPSULE, EXTENDED RELEASE ORAL at 21:43

## 2018-12-03 RX ADMIN — HEPARIN SODIUM 5000 UNIT(S): 5000 INJECTION INTRAVENOUS; SUBCUTANEOUS at 06:15

## 2018-12-03 RX ADMIN — TAMSULOSIN HYDROCHLORIDE 0.4 MILLIGRAM(S): 0.4 CAPSULE ORAL at 18:30

## 2018-12-03 RX ADMIN — SERTRALINE 100 MILLIGRAM(S): 25 TABLET, FILM COATED ORAL at 09:18

## 2018-12-03 RX ADMIN — OXYCODONE HYDROCHLORIDE 5 MILLIGRAM(S): 5 TABLET ORAL at 10:02

## 2018-12-03 RX ADMIN — OXYCODONE HYDROCHLORIDE 5 MILLIGRAM(S): 5 TABLET ORAL at 01:00

## 2018-12-03 NOTE — PROGRESS NOTE ADULT - SUBJECTIVE AND OBJECTIVE BOX
Patient seen and examined;  Chart reviewed and events noted;   resting in bed comfortably    MEDICATIONS  (STANDING):  aspirin enteric coated 325 milliGRAM(s) Oral daily  atorvastatin 40 milliGRAM(s) Oral at bedtime   cefTRIAXone   IVPB 1 Gram(s) IV Intermittent every 24 hours  dextrose 5%. 1000 milliLiter(s) (50 mL/Hr) IV Continuous <Continuous>  furosemide    Tablet 40 milliGRAM(s) Oral daily  heparin  Injectable 5000 Unit(s) SubCutaneous every 12 hours  insulin lispro (HumaLOG) corrective regimen sliding scale   SubCutaneous three times a day before meals  levothyroxine 100 MICROGram(s) Oral daily  metoprolol tartrate 12.5 milliGRAM(s) Oral two times a day  sertraline 100 milliGRAM(s) Oral daily  tamsulosin 0.4 milliGRAM(s) Oral every 12 hours    MEDICATIONS  (PRN):  acetaminophen   Tablet .. 1000 milliGRAM(s) Oral three times a day PRN Temp greater or equal to 38C (100.4F), Mild Pain (1 - 3)  bisacodyl 5 milliGRAM(s) Oral every 12 hours PRN Constipation  dextrose 40% Gel 15 Gram(s) Oral once PRN Blood Glucose LESS THAN 70 milliGRAM(s)/deciliter  glucagon  Injectable 1 milliGRAM(s) IntraMuscular once PRN Glucose LESS THAN 70 milligrams/deciliter  oxyCODONE    IR 5 milliGRAM(s) Oral every 4 hours PRN moderate to severe pain      Vital Signs Last 24 Hrs  T(C): 36.3 (03 Dec 2018 00:45), Max: 36.6 (02 Dec 2018 07:30)  T(F): 97.4 (03 Dec 2018 00:45), Max: 97.8 (02 Dec 2018 07:30)  HR: 92 (03 Dec 2018 00:45) (90 - 94)  BP: 108/64 (03 Dec 2018 00:45) (94/60 - 116/74)  RR: 17 (03 Dec 2018 00:45) (17 - 17)  SpO2: 93% (03 Dec 2018 00:45) (93% - 97%)    PHYSICAL EXAM  General: adult in NAD  HEENT: clear oropharynx, anicteric sclera, pink conjunctivae  Neck: supple  CV: normal S1S2; occasional S3 gallop  Lungs: clear to auscultation, no wheezes, no rhales  Abdomen: soft non-tender non-distended, no hepato/splenomegaly  Ext: chronic LE wounds  Skin: no rashes and no petichiae    LABS:                        12.7   8.39  )-----------( 180      ( 02 Dec 2018 05:58 )             40.1     12-02    142  |  106  |  66<H>  ----------------------------<  122<H>  4.5   |  26  |  2.56<H>    Ca    8.4      02 Dec 2018 05:58      PT/INR - ( 02 Dec 2018 05:58 )   PT: 15.5 sec;   INR: 1.41 ratio

## 2018-12-03 NOTE — PROGRESS NOTE ADULT - SUBJECTIVE AND OBJECTIVE BOX
Subjective: improved pain control.       MEDICATIONS  (STANDING):  aspirin enteric coated 325 milliGRAM(s) Oral daily  atorvastatin 40 milliGRAM(s) Oral at bedtime  cefTRIAXone   IVPB      cefTRIAXone   IVPB 1 Gram(s) IV Intermittent every 24 hours  dextrose 5%. 1000 milliLiter(s) (50 mL/Hr) IV Continuous <Continuous>  dextrose 50% Injectable 12.5 Gram(s) IV Push once  dextrose 50% Injectable 25 Gram(s) IV Push once  dextrose 50% Injectable 25 Gram(s) IV Push once  furosemide    Tablet 40 milliGRAM(s) Oral daily  heparin  Injectable 5000 Unit(s) SubCutaneous every 12 hours  insulin lispro (HumaLOG) corrective regimen sliding scale   SubCutaneous three times a day before meals  levothyroxine 100 MICROGram(s) Oral daily  metoprolol tartrate 12.5 milliGRAM(s) Oral two times a day  sertraline 100 milliGRAM(s) Oral daily  tamsulosin 0.4 milliGRAM(s) Oral every 12 hours    MEDICATIONS  (PRN):  acetaminophen   Tablet .. 1000 milliGRAM(s) Oral three times a day PRN Temp greater or equal to 38C (100.4F), Mild Pain (1 - 3)  bisacodyl 5 milliGRAM(s) Oral every 12 hours PRN Constipation  dextrose 40% Gel 15 Gram(s) Oral once PRN Blood Glucose LESS THAN 70 milliGRAM(s)/deciliter  glucagon  Injectable 1 milliGRAM(s) IntraMuscular once PRN Glucose LESS THAN 70 milligrams/deciliter  oxyCODONE    IR 5 milliGRAM(s) Oral every 4 hours PRN moderate to severe pain          T(C): 36.3 (18 @ 00:45), Max: 36.4 (18 @ 15:59)  HR: 91 (18 @ 06:13) (90 - 92)  BP: 95/63 (18 @ 06:13) (95/60 - 109/72)  RR: 17 (18 @ 00:45) (17 - 17)  SpO2: 93% (18 @ 00:45) (93% - 97%)  Wt(kg): --        I&O's Detail    02 Dec 2018 07:01  -  03 Dec 2018 07:00  --------------------------------------------------------  IN:  Total IN: 0 mL    OUT:    Voided: 50 mL  Total OUT: 50 mL    Total NET: -50 mL               PHYSICAL EXAM:    GENERAL: comfortable  EYES: EOMI, PERRLA, conjunctiva and sclera clear  NECK: Supple, no inc in JVP  CHEST/LUNG: Clear  HEART: S1S2  ABDOMEN: Soft, Nontender, Nondistended; Bowel sounds present  EXTREMITIES:  stasis cyanosis, pos edema  NEURO: no asterixis      LABS:  CBC Full  -  ( 02 Dec 2018 05:58 )  WBC Count : 8.39 K/uL  Hemoglobin : 12.7 g/dL  Hematocrit : 40.1 %  Platelet Count - Automated : 180 K/uL  Mean Cell Volume : 90.5 fl  Mean Cell Hemoglobin : 28.7 pg  Mean Cell Hemoglobin Concentration : 31.7 gm/dL  Auto Neutrophil # : x  Auto Lymphocyte # : x  Auto Monocyte # : x  Auto Eosinophil # : x  Auto Basophil # : x  Auto Neutrophil % : x  Auto Lymphocyte % : x  Auto Monocyte % : x  Auto Eosinophil % : x  Auto Basophil % : x    12    142  |  106  |  66<H>  ----------------------------<  122<H>  4.5   |  26  |  2.56<H>    Ca    8.4      02 Dec 2018 05:58      PT/INR - ( 02 Dec 2018 05:58 )   PT: 15.5 sec;   INR: 1.41 ratio           Urinalysis Basic - ( 01 Dec 2018 08:52 )    Color: Yellow / Appearance: Clear / S.020 / pH: x  Gluc: x / Ketone: Trace  / Bili: Small / Urobili: 4 mg/dL   Blood: x / Protein: 100 mg/dL / Nitrite: Positive   Leuk Esterase: Small / RBC: 0-2 /HPF / WBC 11-25   Sq Epi: x / Non Sq Epi: Few / Bacteria: Moderate      Culture Results:   <10,000 CFU/ml  Normal Urogenital fito present ( @ 12:51)      Impression:  * YELITZA -- no hydro on renal US.  DDx: pre-renal azotemia,  hemodynamic ATN,  septic ATN  * UTI  * Back pain, vertebral fx  * Pleural eff    Recommendations:   * Off Metformin  * Cozaar dced  * Cont daily lasix  * Consider reducing beta-blocker  * Follow repeat Cr this am  * Avoid hypotension  * Cont Rocephin for UTI

## 2018-12-03 NOTE — PROGRESS NOTE ADULT - SUBJECTIVE AND OBJECTIVE BOX
Patient is a 78y old  Male who presents with a chief complaint of back pain (03 Dec 2018 09:53)      INTERVAL HPI/OVERNIGHT EVENTS: pt with back pain, for mri and thoracentisis today, pain meds adjusted    MEDICATIONS  (STANDING):  aspirin enteric coated 325 milliGRAM(s) Oral daily  atorvastatin 40 milliGRAM(s) Oral at bedtime  dextrose 5%. 1000 milliLiter(s) (50 mL/Hr) IV Continuous <Continuous>  dextrose 50% Injectable 12.5 Gram(s) IV Push once  dextrose 50% Injectable 25 Gram(s) IV Push once  dextrose 50% Injectable 25 Gram(s) IV Push once  furosemide    Tablet 40 milliGRAM(s) Oral daily  heparin  Injectable 5000 Unit(s) SubCutaneous every 12 hours  insulin lispro (HumaLOG) corrective regimen sliding scale   SubCutaneous three times a day before meals  levothyroxine 100 MICROGram(s) Oral daily  metoprolol tartrate 12.5 milliGRAM(s) Oral two times a day  sertraline 100 milliGRAM(s) Oral daily  tamsulosin 0.4 milliGRAM(s) Oral every 12 hours    MEDICATIONS  (PRN):  acetaminophen   Tablet .. 1000 milliGRAM(s) Oral three times a day PRN Temp greater or equal to 38C (100.4F), Mild Pain (1 - 3)  bisacodyl 5 milliGRAM(s) Oral every 12 hours PRN Constipation  dextrose 40% Gel 15 Gram(s) Oral once PRN Blood Glucose LESS THAN 70 milliGRAM(s)/deciliter  glucagon  Injectable 1 milliGRAM(s) IntraMuscular once PRN Glucose LESS THAN 70 milligrams/deciliter  morphine  - Injectable 2 milliGRAM(s) IV Push every 4 hours PRN Severe Pain (7 - 10)  oxyCODONE    IR 5 milliGRAM(s) Oral every 4 hours PRN Mild Pain (1 - 3)  oxyCODONE    IR 10 milliGRAM(s) Oral every 6 hours PRN Moderate Pain (4 - 6)      Allergies    No Known Allergies    Intolerances        REVIEW OF SYSTEMS:  CONSTITUTIONAL: No fever, weight loss, or fatigue  EYES: No eye pain, visual disturbances  ENMT:  No difficulty hearing, tinnitus, vertigo; No sinus or throat pain  NECK: No pain or stiffness  RESPIRATORY: No cough, wheezing, chills or hemoptysis; No shortness of breath  CARDIOVASCULAR: No chest pain, palpitations, dizziness  GASTROINTESTINAL: No abdominal or epigastric pain. No nausea, vomiting, or hematemesis; No diarrhea or constipation. No melena or hematochezia.  GENITOURINARY: No dysuria, frequency, hematuria, or incontinence  NEUROLOGICAL: No headaches, memory loss, loss of strength, numbness, or tremors  SKIN: No itching, burning  LYMPH NODES: No enlarged glands  MUSCULOSKELETAL: back pain  PSYCHIATRIC: No depression, mood swings  HEME/LYMPH: No easy bruising, or bleeding gums  ALLERGY AND IMMUNOLOGIC: No hives    Vital Signs Last 24 Hrs  T(C): 36.3 (03 Dec 2018 00:45), Max: 36.4 (02 Dec 2018 15:59)  T(F): 97.4 (03 Dec 2018 00:45), Max: 97.5 (02 Dec 2018 15:59)  HR: 91 (03 Dec 2018 06:13) (90 - 92)  BP: 95/63 (03 Dec 2018 06:13) (95/60 - 108/64)  BP(mean): --  RR: 17 (03 Dec 2018 00:45) (17 - 17)  SpO2: 93% (03 Dec 2018 00:45) (93% - 97%)    PHYSICAL EXAM:  GENERAL: NAD, well-groomed, well-developed  HEAD:  nml  EYES: EOMI, PERRLA, conjunctiva and sclera clear  ENMT: No tonsillar erythema, exudates, or enlargement   NECK: Supple, No JVD  NERVOUS SYSTEM:  Alert & Oriented X3, Good concentration  CHEST/LUNG: Clear to auscultation bilaterally; No rales, rhonchi, wheezing  HEART: Regular rate and rhythm  ABDOMEN: Soft, Nontender, Nondistended; Bowel sounds present  BACK: pain in thoracic - lumbar area  EXTREMITIES:  2+ Peripheral Pulses , chronic changes with open wounds  LYMPH: No lymphadenopathy noted  SKIN: No rashes     LABS:                        12.6   6.69  )-----------( 174      ( 03 Dec 2018 07:34 )             39.4     03 Dec 2018 07:34    140    |  104    |  68     ----------------------------<  150    4.1     |  27     |  2.39     Ca    8.3        03 Dec 2018 07:34      PT/INR - ( 02 Dec 2018 05:58 )   PT: 15.5 sec;   INR: 1.41 ratio             CAPILLARY BLOOD GLUCOSE      POCT Blood Glucose.: 148 mg/dL (03 Dec 2018 08:28)  POCT Blood Glucose.: 147 mg/dL (02 Dec 2018 21:28)  POCT Blood Glucose.: 126 mg/dL (02 Dec 2018 16:58)  POCT Blood Glucose.: 110 mg/dL (02 Dec 2018 12:55)    blood culture --   <10,000 CFU/ml  Normal Urogenital fito present   12-01 @ 12:51      urine culture --  12-01 @ 12:51  results   <10,000 CFU/ml  Normal Urogenital fito present 12-01 @ 12:51    wound with gram statin --    12-01 @ 12:51  organism  --   12-01 @ 12:51  specimen source .Urine Clean Catch (Midstream)  12-01 @ 12:51      RADIOLOGY & ADDITIONAL TESTS:  < from: US Renal (12.02.18 @ 12:55) >    EXAM:  US KIDNEY(S)                                  PROCEDURE DATE:  12/02/2018          INTERPRETATION:  CLINICAL INFORMATION: Acute kidney insufficiency    COMPARISON: Right upper quadrant, from 7/20/2018.    TECHNIQUE: Sonography of the kidneysand bladder.     FINDINGS:    Right kidney:  10.3 cm. No renal mass, hydronephrosis or calculi.    Left kidney:  10.6 cm. No renal mass, hydronephrosis or calculi.    Urinary bladder: Not imaged    IMPRESSION:     No obstructive uropathy.                      HENRIETTA KIDD M.D., RADIOLOGIST    < end of copied text >        Consultant(s) Notes Reviewed:  [x ] YES  [ ] NO    Care Discussed with Consultants/Other Providers [x ] YES  [ ] NO    Advanced care planning discussed with patient and family, advanced care planning forms reviewed, discussed, and completed.  20 minutes spent.

## 2018-12-03 NOTE — ED ADULT NURSE REASSESSMENT NOTE - NS ED NURSE REASSESS COMMENT FT1
Received call from Brendon Graf from [facility]. Pt returning back to ED. As per Brendon patient refused MRI and Dr Munguia was made aware. Thoracentesis done by Dr Gomez drained 160cc from right chest., band aid applied. Specimens sent to lab. Chest Xray performed. Progress note will be on chart.

## 2018-12-03 NOTE — PROGRESS NOTE ADULT - PROBLEM SELECTOR PLAN 8
ct noted  echo from 7/2018 noted  for ir guided thoracentisis today  change to oral lasix for bump in cr  pulm eval noted

## 2018-12-03 NOTE — PROGRESS NOTE ADULT - SUBJECTIVE AND OBJECTIVE BOX
Date/Time Patient Seen:  		  Referring MD:   Data Reviewed	       Patient is a 78y old  Male who presents with a chief complaint of back pain (03 Dec 2018 09:34)  in bed  seen and examined  vs and meds reviewed        Subjective/HPI     PAST MEDICAL & SURGICAL HISTORY:  Diabetic ulcer of both lower extremities  Venous stasis dermatitis of both lower extremities  Hyperlipidemia  Type 2 diabetes mellitus without complication  Hypothyroidism  Bladder stones: s/p extraction  Prostate cancer: s/p brachytherapy  Myocardial infarction: 1995  SDH (subdural hematoma)  CAD (coronary artery disease)  S/P appendectomy  S/P CABG x 3: saphenous vein harvested from LLE, performed in March 1996 at [facility] by Dr. Mcgraw        Medication list         MEDICATIONS  (STANDING):  aspirin enteric coated 325 milliGRAM(s) Oral daily  atorvastatin 40 milliGRAM(s) Oral at bedtime  cefTRIAXone   IVPB      cefTRIAXone   IVPB 1 Gram(s) IV Intermittent every 24 hours  dextrose 5%. 1000 milliLiter(s) (50 mL/Hr) IV Continuous <Continuous>  dextrose 50% Injectable 12.5 Gram(s) IV Push once  dextrose 50% Injectable 25 Gram(s) IV Push once  dextrose 50% Injectable 25 Gram(s) IV Push once  furosemide    Tablet 40 milliGRAM(s) Oral daily  heparin  Injectable 5000 Unit(s) SubCutaneous every 12 hours  insulin lispro (HumaLOG) corrective regimen sliding scale   SubCutaneous three times a day before meals  levothyroxine 100 MICROGram(s) Oral daily  metoprolol tartrate 12.5 milliGRAM(s) Oral two times a day  sertraline 100 milliGRAM(s) Oral daily  tamsulosin 0.4 milliGRAM(s) Oral every 12 hours    MEDICATIONS  (PRN):  acetaminophen   Tablet .. 1000 milliGRAM(s) Oral three times a day PRN Temp greater or equal to 38C (100.4F), Mild Pain (1 - 3)  bisacodyl 5 milliGRAM(s) Oral every 12 hours PRN Constipation  dextrose 40% Gel 15 Gram(s) Oral once PRN Blood Glucose LESS THAN 70 milliGRAM(s)/deciliter  glucagon  Injectable 1 milliGRAM(s) IntraMuscular once PRN Glucose LESS THAN 70 milligrams/deciliter  oxyCODONE    IR 5 milliGRAM(s) Oral every 4 hours PRN moderate to severe pain         Vitals log        ICU Vital Signs Last 24 Hrs  T(C): 36.3 (03 Dec 2018 00:45), Max: 36.4 (02 Dec 2018 15:59)  T(F): 97.4 (03 Dec 2018 00:45), Max: 97.5 (02 Dec 2018 15:59)  HR: 91 (03 Dec 2018 06:13) (90 - 92)  BP: 95/63 (03 Dec 2018 06:13) (95/60 - 109/72)  BP(mean): --  ABP: --  ABP(mean): --  RR: 17 (03 Dec 2018 00:45) (17 - 17)  SpO2: 93% (03 Dec 2018 00:45) (93% - 97%)           Input and Output:  I&O's Detail    02 Dec 2018 07:01  -  03 Dec 2018 07:00  --------------------------------------------------------  IN:  Total IN: 0 mL    OUT:    Voided: 50 mL  Total OUT: 50 mL    Total NET: -50 mL          Lab Data                        12.6   6.69  )-----------( 174      ( 03 Dec 2018 07:34 )             39.4     12-03    140  |  104  |  68<H>  ----------------------------<  150<H>  4.1   |  27  |  2.39<H>    Ca    8.3<L>      03 Dec 2018 07:34        CARDIAC MARKERS ( 03 Dec 2018 07:34 )  x     / x     / 156 U/L / x     / x      CARDIAC MARKERS ( 02 Dec 2018 16:43 )  x     / x     / 175 U/L / x     / x            Review of Systems	      Objective     Physical Examination    heart s1s2  lung dec BS  abd soft      Pertinent Lab findings & Imaging      Sharona:  NO   Adequate UO     I&O's Detail    02 Dec 2018 07:01  -  03 Dec 2018 07:00  --------------------------------------------------------  IN:  Total IN: 0 mL    OUT:    Voided: 50 mL  Total OUT: 50 mL    Total NET: -50 mL               Discussed with:     Cultures:	        Radiology

## 2018-12-03 NOTE — PROGRESS NOTE ADULT - ASSESSMENT
[ASSESSMENT and  PLAN]  77yo M with hx of prostate cancer in 2001 s/p brachytherapy in 2002 and followed by Dr. Martinez as outpatient recent fall and prior falls, with hx of rib fx, subdural hematoma. Presented to ER on 12/1/18  with back pain s/p fall 1wk prior, found to have fracture at T12 and L1 on CT; repeat PSA low at 0.03 and therefore unlikely recurrent prostate cancer contributing to fx.   Noted also to have mild coagulopathy; possibly nutrition associated vs. cirrhosis as noted on CT  Noted also to have renal insufficiency      RECOMMEND:   Orthopaedic eval, Dr Medellin; to be considered for kyphoplasty  F/U MRI spine  F/U work-up for plasma cell disorder (immunofixation/immunoglobulin levels); although patient likely a poor candidate for any treatment given multitude of comorbidities

## 2018-12-03 NOTE — PROGRESS NOTE ADULT - SUBJECTIVE AND OBJECTIVE BOX
CAPILLARY BLOOD GLUCOSE      POCT Blood Glucose.: 148 mg/dL (03 Dec 2018 08:28)  POCT Blood Glucose.: 147 mg/dL (02 Dec 2018 21:28)  POCT Blood Glucose.: 126 mg/dL (02 Dec 2018 16:58)  POCT Blood Glucose.: 110 mg/dL (02 Dec 2018 12:55)      Vital Signs Last 24 Hrs  T(C): 36.3 (03 Dec 2018 00:45), Max: 36.4 (02 Dec 2018 15:59)  T(F): 97.4 (03 Dec 2018 00:45), Max: 97.5 (02 Dec 2018 15:59)  HR: 91 (03 Dec 2018 06:13) (90 - 92)  BP: 95/63 (03 Dec 2018 06:13) (95/60 - 109/72)  BP(mean): --  RR: 17 (03 Dec 2018 00:45) (17 - 17)  SpO2: 93% (03 Dec 2018 00:45) (93% - 97%)    General: WN/WD NAD  Respiratory: CTA B/L  CV: RRR, S1S2, no murmurs, rubs or gallops  Abdominal: Soft, NT, ND +BS, Last BM  Extremities: No edema, + peripheral pulses    Hemoglobin A1C, Whole Blood: 5.8 % (12-02 @ 11:51)   12-03    140  |  104  |  68<H>  ----------------------------<  150<H>  4.1   |  27  |  2.39<H>    Ca    8.3<L>      03 Dec 2018 07:34        atorvastatin 40 milliGRAM(s) Oral at bedtime  dextrose 40% Gel 15 Gram(s) Oral once PRN  dextrose 50% Injectable 12.5 Gram(s) IV Push once  dextrose 50% Injectable 25 Gram(s) IV Push once  dextrose 50% Injectable 25 Gram(s) IV Push once  glucagon  Injectable 1 milliGRAM(s) IntraMuscular once PRN  insulin lispro (HumaLOG) corrective regimen sliding scale   SubCutaneous three times a day before meals  levothyroxine 100 MICROGram(s) Oral daily

## 2018-12-04 LAB
ALBUMIN FLD-MCNC: 1 G/DL — SIGNIFICANT CHANGE UP
ANION GAP SERPL CALC-SCNC: 11 MMOL/L — SIGNIFICANT CHANGE UP (ref 5–17)
BUN SERPL-MCNC: 63 MG/DL — HIGH (ref 7–23)
CALCIUM SERPL-MCNC: 8.9 MG/DL — SIGNIFICANT CHANGE UP (ref 8.4–10.5)
CHLORIDE SERPL-SCNC: 105 MMOL/L — SIGNIFICANT CHANGE UP (ref 96–108)
CO2 SERPL-SCNC: 26 MMOL/L — SIGNIFICANT CHANGE UP (ref 22–31)
CREAT SERPL-MCNC: 2.14 MG/DL — HIGH (ref 0.5–1.3)
GLUCOSE BLDC GLUCOMTR-MCNC: 118 MG/DL — HIGH (ref 70–99)
GLUCOSE BLDC GLUCOMTR-MCNC: 128 MG/DL — HIGH (ref 70–99)
GLUCOSE BLDC GLUCOMTR-MCNC: 131 MG/DL — HIGH (ref 70–99)
GLUCOSE BLDC GLUCOMTR-MCNC: 145 MG/DL — HIGH (ref 70–99)
GLUCOSE FLD-MCNC: 164 MG/DL — SIGNIFICANT CHANGE UP
GLUCOSE SERPL-MCNC: 125 MG/DL — HIGH (ref 70–99)
HCT VFR BLD CALC: 41.6 % — SIGNIFICANT CHANGE UP (ref 39–50)
HGB BLD-MCNC: 13.3 G/DL — SIGNIFICANT CHANGE UP (ref 13–17)
LDH SERPL L TO P-CCNC: 92 U/L — SIGNIFICANT CHANGE UP
MCHC RBC-ENTMCNC: 29.1 PG — SIGNIFICANT CHANGE UP (ref 27–34)
MCHC RBC-ENTMCNC: 32 GM/DL — SIGNIFICANT CHANGE UP (ref 32–36)
MCV RBC AUTO: 91 FL — SIGNIFICANT CHANGE UP (ref 80–100)
NIGHT BLUE STAIN TISS: SIGNIFICANT CHANGE UP
NRBC # BLD: 0 /100 WBCS — SIGNIFICANT CHANGE UP (ref 0–0)
PLATELET # BLD AUTO: 185 K/UL — SIGNIFICANT CHANGE UP (ref 150–400)
POTASSIUM SERPL-MCNC: 4.4 MMOL/L — SIGNIFICANT CHANGE UP (ref 3.5–5.3)
POTASSIUM SERPL-SCNC: 4.4 MMOL/L — SIGNIFICANT CHANGE UP (ref 3.5–5.3)
PROT FLD-MCNC: 1.8 G/DL — SIGNIFICANT CHANGE UP
RBC # BLD: 4.57 M/UL — SIGNIFICANT CHANGE UP (ref 4.2–5.8)
RBC # FLD: 17.2 % — HIGH (ref 10.3–14.5)
SODIUM SERPL-SCNC: 142 MMOL/L — SIGNIFICANT CHANGE UP (ref 135–145)
SPECIMEN SOURCE: SIGNIFICANT CHANGE UP
WBC # BLD: 8.45 K/UL — SIGNIFICANT CHANGE UP (ref 3.8–10.5)
WBC # FLD AUTO: 8.45 K/UL — SIGNIFICANT CHANGE UP (ref 3.8–10.5)

## 2018-12-04 PROCEDURE — 78306 BONE IMAGING WHOLE BODY: CPT | Mod: 26

## 2018-12-04 PROCEDURE — 99223 1ST HOSP IP/OBS HIGH 75: CPT

## 2018-12-04 RX ORDER — FENTANYL CITRATE 50 UG/ML
1 INJECTION INTRAVENOUS
Qty: 0 | Refills: 0 | Status: DISCONTINUED | OUTPATIENT
Start: 2018-12-04 | End: 2018-12-05

## 2018-12-04 RX ADMIN — HEPARIN SODIUM 5000 UNIT(S): 5000 INJECTION INTRAVENOUS; SUBCUTANEOUS at 17:50

## 2018-12-04 RX ADMIN — MORPHINE SULFATE 2 MILLIGRAM(S): 50 CAPSULE, EXTENDED RELEASE ORAL at 07:23

## 2018-12-04 RX ADMIN — Medication 12.5 MILLIGRAM(S): at 17:50

## 2018-12-04 RX ADMIN — HEPARIN SODIUM 5000 UNIT(S): 5000 INJECTION INTRAVENOUS; SUBCUTANEOUS at 05:28

## 2018-12-04 RX ADMIN — Medication 325 MILLIGRAM(S): at 08:41

## 2018-12-04 RX ADMIN — ATORVASTATIN CALCIUM 40 MILLIGRAM(S): 80 TABLET, FILM COATED ORAL at 21:53

## 2018-12-04 RX ADMIN — TAMSULOSIN HYDROCHLORIDE 0.4 MILLIGRAM(S): 0.4 CAPSULE ORAL at 05:28

## 2018-12-04 RX ADMIN — TAMSULOSIN HYDROCHLORIDE 0.4 MILLIGRAM(S): 0.4 CAPSULE ORAL at 17:50

## 2018-12-04 RX ADMIN — Medication 40 MILLIGRAM(S): at 05:27

## 2018-12-04 RX ADMIN — SERTRALINE 100 MILLIGRAM(S): 25 TABLET, FILM COATED ORAL at 08:41

## 2018-12-04 RX ADMIN — Medication 12.5 MILLIGRAM(S): at 05:28

## 2018-12-04 RX ADMIN — MORPHINE SULFATE 2 MILLIGRAM(S): 50 CAPSULE, EXTENDED RELEASE ORAL at 12:19

## 2018-12-04 RX ADMIN — MORPHINE SULFATE 2 MILLIGRAM(S): 50 CAPSULE, EXTENDED RELEASE ORAL at 11:05

## 2018-12-04 RX ADMIN — FENTANYL CITRATE 1 PATCH: 50 INJECTION INTRAVENOUS at 12:22

## 2018-12-04 RX ADMIN — MORPHINE SULFATE 2 MILLIGRAM(S): 50 CAPSULE, EXTENDED RELEASE ORAL at 08:19

## 2018-12-04 RX ADMIN — Medication 100 MICROGRAM(S): at 05:29

## 2018-12-04 NOTE — PROGRESS NOTE ADULT - PROBLEM SELECTOR PLAN 7
onc john called and noted- no further inpt w/u  psa ok  refused bone scan today  pain meds as needed

## 2018-12-04 NOTE — CONSULT NOTE ADULT - SUBJECTIVE AND OBJECTIVE BOX
Pt Name: JS WOOD    MRN: [id number]    Patient is a 78y Male presenting to the emergency department with a chief complaint of back pain (04 Dec 2018 13:45)  .    HPI:  Js Wood, 77 y/o M pt w/ PMHx CAD (on asa), MI, HTN, HLD, DM, venous stasis dermatitis, bladder stones, prostate CA, hypothyroidism presents to the ED c/o lower back pain s/p slip and fall backwards on 11/22. Reports he fell landing on his back resulting in lower back pain, saw orthopedist 2-3 days ago, Rxd 5mg oxycodone-acetaminophen. Also XRs completed at that time which revealed arthritis, no fx. He endorses that the medication is not relieving his pain. Pt denies bladder or bowel dysfunction, fever, chills or any other complaints at this time. CT showed L1 distraction injury and bl T12 pedicle fx (01 Dec 2018 06:56)      HEALTH ISSUES - PROBLEM Dx:  YELITZA (acute kidney injury): YELITZA (acute kidney injury)  Cystitis: Cystitis  Pleural effusion: Pleural effusion  Fluid overload: Fluid overload  Prostate cancer: Prostate cancer  Hypothyroidism: Hypothyroidism  Type 2 diabetes mellitus without complication: Type 2 diabetes mellitus without complication  Diabetic ulcer of both lower extremities: Diabetic ulcer of both lower extremities  Fracture of vertebra, thoracic: Fracture of vertebra, thoracic  Fracture of vertebra, lumbar: Fracture of vertebra, lumbar        REVIEW OF SYSTEMS:    CONSTITUTIONAL: pain  EYES: No eye pain, visual disturbances  ENMT:  No difficulty hearing, tinnitus, vertigo; No sinus or throat pain  NECK: No pain or stiffness  RESPIRATORY: No cough, wheezing, chills or hemoptysis; No shortness of breath  CARDIOVASCULAR: No chest pain, palpitations, dizziness  GASTROINTESTINAL: No abdominal or epigastric pain. No nausea, vomiting, or hematemesis; No diarrhea or constipation. No melena or hematochezia.  GENITOURINARY: No dysuria, frequency, hematuria, or incontinence  NEUROLOGICAL: no neuro deficits  SKIN: No itching, burning  LYMPH NODES: No enlarged glands  MUSCULOSKELETAL: back pain  PSYCHIATRIC: No depression, mood swings  HEME/LYMPH: No easy bruising, or bleeding gums  ALLERGY AND IMMUNOLOGIC: No hives    ROS is otherwise negative.    PAST MEDICAL & SURGICAL HISTORY:  Diabetic ulcer of both lower extremities  Venous stasis dermatitis of both lower extremities  Hyperlipidemia  Type 2 diabetes mellitus without complication  Hypothyroidism  Bladder stones: s/p extraction  Prostate cancer: s/p brachytherapy  Myocardial infarction: 1995  CAD (coronary artery disease)  S/P appendectomy  S/P CABG x 3: saphenous vein harvested from LLE, performed in March 1996 at [facility] by Dr. Mcgraw      ALLERGIES: No Known Allergies      Medications: acetaminophen   Tablet .. 650 milliGRAM(s) Oral every 6 hours PRN  dextrose 40% Gel 15 Gram(s) Oral once PRN  dextrose 5%. 1000 milliLiter(s) IV Continuous <Continuous>  dextrose 50% Injectable 12.5 Gram(s) IV Push once  dextrose 50% Injectable 25 Gram(s) IV Push once  dextrose 50% Injectable 25 Gram(s) IV Push once  docusate sodium 100 milliGRAM(s) Oral three times a day  glucagon  Injectable 1 milliGRAM(s) IntraMuscular once PRN  insulin lispro (HumaLOG) corrective regimen sliding scale   SubCutaneous three times a day before meals  insulin lispro (HumaLOG) corrective regimen sliding scale   SubCutaneous at bedtime  ondansetron Injectable 4 milliGRAM(s) IV Push every 6 hours PRN  oxyCODONE    IR 5 milliGRAM(s) Oral every 4 hours PRN  oxyCODONE    IR 10 milliGRAM(s) Oral every 4 hours PRN  senna 2 Tablet(s) Oral at bedtime PRN  sodium chloride 0.9% lock flush 3 milliLiter(s) IV Push every 8 hours      FAMILY HISTORY:  No pertinent family history in first degree relatives  : non-contributory    SOCIAL HISTORY:     Ambulation:  Bedbound                            13.5   8.19  )-----------( 209      ( 05 Dec 2018 07:59 )             42.9     12-05    144  |  105  |  63<H>  ----------------------------<  177<H>  4.6   |  22  |  1.89<H>    Ca    9.1      05 Dec 2018 13:36    TPro  6.4  /  Alb  3.2<L>  /  TBili  1.7<H>  /  DBili  x   /  AST  23  /  ALT  11  /  AlkPhos  158<H>  12-05      PHYSICAL EXAM:    Vital Signs Last 24 Hrs  T(C): 36.6 (06 Dec 2018 04:33), Max: 36.9 (05 Dec 2018 21:33)  T(F): 97.8 (06 Dec 2018 04:33), Max: 98.4 (05 Dec 2018 21:33)  HR: 88 (06 Dec 2018 04:33) (88 - 98)  BP: 126/81 (06 Dec 2018 04:33) (110/72 - 134/81)  BP(mean): --  RR: 18 (06 Dec 2018 04:33) (16 - 18)  SpO2: 93% (06 Dec 2018 04:33) (93% - 99%)    Appearance: Alert, responsive, in no acute distress.    Skin: Lymphedema bilateral legs, venous stasis disease lower extremity, AFO    Vascular: No palpable pulses bilateral legs    Musculoskeletal:       Neurological: Sensation is grossly intact to light touch. No focal deficits or weaknesses found.    Pathologic reflexes: [-ve ] Heredia,  [-ve ]  Clonus            Reflexes:   Biceps, Bracioradialis, Patella, Achilles absent           Motor exam: Exam limited by patients non cooperation    IMAGING STUDIES:      EXAM:  CT LUMBAR SPINE                          PROCEDURE DATE:  11/30/2018      INTERPRETATION:  CT of the lumbar spine dated 11/30/2018.    CLINICAL INFORMATION: Status post fall last week.    TECHNIQUE: Thin section axial CT images are obtained through the lumbar   spine with reformatted images in the sagittal and coronal planes.    The study is correlated with a CT the abdomen and pelvis from 1/2/2016.    FINDINGS:    The bones are diffusely demineralized. There are 5 nonrib-bearing   lumbar-type vertebral bodies. There are multilevel ventral bridging   osteophytes. There is a wide fracture lucency through the superior   endplate of L1 which extends through the anterior and middle column to   the posterior cortex.  There is distraction of the superior endplate of   L1 relative to the L1 vertebral body of up to 1 cm. There is also a   fracture lucency through the inferior endplate of T12 through the   anterior column with anterior wedging of the T12 vertebral body. Fracture   lucencies are also seen to involve the bilateral lamina and pedicles of   T12 up to the spinous process.  There are multilevel degenerative changes   with multilevel disc bulges and facet and ligamentous hypertrophy   contributing to multilevel segmental canal stenosis and neural foraminal   stenosis which is difficult to evaluate secondary to beam hardening   artifact. There is ankylosis of the sacroiliac joints.    Prostate radiation seeds are noted. There is circumferential thickening   ofthe bladder walls. There is mild dependent edema in the presacral   space. There are numerous mildly prominent periaortic lymph nodes. There   is a moderate size right pleural effusion and small left pleural effusion   with left-sided pleural thickening and calcification.    IMPRESSION:    Wide fracture lucency through the superior endplate of L1 which extends   through the anterior and middle column to the posterior cortex with   distraction of the superior endplate of L1 relative to the L1 vertebral   body of up to 1 cm. Fracture lucency through the inferior endplate of T12   through the anterior column with anterior wedging of the T12 vertebral   body. Fracture lucency is also seen to involve the bilateral lamina and   pedicles of T12 up to the spinous process.    Multilevel degenerative changes and multilevel bridging ventral   osteophytes with ankylosis of the sacroiliac joints.    Prostate radiation seeds. Mild nonspecific circumferential thickening of   the bladder walls. Numerous mildly prominent periaortic lymph nodes.    Moderate-sized right pleural effusion and small left pleural effusion   with left-sided pleural thickening and calcification.    COCO ROBERTOJaylin; ATTENDING RADIOLOGIST  This document has been electronically signed. Dec  1 2018 12:28AM        A/P:  Pt is a  78y Male with Patient is a 78y old  Male who presents with a chief complaint of back pain (06 Dec 2018 07:23)  found to have a Chance fracture through L1.     PLAN:  * Pain control  * Patient cannot tolerate MRI without sedation. At this point recommended patient be transferred to a tertiary care facility for better evaluation and management of the patient's fracture.  * Case discussed with patient's medical attending

## 2018-12-04 NOTE — BRIEF OPERATIVE NOTE - PROCEDURE
<<-----Click on this checkbox to enter Procedure Thoracentesis by physician  12/04/2018    Active  SHERRIE

## 2018-12-04 NOTE — PROGRESS NOTE ADULT - ASSESSMENT
[ASSESSMENT and  PLAN]  79yo man w hx prostate cancer 2001 s/p brachytherapy 2002, followed by Dr. Martinez as outpatient, recent fall and prior falls, with hx of rib fx, subdural hematoma. Presented to ER 12/1/18  w back pain post fall 1wk prior, found to have fracture at T12 and L1 on CT; repeat PSA low at 0.03 and therefore unlikely recurrent prostate cancer contributing to fx.   Noted also to have mild coagulopathy; possibly nutrition associated vs. cirrhosis as noted on CT  Noted also to have renal insufficiency      RECOMMEND:   plasma cell dyscrasia w/u was started, SIFE w IgG lambda monoclonal gammapathy, but normal IgG/A/M titers, kappa and lambda both elevated but ratio normal therefore not c/w active disease, at most MGUS or reactive, can be followed up as outpatient.  Proceed w present acute care    discussed w pt  will sign off for now, please call if any questions.

## 2018-12-04 NOTE — PROGRESS NOTE ADULT - ASSESSMENT
1.	YELITZA, CKD 3: ? Baseline creatinine 1.7-1.9  2.	CHF  3.	? MGUS  4.	Hypertension    Improving creatinine trend. To continue current meds. Hematology note reviewed.   Monitor BP trend. Titrate BP meds as needed. Salt restriction. On PO lasix.   Will follow electrolytes and renal function trend.

## 2018-12-04 NOTE — PROGRESS NOTE ADULT - SUBJECTIVE AND OBJECTIVE BOX
Date/Time Patient Seen:  		  Referring MD:   Data Reviewed	       Patient is a 78y old  Male who presents with a chief complaint of back pain (04 Dec 2018 08:23)  in bed  seen and examined  vs and meds reviewed        Subjective/HPI     PAST MEDICAL & SURGICAL HISTORY:  Diabetic ulcer of both lower extremities  Venous stasis dermatitis of both lower extremities  Hyperlipidemia  Type 2 diabetes mellitus without complication  Hypothyroidism  Bladder stones: s/p extraction  Prostate cancer: s/p brachytherapy  Myocardial infarction: 1995  SDH (subdural hematoma)  CAD (coronary artery disease)  S/P appendectomy  S/P CABG x 3: saphenous vein harvested from LLE, performed in March 1996 at [facility] by Dr. Mcgraw        Medication list         MEDICATIONS  (STANDING):  aspirin enteric coated 325 milliGRAM(s) Oral daily  atorvastatin 40 milliGRAM(s) Oral at bedtime  dextrose 5%. 1000 milliLiter(s) (50 mL/Hr) IV Continuous <Continuous>  dextrose 50% Injectable 12.5 Gram(s) IV Push once  dextrose 50% Injectable 25 Gram(s) IV Push once  dextrose 50% Injectable 25 Gram(s) IV Push once  fentaNYL   Patch  12 MICROgram(s)/Hr 1 Patch Transdermal every 72 hours  furosemide    Tablet 40 milliGRAM(s) Oral daily  heparin  Injectable 5000 Unit(s) SubCutaneous every 12 hours  insulin lispro (HumaLOG) corrective regimen sliding scale   SubCutaneous three times a day before meals  levothyroxine 100 MICROGram(s) Oral daily  metoprolol tartrate 12.5 milliGRAM(s) Oral two times a day  sertraline 100 milliGRAM(s) Oral daily  tamsulosin 0.4 milliGRAM(s) Oral every 12 hours    MEDICATIONS  (PRN):  acetaminophen   Tablet .. 1000 milliGRAM(s) Oral three times a day PRN Temp greater or equal to 38C (100.4F), Mild Pain (1 - 3)  bisacodyl 5 milliGRAM(s) Oral every 12 hours PRN Constipation  dextrose 40% Gel 15 Gram(s) Oral once PRN Blood Glucose LESS THAN 70 milliGRAM(s)/deciliter  glucagon  Injectable 1 milliGRAM(s) IntraMuscular once PRN Glucose LESS THAN 70 milligrams/deciliter  morphine  - Injectable 2 milliGRAM(s) IV Push every 4 hours PRN Severe Pain (7 - 10)  oxyCODONE    IR 5 milliGRAM(s) Oral every 4 hours PRN Mild Pain (1 - 3)  oxyCODONE    IR 10 milliGRAM(s) Oral every 6 hours PRN Moderate Pain (4 - 6)         Vitals log        ICU Vital Signs Last 24 Hrs  T(C): 36.4 (04 Dec 2018 09:56), Max: 36.7 (04 Dec 2018 01:02)  T(F): 97.6 (04 Dec 2018 09:56), Max: 98 (04 Dec 2018 01:02)  HR: 860 (04 Dec 2018 09:56) (88 - 860)  BP: 94/60 (04 Dec 2018 09:56) (92/66 - 125/76)  BP(mean): --  ABP: --  ABP(mean): --  RR: 16 (04 Dec 2018 09:56) (16 - 18)  SpO2: 91% (04 Dec 2018 09:56) (90% - 97%)           Input and Output:  I&O's Detail      Lab Data                        13.3   8.45  )-----------( 185      ( 04 Dec 2018 08:08 )             41.6     12-04    142  |  105  |  63<H>  ----------------------------<  125<H>  4.4   |  26  |  2.14<H>    Ca    8.9      04 Dec 2018 08:08        CARDIAC MARKERS ( 03 Dec 2018 07:34 )  x     / x     / 156 U/L / x     / x      CARDIAC MARKERS ( 02 Dec 2018 16:43 )  x     / x     / 175 U/L / x     / x            Review of Systems	      Objective     Physical Examination    heart s1s2  lung dec BS      Pertinent Lab findings & Imaging      Sharona:  NO   Adequate UO     I&O's Detail           Discussed with:     Cultures:	  Culture Results:   Testing in progress (12-03 @ 19:21)        Radiology

## 2018-12-04 NOTE — PROGRESS NOTE ADULT - SUBJECTIVE AND OBJECTIVE BOX
All interim records and events noted.    back pain but not in acute distress      MEDICATIONS  (STANDING):  aspirin enteric coated 325 milliGRAM(s) Oral daily  atorvastatin 40 milliGRAM(s) Oral at bedtime  dextrose 5%. 1000 milliLiter(s) (50 mL/Hr) IV Continuous <Continuous>  dextrose 50% Injectable 12.5 Gram(s) IV Push once  dextrose 50% Injectable 25 Gram(s) IV Push once  dextrose 50% Injectable 25 Gram(s) IV Push once  furosemide    Tablet 40 milliGRAM(s) Oral daily  heparin  Injectable 5000 Unit(s) SubCutaneous every 12 hours  insulin lispro (HumaLOG) corrective regimen sliding scale   SubCutaneous three times a day before meals  levothyroxine 100 MICROGram(s) Oral daily  metoprolol tartrate 12.5 milliGRAM(s) Oral two times a day  sertraline 100 milliGRAM(s) Oral daily  tamsulosin 0.4 milliGRAM(s) Oral every 12 hours    MEDICATIONS  (PRN):  acetaminophen   Tablet .. 1000 milliGRAM(s) Oral three times a day PRN Temp greater or equal to 38C (100.4F), Mild Pain (1 - 3)  bisacodyl 5 milliGRAM(s) Oral every 12 hours PRN Constipation  dextrose 40% Gel 15 Gram(s) Oral once PRN Blood Glucose LESS THAN 70 milliGRAM(s)/deciliter  glucagon  Injectable 1 milliGRAM(s) IntraMuscular once PRN Glucose LESS THAN 70 milligrams/deciliter  morphine  - Injectable 2 milliGRAM(s) IV Push every 4 hours PRN Severe Pain (7 - 10)  oxyCODONE    IR 5 milliGRAM(s) Oral every 4 hours PRN Mild Pain (1 - 3)  oxyCODONE    IR 10 milliGRAM(s) Oral every 6 hours PRN Moderate Pain (4 - 6)      Vital Signs Last 24 Hrs  T(C): 36.4 (04 Dec 2018 05:17), Max: 36.7 (04 Dec 2018 01:02)  T(F): 97.5 (04 Dec 2018 05:17), Max: 98 (04 Dec 2018 01:02)  HR: 89 (04 Dec 2018 05:17) (88 - 106)  BP: 115/66 (04 Dec 2018 05:17) (92/66 - 125/76)  BP(mean): --  RR: 18 (04 Dec 2018 05:17) (16 - 18)  SpO2: 90% (04 Dec 2018 05:17) (90% - 97%)    PHYSICAL EXAM  General: well developed  well nourished, in no acute distress  Head: atraumatic, normocephalic  ENT: sclera anicteric, buccal mucosa moist  Neck: supple, trachea midline  CV: S1 S2, regular rate and rhythm  Lungs: clear to auscultation, no wheezes/rhonchi  Abdomen: soft, nontender, bowel sounds present, no palpable hepatosplenomegaly  Skin: no significant increased ecchymosis/petechiae  Neuro: alert and oriented X3,  no focal deficits      LABS:             12.6   6.69  )-----------( 174      ( 12-03 @ 07:34 )             39.4                12.7   8.39  )-----------( 180      ( 12-02 @ 05:58 )             40.1       12-03    140  |  104  |  68<H>  ----------------------------<  150<H>  4.1   |  27  |  2.39<H>    Ca    8.3<L>      03 Dec 2018 07:34      12-02 @ 05:58  PT15.5 INR1.41  PTT--  12-01 @ 01:51  PT15.8 INR1.43  PTT34.9      RADIOLOGY & ADDITIONAL STUDIES:    IMPRESSION/RECOMMENDATIONS:

## 2018-12-04 NOTE — PROGRESS NOTE ADULT - SUBJECTIVE AND OBJECTIVE BOX
Patient is a 78y old  Male who presents with a chief complaint of back pain (04 Dec 2018 08:23)      Patient seen in follow up for YELITZA, CKD 3. NAD    PAST MEDICAL HISTORY:  Diabetic ulcer of both lower extremities  Venous stasis dermatitis of both lower extremities  Hyperlipidemia  Type 2 diabetes mellitus without complication  Hypothyroidism  Bladder stones  Prostate cancer  Myocardial infarction  SDH (subdural hematoma)  CAD (coronary artery disease)    MEDICATIONS  (STANDING):  aspirin enteric coated 325 milliGRAM(s) Oral daily  atorvastatin 40 milliGRAM(s) Oral at bedtime  dextrose 5%. 1000 milliLiter(s) (50 mL/Hr) IV Continuous <Continuous>  dextrose 50% Injectable 12.5 Gram(s) IV Push once  dextrose 50% Injectable 25 Gram(s) IV Push once  dextrose 50% Injectable 25 Gram(s) IV Push once  fentaNYL   Patch  12 MICROgram(s)/Hr 1 Patch Transdermal every 72 hours  furosemide    Tablet 40 milliGRAM(s) Oral daily  heparin  Injectable 5000 Unit(s) SubCutaneous every 12 hours  insulin lispro (HumaLOG) corrective regimen sliding scale   SubCutaneous three times a day before meals  levothyroxine 100 MICROGram(s) Oral daily  metoprolol tartrate 12.5 milliGRAM(s) Oral two times a day  sertraline 100 milliGRAM(s) Oral daily  tamsulosin 0.4 milliGRAM(s) Oral every 12 hours    MEDICATIONS  (PRN):  acetaminophen   Tablet .. 1000 milliGRAM(s) Oral three times a day PRN Temp greater or equal to 38C (100.4F), Mild Pain (1 - 3)  bisacodyl 5 milliGRAM(s) Oral every 12 hours PRN Constipation  dextrose 40% Gel 15 Gram(s) Oral once PRN Blood Glucose LESS THAN 70 milliGRAM(s)/deciliter  glucagon  Injectable 1 milliGRAM(s) IntraMuscular once PRN Glucose LESS THAN 70 milligrams/deciliter  morphine  - Injectable 2 milliGRAM(s) IV Push every 4 hours PRN Severe Pain (7 - 10)  oxyCODONE    IR 5 milliGRAM(s) Oral every 4 hours PRN Mild Pain (1 - 3)  oxyCODONE    IR 10 milliGRAM(s) Oral every 6 hours PRN Moderate Pain (4 - 6)    T(C): 36.4 (12-04-18 @ 09:56), Max: 36.7 (12-04-18 @ 01:02)  HR: 860 (12-04-18 @ 09:56) (88 - 860)  BP: 94/60 (12-04-18 @ 09:56) (92/66 - 125/76)  RR: 16 (12-04-18 @ 09:56) (16 - 18)  SpO2: 91% (12-04-18 @ 09:56) (90% - 97%)  Wt(kg): --  I&O's Detail      PHYSICAL EXAM:  General: NAD  Respiratory: b/l air entry  Cardiovascular: S1 S2  Gastrointestinal: soft  Extremities:  b/l leg dressings                          13.3   8.45  )-----------( 185      ( 04 Dec 2018 08:08 )             41.6     12-04    142  |  105  |  63<H>  ----------------------------<  125<H>  4.4   |  26  |  2.14<H>    Ca    8.9      04 Dec 2018 08:08      CARDIAC MARKERS ( 03 Dec 2018 07:34 )  x     / x     / 156 U/L / x     / x      CARDIAC MARKERS ( 02 Dec 2018 16:43 )  x     / x     / 175 U/L / x     / x                Sodium, Serum: 142 (12-04 @ 08:08)  Sodium, Serum: 140 (12-03 @ 07:34)  Sodium, Serum: 142 (12-02 @ 05:58)  Sodium, Serum: 142 (12-01 @ 01:51)    Creatinine, Serum: 2.14 (12-04 @ 08:08)  Creatinine, Serum: 2.39 (12-03 @ 07:34)  Creatinine, Serum: 2.56 (12-02 @ 05:58)  Creatinine, Serum: 2.26 (12-01 @ 01:51)    Potassium, Serum: 4.4 (12-04 @ 08:08)  Potassium, Serum: 4.1 (12-03 @ 07:34)  Potassium, Serum: 4.5 (12-02 @ 05:58)  Potassium, Serum: 4.6 (12-01 @ 01:51)    Hemoglobin: 13.3 (12-04 @ 08:08)  Hemoglobin: 12.6 (12-03 @ 07:34)  Hemoglobin: 12.7 (12-02 @ 05:58)  Hemoglobin: 14.6 (12-01 @ 01:51)

## 2018-12-04 NOTE — PROGRESS NOTE ADULT - ATTENDING COMMENTS
refusing further imaging  spine eval  pain managment  pt eval  discussed with wife in detail refusing further imaging  spine eval  pain managment  pt eval  discussed with wife in detail    addendum- discussed with spine sx dr norman, reviewed ct of spine here, recommend transfer to [facility] for mri with sedation and further care and management, to transfer to [facility] to neurosx service to dr epifanio crisostomo

## 2018-12-04 NOTE — PROGRESS NOTE ADULT - SUBJECTIVE AND OBJECTIVE BOX
Patient is a 78y old  Male who presents with a chief complaint of back pain (04 Dec 2018 11:57)      INTERVAL HPI/OVERNIGHT EVENTS: pt still with pain, refused mri yesterday and refusing bone scan today, pain meds adjusted, spine sx to see pt today, discussed with wife in detail    MEDICATIONS  (STANDING):  aspirin enteric coated 325 milliGRAM(s) Oral daily  atorvastatin 40 milliGRAM(s) Oral at bedtime  dextrose 5%. 1000 milliLiter(s) (50 mL/Hr) IV Continuous <Continuous>  dextrose 50% Injectable 12.5 Gram(s) IV Push once  dextrose 50% Injectable 25 Gram(s) IV Push once  dextrose 50% Injectable 25 Gram(s) IV Push once  fentaNYL   Patch  12 MICROgram(s)/Hr 1 Patch Transdermal every 72 hours  furosemide    Tablet 40 milliGRAM(s) Oral daily  heparin  Injectable 5000 Unit(s) SubCutaneous every 12 hours  insulin lispro (HumaLOG) corrective regimen sliding scale   SubCutaneous three times a day before meals  levothyroxine 100 MICROGram(s) Oral daily  metoprolol tartrate 12.5 milliGRAM(s) Oral two times a day  sertraline 100 milliGRAM(s) Oral daily  tamsulosin 0.4 milliGRAM(s) Oral every 12 hours    MEDICATIONS  (PRN):  acetaminophen   Tablet .. 1000 milliGRAM(s) Oral three times a day PRN Temp greater or equal to 38C (100.4F), Mild Pain (1 - 3)  bisacodyl 5 milliGRAM(s) Oral every 12 hours PRN Constipation  dextrose 40% Gel 15 Gram(s) Oral once PRN Blood Glucose LESS THAN 70 milliGRAM(s)/deciliter  glucagon  Injectable 1 milliGRAM(s) IntraMuscular once PRN Glucose LESS THAN 70 milligrams/deciliter  morphine  - Injectable 2 milliGRAM(s) IV Push every 4 hours PRN Severe Pain (7 - 10)  oxyCODONE    IR 5 milliGRAM(s) Oral every 4 hours PRN Mild Pain (1 - 3)  oxyCODONE    IR 10 milliGRAM(s) Oral every 6 hours PRN Moderate Pain (4 - 6)      Allergies    No Known Allergies    Intolerances        REVIEW OF SYSTEMS:  CONSTITUTIONAL: pain  EYES: No eye pain, visual disturbances  ENMT:  No difficulty hearing, tinnitus, vertigo; No sinus or throat pain  NECK: No pain or stiffness  RESPIRATORY: No cough, wheezing, chills or hemoptysis; No shortness of breath  CARDIOVASCULAR: No chest pain, palpitations, dizziness  GASTROINTESTINAL: No abdominal or epigastric pain. No nausea, vomiting, or hematemesis; No diarrhea or constipation. No melena or hematochezia.  GENITOURINARY: No dysuria, frequency, hematuria, or incontinence  NEUROLOGICAL: no neuro deficits  SKIN: No itching, burning  LYMPH NODES: No enlarged glands  MUSCULOSKELETAL: back pain  PSYCHIATRIC: No depression, mood swings  HEME/LYMPH: No easy bruising, or bleeding gums  ALLERGY AND IMMUNOLOGIC: No hives    Vital Signs Last 24 Hrs  T(C): 36.4 (04 Dec 2018 09:56), Max: 36.7 (04 Dec 2018 01:02)  T(F): 97.6 (04 Dec 2018 09:56), Max: 98 (04 Dec 2018 01:02)  HR: 860 (04 Dec 2018 09:56) (88 - 860)  BP: 94/60 (04 Dec 2018 09:56) (92/66 - 125/76)  BP(mean): --  RR: 16 (04 Dec 2018 09:56) (16 - 18)  SpO2: 91% (04 Dec 2018 09:56) (90% - 97%)    PHYSICAL EXAM:  GENERAL: mild distress from pain  HEAD:  Atraumatic, Normocephalic  EYES: EOMI, PERRLA, conjunctiva and sclera clear  ENMT: No tonsillar erythema, exudates, or enlargement   NECK: Supple, No JVD  NERVOUS SYSTEM:  Alert & Oriented X3, Good concentration  CHEST/LUNG: Clear to auscultation bilaterally; No rales, rhonchi, wheezing  HEART: Regular rate and rhythm  ABDOMEN: Soft, Nontender, Nondistended; Bowel sounds present  EXTREMITIES:  2+ Peripheral Pulses, moves extremities   LYMPH: No lymphadenopathy noted  SKIN: No rashes     LABS:                        13.3   8.45  )-----------( 185      ( 04 Dec 2018 08:08 )             41.6     04 Dec 2018 08:08    142    |  105    |  63     ----------------------------<  125    4.4     |  26     |  2.14     Ca    8.9        04 Dec 2018 08:08          CAPILLARY BLOOD GLUCOSE      POCT Blood Glucose.: 128 mg/dL (04 Dec 2018 12:27)  POCT Blood Glucose.: 118 mg/dL (04 Dec 2018 08:02)  POCT Blood Glucose.: 158 mg/dL (03 Dec 2018 22:31)  POCT Blood Glucose.: 121 mg/dL (03 Dec 2018 18:15)    blood culture --   Testing in progress   12-03 @ 19:21    blood culture --   <10,000 CFU/ml  Normal Urogenital fito present   12-01 @ 12:51      urine culture --  12-03 @ 19:21  results   Testing in progress 12-03 @ 19:21  urine culture --  12-01 @ 12:51  results   <10,000 CFU/ml  Normal Urogenital fito present 12-01 @ 12:51    wound with gram statin --    12-03 @ 19:21  organism  --   12-03 @ 19:21  specimen source .Body Fluid Pleural Fluid  12-03 @ 19:21  wound with gram statin --    12-01 @ 12:51  organism  --   12-01 @ 12:51  specimen source .Urine Clean Catch (Midstream)  12-01 @ 12:51      RADIOLOGY & ADDITIONAL TESTS:  no new      Consultant(s) Notes Reviewed:  [ x] YES  [ ] NO    Care Discussed with Consultants/Other Providers [x ] YES  [ ] NO    Advanced care planning discussed with patient and family, advanced care planning forms reviewed, discussed, and completed.  20 minutes spent.

## 2018-12-05 LAB
ALBUMIN SERPL ELPH-MCNC: 3.2 G/DL — LOW (ref 3.3–5)
ALP SERPL-CCNC: 158 U/L — HIGH (ref 40–120)
ALT FLD-CCNC: 11 U/L — SIGNIFICANT CHANGE UP (ref 10–45)
ANION GAP SERPL CALC-SCNC: 13 MMOL/L — SIGNIFICANT CHANGE UP (ref 5–17)
ANION GAP SERPL CALC-SCNC: 17 MMOL/L — SIGNIFICANT CHANGE UP (ref 5–17)
APTT BLD: 32.6 SEC — SIGNIFICANT CHANGE UP (ref 27.5–36.3)
AST SERPL-CCNC: 23 U/L — SIGNIFICANT CHANGE UP (ref 10–40)
BILIRUB SERPL-MCNC: 1.7 MG/DL — HIGH (ref 0.2–1.2)
BLD GP AB SCN SERPL QL: NEGATIVE — SIGNIFICANT CHANGE UP
BUN SERPL-MCNC: 63 MG/DL — HIGH (ref 7–23)
BUN SERPL-MCNC: 64 MG/DL — HIGH (ref 7–23)
CALCIUM SERPL-MCNC: 9.1 MG/DL — SIGNIFICANT CHANGE UP (ref 8.4–10.5)
CALCIUM SERPL-MCNC: 9.2 MG/DL — SIGNIFICANT CHANGE UP (ref 8.4–10.5)
CHLORIDE SERPL-SCNC: 105 MMOL/L — SIGNIFICANT CHANGE UP (ref 96–108)
CHLORIDE SERPL-SCNC: 105 MMOL/L — SIGNIFICANT CHANGE UP (ref 96–108)
CO2 SERPL-SCNC: 22 MMOL/L — SIGNIFICANT CHANGE UP (ref 22–31)
CO2 SERPL-SCNC: 25 MMOL/L — SIGNIFICANT CHANGE UP (ref 22–31)
CREAT SERPL-MCNC: 1.89 MG/DL — HIGH (ref 0.5–1.3)
CREAT SERPL-MCNC: 2.09 MG/DL — HIGH (ref 0.5–1.3)
GLUCOSE BLDC GLUCOMTR-MCNC: 125 MG/DL — HIGH (ref 70–99)
GLUCOSE BLDC GLUCOMTR-MCNC: 129 MG/DL — HIGH (ref 70–99)
GLUCOSE BLDC GLUCOMTR-MCNC: 150 MG/DL — HIGH (ref 70–99)
GLUCOSE SERPL-MCNC: 141 MG/DL — HIGH (ref 70–99)
GLUCOSE SERPL-MCNC: 177 MG/DL — HIGH (ref 70–99)
HCT VFR BLD CALC: 42.9 % — SIGNIFICANT CHANGE UP (ref 39–50)
HGB BLD-MCNC: 13.5 G/DL — SIGNIFICANT CHANGE UP (ref 13–17)
INR BLD: 1.27 RATIO — HIGH (ref 0.88–1.16)
MCHC RBC-ENTMCNC: 28.1 PG — SIGNIFICANT CHANGE UP (ref 27–34)
MCHC RBC-ENTMCNC: 31.5 GM/DL — LOW (ref 32–36)
MCV RBC AUTO: 89.4 FL — SIGNIFICANT CHANGE UP (ref 80–100)
NON-GYN CYTOLOGY SPEC: SIGNIFICANT CHANGE UP
NRBC # BLD: 0 /100 WBCS — SIGNIFICANT CHANGE UP (ref 0–0)
PLATELET # BLD AUTO: 209 K/UL — SIGNIFICANT CHANGE UP (ref 150–400)
POTASSIUM SERPL-MCNC: 4.3 MMOL/L — SIGNIFICANT CHANGE UP (ref 3.5–5.3)
POTASSIUM SERPL-MCNC: 4.6 MMOL/L — SIGNIFICANT CHANGE UP (ref 3.5–5.3)
POTASSIUM SERPL-SCNC: 4.3 MMOL/L — SIGNIFICANT CHANGE UP (ref 3.5–5.3)
POTASSIUM SERPL-SCNC: 4.6 MMOL/L — SIGNIFICANT CHANGE UP (ref 3.5–5.3)
PROT SERPL-MCNC: 6.4 G/DL — SIGNIFICANT CHANGE UP (ref 6–8.3)
PROTHROM AB SERPL-ACNC: 14.6 SEC — HIGH (ref 10–12.9)
RBC # BLD: 4.8 M/UL — SIGNIFICANT CHANGE UP (ref 4.2–5.8)
RBC # FLD: 17.4 % — HIGH (ref 10.3–14.5)
RH IG SCN BLD-IMP: POSITIVE — SIGNIFICANT CHANGE UP
SODIUM SERPL-SCNC: 143 MMOL/L — SIGNIFICANT CHANGE UP (ref 135–145)
SODIUM SERPL-SCNC: 144 MMOL/L — SIGNIFICANT CHANGE UP (ref 135–145)
WBC # BLD: 8.19 K/UL — SIGNIFICANT CHANGE UP (ref 3.8–10.5)
WBC # FLD AUTO: 8.19 K/UL — SIGNIFICANT CHANGE UP (ref 3.8–10.5)

## 2018-12-05 PROCEDURE — 93010 ELECTROCARDIOGRAM REPORT: CPT

## 2018-12-05 RX ORDER — OXYCODONE HYDROCHLORIDE 5 MG/1
10 TABLET ORAL EVERY 4 HOURS
Qty: 0 | Refills: 0 | Status: DISCONTINUED | OUTPATIENT
Start: 2018-12-05 | End: 2018-12-09

## 2018-12-05 RX ORDER — DEXTROSE 50 % IN WATER 50 %
12.5 SYRINGE (ML) INTRAVENOUS ONCE
Qty: 0 | Refills: 0 | Status: DISCONTINUED | OUTPATIENT
Start: 2018-12-05 | End: 2018-12-09

## 2018-12-05 RX ORDER — INSULIN LISPRO 100/ML
VIAL (ML) SUBCUTANEOUS
Qty: 0 | Refills: 0 | Status: DISCONTINUED | OUTPATIENT
Start: 2018-12-05 | End: 2018-12-09

## 2018-12-05 RX ORDER — ACETAMINOPHEN 500 MG
650 TABLET ORAL EVERY 6 HOURS
Qty: 0 | Refills: 0 | Status: DISCONTINUED | OUTPATIENT
Start: 2018-12-05 | End: 2018-12-09

## 2018-12-05 RX ORDER — DOCUSATE SODIUM 100 MG
100 CAPSULE ORAL THREE TIMES A DAY
Qty: 0 | Refills: 0 | Status: DISCONTINUED | OUTPATIENT
Start: 2018-12-05 | End: 2018-12-08

## 2018-12-05 RX ORDER — SENNA PLUS 8.6 MG/1
2 TABLET ORAL AT BEDTIME
Qty: 0 | Refills: 0 | Status: DISCONTINUED | OUTPATIENT
Start: 2018-12-05 | End: 2018-12-09

## 2018-12-05 RX ORDER — DEXTROSE 50 % IN WATER 50 %
25 SYRINGE (ML) INTRAVENOUS ONCE
Qty: 0 | Refills: 0 | Status: DISCONTINUED | OUTPATIENT
Start: 2018-12-05 | End: 2018-12-09

## 2018-12-05 RX ORDER — GLUCAGON INJECTION, SOLUTION 0.5 MG/.1ML
1 INJECTION, SOLUTION SUBCUTANEOUS ONCE
Qty: 0 | Refills: 0 | Status: DISCONTINUED | OUTPATIENT
Start: 2018-12-05 | End: 2018-12-09

## 2018-12-05 RX ORDER — DEXTROSE 50 % IN WATER 50 %
15 SYRINGE (ML) INTRAVENOUS ONCE
Qty: 0 | Refills: 0 | Status: DISCONTINUED | OUTPATIENT
Start: 2018-12-05 | End: 2018-12-09

## 2018-12-05 RX ORDER — INSULIN LISPRO 100/ML
VIAL (ML) SUBCUTANEOUS AT BEDTIME
Qty: 0 | Refills: 0 | Status: DISCONTINUED | OUTPATIENT
Start: 2018-12-05 | End: 2018-12-09

## 2018-12-05 RX ORDER — SODIUM CHLORIDE 9 MG/ML
1000 INJECTION, SOLUTION INTRAVENOUS
Qty: 0 | Refills: 0 | Status: DISCONTINUED | OUTPATIENT
Start: 2018-12-05 | End: 2018-12-09

## 2018-12-05 RX ORDER — OXYCODONE HYDROCHLORIDE 5 MG/1
5 TABLET ORAL EVERY 4 HOURS
Qty: 0 | Refills: 0 | Status: DISCONTINUED | OUTPATIENT
Start: 2018-12-05 | End: 2018-12-09

## 2018-12-05 RX ORDER — SODIUM CHLORIDE 9 MG/ML
3 INJECTION INTRAMUSCULAR; INTRAVENOUS; SUBCUTANEOUS EVERY 8 HOURS
Qty: 0 | Refills: 0 | Status: DISCONTINUED | OUTPATIENT
Start: 2018-12-05 | End: 2018-12-09

## 2018-12-05 RX ORDER — ONDANSETRON 8 MG/1
4 TABLET, FILM COATED ORAL EVERY 6 HOURS
Qty: 0 | Refills: 0 | Status: DISCONTINUED | OUTPATIENT
Start: 2018-12-05 | End: 2018-12-09

## 2018-12-05 RX ADMIN — HEPARIN SODIUM 5000 UNIT(S): 5000 INJECTION INTRAVENOUS; SUBCUTANEOUS at 06:31

## 2018-12-05 RX ADMIN — Medication 100 MICROGRAM(S): at 06:30

## 2018-12-05 RX ADMIN — SERTRALINE 100 MILLIGRAM(S): 25 TABLET, FILM COATED ORAL at 08:52

## 2018-12-05 RX ADMIN — FENTANYL CITRATE 1 PATCH: 50 INJECTION INTRAVENOUS at 18:43

## 2018-12-05 RX ADMIN — Medication 325 MILLIGRAM(S): at 08:52

## 2018-12-05 RX ADMIN — OXYCODONE HYDROCHLORIDE 10 MILLIGRAM(S): 5 TABLET ORAL at 15:35

## 2018-12-05 RX ADMIN — Medication 100 MILLIGRAM(S): at 15:36

## 2018-12-05 RX ADMIN — Medication 40 MILLIGRAM(S): at 06:30

## 2018-12-05 RX ADMIN — MORPHINE SULFATE 2 MILLIGRAM(S): 50 CAPSULE, EXTENDED RELEASE ORAL at 10:41

## 2018-12-05 RX ADMIN — SENNA PLUS 2 TABLET(S): 8.6 TABLET ORAL at 22:46

## 2018-12-05 RX ADMIN — OXYCODONE HYDROCHLORIDE 10 MILLIGRAM(S): 5 TABLET ORAL at 16:05

## 2018-12-05 RX ADMIN — SODIUM CHLORIDE 3 MILLILITER(S): 9 INJECTION INTRAMUSCULAR; INTRAVENOUS; SUBCUTANEOUS at 22:43

## 2018-12-05 RX ADMIN — Medication 100 MILLIGRAM(S): at 22:45

## 2018-12-05 RX ADMIN — TAMSULOSIN HYDROCHLORIDE 0.4 MILLIGRAM(S): 0.4 CAPSULE ORAL at 06:30

## 2018-12-05 RX ADMIN — Medication 12.5 MILLIGRAM(S): at 06:30

## 2018-12-05 RX ADMIN — SODIUM CHLORIDE 3 MILLILITER(S): 9 INJECTION INTRAMUSCULAR; INTRAVENOUS; SUBCUTANEOUS at 14:23

## 2018-12-05 NOTE — CONSULT NOTE ADULT - ASSESSMENT
Js Wood, 79 y/o M pt w/ PMHx CAD (on asa), MI, HTN, HLD, DM, venous stasis dermatitis, bladder stones, prostate CA, hypothyroidism presents to the ED c/o lower back pain s/p slip and fall backwards on 11/22. Reports he fell landing on his back resulting in lower back pain, saw orthopedist 2-3 days ago, Rxd 5mg oxycodone-acetaminophen. Also XRs completed at that time which revealed arthritis, no fx. He endorses that the medication is not relieving his pain. Pt denies bladder or bowel dysfunction, fever, chills or any other complaints at this time. CT showed L1 distraction injury and bl T12 pedicle fx (01 Dec 2018 06:56)  - for complete details see H&P from initial admission  - MRI  - OR friday  - Pending clearence

## 2018-12-05 NOTE — PROGRESS NOTE ADULT - SUBJECTIVE AND OBJECTIVE BOX
Date/Time Patient Seen:  		  Referring MD:   Data Reviewed	       Patient is a 78y old  Male who presents with a chief complaint of back pain (04 Dec 2018 12:29)    in bed  seen and examined  vs and meds reviewed      Subjective/HPI     PAST MEDICAL & SURGICAL HISTORY:  Diabetic ulcer of both lower extremities  Venous stasis dermatitis of both lower extremities  Hyperlipidemia  Type 2 diabetes mellitus without complication  Hypothyroidism  Bladder stones: s/p extraction  Prostate cancer: s/p brachytherapy  Myocardial infarction: 1995  SDH (subdural hematoma)  CAD (coronary artery disease)  S/P appendectomy  S/P CABG x 3: saphenous vein harvested from LLE, performed in March 1996 at [facility] by Dr. Mcgraw        Medication list         MEDICATIONS  (STANDING):  aspirin enteric coated 325 milliGRAM(s) Oral daily  atorvastatin 40 milliGRAM(s) Oral at bedtime  dextrose 5%. 1000 milliLiter(s) (50 mL/Hr) IV Continuous <Continuous>  dextrose 50% Injectable 12.5 Gram(s) IV Push once  dextrose 50% Injectable 25 Gram(s) IV Push once  dextrose 50% Injectable 25 Gram(s) IV Push once  fentaNYL   Patch  12 MICROgram(s)/Hr 1 Patch Transdermal every 72 hours  furosemide    Tablet 40 milliGRAM(s) Oral daily  heparin  Injectable 5000 Unit(s) SubCutaneous every 12 hours  insulin lispro (HumaLOG) corrective regimen sliding scale   SubCutaneous three times a day before meals  levothyroxine 100 MICROGram(s) Oral daily  metoprolol tartrate 12.5 milliGRAM(s) Oral two times a day  sertraline 100 milliGRAM(s) Oral daily  tamsulosin 0.4 milliGRAM(s) Oral every 12 hours    MEDICATIONS  (PRN):  acetaminophen   Tablet .. 1000 milliGRAM(s) Oral three times a day PRN Temp greater or equal to 38C (100.4F), Mild Pain (1 - 3)  bisacodyl 5 milliGRAM(s) Oral every 12 hours PRN Constipation  dextrose 40% Gel 15 Gram(s) Oral once PRN Blood Glucose LESS THAN 70 milliGRAM(s)/deciliter  glucagon  Injectable 1 milliGRAM(s) IntraMuscular once PRN Glucose LESS THAN 70 milligrams/deciliter  morphine  - Injectable 2 milliGRAM(s) IV Push every 4 hours PRN Severe Pain (7 - 10)  oxyCODONE    IR 5 milliGRAM(s) Oral every 4 hours PRN Mild Pain (1 - 3)  oxyCODONE    IR 10 milliGRAM(s) Oral every 6 hours PRN Moderate Pain (4 - 6)         Vitals log        ICU Vital Signs Last 24 Hrs  T(C): 36.4 (05 Dec 2018 09:33), Max: 36.7 (04 Dec 2018 14:18)  T(F): 97.5 (05 Dec 2018 09:33), Max: 98.1 (04 Dec 2018 17:03)  HR: 89 (05 Dec 2018 09:33) (78 - 90)  BP: 110/72 (05 Dec 2018 09:33) (104/66 - 133/77)  BP(mean): --  ABP: --  ABP(mean): --  RR: 16 (05 Dec 2018 09:33) (16 - 20)  SpO2: 97% (05 Dec 2018 09:33) (93% - 97%)           Input and Output:  I&O's Detail    04 Dec 2018 07:01  -  05 Dec 2018 07:00  --------------------------------------------------------  IN:    Oral Fluid: 550 mL  Total IN: 550 mL    OUT:    Voided: 400 mL  Total OUT: 400 mL    Total NET: 150 mL          Lab Data                        13.5   8.19  )-----------( 209      ( 05 Dec 2018 07:59 )             42.9     12-05    143  |  105  |  64<H>  ----------------------------<  141<H>  4.3   |  25  |  2.09<H>    Ca    9.2      05 Dec 2018 07:59              Review of Systems	      Objective     Physical Examination    heart s1s2  lung dec BS      Pertinent Lab findings & Imaging      Sharona:  NO   Adequate UO     I&O's Detail    04 Dec 2018 07:01  -  05 Dec 2018 07:00  --------------------------------------------------------  IN:    Oral Fluid: 550 mL  Total IN: 550 mL    OUT:    Voided: 400 mL  Total OUT: 400 mL    Total NET: 150 mL               Discussed with:     Cultures:	        Radiology

## 2018-12-05 NOTE — CONSULT NOTE ADULT - SUBJECTIVE AND OBJECTIVE BOX
p (1480)     HPI:  Js Wood, 79 y/o M pt w/ PMHx CAD (on asa), MI, HTN, HLD, DM, venous stasis dermatitis, bladder stones, prostate CA, hypothyroidism presents to the ED c/o lower back pain s/p slip and fall backwards on 11/22. Reports he fell landing on his back resulting in lower back pain, saw orthopedist 2-3 days ago, Rxd 5mg oxycodone-acetaminophen. Also XRs completed at that time which revealed arthritis, no fx. He endorses that the medication is not relieving his pain. Pt denies bladder or bowel dysfunction, fever, chills or any other complaints at this time. CT showed L1 distraction injury and bl T12 pedicle fx (01 Dec 2018 06:56)    Exam:  Exam:  AOx3, Following Commands  Motor:          Upper extremity                    Delt      Bicep     Tricep     HG                                                 R         5/5        5/5          5/5        5/5                                               L          5/5       5/5          5/5        5/5          Lower extremity                    HF          KF         KE         DF        PF                                                  R        5/5         5/5        5/5       5/5        5/5                                               L         5/5        5/5        5/5       5/5        5/5  Sensation / Reflexes  [ ] intact to light touch  [x ] decreased: b/l diabetic neuropathy  No clonus      --Anticoagulation:    =====================  PAST MEDICAL HISTORY   Diabetic ulcer of both lower extremities  Venous stasis dermatitis of both lower extremities  Hyperlipidemia  Type 2 diabetes mellitus without complication  Hypothyroidism  Bladder stones  Prostate cancer  Myocardial infarction  CAD (coronary artery disease)    PAST SURGICAL HISTORY   S/P appendectomy  S/P CABG x 3        MEDICATIONS:  Antibiotics:    Neuro:  acetaminophen   Tablet .. 650 milliGRAM(s) Oral every 6 hours PRN  ondansetron Injectable 4 milliGRAM(s) IV Push every 6 hours PRN    Other:  docusate sodium 100 milliGRAM(s) Oral three times a day  senna 2 Tablet(s) Oral at bedtime PRN  sodium chloride 0.9% lock flush 3 milliLiter(s) IV Push every 8 hours      SOCIAL HISTORY:   Occupation:   Marital Status:     FAMILY HISTORY:  No pertinent family history in first degree relatives      ROS: Negative except per HPI    LABS:  PTT - ( 05 Dec 2018 13:36 )  PTT:32.6 sec                        13.5   8.19  )-----------( 209      ( 05 Dec 2018 07:59 )             42.9     12-05    143  |  105  |  64<H>  ----------------------------<  141<H>  4.3   |  25  |  2.09<H>    Ca    9.2      05 Dec 2018 07:59

## 2018-12-05 NOTE — PROGRESS NOTE ADULT - SUBJECTIVE AND OBJECTIVE BOX
CAPILLARY BLOOD GLUCOSE      POCT Blood Glucose.: 125 mg/dL (05 Dec 2018 07:53)  POCT Blood Glucose.: 145 mg/dL (04 Dec 2018 21:16)  POCT Blood Glucose.: 131 mg/dL (04 Dec 2018 16:46)  POCT Blood Glucose.: 128 mg/dL (04 Dec 2018 12:27)      Vital Signs Last 24 Hrs  T(C): 36.4 (05 Dec 2018 09:33), Max: 36.7 (04 Dec 2018 14:18)  T(F): 97.5 (05 Dec 2018 09:33), Max: 98.1 (04 Dec 2018 17:03)  HR: 89 (05 Dec 2018 09:33) (78 - 90)  BP: 110/72 (05 Dec 2018 09:33) (104/66 - 133/77)  BP(mean): --  RR: 16 (05 Dec 2018 09:33) (16 - 20)  SpO2: 97% (05 Dec 2018 09:33) (93% - 97%)    General: WN/WD NAD  Respiratory: CTA B/L  CV: RRR, S1S2, no murmurs, rubs or gallops  Abdominal: Soft, NT, ND +BS, Last BM  Extremities: No edema, + peripheral pulses    Hemoglobin A1C, Whole Blood: 5.8 % (12-02 @ 11:51)   12-05    143  |  105  |  64<H>  ----------------------------<  141<H>  4.3   |  25  |  2.09<H>    Ca    9.2      05 Dec 2018 07:59        atorvastatin 40 milliGRAM(s) Oral at bedtime  dextrose 40% Gel 15 Gram(s) Oral once PRN  dextrose 50% Injectable 12.5 Gram(s) IV Push once  dextrose 50% Injectable 25 Gram(s) IV Push once  dextrose 50% Injectable 25 Gram(s) IV Push once  glucagon  Injectable 1 milliGRAM(s) IntraMuscular once PRN  insulin lispro (HumaLOG) corrective regimen sliding scale   SubCutaneous three times a day before meals  levothyroxine 100 MICROGram(s) Oral daily

## 2018-12-05 NOTE — PROGRESS NOTE ADULT - PROBLEM SELECTOR PLAN 7
onc john called and noted- no further inpt w/u  psa ok  bone scan terminated due to noncompliance  pain meds as needed

## 2018-12-05 NOTE — PROGRESS NOTE ADULT - SUBJECTIVE AND OBJECTIVE BOX
Patient is a 78y old  Male who presents with a chief complaint of back pain (05 Dec 2018 11:20)      INTERVAL HPI/OVERNIGHT EVENTS: pt better overnight, for transfer this am, disucssed with spine sx and transfer service to [facility]    MEDICATIONS  (STANDING):  aspirin enteric coated 325 milliGRAM(s) Oral daily  atorvastatin 40 milliGRAM(s) Oral at bedtime  dextrose 5%. 1000 milliLiter(s) (50 mL/Hr) IV Continuous <Continuous>  dextrose 50% Injectable 12.5 Gram(s) IV Push once  dextrose 50% Injectable 25 Gram(s) IV Push once  dextrose 50% Injectable 25 Gram(s) IV Push once  fentaNYL   Patch  12 MICROgram(s)/Hr 1 Patch Transdermal every 72 hours  furosemide    Tablet 40 milliGRAM(s) Oral daily  heparin  Injectable 5000 Unit(s) SubCutaneous every 12 hours  insulin lispro (HumaLOG) corrective regimen sliding scale   SubCutaneous three times a day before meals  levothyroxine 100 MICROGram(s) Oral daily  metoprolol tartrate 12.5 milliGRAM(s) Oral two times a day  sertraline 100 milliGRAM(s) Oral daily  tamsulosin 0.4 milliGRAM(s) Oral every 12 hours    MEDICATIONS  (PRN):  acetaminophen   Tablet .. 1000 milliGRAM(s) Oral three times a day PRN Temp greater or equal to 38C (100.4F), Mild Pain (1 - 3)  bisacodyl 5 milliGRAM(s) Oral every 12 hours PRN Constipation  dextrose 40% Gel 15 Gram(s) Oral once PRN Blood Glucose LESS THAN 70 milliGRAM(s)/deciliter  glucagon  Injectable 1 milliGRAM(s) IntraMuscular once PRN Glucose LESS THAN 70 milligrams/deciliter  morphine  - Injectable 2 milliGRAM(s) IV Push every 4 hours PRN Severe Pain (7 - 10)  oxyCODONE    IR 5 milliGRAM(s) Oral every 4 hours PRN Mild Pain (1 - 3)  oxyCODONE    IR 10 milliGRAM(s) Oral every 6 hours PRN Moderate Pain (4 - 6)      Allergies    No Known Allergies    Intolerances        REVIEW OF SYSTEMS:  CONSTITUTIONAL: back pain  EYES: No eye pain, visual disturbances  ENMT:  No difficulty hearing, tinnitus, vertigo; No sinus or throat pain  NECK: No pain or stiffness  RESPIRATORY: No cough, wheezing, chills or hemoptysis; No shortness of breath  CARDIOVASCULAR: No chest pain, palpitations, dizziness  GASTROINTESTINAL: No abdominal or epigastric pain. No nausea, vomiting, or hematemesis; No diarrhea or constipation. No melena or hematochezia.  GENITOURINARY: No dysuria, frequency, hematuria, or incontinence  NEUROLOGICAL: No headaches, memory loss, loss of strength, numbness, or tremors  SKIN: No itching, burning  LYMPH NODES: No enlarged glands  MUSCULOSKELETAL: back pain  PSYCHIATRIC: anxious  HEME/LYMPH: No easy bruising, or bleeding gums  ALLERGY AND IMMUNOLOGIC: No hives    Vital Signs Last 24 Hrs  T(C): 36.4 (05 Dec 2018 09:33), Max: 36.7 (04 Dec 2018 14:18)  T(F): 97.5 (05 Dec 2018 09:33), Max: 98.1 (04 Dec 2018 17:03)  HR: 89 (05 Dec 2018 09:33) (78 - 90)  BP: 110/72 (05 Dec 2018 09:33) (104/66 - 133/77)  BP(mean): --  RR: 16 (05 Dec 2018 09:33) (16 - 20)  SpO2: 97% (05 Dec 2018 09:33) (93% - 97%)    PHYSICAL EXAM:  GENERAL: NAD, well-groomed, well-developed  HEAD:  Atraumatic, Normocephalic  EYES: EOMI, PERRLA, conjunctiva and sclera clear  ENMT: No tonsillar erythema, exudates, or enlargement   NECK: Supple, No JVD  NERVOUS SYSTEM:  Alert & Oriented , confused at times  CHEST/LUNG: Clear to auscultation bilaterally; No rales, rhonchi, wheezing  HEART: Regular rate and rhythm  ABDOMEN: Soft, Nontender, Nondistended; Bowel sounds present  EXTREMITIES:  2+ Peripheral Pulses   LYMPH: No lymphadenopathy noted  SKIN: No rashes     LABS:                        13.5   8.19  )-----------( 209      ( 05 Dec 2018 07:59 )             42.9     05 Dec 2018 07:59    143    |  105    |  64     ----------------------------<  141    4.3     |  25     |  2.09     Ca    9.2        05 Dec 2018 07:59          CAPILLARY BLOOD GLUCOSE      POCT Blood Glucose.: 125 mg/dL (05 Dec 2018 07:53)  POCT Blood Glucose.: 145 mg/dL (04 Dec 2018 21:16)  POCT Blood Glucose.: 131 mg/dL (04 Dec 2018 16:46)  POCT Blood Glucose.: 128 mg/dL (04 Dec 2018 12:27)    blood culture --   No growth to date.   12-03 @ 19:21    blood culture --   <10,000 CFU/ml  Normal Urogenital fito present   12-01 @ 12:51      urine culture --  12-03 @ 19:21  results   No growth to date. 12-03 @ 19:21  urine culture --  12-01 @ 12:51  results   <10,000 CFU/ml  Normal Urogenital fito present 12-01 @ 12:51    wound with gram statin --    12-03 @ 19:21  organism  --   12-03 @ 19:21  specimen source .Body Fluid Pleural Fluid  12-03 @ 19:21  wound with gram statin --    12-01 @ 12:51  organism  --   12-01 @ 12:51  specimen source .Urine Clean Catch (Midstream)  12-01 @ 12:51      RADIOLOGY & ADDITIONAL TESTS:  < from: NM Bone Imaging Total (12.04.18 @ 11:50) >  XAM:  NM BONE IMG WHOLE BODY                                  PROCEDURE DATE:  12/04/2018          INTERPRETATION:  RADIOPHARMACEUTICAL: 20.0 mCi Tc-99m HDP, I.V.     CLINICAL INFORMATION: 78 year old male with prostate carcinoma; referred   to evaluate for osseous metastases.    TECHNIQUE:  The patient was injected with the above radiopharmaceutical   but was unable to cooperate for imaging because of pain, and the study   was terminated.     IMPRESSION: Incomplete bone scan. Patient was injected with the above   radiopharmaceutical but was unable to cooperate for imaging and the test   was terminated.                   CAROL ODELL M.D., CHIEF OF NUCLEAR MEDICINE  This document has been electronically signed. Dec  4 2018  5:12PM                < end of copied text >        Consultant(s) Notes Reviewed:  [x ] YES  [ ] NO    Care Discussed with Consultants/Other Providers [x ] YES  [ ] NO    Advanced care planning discussed with patient and family, advanced care planning forms reviewed, discussed, and completed.  20 minutes spent.

## 2018-12-06 DIAGNOSIS — R06.1 STRIDOR: ICD-10-CM

## 2018-12-06 DIAGNOSIS — E11.22 TYPE 2 DIABETES MELLITUS WITH DIABETIC CHRONIC KIDNEY DISEASE: ICD-10-CM

## 2018-12-06 DIAGNOSIS — Z01.810 ENCOUNTER FOR PREPROCEDURAL CARDIOVASCULAR EXAMINATION: ICD-10-CM

## 2018-12-06 DIAGNOSIS — E03.9 HYPOTHYROIDISM, UNSPECIFIED: ICD-10-CM

## 2018-12-06 DIAGNOSIS — E78.5 HYPERLIPIDEMIA, UNSPECIFIED: ICD-10-CM

## 2018-12-06 DIAGNOSIS — I10 ESSENTIAL (PRIMARY) HYPERTENSION: ICD-10-CM

## 2018-12-06 DIAGNOSIS — S22.009A UNSPECIFIED FRACTURE OF UNSPECIFIED THORACIC VERTEBRA, INITIAL ENCOUNTER FOR CLOSED FRACTURE: ICD-10-CM

## 2018-12-06 LAB
ANION GAP SERPL CALC-SCNC: 16 MMOL/L — SIGNIFICANT CHANGE UP (ref 5–17)
APTT BLD: 33.8 SEC — SIGNIFICANT CHANGE UP (ref 27.5–36.3)
BLD GP AB SCN SERPL QL: NEGATIVE — SIGNIFICANT CHANGE UP
BUN SERPL-MCNC: 61 MG/DL — HIGH (ref 7–23)
CALCIUM SERPL-MCNC: 9.3 MG/DL — SIGNIFICANT CHANGE UP (ref 8.4–10.5)
CHLORIDE SERPL-SCNC: 105 MMOL/L — SIGNIFICANT CHANGE UP (ref 96–108)
CO2 SERPL-SCNC: 24 MMOL/L — SIGNIFICANT CHANGE UP (ref 22–31)
CREAT SERPL-MCNC: 1.84 MG/DL — HIGH (ref 0.5–1.3)
GLUCOSE BLDC GLUCOMTR-MCNC: 131 MG/DL — HIGH (ref 70–99)
GLUCOSE BLDC GLUCOMTR-MCNC: 132 MG/DL — HIGH (ref 70–99)
GLUCOSE BLDC GLUCOMTR-MCNC: 139 MG/DL — HIGH (ref 70–99)
GLUCOSE BLDC GLUCOMTR-MCNC: 147 MG/DL — HIGH (ref 70–99)
GLUCOSE BLDC GLUCOMTR-MCNC: 168 MG/DL — HIGH (ref 70–99)
GLUCOSE BLDC GLUCOMTR-MCNC: 220 MG/DL — HIGH (ref 70–99)
GLUCOSE SERPL-MCNC: 152 MG/DL — HIGH (ref 70–99)
HCT VFR BLD CALC: 43.1 % — SIGNIFICANT CHANGE UP (ref 39–50)
HGB BLD-MCNC: 13.8 G/DL — SIGNIFICANT CHANGE UP (ref 13–17)
INR BLD: 1.25 RATIO — HIGH (ref 0.88–1.16)
MCHC RBC-ENTMCNC: 29.1 PG — SIGNIFICANT CHANGE UP (ref 27–34)
MCHC RBC-ENTMCNC: 32 GM/DL — SIGNIFICANT CHANGE UP (ref 32–36)
MCV RBC AUTO: 91 FL — SIGNIFICANT CHANGE UP (ref 80–100)
NT-PROBNP SERPL-SCNC: HIGH PG/ML (ref 0–300)
PLATELET # BLD AUTO: 218 K/UL — SIGNIFICANT CHANGE UP (ref 150–400)
POTASSIUM SERPL-MCNC: 4.3 MMOL/L — SIGNIFICANT CHANGE UP (ref 3.5–5.3)
POTASSIUM SERPL-SCNC: 4.3 MMOL/L — SIGNIFICANT CHANGE UP (ref 3.5–5.3)
PROTHROM AB SERPL-ACNC: 14.3 SEC — HIGH (ref 10–12.9)
RBC # BLD: 4.73 M/UL — SIGNIFICANT CHANGE UP (ref 4.2–5.8)
RBC # FLD: 16.9 % — HIGH (ref 10.3–14.5)
RH IG SCN BLD-IMP: POSITIVE — SIGNIFICANT CHANGE UP
SODIUM SERPL-SCNC: 145 MMOL/L — SIGNIFICANT CHANGE UP (ref 135–145)
WBC # BLD: 7.3 K/UL — SIGNIFICANT CHANGE UP (ref 3.8–10.5)
WBC # FLD AUTO: 7.3 K/UL — SIGNIFICANT CHANGE UP (ref 3.8–10.5)

## 2018-12-06 PROCEDURE — 71045 X-RAY EXAM CHEST 1 VIEW: CPT | Mod: 26

## 2018-12-06 PROCEDURE — 99222 1ST HOSP IP/OBS MODERATE 55: CPT | Mod: 25

## 2018-12-06 PROCEDURE — 93970 EXTREMITY STUDY: CPT | Mod: 26

## 2018-12-06 PROCEDURE — 31575 DIAGNOSTIC LARYNGOSCOPY: CPT

## 2018-12-06 PROCEDURE — 99222 1ST HOSP IP/OBS MODERATE 55: CPT

## 2018-12-06 PROCEDURE — 99223 1ST HOSP IP/OBS HIGH 75: CPT | Mod: GC

## 2018-12-06 RX ORDER — CHOLECALCIFEROL (VITAMIN D3) 125 MCG
1000 CAPSULE ORAL DAILY
Qty: 0 | Refills: 0 | Status: DISCONTINUED | OUTPATIENT
Start: 2018-12-06 | End: 2018-12-09

## 2018-12-06 RX ORDER — ATORVASTATIN CALCIUM 80 MG/1
40 TABLET, FILM COATED ORAL AT BEDTIME
Qty: 0 | Refills: 0 | Status: DISCONTINUED | OUTPATIENT
Start: 2018-12-06 | End: 2018-12-09

## 2018-12-06 RX ORDER — METOPROLOL TARTRATE 50 MG
12.5 TABLET ORAL EVERY 12 HOURS
Qty: 0 | Refills: 0 | Status: DISCONTINUED | OUTPATIENT
Start: 2018-12-06 | End: 2018-12-07

## 2018-12-06 RX ORDER — LEVOTHYROXINE SODIUM 125 MCG
100 TABLET ORAL DAILY
Qty: 0 | Refills: 0 | Status: DISCONTINUED | OUTPATIENT
Start: 2018-12-06 | End: 2018-12-09

## 2018-12-06 RX ORDER — HYDROMORPHONE HYDROCHLORIDE 2 MG/ML
0.5 INJECTION INTRAMUSCULAR; INTRAVENOUS; SUBCUTANEOUS ONCE
Qty: 0 | Refills: 0 | Status: DISCONTINUED | OUTPATIENT
Start: 2018-12-06 | End: 2018-12-06

## 2018-12-06 RX ADMIN — Medication 100 MILLIGRAM(S): at 06:13

## 2018-12-06 RX ADMIN — SODIUM CHLORIDE 3 MILLILITER(S): 9 INJECTION INTRAMUSCULAR; INTRAVENOUS; SUBCUTANEOUS at 06:16

## 2018-12-06 RX ADMIN — SODIUM CHLORIDE 3 MILLILITER(S): 9 INJECTION INTRAMUSCULAR; INTRAVENOUS; SUBCUTANEOUS at 21:29

## 2018-12-06 RX ADMIN — OXYCODONE HYDROCHLORIDE 10 MILLIGRAM(S): 5 TABLET ORAL at 06:11

## 2018-12-06 RX ADMIN — Medication 12.5 MILLIGRAM(S): at 18:51

## 2018-12-06 RX ADMIN — OXYCODONE HYDROCHLORIDE 10 MILLIGRAM(S): 5 TABLET ORAL at 06:40

## 2018-12-06 RX ADMIN — FENTANYL CITRATE 1 PATCH: 50 INJECTION INTRAVENOUS at 07:21

## 2018-12-06 RX ADMIN — Medication 100 MILLIGRAM(S): at 22:11

## 2018-12-06 RX ADMIN — HYDROMORPHONE HYDROCHLORIDE 0.5 MILLIGRAM(S): 2 INJECTION INTRAMUSCULAR; INTRAVENOUS; SUBCUTANEOUS at 15:48

## 2018-12-06 RX ADMIN — ATORVASTATIN CALCIUM 40 MILLIGRAM(S): 80 TABLET, FILM COATED ORAL at 22:12

## 2018-12-06 RX ADMIN — OXYCODONE HYDROCHLORIDE 10 MILLIGRAM(S): 5 TABLET ORAL at 22:12

## 2018-12-06 RX ADMIN — SODIUM CHLORIDE 3 MILLILITER(S): 9 INJECTION INTRAMUSCULAR; INTRAVENOUS; SUBCUTANEOUS at 15:49

## 2018-12-06 RX ADMIN — FENTANYL CITRATE 1 PATCH: 50 INJECTION INTRAVENOUS at 18:39

## 2018-12-06 RX ADMIN — HYDROMORPHONE HYDROCHLORIDE 0.5 MILLIGRAM(S): 2 INJECTION INTRAMUSCULAR; INTRAVENOUS; SUBCUTANEOUS at 16:10

## 2018-12-06 NOTE — CONSULT NOTE ADULT - SUBJECTIVE AND OBJECTIVE BOX
Patient is a 78y old  Male who presents with a chief complaint of back pain (05 Dec 2018 13:51)      HPI:  Js Wood, 77 y/o M pt w/ PMHx CAD (on asa), MI, HTN, HLD, DM, venous stasis dermatitis, bladder stones, prostate CA, hypothyroidism presents to the ED c/o lower back pain s/p slip and fall backwards on 11/22. Reports he fell landing on his back resulting in lower back pain, saw orthopedist 2-3 days ago, Rxd 5mg oxycodone-acetaminophen. Also XRs completed at that time which revealed arthritis, no fx. He endorses that the medication is not relieving his pain. Pt denies bladder or bowel dysfunction, fever, chills or any other complaints at this time. CT showed L1 distraction injury and bl T12 pedicle fx (01 Dec 2018 06:56)      PAST MEDICAL & SURGICAL HISTORY:  Diabetic ulcer of both lower extremities  Venous stasis dermatitis of both lower extremities  Hyperlipidemia  Type 2 diabetes mellitus without complication  Hypothyroidism  Bladder stones: s/p extraction  Prostate cancer: s/p brachytherapy  Myocardial infarction: 1995  CAD (coronary artery disease)  S/P appendectomy  S/P CABG x 3: saphenous vein harvested from LLE, performed in March 1996 at [facility] by Dr. Mcgraw      FAMILY HISTORY:  No pertinent family history in first degree relatives      SOCIAL HISTORY:    Allergies    No Known Allergies    Intolerances        SUBJECTIVE / OVERNIGHT EVENTS:          ROS  REVIEW OF SYSTEMS      General:	 alert conversive     Skin/Breast:  warm no rash  	  Ophthalmologic: no blurry vision no double vision  	  ENMT:	no discharge no tenderness     Respiratory and Thorax: no wheezing no discomfort   	  Cardiovascular:	no cp no chest wall tenderness     Gastrointestinal:	no diarrhea no vomiting    Genitourinary:	    Musculoskeletal:	    Neurological:	 no tremor no dysphagia     Psychiatric:	    Hematology/Lymphatics:	    Endocrine:	no hair loss nor dry skin     Allergic/Immunologic:	    Vital Signs Last 24 Hrs  T(C): 36.6 (06 Dec 2018 04:33), Max: 36.9 (05 Dec 2018 21:33)  T(F): 97.8 (06 Dec 2018 04:33), Max: 98.4 (05 Dec 2018 21:33)  HR: 88 (06 Dec 2018 04:33) (88 - 98)  BP: 126/81 (06 Dec 2018 04:33) (110/72 - 134/81)  BP(mean): --  RR: 18 (06 Dec 2018 04:33) (16 - 18)  SpO2: 93% (06 Dec 2018 04:33) (93% - 99%)  I&O's Summary    05 Dec 2018 07:01  -  06 Dec 2018 07:00  --------------------------------------------------------  IN: 80 mL / OUT: 0 mL / NET: 80 mL          LABS:                        13.5   8.19  )-----------( 209      ( 05 Dec 2018 07:59 )             42.9     12-05    144  |  105  |  63<H>  ----------------------------<  177<H>  4.6   |  22  |  1.89<H>    Ca    9.1      05 Dec 2018 13:36    TPro  6.4  /  Alb  3.2<L>  /  TBili  1.7<H>  /  DBili  x   /  AST  23  /  ALT  11  /  AlkPhos  158<H>  12-05    PT/INR - ( 05 Dec 2018 13:36 )   PT: 14.6 sec;   INR: 1.27 ratio         PTT - ( 05 Dec 2018 13:36 )  PTT:32.6 sec  CAPILLARY BLOOD GLUCOSE      POCT Blood Glucose.: 129 mg/dL (05 Dec 2018 22:06)  POCT Blood Glucose.: 150 mg/dL (05 Dec 2018 18:38)  POCT Blood Glucose.: 125 mg/dL (05 Dec 2018 07:53)            RADIOLOGY & ADDITIONAL TESTS:    Imaging Personally Reviewed:  [x] YES  [ ] NO    Consultant(s) Notes Reviewed:  [x] YES  [ ] NO      MEDICATIONS  (STANDING):  dextrose 5%. 1000 milliLiter(s) (50 mL/Hr) IV Continuous <Continuous>  dextrose 50% Injectable 12.5 Gram(s) IV Push once  dextrose 50% Injectable 25 Gram(s) IV Push once  dextrose 50% Injectable 25 Gram(s) IV Push once  docusate sodium 100 milliGRAM(s) Oral three times a day  insulin lispro (HumaLOG) corrective regimen sliding scale   SubCutaneous three times a day before meals  insulin lispro (HumaLOG) corrective regimen sliding scale   SubCutaneous at bedtime  sodium chloride 0.9% lock flush 3 milliLiter(s) IV Push every 8 hours    MEDICATIONS  (PRN):  acetaminophen   Tablet .. 650 milliGRAM(s) Oral every 6 hours PRN Temp greater or equal to 38C (100.4F), Mild Pain (1 - 3)  dextrose 40% Gel 15 Gram(s) Oral once PRN Blood Glucose LESS THAN 70 milliGRAM(s)/deciliter  glucagon  Injectable 1 milliGRAM(s) IntraMuscular once PRN Glucose LESS THAN 70 milligrams/deciliter  ondansetron Injectable 4 milliGRAM(s) IV Push every 6 hours PRN Nausea and/or Vomiting  oxyCODONE    IR 5 milliGRAM(s) Oral every 4 hours PRN Moderate Pain (4 - 6)  oxyCODONE    IR 10 milliGRAM(s) Oral every 4 hours PRN Severe Pain (7 - 10)  senna 2 Tablet(s) Oral at bedtime PRN Constipation      Care Discussed with Consultants/Other Providers [x] YES  [ ] NO    HEALTH ISSUES - PROBLEM Dx:  YELITZA (acute kidney injury): YELITZA (acute kidney injury)  Cystitis: Cystitis  Pleural effusion: Pleural effusion  Fluid overload: Fluid overload  Prostate cancer: Prostate cancer  Hypothyroidism: Hypothyroidism  Type 2 diabetes mellitus without complication: Type 2 diabetes mellitus without complication  Diabetic ulcer of both lower extremities: Diabetic ulcer of both lower extremities  Fracture of vertebra, thoracic: Fracture of vertebra, thoracic  Fracture of vertebra, lumbar: Fracture of vertebra, lumbar

## 2018-12-06 NOTE — PROVIDER CONTACT NOTE (OTHER) - ACTION/TREATMENT ORDERED:
As per Jeff PA, apply 2LO2NC and recheck O2 sats. Supplemental oxygen applied. Will cont to monitor and assess for any changes. Safety maintained.

## 2018-12-06 NOTE — CONSULT NOTE ADULT - ASSESSMENT
78 year old man with tightly controlled DM2, HTN, HLD, hypothyroidism here with lumbar and thoracic  fracture.

## 2018-12-06 NOTE — CONSULT NOTE ADULT - PROBLEM SELECTOR PROBLEM 1
Pleural effusion
Type 2 diabetes mellitus without complication
Preop cardiovascular exam
Stridor
Type 2 diabetes mellitus with stage 3 chronic kidney disease, with long-term current use of insulin

## 2018-12-06 NOTE — CONSULT NOTE ADULT - ASSESSMENT
78 year old male with cad now being considered for surgery for L1 distraction injury and bl T12 pedicle fx.

## 2018-12-06 NOTE — CONSULT NOTE ADULT - SUBJECTIVE AND OBJECTIVE BOX
Wound SURGERY CONSULT NOTE    HPI:  79 y/o M pt w/ PMHx CAD (on asa), MI, HTN, HLD, DM, venous stasis dermatitis, bladder stones, prostate CA, hypothyroidism presents to the ED c/o lower back pain s/p slip and fall backwards on 11/22. Reports he fell landing on his back resulting in lower back pain, saw orthopedist 2-3 days ago, Rxd 5mg oxycodone-acetaminophen. Also XRs completed at that time which revealed arthritis, no fx. He endorses that the medication is not relieving his pain. Pt denies bladder or bowel dysfunction, fever, chills or any other complaints at this time. CT showed L1 distraction injury and bl T12 pedicle fx  Wound consult requested to assist w/ management of multiple  wounds.   Pt w/ h/o multiple falls.  Bright wounds are traumatic.  Uses wheelchair, sedentary & incontinent.  Pt noted h/o osteo heels w/ Tx including HBO.  Pt has been following at [facility].  ?compliance to compression.  Pt w/o h/o surgery in area of sacral wound.   RN place allevyn dressings on sacrum and  on BLE DSD/ xeroform dressing placed awaiting consult. No drainage, odor, redness, warmth, pain, f/c/s noted. All questions asked and answered to pt's satisfaction.       PAST MEDICAL & SURGICAL HISTORY:  Venous stasis dermatitis   Hyperlipidemia  Type 2 diabetes mellitus without complication  Hypothyroidism  Bladder stones: s/p extraction  Prostate cancer: s/p brachytherapy  Myocardial infarction  CAD (coronary artery disease) S/P CABG x 3: saphenous vein harvested from LLE  S/P appendectomy  s/p Pilonidal Cyst excision      REVIEW OF SYSTEMS  Skin/ MSK: see HPI  All other systems negative    MEDICATIONS  (STANDING):  atorvastatin 40 milliGRAM(s) Oral at bedtime  cholecalciferol 1000 Unit(s) Oral daily  dextrose 5%. 1000 milliLiter(s) (50 mL/Hr) IV Continuous <Continuous>  dextrose 50% Injectable 12.5 Gram(s) IV Push once  dextrose 50% Injectable 25 Gram(s) IV Push once  dextrose 50% Injectable 25 Gram(s) IV Push once  docusate sodium 100 milliGRAM(s) Oral three times a day  insulin lispro (HumaLOG) corrective regimen sliding scale   SubCutaneous three times a day before meals  insulin lispro (HumaLOG) corrective regimen sliding scale   SubCutaneous at bedtime  levothyroxine 100 MICROGram(s) Oral daily  metoprolol tartrate 12.5 milliGRAM(s) Oral every 12 hours  sodium chloride 0.9% lock flush 3 milliLiter(s) IV Push every 8 hours    MEDICATIONS  (PRN):  acetaminophen   Tablet .. 650 milliGRAM(s) Oral every 6 hours PRN Temp greater or equal to 38C (100.4F), Mild Pain (1 - 3)  dextrose 40% Gel 15 Gram(s) Oral once PRN Blood Glucose LESS THAN 70 milliGRAM(s)/deciliter  glucagon  Injectable 1 milliGRAM(s) IntraMuscular once PRN Glucose LESS THAN 70 milligrams/deciliter  ondansetron Injectable 4 milliGRAM(s) IV Push every 6 hours PRN Nausea and/or Vomiting  oxyCODONE    IR 5 milliGRAM(s) Oral every 4 hours PRN Moderate Pain (4 - 6)  oxyCODONE    IR 10 milliGRAM(s) Oral every 4 hours PRN Severe Pain (7 - 10)  senna 2 Tablet(s) Oral at bedtime PRN Constipation    No Known Allergies    SOCIAL HISTORY:  ; Denies smoking, ETOH, drugs    FAMILY HISTORY:  No pertinent family history in first degree relatives      Vital Signs Last 24 Hrs  T(C): 36.6 (06 Dec 2018 16:15), Max: 36.9 (05 Dec 2018 21:33)  T(F): 97.8 (06 Dec 2018 16:15), Max: 98.4 (05 Dec 2018 21:33)  HR: 64 (06 Dec 2018 16:15) (64 - 99)  BP: 133/76 (06 Dec 2018 16:15) (116/80 - 155/93)  BP(mean): --  RR: 18 (06 Dec 2018 16:15) (18 - 18)  SpO2: 98% (06 Dec 2018 16:15) (86% - 99%)    NAD / A&Ox3/ Obese/ frail  WD/ WN/  Disheveled  Versa Care P500 bed    Cardiovascular: RRR     Respiratory: CTA    Gastrointestinal soft NT/ND (+)BS      Neurology  weakened strength & sensation grossly intact    Musculoskeletal/Vascular: FROM x4 stiff /limited   BLE edema equal  No DP/PT pulses palpable  BLE equally warm  no acute ischemia noted  BLE hemosiderin staining  Bilateral shins w/ partial thickness moist & granular  serosanguinous drainage  No odor, erythema, increased warmth, tenderness, induration, fluctuance    Skin:  dry. frail    COccyx/ gluteal cleft wound adjacent to well healed linear incision  3.5cm x 1.5cm x 0.1cm   scant serosanguinous drainage  No odor, erythema, increased warmth, tenderness, induration, fluctuance    LABS:  12-06    145  |  105  |  61<H>  ----------------------------<  152<H>  4.3   |  24  |  1.84<H>    Ca    9.3      06 Dec 2018 08:21    TPro  6.4  /  Alb  3.2<L>  /  TBili  1.7<H>  /  DBili  x   /  AST  23  /  ALT  11  /  AlkPhos  158<H>  12-05                          13.8   7.3   )-----------( 218      ( 06 Dec 2018 08:21 )             43.1     PT/INR - ( 06 Dec 2018 08:21 )   PT: 14.3 sec;   INR: 1.25 ratio    PTT - ( 06 Dec 2018 08:21 )  PTT:33.8 sec      RADIOLOGY & ADDITIONAL STUDIES:      < from: VA Duplex Lower Ext Vein Scan, Bilat (12.06.18 @ 17:12) >  Findings: Venous duplex imaging was performed on both lower extremities.   The common femoral, superficial femoral and popliteal veins demonstrated   normal flow and compression. Calf veins are not well-seen due to   overlying gauze dressings.    Impression: There is no evidence of deep vein thrombosis involving either   lower extremity. Note however that isolated calf vein thrombus is not   excluded.    < from: VA Physiol Extremity Lower 3+ Level, BI (05.21.18 @ 11:56) >  Comparison: None     Findings:     RIGHT  MARY ANNE: 1.11       Flow study: Flow study shows good flow from the high thigh through the   right great toe, however the flow in the right great toe is lower   amplitude than on the left.    LEFT  MARY ANNE: 1.08      Flow study: Flow study shows good flow from the high thigh through the   left great toe        IMPRESSION:     NO HEMODYNAMICALLY SIGNIFICANT LOWER EXTREMITY ARTERIAL DISEASE      Culture - Fungal, Body Fluid (12.03.18 @ 19:21)    Specimen Source: .Body Fluid Pleural Fluid    Culture Results:   Testing in progress    Culture - Acid Fast - Body Fluid w/Smear (12.03.18 @ 19:21)    Specimen Source: .Body Fluid Pleural Fluid    Acid Fast Bacilli Smear:   No acid fast bacilli seen by fluorochrome stain    Culture - Body Fluid with Gram Stain (12.03.18 @ 19:21)    Gram Stain:   polymorphonuclear leukocytes seen  No organisms seen  by cytocentrifuge    Specimen Source: .Body Fluid Pleural Fluid    Culture Results:   No growth to date.

## 2018-12-06 NOTE — CONSULT NOTE ADULT - ATTENDING COMMENTS
Agree with assessment and plan as above by Dr. Pemberton. Reviewed all pertinent labs, glucose values, and imaging studies. Modifications made as indicated above.     Alejandro Iverson D.O  [phone]

## 2018-12-06 NOTE — CONSULT NOTE ADULT - PROBLEM SELECTOR RECOMMENDATION 9
pending indirect laryngoscopy -Humidified O2  -Ocean NS nasal spray BID   -No further ENT intervention at this time

## 2018-12-06 NOTE — CONSULT NOTE ADULT - CONSULT REQUESTED DATE/TIME
01-Dec-2018 09:44
01-Dec-2018 10:17
01-Dec-2018 15:55
01-Dec-2018 18:59
04-Dec-2018 13:46
05-Dec-2018 13:52
06-Dec-2018 07:24
06-Dec-2018 09:27
06-Dec-2018 10:09
06-Dec-2018 10:50
06-Dec-2018 11:24
06-Dec-2018 13:13
06-Dec-2018 13:45
02-Dec-2018 14:46

## 2018-12-06 NOTE — CONSULT NOTE ADULT - SUBJECTIVE AND OBJECTIVE BOX
NEPHROLOGY CONSULTATION    CHIEF COMPLAINT:    HPI:  Js Wood is 79 y/o M w/PMHx CAD (on asa), MI, HTN, HLD, DM, venous stasis dermatitis, bladder stones, prostate CA, hypothyroidism presented to the [facility] ED c/o lower back pain s/p slip and fall backwards on 11/22. Reports he fell landing on his back resulting in lower back pain, saw orthopedist. Also XR completed at that time which revealed arthritis, no fx. He endorsed that the medication was not relieving his pain. Pt denies bladder or bowel dysfunction, fever, chills, CP, SOB, N/V. CT showed L1 distraction injury and bl T12 pedicle fx. noted to have YELITZA.    ROS:  as above    Allergies:  No Known Allergies    PAST MEDICAL & SURGICAL HISTORY:  Diabetic ulcer of both lower extremities  Venous stasis dermatitis of both lower extremities  Hyperlipidemia  Type 2 diabetes mellitus without complication  Hypothyroidism  Bladder stones: s/p extraction  Prostate cancer: s/p brachytherapy  Myocardial infarction: 1995  CAD (coronary artery disease)  S/P appendectomy  S/P CABG x 3: saphenous vein harvested from LLE, performed in March 1996 at [facility] by Dr. Mcgraw    SOCIAL HISTORY:  negative    FAMILY HISTORY:  No pertinent family history in first degree relatives    MEDICATIONS  (STANDING):  dextrose 5%. 1000 milliLiter(s) (50 mL/Hr) IV Continuous <Continuous>  dextrose 50% Injectable 12.5 Gram(s) IV Push once  dextrose 50% Injectable 25 Gram(s) IV Push once  dextrose 50% Injectable 25 Gram(s) IV Push once  docusate sodium 100 milliGRAM(s) Oral three times a day  insulin lispro (HumaLOG) corrective regimen sliding scale   SubCutaneous three times a day before meals  insulin lispro (HumaLOG) corrective regimen sliding scale   SubCutaneous at bedtime  sodium chloride 0.9% lock flush 3 milliLiter(s) IV Push every 8 hours    Vital Signs Last 24 Hrs  T(C): 36.3 (12-06-18 @ 09:04), Max: 36.9 (12-05-18 @ 21:33)  T(F): 97.4 (12-06-18 @ 09:04), Max: 98.4 (12-05-18 @ 21:33)  HR: 72 (12-06-18 @ 09:04) (72 - 98)  BP: 116/80 (12-06-18 @ 09:04) (116/80 - 134/81)  RR: 18 (12-06-18 @ 10:47) (18 - 18)  SpO2: 98% (12-06-18 @ 10:47) (86% - 99%)    Conversant, no apparent distress  PERRLA, pink conjunctivae, no ptosis  Good dentition, no pharyngeal erythema  Neck non tender, no mass, no thyromegaly or nodules  Normal respiratory effort, lungs clear to auscultation  Heart with RRR, no murmurs or rubs, no peripheral edema  Abdomen soft, no masses, no organomegaly  Skin no rashes, ulcers or lesions, normal turgor and temperature  Appropriate affect, AO x 3    LABS:                        13.8   7.3   )-----------( 218      ( 06 Dec 2018 08:21 )             43.1     12-06    145  |  105  |  61<H>  ----------------------------<  152<H>  4.3   |  24  |  1.84<H>    Ca    9.3      06 Dec 2018 08:21    TPro  6.4  /  Alb  3.2<L>  /  TBili  1.7<H>  /  DBili  x   /  AST  23  /  ALT  11  /  AlkPhos  158<H>  12-05    LIVER FUNCTIONS - ( 05 Dec 2018 13:36 )  Alb: 3.2 g/dL / Pro: 6.4 g/dL / ALK PHOS: 158 U/L / ALT: 11 U/L / AST: 23 U/L / GGT: x           Culture - Fungal, Body Fluid (collected 03 Dec 2018 19:21)  Source: .Body Fluid Pleural Fluid  Preliminary Report (04 Dec 2018 07:44):    Testing in progress    Culture - Acid Fast - Body Fluid w/Smear (collected 03 Dec 2018 19:21)  Source: .Body Fluid Pleural Fluid    Culture - Body Fluid with Gram Stain (collected 03 Dec 2018 19:21)  Source: .Body Fluid Pleural Fluid  Gram Stain (03 Dec 2018 22:18):    polymorphonuclear leukocytes seen    No organisms seen    by cytocentrifuge  Preliminary Report (04 Dec 2018 17:19):    No growth to date.    PT/INR - ( 06 Dec 2018 08:21 )   PT: 14.3 sec;   INR: 1.25 ratio      PTT - ( 06 Dec 2018 08:21 )  PTT:33.8 sec    A/P: NEPHROLOGY CONSULTATION    CHIEF COMPLAINT: back pain    HPI:  Js Wood is 79 y/o M w/PMHx CAD (on asa), MI, HTN, HLD, DM, CKD III (baseline Cr 1.5-1.9) venous stasis dermatitis, bladder stones, prostate CA, hypothyroidism presented to the [facility] ED c/o lower back pain s/p slip and fall backwards on 11/22. Reports he fell landing on his back resulting in lower back pain, saw orthopedist. Also XR completed at that time which revealed arthritis, no fx. He endorsed that the medication was not relieving his pain. Pt denies bladder or bowel dysfunction, fever, chills, CP, SOB, N/V. CT showed T12-L1 fx. Noted to have YELITZA on CKD. Now transferred to [facility] for neurosurgical eval. Awake, alert, not in distress. Was on Metformin, Lasix, Losartan prior to adm. S/p Ketorolac x 1 on Dec 1. Metformin d/c-d since Nov 30. Off ARB and Lasix since Dec 2.    ROS:  as above    Allergies:  No Known Allergies    PAST MEDICAL & SURGICAL HISTORY:  Diabetic ulcer of both lower extremities  Venous stasis dermatitis of both lower extremities  Hyperlipidemia  Type 2 diabetes mellitus without complication  Hypothyroidism  Bladder stones: s/p extraction  Prostate cancer: s/p brachytherapy  Myocardial infarction: 1995  CAD (coronary artery disease)  S/P appendectomy  S/P CABG x 3: saphenous vein harvested from LLE, performed in March 1996 at [facility] by Dr. Mcgraw  CKD III    SOCIAL HISTORY:  negative    FAMILY HISTORY:  No pertinent family history in first degree relatives    MEDICATIONS  (STANDING):  dextrose 5%. 1000 milliLiter(s) (50 mL/Hr) IV Continuous <Continuous>  dextrose 50% Injectable 12.5 Gram(s) IV Push once  dextrose 50% Injectable 25 Gram(s) IV Push once  dextrose 50% Injectable 25 Gram(s) IV Push once  docusate sodium 100 milliGRAM(s) Oral three times a day  insulin lispro (HumaLOG) corrective regimen sliding scale   SubCutaneous three times a day before meals  insulin lispro (HumaLOG) corrective regimen sliding scale   SubCutaneous at bedtime  sodium chloride 0.9% lock flush 3 milliLiter(s) IV Push every 8 hours    Vital Signs Last 24 Hrs  T(C): 36.3 (12-06-18 @ 09:04), Max: 36.9 (12-05-18 @ 21:33)  T(F): 97.4 (12-06-18 @ 09:04), Max: 98.4 (12-05-18 @ 21:33)  HR: 72 (12-06-18 @ 09:04) (72 - 98)  BP: 116/80 (12-06-18 @ 09:04) (116/80 - 134/81)  RR: 18 (12-06-18 @ 10:47) (18 - 18)  SpO2: 98% (12-06-18 @ 10:47) (86% - 99%)    Conversant, no apparent distress  PERRLA, EOMI  Neck non tender, supple  Normal respiratory effort, lungs clear to auscultation  Heart with RRR, no murmurs or rubs  Extr tr peripheral edema, b/l LE dressed  Abdomen soft, NT, ND, + BS  Appropriate affect, AO     LABS:                        13.8   7.3   )-----------( 218      ( 06 Dec 2018 08:21 )             43.1     12-06    145  |  105  |  61<H>  ----------------------------<  152<H>  4.3   |  24  |  1.84<H>    Ca    9.3      06 Dec 2018 08:21    TPro  6.4  /  Alb  3.2<L>  /  TBili  1.7<H>  /  DBili  x   /  AST  23  /  ALT  11  /  AlkPhos  158<H>  12-05    LIVER FUNCTIONS - ( 05 Dec 2018 13:36 )  Alb: 3.2 g/dL / Pro: 6.4 g/dL / ALK PHOS: 158 U/L / ALT: 11 U/L / AST: 23 U/L / GGT: x           Culture - Fungal, Body Fluid (collected 03 Dec 2018 19:21)  Source: .Body Fluid Pleural Fluid  Preliminary Report (04 Dec 2018 07:44):    Testing in progress    Culture - Acid Fast - Body Fluid w/Smear (collected 03 Dec 2018 19:21)  Source: .Body Fluid Pleural Fluid    Culture - Body Fluid with Gram Stain (collected 03 Dec 2018 19:21)  Source: .Body Fluid Pleural Fluid  Gram Stain (03 Dec 2018 22:18):    polymorphonuclear leukocytes seen    No organisms seen    by cytocentrifuge  Preliminary Report (04 Dec 2018 17:19):    No growth to date.    PT/INR - ( 06 Dec 2018 08:21 )   PT: 14.3 sec;   INR: 1.25 ratio      PTT - ( 06 Dec 2018 08:21 )  PTT:33.8 sec    A/P:    Suspect baseline DM CKD III  S/p hemodynamic YELITZA on CKD in setting of ARB, Lasix, Ketorolac  Avoid nephrotoxins  Renal fx has improved and is presently at baseline  No objection to diuresis as needed  No objection to planned surgery from renal pov  Avoid hypotension  Positive SPEP on Dec 2 noted  Bence Jones ordered  Consider Heme/onc input  D/w team

## 2018-12-06 NOTE — CONSULT NOTE ADULT - PROBLEM SELECTOR RECOMMENDATION 3
- recommend check vitamin D 25 level  - start vitamin D 1000 and calcium 500 mg daily units daily for now  - recommend outpatient DEXA

## 2018-12-06 NOTE — CONSULT NOTE ADULT - PROBLEM SELECTOR RECOMMENDATION 9
-  -Recommend proceeding with procedure based on the following     Revised cardiac risk index      Not high risk surgery      History of  ischemic heart disease, congestive heart failure      No history of stroke      Preop insulin use      Preop creatinine <2mg/dL  Given the above patient is considered MODERATE risk for INTERMEDIATE risk procedure. Doing further risk stratification such as cardiac catheterization, stress testing will only delay procedure patient needs and may cause harm to patient given ckd.     Will follow    Haroon Segovia D.O.  [phone]

## 2018-12-06 NOTE — CONSULT NOTE ADULT - SUBJECTIVE AND OBJECTIVE BOX
PULMONARY CONSULT  Jurgen Rojas MD  [phone]    Initial HPI on admission:  HPI:  Js Wood, 79 y/o M pt w/ PMHx CAD (on asa), MI, HTN, HLD, DM, venous stasis dermatitis, bladder stones, prostate CA, hypothyroidism presents to the ED c/o lower back pain s/p slip and fall backwards on 11/22. Reports he fell landing on his back resulting in lower back pain, saw orthopedist 2-3 days ago, Rxd 5mg oxycodone-acetaminophen. Also XRs completed at that time which revealed arthritis, no fx. He endorses that the medication is not relieving his pain. Pt denies bladder or bowel dysfunction, fever, chills or any other complaints at this time. CT showed L1 distraction injury and bl T12 pedicle fx (01 Dec 2018 06:56)    Patient was transferred from [facility] on  12/7  He underwent R  US guided thorocentesis on 12/4: total of 160 ml transudative fluid aspirated at that time  He denies history of COPD/Asthm, or recent URI, fever, chills, productive cough  He has increasingly limited mobility prior to mechanical fall, and uses walker at home  Diuretic regimen at [place]: lasix 40 mg po qd - he is currently not on diuretics  He has complained of hoarse voice recently, and intermittent "wheeze" during inspiration since the fall. He denies SOB today at rest, supine      PAST MEDICAL & SURGICAL HISTORY:  Diabetic ulcer of both lower extremities  Venous stasis dermatitis of both lower extremities  Hyperlipidemia  Type 2 diabetes mellitus without complication  Hypothyroidism  Bladder stones: s/p extraction  Prostate cancer: s/p brachytherapy  Myocardial infarction: 1995  CAD (coronary artery disease)  S/P appendectomy  S/P CABG x 3: saphenous vein harvested from LLE, performed in March 1996 at [facility] by Dr. Mcgraw     Review of Systems:  · General	negative	   		  · Skin/Breast Comments	chronic b/l le ulcers	  · Ophthalmologic	negative	  · ENMT	negative	  · Respiratory and Thorax	negative	  · Cardiovascular	negative	  · Gastrointestinal Comments	belly swelling	  · Musculoskeletal Symptoms	stiffness	  · Musculoskeletal Comments	le pain and ulcers	  · Neurological	negative	  · Psychiatric	negative	  · Hematology/Lymphatics	negative	  · Endocrine	negative	      Allergies and Intolerances:        Allergies:  	No Known Allergies:     FAMILY HISTORY:  No pertinent family history in first degree relatives    Social history:  and lives with spouse; 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 1000 milliLiter(s) (50 mL/Hr) IV Continuous <Continuous>  dextrose 50% Injectable 12.5 Gram(s) IV Push once  dextrose 50% Injectable 25 Gram(s) IV Push once  dextrose 50% Injectable 25 Gram(s) IV Push once  docusate sodium 100 milliGRAM(s) Oral three times a day  insulin lispro (HumaLOG) corrective regimen sliding scale   SubCutaneous three times a day before meals  insulin lispro (HumaLOG) corrective regimen sliding scale   SubCutaneous at bedtime  sodium chloride 0.9% lock flush 3 milliLiter(s) IV Push every 8 hours    MEDICATIONS  (PRN):  acetaminophen   Tablet .. 650 milliGRAM(s) Oral every 6 hours PRN Temp greater or equal to 38C (100.4F), Mild Pain (1 - 3)  dextrose 40% Gel 15 Gram(s) Oral once PRN Blood Glucose LESS THAN 70 milliGRAM(s)/deciliter  glucagon  Injectable 1 milliGRAM(s) IntraMuscular once PRN Glucose LESS THAN 70 milligrams/deciliter  ondansetron Injectable 4 milliGRAM(s) IV Push every 6 hours PRN Nausea and/or Vomiting  oxyCODONE    IR 5 milliGRAM(s) Oral every 4 hours PRN Moderate Pain (4 - 6)  oxyCODONE    IR 10 milliGRAM(s) Oral every 4 hours PRN Severe Pain (7 - 10)  senna 2 Tablet(s) Oral at bedtime PRN Constipation    Vital Signs Last 24 Hrs  T(C): 36.3 (06 Dec 2018 09:04), Max: 36.9 (05 Dec 2018 21:33)  T(F): 97.4 (06 Dec 2018 09:04), Max: 98.4 (05 Dec 2018 21:33)  HR: 72 (06 Dec 2018 09:04) (72 - 98)  BP: 116/80 (06 Dec 2018 09:04) (116/80 - 134/81)  BP(mean): --  RR: 18 (06 Dec 2018 10:47) (18 - 18)  SpO2: 98% (06 Dec 2018 10:47) (86% - 99%)      12-05 @ 07:01  -  12-06 @ 07:00  --------------------------------------------------------  IN: 140 mL / OUT: 0 mL / NET: 140 mL      LABS:                        13.8   7.3   )-----------( 218      ( 06 Dec 2018 08:21 )             43.1     12-06    145  |  105  |  61<H>  ----------------------------<  152<H>  4.3   |  24  |  1.84<H>    Ca    9.3      06 Dec 2018 08:21    TPro  6.4  /  Alb  3.2<L>  /  TBili  1.7<H>  /  DBili  x   /  AST  23  /  ALT  11  /  AlkPhos  158<H>  12-05      PT/INR - ( 06 Dec 2018 08:21 )   PT: 14.3 sec;   INR: 1.25 ratio         PTT - ( 06 Dec 2018 08:21 )  PTT:33.8 sec    CULTURES:  Culture Results:   No growth to date. (12-03 @ 19:21)  Culture Results:   Testing in progress (12-03 @ 19:21)  Culture Results:   <10,000 CFU/ml  Normal Urogenital fito present (12-01 @ 12:51)        Physical Examination:    General: Non toxic, No acute distress.  Sat 98% on 2 liters/min nasal O2    HEENT: Pupils equal, reactive to light.  Symmetric.    PULM: Limitetd anterolateral exam: no stridor, wheeze; diminished effort, audible BS R basilar    CVS: Regular rate and rhythm, no murmurs, rubs, or gallops    ABD: Soft, nondistended, nontender, normoactive bowel sounds, no masses    EXT: Trace to 1+ LE edema; chronic stasis changes, below knee dressings    SKIN: Warm and well perfused, no rashes noted.    NEURO: Alert, oriented, interactive, nonfocal    RADIOLOGY REVIEWED PERSONALLY  CXR:    CT chest:  PROCEDURE DATE:  12/01/2018    INTERPRETATION:  .    CLINICAL INFORMATION: History of prostate ca with back pain and fracture.   Now with pleural effusion. Evaluate for metastatic disease.    TECHNIQUE: Helical axial images of the chest were obtained from the   thoracic inlet to the adrenal glands without the administration of   intravenous contrast. Sagittal and coronal reformations were obtained   from the source data.     COMPARISON: Prior chest x-ray examination from earlier today. No prior   chest CT examinations are available for comparison. Prior CT examination   of the lumbar spine from 11/30/2018.    FINDINGS: Scattered nonspecific subcentimeter sized lymph nodes are noted   within the axillary areas, mediastinum, and hilar regions.    The heart size is borderline enlarged. The patient is status post median   sternotomy. The patient is status post CABG There are atherosclerotic   calcifications of the imaged coronary arteries, aorta, and branch   vessels. The imaged portions of the aorta are normal in caliber.    Small scattered layering secretions are notable within the trachea.   Otherwise, the central airways are patent. There are moderate-sized right   and trace left-sided pleural effusions with adjacent atelectasis.   Calcified granulomas are notable within the left lung base. Fluid is   notable within the left major fissure.    Mild liver cirrhosis is notable. Cholelithiasis is noted.     There is mild vague peripancreatic fat stranding. Correlate with lipase   levels to assess for pancreatitis.    There is nonspecific bilateral perinephric stranding. There is a small   amount of abdominal ascites. The other visualized upper abdominal organs   appear unremarkable.    Previously noted three column fracture at the level of L1 through the   superior endplate is again noted. There is distraction of the fracture   fragments in the craniocaudad direction measuring approximately 1 cm. An   epidural hematoma at this level cannot be excluded. There is also   redemonstration of a relatively nondisplaced linear obliquely aortic   fracture through the anteroinferior endplate of T12 with anterior wedge   compression.    Flowing marginal osteophytes are noted with relative preservation of the   disc spaces. Mild hypertrophy of the spinous processes are notable with   mild calcification/ossification of the nuchal ligament. No aggressive   osteoblastic lesions are notable.    Old right-sided rib fractures are noted    IMPRESSION:    1. Redemonstration of an unstable three column fracture through the level   of L1. No gross epidural hematoma is visualized at this time, although   evaluation is limited on CT modality. Recommend further evaluation with   an MRI examination of the thoracic and lumbar spine, if there are no   clinical contraindications. Redemonstration of a linear relatively   nondisplaced fracture through the anteroinferior endplate of T12 with   anterior wedging.    2. Osseous findings compatible with either DISH versus ankylosing   spondylitis which predisposes the osseous structures to pathologic   fractures.    3. No aggressive osteoblastic lesions to suggest metastatic disease.   Correlate with pending bone scan which is already ordered at the time of   this report.    4. Moderate-sized right and trace left-sided pleural effusions with   adjacent atelectasis.    5. Other findings, as discussed.  TTE:    EXAM:  ECHO TTE W/O CON COMP W/DOPPLR         PROCEDURE DATE:  07/22/2018        INTERPRETATION:  INDICATION: pleural effusion  Referring M.D.:Chin  Blood Pressure 121/79        Weight (kg) :127     Height (cm):177       BSA (sq m): 2.4  Technician: RM    Dimensions:    LA 5.6       Normal Values: 2.0 - 4.0 cm    Ao 3.0        Normal Values: 2.0 - 3.8 cm  SEPTUM 0.9       Normal Values: 0.6 - 1.2 cm  PWT 0.9       Normal Values: 0.6 - 1.1 cm  LVIDd 6.6         Normal Values: 3.0 - 5.6 cm  LVIDs 4.9         Normal Values: 1.8 - 4.0 cm      OBSERVATIONS:  Technically difficult study  Mitral Valve: Calcified mitral annulus with normally opening leaflets.   Mild mitral regurgitation.  Aortic Valve/Aorta: Calcified trileaflet aortic valve decreased visual   opening. Insufficient interrogation of the aortic valve in order to give   an area.  Tricuspid Valve: Normal tricuspid valve. Trace tricuspid regurgitation.  Pulmonic Valve: The pulmonic valve is not well visualized. Probably   normal.  Left Atrium: Moderate enlargement  Right Atrium: Normal  Left Ventricle: The endocardium is not well-visualized. There appears to   be mild to moderate left ventricular systolic dysfunction. The septum,   inferior wall, inferolateral walls on limited evaluation appear to be   hypokinetic. The left ventricular assessment was limited by the presence   of significant ectopy/questionable A. fib. The EF is approximately 40-45%.  Right Ventricle: Normal right ventricular size and function.  Pericardium/Pleura: No pericardial effusion noted. Right-sided pleural   effusion noted  Pulmonary/RV Pressure: Insufficient tricuspid regurgitation Doppler in   order to estimate the right ventricular systolic pressure  LV Diastolic Function: Indeterminant    Conclusion: Technically difficult and limited study. Moderate left atrial   enlargement. Mild to moderate left ventricular systolic dysfunction on   limited evaluation. EF is 40-45%. Right-sided pleural effusion            JANETTE MCINTOSH M.D., ATTENDING CARDIOLOGIST  This document has been electronically signed. Jul 23 2018  5:18PM

## 2018-12-06 NOTE — CONSULT NOTE ADULT - ASSESSMENT
L1 and T12 bilateral pedicle fracture post mechanical fall on 11/22, now preop  Systolic CHF with LVEF 40-45% s/p CABG  Prostate CA: early stage, s/p brachetherapy without clinical suspicion of metastatic disease: bone scan deferred  Bilateral R>L pleural effusions and LE edema dut to CHF: 12/1 CT showed moderate R effusion - thorocentesis only withdrew 160 ml, so suspect there may be significant residual fluid, unless diuretic regimen prior to transfer improved  Remote smoking history, no clinical history of COPD  Mild hypoxemia likely due to RLL atelectasis secondary to effusion - patient is currently without stridor, wheezing, or dyspnea.   Patient is at high risk for DVT given prolonged immobility, fracture, and CHF. He is currently not on proophylaxis (held preop)    REC    LE dopplers pre-op: ordered for today  F/U CXR to assess residual effusion: if not significant, see no pulmonary contraindication to planned surgical intervention  Bedside ENT exam for intermittent insp wheeze/hoarsness  Would resume diuretics as tolerated - will address with cardiology

## 2018-12-06 NOTE — PROGRESS NOTE ADULT - SUBJECTIVE AND OBJECTIVE BOX
Patient is a 78y old  Male who presents with a chief complaint of back pain (05 Dec 2018 13:51)      SUBJECTIVE / OVERNIGHT EVENTS:    pt resting in bed no events overnight pt notes weakness malaise.and chill     Vital Signs Last 24 Hrs  T(C): 36.3 (06 Dec 2018 09:04), Max: 36.9 (05 Dec 2018 21:33)  T(F): 97.4 (06 Dec 2018 09:04), Max: 98.4 (05 Dec 2018 21:33)  HR: 72 (06 Dec 2018 09:04) (72 - 98)  BP: 116/80 (06 Dec 2018 09:04) (110/72 - 134/81)  BP(mean): --  RR: 18 (06 Dec 2018 04:33) (16 - 18)  SpO2: 93% (06 Dec 2018 04:33) (93% - 99%)  I&O's Summary    05 Dec 2018 07:01  -  06 Dec 2018 07:00  --------------------------------------------------------  IN: 140 mL / OUT: 0 mL / NET: 140 mL            LABS:                        13.8   7.3   )-----------( 218      ( 06 Dec 2018 08:21 )             43.1     12-06    145  |  105  |  61<H>  ----------------------------<  152<H>  4.3   |  24  |  1.84<H>    Ca    9.3      06 Dec 2018 08:21    TPro  6.4  /  Alb  3.2<L>  /  TBili  1.7<H>  /  DBili  x   /  AST  23  /  ALT  11  /  AlkPhos  158<H>  12-05    PT/INR - ( 06 Dec 2018 08:21 )   PT: 14.3 sec;   INR: 1.25 ratio         PTT - ( 06 Dec 2018 08:21 )  PTT:33.8 sec  CAPILLARY BLOOD GLUCOSE      POCT Blood Glucose.: 129 mg/dL (05 Dec 2018 22:06)  POCT Blood Glucose.: 150 mg/dL (05 Dec 2018 18:38)            RADIOLOGY & ADDITIONAL TESTS:    Imaging Personally Reviewed:  [x] YES  [ ] NO    Consultant(s) Notes Reviewed:  [x] YES  [ ] NO      MEDICATIONS  (STANDING):  dextrose 5%. 1000 milliLiter(s) (50 mL/Hr) IV Continuous <Continuous>  dextrose 50% Injectable 12.5 Gram(s) IV Push once  dextrose 50% Injectable 25 Gram(s) IV Push once  dextrose 50% Injectable 25 Gram(s) IV Push once  docusate sodium 100 milliGRAM(s) Oral three times a day  insulin lispro (HumaLOG) corrective regimen sliding scale   SubCutaneous three times a day before meals  insulin lispro (HumaLOG) corrective regimen sliding scale   SubCutaneous at bedtime  sodium chloride 0.9% lock flush 3 milliLiter(s) IV Push every 8 hours    MEDICATIONS  (PRN):  acetaminophen   Tablet .. 650 milliGRAM(s) Oral every 6 hours PRN Temp greater or equal to 38C (100.4F), Mild Pain (1 - 3)  dextrose 40% Gel 15 Gram(s) Oral once PRN Blood Glucose LESS THAN 70 milliGRAM(s)/deciliter  glucagon  Injectable 1 milliGRAM(s) IntraMuscular once PRN Glucose LESS THAN 70 milligrams/deciliter  ondansetron Injectable 4 milliGRAM(s) IV Push every 6 hours PRN Nausea and/or Vomiting  oxyCODONE    IR 5 milliGRAM(s) Oral every 4 hours PRN Moderate Pain (4 - 6)  oxyCODONE    IR 10 milliGRAM(s) Oral every 4 hours PRN Severe Pain (7 - 10)  senna 2 Tablet(s) Oral at bedtime PRN Constipation      Care Discussed with Consultants/Other Providers [x] YES  [ ] NO    HEALTH ISSUES - PROBLEM Dx:  YELITZA (acute kidney injury): YELITZA (acute kidney injury)  Cystitis: Cystitis  Pleural effusion: Pleural effusion  Fluid overload: Fluid overload  Prostate cancer: Prostate cancer  Hypothyroidism: Hypothyroidism  Type 2 diabetes mellitus without complication: Type 2 diabetes mellitus without complication  Diabetic ulcer of both lower extremities: Diabetic ulcer of both lower extremities  Fracture of vertebra, thoracic: Fracture of vertebra, thoracic  Fracture of vertebra, lumbar: Fracture of vertebra, lumbar Patient is a 78y old  Male who presents with a chief complaint of back pain (05 Dec 2018 13:51)      as per hpi - Js Wood, 79 y/o M pt w/ PMHx CAD (on asa), MI, HTN, HLD, DM, venous stasis dermatitis, bladder stones, prostate CA, hypothyroidism presents to the ED c/o lower back pain s/p slip and fall backwards on 11/22. Reports he fell landing on his back resulting in lower back pain, saw orthopedist 2-3 days ago, Rxd 5mg oxycodone-acetaminophen. Also XRs completed at that time which revealed arthritis, no fx. He endorses that the medication is not relieving his pain. Pt denies bladder or bowel dysfunction, fever, chills or any other complaints at this time. CT showed L1 distraction injury and bl T12 pedicle fx        SUBJECTIVE / OVERNIGHT EVENTS:    pt resting in bed no events overnight pt notes weakness malaise.and chill     Vital Signs Last 24 Hrs  T(C): 36.3 (06 Dec 2018 09:04), Max: 36.9 (05 Dec 2018 21:33)  T(F): 97.4 (06 Dec 2018 09:04), Max: 98.4 (05 Dec 2018 21:33)  HR: 72 (06 Dec 2018 09:04) (72 - 98)  BP: 116/80 (06 Dec 2018 09:04) (110/72 - 134/81)  BP(mean): --  RR: 18 (06 Dec 2018 04:33) (16 - 18)  SpO2: 93% (06 Dec 2018 04:33) (93% - 99%)  I&O's Summary    05 Dec 2018 07:01  -  06 Dec 2018 07:00  --------------------------------------------------------  IN: 140 mL / OUT: 0 mL / NET: 140 mL            LABS:                        13.8   7.3   )-----------( 218      ( 06 Dec 2018 08:21 )             43.1     12-06    145  |  105  |  61<H>  ----------------------------<  152<H>  4.3   |  24  |  1.84<H>    Ca    9.3      06 Dec 2018 08:21    TPro  6.4  /  Alb  3.2<L>  /  TBili  1.7<H>  /  DBili  x   /  AST  23  /  ALT  11  /  AlkPhos  158<H>  12-05    PT/INR - ( 06 Dec 2018 08:21 )   PT: 14.3 sec;   INR: 1.25 ratio         PTT - ( 06 Dec 2018 08:21 )  PTT:33.8 sec  CAPILLARY BLOOD GLUCOSE      POCT Blood Glucose.: 129 mg/dL (05 Dec 2018 22:06)  POCT Blood Glucose.: 150 mg/dL (05 Dec 2018 18:38)      REVIEW OF SYSTEMS      General:	 conversive     Skin/Breast:  warm no rash  	  Ophthalmologic: no blurry vision no double vision  	  ENMT:	no discharge no tenderness     Respiratory and Thorax: no wheezing no discomfort   	  Cardiovascular:	no cp no chest wall tenderness     Gastrointestinal:	no diarrhea no vomiting    Genitourinary:	    Musculoskeletal:	    Neurological:	 no tremor no dysphagia     Psychiatric:	    Hematology/Lymphatics:	    Endocrine:	no hair loss nor dry skin     Allergic/Immunologic:	      RADIOLOGY & ADDITIONAL TESTS:    Imaging Personally Reviewed:  [x] YES  [ ] NO    Consultant(s) Notes Reviewed:  [x] YES  [ ] NO      MEDICATIONS  (STANDING):  dextrose 5%. 1000 milliLiter(s) (50 mL/Hr) IV Continuous <Continuous>  dextrose 50% Injectable 12.5 Gram(s) IV Push once  dextrose 50% Injectable 25 Gram(s) IV Push once  dextrose 50% Injectable 25 Gram(s) IV Push once  docusate sodium 100 milliGRAM(s) Oral three times a day  insulin lispro (HumaLOG) corrective regimen sliding scale   SubCutaneous three times a day before meals  insulin lispro (HumaLOG) corrective regimen sliding scale   SubCutaneous at bedtime  sodium chloride 0.9% lock flush 3 milliLiter(s) IV Push every 8 hours    MEDICATIONS  (PRN):  acetaminophen   Tablet .. 650 milliGRAM(s) Oral every 6 hours PRN Temp greater or equal to 38C (100.4F), Mild Pain (1 - 3)  dextrose 40% Gel 15 Gram(s) Oral once PRN Blood Glucose LESS THAN 70 milliGRAM(s)/deciliter  glucagon  Injectable 1 milliGRAM(s) IntraMuscular once PRN Glucose LESS THAN 70 milligrams/deciliter  ondansetron Injectable 4 milliGRAM(s) IV Push every 6 hours PRN Nausea and/or Vomiting  oxyCODONE    IR 5 milliGRAM(s) Oral every 4 hours PRN Moderate Pain (4 - 6)  oxyCODONE    IR 10 milliGRAM(s) Oral every 4 hours PRN Severe Pain (7 - 10)  senna 2 Tablet(s) Oral at bedtime PRN Constipation      Care Discussed with Consultants/Other Providers [x] YES  [ ] NO    HEALTH ISSUES - PROBLEM Dx:  YELITZA (acute kidney injury): YELITZA (acute kidney injury)  Cystitis: Cystitis  Pleural effusion: Pleural effusion  Fluid overload: Fluid overload  Prostate cancer: Prostate cancer  Hypothyroidism: Hypothyroidism  Type 2 diabetes mellitus without complication: Type 2 diabetes mellitus without complication  Diabetic ulcer of both lower extremities: Diabetic ulcer of both lower extremities  Fracture of vertebra, thoracic: Fracture of vertebra, thoracic  Fracture of vertebra, lumbar: Fracture of vertebra, lumbar

## 2018-12-06 NOTE — CONSULT NOTE ADULT - PROBLEM SELECTOR RECOMMENDATION 9
- BG values currently at goal on low correction sliding scale insulin  - continue with HISS qac and qhs  - consistent carb diet  - will follow  - for discharge: Hba1c is 5.8%, which is too low for patient. Stop metformin on d/c given renal function and stop Victoza as well. Will likely d/c on Tresiba only, but final recommendations to be determined.

## 2018-12-06 NOTE — CONSULT NOTE ADULT - SUBJECTIVE AND OBJECTIVE BOX
[facility] Cardiology Consult  _________________________    Patient is a 78y old  Male who presents with a chief complaint of back pain (06 Dec 2018 07:23)      HPI:  Js Wood, 77 y/o M pt w/ PMHx CAD (on asa), MI, HTN, HLD, DM, venous stasis dermatitis, bladder stones, prostate CA, hypothyroidism presents to the ED c/o lower back pain s/p slip and fall backwards on 11/22. Reports he fell landing on his back resulting in lower back pain, saw orthopedist 2-3 days ago, Rxd 5mg oxycodone-acetaminophen. Also XRs completed at that time which revealed arthritis, no fx. He endorses that the medication is not relieving his pain. Pt denies bladder or bowel dysfunction, fever, chills or any other complaints at this time. CT showed L1 distraction injury and bl T12 pedicle fx.    Cardiology consulted for preoperative evaluation for surgery for his back.   Patient denies any chest pain, palpitations, no syncope.   He has some shortness of breath and he walks with walker at baseline.     PAST MEDICAL & SURGICAL HISTORY:  Diabetic ulcer of both lower extremities  Venous stasis dermatitis of both lower extremities  Hyperlipidemia  Type 2 diabetes mellitus without complication  Hypothyroidism  Bladder stones: s/p extraction  Prostate cancer: s/p brachytherapy  Myocardial infarction: 1995  CAD (coronary artery disease)  S/P appendectomy  S/P CABG x 3: saphenous vein harvested from LLE, performed in March 1996 at [facility] by Dr. Mcgraw      MEDICATIONS  (STANDING):  dextrose 5%. 1000 milliLiter(s) (50 mL/Hr) IV Continuous <Continuous>  dextrose 50% Injectable 12.5 Gram(s) IV Push once  dextrose 50% Injectable 25 Gram(s) IV Push once  dextrose 50% Injectable 25 Gram(s) IV Push once  docusate sodium 100 milliGRAM(s) Oral three times a day  insulin lispro (HumaLOG) corrective regimen sliding scale   SubCutaneous three times a day before meals  insulin lispro (HumaLOG) corrective regimen sliding scale   SubCutaneous at bedtime  sodium chloride 0.9% lock flush 3 milliLiter(s) IV Push every 8 hours    MEDICATIONS  (PRN):  acetaminophen   Tablet .. 650 milliGRAM(s) Oral every 6 hours PRN Temp greater or equal to 38C (100.4F), Mild Pain (1 - 3)  dextrose 40% Gel 15 Gram(s) Oral once PRN Blood Glucose LESS THAN 70 milliGRAM(s)/deciliter  glucagon  Injectable 1 milliGRAM(s) IntraMuscular once PRN Glucose LESS THAN 70 milligrams/deciliter  ondansetron Injectable 4 milliGRAM(s) IV Push every 6 hours PRN Nausea and/or Vomiting  oxyCODONE    IR 5 milliGRAM(s) Oral every 4 hours PRN Moderate Pain (4 - 6)  oxyCODONE    IR 10 milliGRAM(s) Oral every 4 hours PRN Severe Pain (7 - 10)  senna 2 Tablet(s) Oral at bedtime PRN Constipation      Allergies    No Known Allergies    Intolerances        Social Histroy: Tobacco- , ETOH-, Illicit Drugs-    T(C): 36.3 (12-06-18 @ 09:04), Max: 36.9 (12-05-18 @ 21:33)  HR: 72 (12-06-18 @ 09:04) (72 - 98)  BP: 116/80 (12-06-18 @ 09:04) (110/72 - 134/81)  RR: 18 (12-06-18 @ 04:33) (16 - 18)  SpO2: 93% (12-06-18 @ 04:33) (93% - 99%)  I&O's Summary    05 Dec 2018 07:01  -  06 Dec 2018 07:00  --------------------------------------------------------  IN: 140 mL / OUT: 0 mL / NET: 140 mL        Review of Systems:  Constitutional: [ ] Fever [ ] Chills [ ] Fatigue [ ] Weight change   HEENT: [ ] Blurred vision [ ] Eye Pain [ ] Headache [ ] Runny nose [ ] Sore Throat   Respiratory: [ ] Cough [ ] Wheezing [x] Shortness of breath  Cardiovascular: [ ] Chest Pain [ ] Palpitations [ ] GORDON [ ] PND [ ] Orthopnea  Gastrointestinal: [ ] Abdominal Pain [ ] Diarrhea [ ] Constipation [ ] Hemorrhoids [ ] Nausea [ ] Vomiting  Genitourinary: [ ] Nocturia [ ] Dysuria [ ] Incontinence  Extremities: [ ] Swelling [x] Joint Pain  Neurologic: [ ] Focal deficit [ ] Paresthesias [ ] Syncope  Lymphatic: [ ] Swelling [ ] Lymphadenopathy   Skin: [ ] Rash [ ] Ecchymoses [ ] Wounds [ ] Lesions  Psychiatry: [ ] Depression [ ] Suicidal/Homicidal Ideation [ ] Anxiety [ ] Sleep Disturbances  [x] 10 point review of systems is otherwise negative except as mentioned above            [ ]Unable to obtain    PHYSICAL EXAM:  GENERAL: Alert, NAD  NECK: Supple  CHEST/LUNG: Clear to auscultation bilaterally; No wheezes, rales, or rhonchi  HEART: S1 S2 normal, RRR,  No murmurs, rubs, or gallops  ABDOMEN: Soft, Nontender, Nondistended; Bowel sounds present  EXTREMITIES:  trace LE edema.      LABS:                        13.8   7.3   )-----------( 218      ( 06 Dec 2018 08:21 )             43.1     12-06    145  |  105  |  61<H>  ----------------------------<  152<H>  4.3   |  24  |  1.84<H>    Ca    9.3      06 Dec 2018 08:21    TPro  6.4  /  Alb  3.2<L>  /  TBili  1.7<H>  /  DBili  x   /  AST  23  /  ALT  11  /  AlkPhos  158<H>  12-05    PT/INR - ( 06 Dec 2018 08:21 )   PT: 14.3 sec;   INR: 1.25 ratio         PTT - ( 06 Dec 2018 08:21 )  PTT:33.8 sec  CARDIAC MARKERS ( 03 Dec 2018 07:34 )  x     / x     / 156 U/L / x     / x      CARDIAC MARKERS ( 02 Dec 2018 16:43 )  x     / x     / 175 U/L / x     / x      CARDIAC MARKERS ( 01 Dec 2018 01:51 )  .042 ng/mL / x     / x     / x     / x        MEDICATIONS  (STANDING):  dextrose 5%. 1000 milliLiter(s) (50 mL/Hr) IV Continuous <Continuous>  dextrose 50% Injectable 12.5 Gram(s) IV Push once  dextrose 50% Injectable 25 Gram(s) IV Push once  dextrose 50% Injectable 25 Gram(s) IV Push once  docusate sodium 100 milliGRAM(s) Oral three times a day  insulin lispro (HumaLOG) corrective regimen sliding scale   SubCutaneous three times a day before meals  insulin lispro (HumaLOG) corrective regimen sliding scale   SubCutaneous at bedtime  sodium chloride 0.9% lock flush 3 milliLiter(s) IV Push every 8 hours    MEDICATIONS  (PRN):  acetaminophen   Tablet .. 650 milliGRAM(s) Oral every 6 hours PRN Temp greater or equal to 38C (100.4F), Mild Pain (1 - 3)  dextrose 40% Gel 15 Gram(s) Oral once PRN Blood Glucose LESS THAN 70 milliGRAM(s)/deciliter  glucagon  Injectable 1 milliGRAM(s) IntraMuscular once PRN Glucose LESS THAN 70 milligrams/deciliter  ondansetron Injectable 4 milliGRAM(s) IV Push every 6 hours PRN Nausea and/or Vomiting  oxyCODONE    IR 5 milliGRAM(s) Oral every 4 hours PRN Moderate Pain (4 - 6)  oxyCODONE    IR 10 milliGRAM(s) Oral every 4 hours PRN Severe Pain (7 - 10)  senna 2 Tablet(s) Oral at bedtime PRN Constipation          RADIOLOGY & ADDITIONAL TESTS:    Cardiology testing:  EKG: sinus rhythm with ivcs, pvc

## 2018-12-06 NOTE — PROGRESS NOTE ADULT - ASSESSMENT
HPI:  Patient is a 78 year old male w/ multiple medical problems with back pain after a fall.  Presented to ED when pain did not improve.  Imaging showed T12 fracture.  Transferred to [facility] for neurosurgical management.    PLAN:  -strict spine precautions  -cardiology recs appreciated: moderate risk for intermediate risk  -pulm recs appreciated - CXR stable, 2L NC to improve oxygenation  -renal recs appreciated - bence mcclellan proteins pending, ?heme/onc preop vs. post op  -medicine recs appreciated  -ENT saw - ocean spray BID  -b/l LE duplex pending to r/o DVT  -home medications  -lovenox/heparin held for OR tomorrow, SCDs for DVT prophylaxis  -consistent carbohydrate, sodium & cholesterol restricted diet, npo/ivf at midnight  -out of bed with assistance  -incentive spirometer for lung expansion  -am labs  -OR in AM    Spectra #[id number] HPI:  Patient is a 78 year old male w/ multiple medical problems with back pain after a fall.  Presented to ED when pain did not improve.  Imaging showed T12 fracture.  Transferred to [facility] for neurosurgical management.    PLAN:  -strict spine precautions  -cardiology recs appreciated: moderate risk for intermediate risk  -pulm recs appreciated - CXR stable, 2L NC to improve oxygenation, no diuretics at this time as per Dr. Rojas  -renal recs appreciated - bence mcclellan proteins pending, ?heme/onc preop vs. post op  -medicine recs appreciated  -ENT saw - ocean spray BID  -b/l LE duplex pending to r/o DVT  -home medications  -lovenox/heparin held for OR tomorrow, SCDs for DVT prophylaxis  -consistent carbohydrate, sodium & cholesterol restricted diet, npo/ivf at midnight  -out of bed with assistance  -incentive spirometer for lung expansion  -am labs  -OR in AM    Spectra #[id number]

## 2018-12-06 NOTE — CONSULT NOTE ADULT - CONSULT REQUESTED BY NAME
CINDY Turner
Dr CINDY Turner
Dr Cooper
Dr Cooper
Dr crisostomo
Dr. Cooper
Dr. Cooper
Dr. Turner
Heath
Johnny
MD
[facility]
Neurosurgery
Johnny

## 2018-12-06 NOTE — PROGRESS NOTE ADULT - SUBJECTIVE AND OBJECTIVE BOX
HPI:  Patient is a 78 year old male w/ multiple medical problems with back pain after a fall.  Presented to ED when pain did not improve.  Imaging showed T12 fracture.  Transferred to [facility] for neurosurgical management.    OVERNIGHT EVENTS:  No acute events overnight.  Patient being followed by multiple consult services in preparation for OR.  Back pain controlled on current regimen.  Tolerating diet.  Planned for OR tomorrow.  Patient had mild desaturation on room air earlier, nasal cannula started, pulm following.    Vital Signs Last 24 Hrs  T(C): 36.4 (06 Dec 2018 15:02), Max: 36.9 (05 Dec 2018 21:33)  T(F): 97.6 (06 Dec 2018 15:02), Max: 98.4 (05 Dec 2018 21:33)  HR: 99 (06 Dec 2018 15:02) (72 - 99)  BP: 155/93 (06 Dec 2018 15:02) (116/80 - 155/93)  BP(mean): --  RR: 18 (06 Dec 2018 15:02) (18 - 18)  SpO2: 95% (06 Dec 2018 15:02) (86% - 99%)        PHYSICAL EXAM:  Neurological: awake, alert, oriented x3, follows commands, speech clear and fluent, moves all extremities x4 w/ 5/5 strength throughout, sensation present, intact, equal throughout    Cardiovascular: +s1, s2  Respiratory: clear to auscultation b/l  Gastrointestinal: soft, non-distended, non-tender  Genitourinary: +voiding    TUBES/LINES:  [x] none    DIET:  [x] consistent carbohydrate/sodium&cholesterol restricted    LABS:                        13.8   7.3   )-----------( 218      ( 06 Dec 2018 08:21 )             43.1     12-06    145  |  105  |  61<H>  ----------------------------<  152<H>  4.3   |  24  |  1.84<H>    Ca    9.3      06 Dec 2018 08:21    TPro  6.4  /  Alb  3.2<L>  /  TBili  1.7<H>  /  DBili  x   /  AST  23  /  ALT  11  /  AlkPhos  158<H>  12-05    PT/INR - ( 06 Dec 2018 08:21 )   PT: 14.3 sec;   INR: 1.25 ratio         PTT - ( 06 Dec 2018 08:21 )  PTT:33.8 sec        CAPILLARY BLOOD GLUCOSE      POCT Blood Glucose.: 147 mg/dL (06 Dec 2018 15:32)  POCT Blood Glucose.: 168 mg/dL (06 Dec 2018 12:26)  POCT Blood Glucose.: 131 mg/dL (06 Dec 2018 10:26)  POCT Blood Glucose.: 129 mg/dL (05 Dec 2018 22:06)  POCT Blood Glucose.: 150 mg/dL (05 Dec 2018 18:38)      Allergies    No Known Allergies        MEDICATIONS:  Antibiotics:  none    Neuro:  acetaminophen   Tablet .. 650 milliGRAM(s) Oral every 6 hours PRN  ondansetron Injectable 4 milliGRAM(s) IV Push every 6 hours PRN  oxyCODONE    IR 5 milliGRAM(s) Oral every 4 hours PRN  oxyCODONE    IR 10 milliGRAM(s) Oral every 4 hours PRN    Anticoagulation:    OTHER:  dextrose 40% Gel 15 Gram(s) Oral once PRN  dextrose 50% Injectable 12.5 Gram(s) IV Push once  dextrose 50% Injectable 25 Gram(s) IV Push once  dextrose 50% Injectable 25 Gram(s) IV Push once  docusate sodium 100 milliGRAM(s) Oral three times a day  glucagon  Injectable 1 milliGRAM(s) IntraMuscular once PRN  insulin lispro (HumaLOG) corrective regimen sliding scale   SubCutaneous three times a day before meals  insulin lispro (HumaLOG) corrective regimen sliding scale   SubCutaneous at bedtime  senna 2 Tablet(s) Oral at bedtime PRN    IVF:  dextrose 5%. 1000 milliLiter(s) IV Continuous <Continuous>  sodium chloride 0.9% lock flush 3 milliLiter(s) IV Push every 8 hours    CULTURES:  Culture Results:   No growth to date. (12-03 @ 19:21)  Culture Results:   Testing in progress (12-03 @ 19:21)    RADIOLOGY & ADDITIONAL TESTS:  LE duplex b/l: pending  CXR: stable right sided pleural effusion

## 2018-12-06 NOTE — CONSULT NOTE ADULT - SUBJECTIVE AND OBJECTIVE BOX
HPI:  79 y/o year old man with well controlled DM2, HTN, HLD, hypothyroidism CAD (on asa), venous stasis dermatitis, bladder stones, prostate CA, presents to the ED c/o lower back pain s/p slip and fall backwards on 11/22, found to have L1 distraction injury and bl T12 pedicle fracture.    Endocrine History:   Difficult to obtain history from patient. He reports being diagnosed with DM2 in 1986. Takes Metformin at home (does not know dose), daily Victoza, and Tresiba 16 units daily. HbA1c is 5.8%. Checks FS twice daily - AM fasting FS around 90 mg/dL. Checks again before lunch or after dinner and notes FS range from 100 - 136 mg/dL. Follows with optho and has retinopathy. Has neuropathy. Has chronic kidney disease stage 3b but he is unaware of this. Unclear how much he is eating at this time. Endocrinologist is Dr. Marta Hicks    Also has a history of hypothyroidism and takes Levothyroxine 88 mcg daily. Does not have neck pain, dysphagia, dysphonia. Has intermittent chest pain and palpitations. No abdominal pain, nausea, vomiting, diarrhea. Has constipation. Thinks he has been losing weight. No heat or cold intolerance. TSH is 8.52.     Regarding fracture history, he states that he slipped and fell backward.    PAST MEDICAL & SURGICAL HISTORY:  Diabetic ulcer of both lower extremities  Venous stasis dermatitis of both lower extremities  Hyperlipidemia  Type 2 diabetes mellitus without complication  Hypothyroidism  Bladder stones: s/p extraction  Prostate cancer: s/p brachytherapy  Myocardial infarction: 1995  CAD (coronary artery disease)  S/P appendectomy  S/P CABG x 3: saphenous vein harvested from LLE, performed in March 1996 at [facility] by Dr. Mcgraw      FAMILY HISTORY:  DM2 in father and aunts      Social History:  No cigarette use  No alcohol use    Outpatient Medications:  · 	metoprolol: 12.5 milligram(s) orally 2 times a day, Last Dose Taken:    · 	Flomax 0.4 mg oral capsule: 1 cap(s) orally 2 times a day, Last Dose Taken:    · 	nitroglycerin 0.4 mg sublingual tablet: 1 tab(s) orally once a day, As Needed, Last Dose Taken:    · 	Victoza 18 mg/3 mL subcutaneous solution: 1.8 milligram(s) subcutaneous once a day, Last Dose Taken:    · 	Aspirin Enteric Coated 325 mg oral delayed release tablet: 1 tab(s) orally once a day, Last Dose Taken:    · 	sertraline 100 mg oral tablet: 1 tab(s) orally once a day, Last Dose Taken:    · 	Tresiba FlexTouch 200 units/mL subcutaneous solution: 16 unit(s) subcutaneous once a day, Last Dose Taken:    · 	simvastatin 80 mg oral tablet: 1 tab(s) orally once a day (at bedtime), Last Dose Taken:    · 	metFORMIN 1000 mg oral tablet:  orally once a day, Last Dose Taken:    · 	potassium citrate 10 mEq oral tablet, extended release: 1 tab(s) orally once a day, Last Dose Taken:    · 	furosemide 40 mg oral tablet: orally every other day (at bedtime), Last Dose Taken:    · 	furosemide 80 mg oral tablet: orally every other day (at bedtime), Last Dose Taken:    · 	levothyroxine 88 mcg (0.088 mg) oral tablet: 1 tab(s) orally once a day, Last Dose Taken:    · 	losartan 25 mg oral tablet: 1 tab(s) orally once a day, Last Dose Taken:    · 	Percocet 5/325 oral tablet: orally 2 times a day    MEDICATIONS  (STANDING):  dextrose 5%. 1000 milliLiter(s) (50 mL/Hr) IV Continuous <Continuous>  dextrose 50% Injectable 12.5 Gram(s) IV Push once  dextrose 50% Injectable 25 Gram(s) IV Push once  dextrose 50% Injectable 25 Gram(s) IV Push once  docusate sodium 100 milliGRAM(s) Oral three times a day  insulin lispro (HumaLOG) corrective regimen sliding scale   SubCutaneous three times a day before meals  insulin lispro (HumaLOG) corrective regimen sliding scale   SubCutaneous at bedtime  sodium chloride 0.9% lock flush 3 milliLiter(s) IV Push every 8 hours    MEDICATIONS  (PRN):  acetaminophen   Tablet .. 650 milliGRAM(s) Oral every 6 hours PRN Temp greater or equal to 38C (100.4F), Mild Pain (1 - 3)  dextrose 40% Gel 15 Gram(s) Oral once PRN Blood Glucose LESS THAN 70 milliGRAM(s)/deciliter  glucagon  Injectable 1 milliGRAM(s) IntraMuscular once PRN Glucose LESS THAN 70 milligrams/deciliter  ondansetron Injectable 4 milliGRAM(s) IV Push every 6 hours PRN Nausea and/or Vomiting  oxyCODONE    IR 5 milliGRAM(s) Oral every 4 hours PRN Moderate Pain (4 - 6)  oxyCODONE    IR 10 milliGRAM(s) Oral every 4 hours PRN Severe Pain (7 - 10)  senna 2 Tablet(s) Oral at bedtime PRN Constipation      Allergies  No Known Allergies    Review of Systems:  Constitutional: No fever  Eyes: No blurry vision  Neuro: No tremors  HEENT: No pain  Cardiovascular: No chest pain, palpitations  Respiratory: No SOB, no cough  GI: No nausea, vomiting, abdominal pain: + constipation  : No dysuria  Skin: no rash  Endocrine: no heat or cold intolerance, no polyuria or polydipsia     ALL OTHER SYSTEMS REVIEWED AND NEGATIVE    PHYSICAL EXAM:  VITALS: T(C): 36.4 (12-06-18 @ 15:02)  T(F): 97.6 (12-06-18 @ 15:02), Max: 98.4 (12-05-18 @ 21:33)  HR: 99 (12-06-18 @ 15:02) (72 - 99)  BP: 155/93 (12-06-18 @ 15:02) (116/80 - 155/93)  RR:  (18 - 18)  SpO2:  (86% - 99%)  Wt(kg): --  GENERAL: NAD, well-developed  EYES: No proptosis, anicteric  HEENT:  Atraumatic, Normocephalic  THYROID: Normal size, no palpable nodules  RESPIRATORY: Clear to auscultation bilaterally; No rales, rhonchi, wheezing  CARDIOVASCULAR: Regular rate and rhythm; No murmurs; + pitting edema bilateral lower extremities  GI: Soft, nontender, non distended, normal bowel sounds  SKIN: Dry, intact, No ulcers noted on feet  PSYCH: Alert and oriented x 3, reactive affect      POCT Blood Glucose.: 168 mg/dL (12-06-18 @ 12:26)  POCT Blood Glucose.: 131 mg/dL (12-06-18 @ 10:26)  POCT Blood Glucose.: 129 mg/dL (12-05-18 @ 22:06)  POCT Blood Glucose.: 150 mg/dL (12-05-18 @ 18:38)  POCT Blood Glucose.: 125 mg/dL (12-05-18 @ 07:53)  POCT Blood Glucose.: 145 mg/dL (12-04-18 @ 21:16)  POCT Blood Glucose.: 131 mg/dL (12-04-18 @ 16:46)  POCT Blood Glucose.: 128 mg/dL (12-04-18 @ 12:27)  POCT Blood Glucose.: 118 mg/dL (12-04-18 @ 08:02)  POCT Blood Glucose.: 158 mg/dL (12-03-18 @ 22:31)  POCT Blood Glucose.: 121 mg/dL (12-03-18 @ 18:15)                          13.8   7.3   )-----------( 218      ( 06 Dec 2018 08:21 )             43.1       12-06    145  |  105  |  61<H>  ----------------------------<  152<H>  4.3   |  24  |  1.84<H>    EGFR if : 40<L>  EGFR if non : 34<L>    Ca    9.3      12-06    TPro  6.4  /  Alb  3.2<L>  /  TBili  1.7<H>  /  DBili  x   /  AST  23  /  ALT  11  /  AlkPhos  158<H>  12-05      Thyroid Function Tests:  12-01 @ 11:41 TSH 8.42 FreeT4 -- T3 -- Anti TPO -- Anti Thyroglobulin Ab -- TSI --      Hemoglobin A1C, Whole Blood: 5.8 % <H> [4.0 - 5.6] (12-02-18 @ 11:51) HPI:  79 y/o year old man with well controlled DM2, HTN, HLD, hypothyroidism CAD (on asa), venous stasis dermatitis, bladder stones, prostate CA, presents to the ED c/o lower back pain s/p slip and fall backwards on 11/22, found to have L1 distraction injury and bl T12 pedicle fracture.    Endocrine History:   Difficult to obtain history from patient. He reports being diagnosed with DM2 in 1986. Takes Metformin at home (does not know dose), daily Victoza, and Tresiba 16 units daily. HbA1c is 5.8%. Checks FS twice daily - AM fasting FS around 90 mg/dL. Checks again before lunch or after dinner and notes FS range from 100 - 136 mg/dL. Follows with optho and has retinopathy. Has neuropathy. Has chronic kidney disease stage 3b but he is unaware of this. Unclear how much he is eating at this time. Endocrinologist is Dr. Marta Hicks    Also has a history of hypothyroidism and takes Levothyroxine 88 mcg daily. Adherent to the medication daily. Does not have neck pain, dysphagia, dysphonia. Has intermittent chest pain and palpitations. No abdominal pain, nausea, vomiting, diarrhea. Has constipation. Thinks he has been losing weight. No heat or cold intolerance. TSH is 8.52.     Regarding fracture history, he states that he slipped and fell backward.    PAST MEDICAL & SURGICAL HISTORY:  Diabetic ulcer of both lower extremities  Venous stasis dermatitis of both lower extremities  Hyperlipidemia  Type 2 diabetes mellitus without complication  Hypothyroidism  Bladder stones: s/p extraction  Prostate cancer: s/p brachytherapy  Myocardial infarction: 1995  CAD (coronary artery disease)  S/P appendectomy  S/P CABG x 3: saphenous vein harvested from LLE, performed in March 1996 at [facility] by Dr. Mcgraw      FAMILY HISTORY:  DM2 in father and aunts      Social History:  No cigarette use  No alcohol use    Outpatient Medications:  · 	metoprolol: 12.5 milligram(s) orally 2 times a day, Last Dose Taken:    · 	Flomax 0.4 mg oral capsule: 1 cap(s) orally 2 times a day, Last Dose Taken:    · 	nitroglycerin 0.4 mg sublingual tablet: 1 tab(s) orally once a day, As Needed, Last Dose Taken:    · 	Victoza 18 mg/3 mL subcutaneous solution: 1.8 milligram(s) subcutaneous once a day, Last Dose Taken:    · 	Aspirin Enteric Coated 325 mg oral delayed release tablet: 1 tab(s) orally once a day, Last Dose Taken:    · 	sertraline 100 mg oral tablet: 1 tab(s) orally once a day, Last Dose Taken:    · 	Tresiba FlexTouch 200 units/mL subcutaneous solution: 16 unit(s) subcutaneous once a day, Last Dose Taken:    · 	simvastatin 80 mg oral tablet: 1 tab(s) orally once a day (at bedtime), Last Dose Taken:    · 	metFORMIN 1000 mg oral tablet:  orally once a day, Last Dose Taken:    · 	potassium citrate 10 mEq oral tablet, extended release: 1 tab(s) orally once a day, Last Dose Taken:    · 	furosemide 40 mg oral tablet: orally every other day (at bedtime), Last Dose Taken:    · 	furosemide 80 mg oral tablet: orally every other day (at bedtime), Last Dose Taken:    · 	levothyroxine 88 mcg (0.088 mg) oral tablet: 1 tab(s) orally once a day, Last Dose Taken:    · 	losartan 25 mg oral tablet: 1 tab(s) orally once a day, Last Dose Taken:    · 	Percocet 5/325 oral tablet: orally 2 times a day    MEDICATIONS  (STANDING):  dextrose 5%. 1000 milliLiter(s) (50 mL/Hr) IV Continuous <Continuous>  dextrose 50% Injectable 12.5 Gram(s) IV Push once  dextrose 50% Injectable 25 Gram(s) IV Push once  dextrose 50% Injectable 25 Gram(s) IV Push once  docusate sodium 100 milliGRAM(s) Oral three times a day  insulin lispro (HumaLOG) corrective regimen sliding scale   SubCutaneous three times a day before meals  insulin lispro (HumaLOG) corrective regimen sliding scale   SubCutaneous at bedtime  sodium chloride 0.9% lock flush 3 milliLiter(s) IV Push every 8 hours    MEDICATIONS  (PRN):  acetaminophen   Tablet .. 650 milliGRAM(s) Oral every 6 hours PRN Temp greater or equal to 38C (100.4F), Mild Pain (1 - 3)  dextrose 40% Gel 15 Gram(s) Oral once PRN Blood Glucose LESS THAN 70 milliGRAM(s)/deciliter  glucagon  Injectable 1 milliGRAM(s) IntraMuscular once PRN Glucose LESS THAN 70 milligrams/deciliter  ondansetron Injectable 4 milliGRAM(s) IV Push every 6 hours PRN Nausea and/or Vomiting  oxyCODONE    IR 5 milliGRAM(s) Oral every 4 hours PRN Moderate Pain (4 - 6)  oxyCODONE    IR 10 milliGRAM(s) Oral every 4 hours PRN Severe Pain (7 - 10)  senna 2 Tablet(s) Oral at bedtime PRN Constipation      Allergies  No Known Allergies    Review of Systems:  Constitutional: No fever  Eyes: No blurry vision  Neuro: No tremors  HEENT: No pain  Cardiovascular: No chest pain, palpitations  Respiratory: No SOB, no cough  GI: No nausea, vomiting, abdominal pain: + constipation  : No dysuria  Skin: no rash  Endocrine: no heat or cold intolerance, no polyuria or polydipsia     ALL OTHER SYSTEMS REVIEWED AND NEGATIVE    PHYSICAL EXAM:  VITALS: T(C): 36.4 (12-06-18 @ 15:02)  T(F): 97.6 (12-06-18 @ 15:02), Max: 98.4 (12-05-18 @ 21:33)  HR: 99 (12-06-18 @ 15:02) (72 - 99)  BP: 155/93 (12-06-18 @ 15:02) (116/80 - 155/93)  RR:  (18 - 18)  SpO2:  (86% - 99%)  Wt(kg): --  GENERAL: NAD, well-developed  EYES: No proptosis, anicteric  HEENT:  Atraumatic, Normocephalic  THYROID: Normal size, no palpable nodules  RESPIRATORY: Clear to auscultation bilaterally; No rales, rhonchi, wheezing  CARDIOVASCULAR: Regular rate and rhythm; No murmurs; + pitting edema bilateral lower extremities  GI: Soft, nontender, non distended, normal bowel sounds  SKIN: Dry, intact, No ulcers noted on feet  PSYCH: Alert and oriented x 3, reactive affect      POCT Blood Glucose.: 168 mg/dL (12-06-18 @ 12:26)  POCT Blood Glucose.: 131 mg/dL (12-06-18 @ 10:26)  POCT Blood Glucose.: 129 mg/dL (12-05-18 @ 22:06)  POCT Blood Glucose.: 150 mg/dL (12-05-18 @ 18:38)  POCT Blood Glucose.: 125 mg/dL (12-05-18 @ 07:53)  POCT Blood Glucose.: 145 mg/dL (12-04-18 @ 21:16)  POCT Blood Glucose.: 131 mg/dL (12-04-18 @ 16:46)  POCT Blood Glucose.: 128 mg/dL (12-04-18 @ 12:27)  POCT Blood Glucose.: 118 mg/dL (12-04-18 @ 08:02)  POCT Blood Glucose.: 158 mg/dL (12-03-18 @ 22:31)  POCT Blood Glucose.: 121 mg/dL (12-03-18 @ 18:15)                          13.8   7.3   )-----------( 218      ( 06 Dec 2018 08:21 )             43.1       12-06    145  |  105  |  61<H>  ----------------------------<  152<H>  4.3   |  24  |  1.84<H>    EGFR if : 40<L>  EGFR if non : 34<L>    Ca    9.3      12-06    TPro  6.4  /  Alb  3.2<L>  /  TBili  1.7<H>  /  DBili  x   /  AST  23  /  ALT  11  /  AlkPhos  158<H>  12-05      Thyroid Function Tests:  12-01 @ 11:41 TSH 8.42 FreeT4 -- T3 -- Anti TPO -- Anti Thyroglobulin Ab -- TSI --      Hemoglobin A1C, Whole Blood: 5.8 % <H> [4.0 - 5.6] (12-02-18 @ 11:51)

## 2018-12-06 NOTE — CONSULT NOTE ADULT - ASSESSMENT
78y with stridor, pending indirect laryngoscopy 77 y/o male admitted for T12 spinal fracture going to OR tomorrow for repair, noted to have stridor as pe primary team this morning. Bedside indirect laryngoscopy revealed crusting int he vallecular space, with patent airway, and no stridor noted.

## 2018-12-06 NOTE — CONSULT NOTE ADULT - SUBJECTIVE AND OBJECTIVE BOX
CC: stridor     HPI: 78 yr male male PMH  HTN HLD CAD s/p CABG 2017 MI DM2, venous stasis dermatitis, bladder stones, prostate CA, hypothyroidism presents to the ED c/o lower back pain s/p slip and fall backwards found to have CT showed L1 distraction injury and bl T12 pedicle fx transferred from [facility] to [facility] for T12 repair. ENT called for stridor. pt seen with sob earlier this am, satting fine. no dysphagia, odynophagia, dysphonia, changes in voice or inability to tolerated secretions       PAST MEDICAL & SURGICAL HISTORY:  Diabetic ulcer of both lower extremities  Venous stasis dermatitis of both lower extremities  Hyperlipidemia  Type 2 diabetes mellitus without complication  Hypothyroidism  Bladder stones: s/p extraction  Prostate cancer: s/p brachytherapy  Myocardial infarction: 1995  CAD (coronary artery disease)  S/P appendectomy  S/P CABG x 3: saphenous vein harvested from LLE, performed in March 1996 at [facility] by Dr. Mcgraw    Allergies    No Known Allergies    Intolerances      MEDICATIONS  (STANDING):  dextrose 5%. 1000 milliLiter(s) (50 mL/Hr) IV Continuous <Continuous>  dextrose 50% Injectable 12.5 Gram(s) IV Push once  dextrose 50% Injectable 25 Gram(s) IV Push once  dextrose 50% Injectable 25 Gram(s) IV Push once  docusate sodium 100 milliGRAM(s) Oral three times a day  insulin lispro (HumaLOG) corrective regimen sliding scale   SubCutaneous three times a day before meals  insulin lispro (HumaLOG) corrective regimen sliding scale   SubCutaneous at bedtime  sodium chloride 0.9% lock flush 3 milliLiter(s) IV Push every 8 hours    MEDICATIONS  (PRN):  acetaminophen   Tablet .. 650 milliGRAM(s) Oral every 6 hours PRN Temp greater or equal to 38C (100.4F), Mild Pain (1 - 3)  dextrose 40% Gel 15 Gram(s) Oral once PRN Blood Glucose LESS THAN 70 milliGRAM(s)/deciliter  glucagon  Injectable 1 milliGRAM(s) IntraMuscular once PRN Glucose LESS THAN 70 milligrams/deciliter  ondansetron Injectable 4 milliGRAM(s) IV Push every 6 hours PRN Nausea and/or Vomiting  oxyCODONE    IR 5 milliGRAM(s) Oral every 4 hours PRN Moderate Pain (4 - 6)  oxyCODONE    IR 10 milliGRAM(s) Oral every 4 hours PRN Severe Pain (7 - 10)  senna 2 Tablet(s) Oral at bedtime PRN Constipation      Social History: no tobacco, no etoh     Family history: Pt denies any sign FHx    ROS:   ENT: all negative except as noted in HPI   CV: denies palpitations  Pulm: denies SOB, cough, hemoptysis  GI: denies change in apetite, indigestion, n/v  : denies pertinent urinary symptoms, urgency  Neuro: denies numbness/tingling, loss of sensation  Psych: denies anxiety  MS: denies muscle weakness, instability  Heme: denies easy bruising or bleeding  Endo: denies heat/cold intolerance, excessive sweating  Vascular: denies LE edema    Vital Signs Last 24 Hrs  T(C): 36.3 (06 Dec 2018 09:04), Max: 36.9 (05 Dec 2018 21:33)  T(F): 97.4 (06 Dec 2018 09:04), Max: 98.4 (05 Dec 2018 21:33)  HR: 72 (06 Dec 2018 09:04) (72 - 98)  BP: 116/80 (06 Dec 2018 09:04) (116/80 - 134/81)  BP(mean): --  RR: 18 (06 Dec 2018 10:47) (18 - 18)  SpO2: 98% (06 Dec 2018 10:47) (86% - 99%)                          13.8   7.3   )-----------( 218      ( 06 Dec 2018 08:21 )             43.1    12-06    145  |  105  |  61<H>  ----------------------------<  152<H>  4.3   |  24  |  1.84<H>    Ca    9.3      06 Dec 2018 08:21    TPro  6.4  /  Alb  3.2<L>  /  TBili  1.7<H>  /  DBili  x   /  AST  23  /  ALT  11  /  AlkPhos  158<H>  12-05   PT/INR - ( 06 Dec 2018 08:21 )   PT: 14.3 sec;   INR: 1.25 ratio         PTT - ( 06 Dec 2018 08:21 )  PTT:33.8 sec    PHYSICAL EXAM:  Gen: NAD  Skin: No rashes, bruises, or lesions  Head: Normocephalic, Atraumatic  Face: no edema, erythema, or fluctuance. Parotid glands soft without mass  Eyes: no scleral injection  Nose: Nares bilaterally patent, no discharge  Mouth: No Stridor / Drooling / Trismus.  Mucosa moist, tongue/uvula midline, oropharynx clear  Neck: Flat, supple, no lymphadenopathy, trachea midline, no masses  Lymphatic: No lymphadenopathy  Resp: breathing easily, no stridor  CV: no peripheral edema/cyanosis  GI: nondistended   Peripheral vascular: no JVD or edema  Neuro: facial nerve intact, no facial droop        Diagnostic Nasal Endoscopy: (Scope #2 used)     Fiberoptic Indirect laryngoscopy:  (Scope #2 used)        IMAGING/ADDITIONAL STUDIES: CC: stridor     HPI: 78 yr male male PMHx of HTN HLD CAD s/p CABG 2017 MI DM2, venous stasis dermatitis, bladder stones, prostate CA, hypothyroidism presents to the ED c/o lower back pain s/p slip and fall backwards found to have CT showed L1 distraction injury and bl T12 pedicle fx transferred from [facility] to [facility] for T12 repair. ENT called for stridor. Pt seen with sob earlier this am, satting fine. Patient admits to cough. Pt denies dysphagia, odynophagia, dysphonia, changes in voice or inability to tolerated secretions.     PAST MEDICAL & SURGICAL HISTORY:  Diabetic ulcer of both lower extremities  Venous stasis dermatitis of both lower extremities  Hyperlipidemia  Type 2 diabetes mellitus without complication  Hypothyroidism  Bladder stones: s/p extraction  Prostate cancer: s/p brachytherapy  Myocardial infarction: 1995  CAD (coronary artery disease)  S/P appendectomy  S/P CABG x 3: saphenous vein harvested from LLE, performed in March 1996 at [facility] by Dr. Mcgraw    Allergies    No Known Allergies    Intolerances      MEDICATIONS  (STANDING):  dextrose 5%. 1000 milliLiter(s) (50 mL/Hr) IV Continuous <Continuous>  dextrose 50% Injectable 12.5 Gram(s) IV Push once  dextrose 50% Injectable 25 Gram(s) IV Push once  dextrose 50% Injectable 25 Gram(s) IV Push once  docusate sodium 100 milliGRAM(s) Oral three times a day  insulin lispro (HumaLOG) corrective regimen sliding scale   SubCutaneous three times a day before meals  insulin lispro (HumaLOG) corrective regimen sliding scale   SubCutaneous at bedtime  sodium chloride 0.9% lock flush 3 milliLiter(s) IV Push every 8 hours    MEDICATIONS  (PRN):  acetaminophen   Tablet .. 650 milliGRAM(s) Oral every 6 hours PRN Temp greater or equal to 38C (100.4F), Mild Pain (1 - 3)  dextrose 40% Gel 15 Gram(s) Oral once PRN Blood Glucose LESS THAN 70 milliGRAM(s)/deciliter  glucagon  Injectable 1 milliGRAM(s) IntraMuscular once PRN Glucose LESS THAN 70 milligrams/deciliter  ondansetron Injectable 4 milliGRAM(s) IV Push every 6 hours PRN Nausea and/or Vomiting  oxyCODONE    IR 5 milliGRAM(s) Oral every 4 hours PRN Moderate Pain (4 - 6)  oxyCODONE    IR 10 milliGRAM(s) Oral every 4 hours PRN Severe Pain (7 - 10)  senna 2 Tablet(s) Oral at bedtime PRN Constipation      Social History: no tobacco, no etoh     Family history: Pt denies any sign FHx    ROS:   ENT: all negative except as noted in HPI   CV: denies palpitations  Pulm: hemoptysis  GI: denies change in apetite, indigestion, n/v  : denies pertinent urinary symptoms, urgency  Psych: denies anxiety  MS: denies muscle weakness, instability  Heme: denies easy bruising or bleeding  Endo: denies heat/cold intolerance, excessive sweating  Vascular: denies LE edema    Vital Signs Last 24 Hrs  T(C): 36.3 (06 Dec 2018 09:04), Max: 36.9 (05 Dec 2018 21:33)  T(F): 97.4 (06 Dec 2018 09:04), Max: 98.4 (05 Dec 2018 21:33)  HR: 72 (06 Dec 2018 09:04) (72 - 98)  BP: 116/80 (06 Dec 2018 09:04) (116/80 - 134/81)  BP(mean): --  RR: 18 (06 Dec 2018 10:47) (18 - 18)  SpO2: 98% (06 Dec 2018 10:47) (86% - 99%)                          13.8   7.3   )-----------( 218      ( 06 Dec 2018 08:21 )             43.1    12-06    145  |  105  |  61<H>  ----------------------------<  152<H>  4.3   |  24  |  1.84<H>    Ca    9.3      06 Dec 2018 08:21    TPro  6.4  /  Alb  3.2<L>  /  TBili  1.7<H>  /  DBili  x   /  AST  23  /  ALT  11  /  AlkPhos  158<H>  12-05   PT/INR - ( 06 Dec 2018 08:21 )   PT: 14.3 sec;   INR: 1.25 ratio         PTT - ( 06 Dec 2018 08:21 )  PTT:33.8 sec    PHYSICAL EXAM:  Gen: NAD  Skin: No rashes, bruises, or lesions  Head: Normocephalic, Atraumatic  Face: no edema, erythema, or fluctuance. Parotid glands soft without mass  Eyes: no scleral injection  Nose: Dry nasal crusting noted, no active bleeding, no purulence, no septal peroration   Mouth: Dry oral mucosa, no ulcerations,  No Stridor / Drooling / Trismus. Tongue/uvula midline, oropharynx clear  Neck: Flat, supple, no lymphadenopathy, trachea midline, no masses  Lymphatic: No lymphadenopathy  Resp: no stridor  CV: no peripheral edema/cyanosis  GI: nondistended   Peripheral vascular: no JVD or edema    Fiberoptic Indirect laryngoscopy:  (Scope #2 used)  Reason for Laryngoscopy:    Patient was unable to cooperate with mirror.  Bilateral nasal crusting, dry crust noted in the vallecular space. Nasopharynx, oropharynx, and hypopharynx clear, no bleeding. Tongue base, posterior pharyngeal wall, vallecula, epiglottis, and subglottis appear otherwise normal. No erythema, edema, pooling of secretions, masses or lesions. Airway patent, no foreign body visualized. No glottic/supraglottic edema. True vocal cords, arytenoids, vestibular folds, ventricles, pyriform sinuses, and aryepiglottic folds appear normal bilaterally. Vocal cords mobile with good contact b/l.

## 2018-12-06 NOTE — PROGRESS NOTE ADULT - ASSESSMENT
78 yr male male PMH  HTN HLD CAD s/p CABG 2017 MI DM2, venous stasis dermatitis, bladder stones, prostate CA, hypothyroidism presents to the ED c/o lower back pain s/p slip and fall backwards found to have CT showed L1 distraction injury and bl T12 pedicle fx    Spinal Fx - discussed w/ cardio Dr Segovia cleared by cardio for procedure discussed w/ Neurosurgery PA    SOB / Wheeze - pulm following f/u pending will evaluate respiratory function    HTN HLD CAD s/p CABG c/w current meds     CKD3 - renal function stable currently renally dose meds monitor     DM2 insulin ss      Hypothyroidism - synthroid     DVT PPX     PCP Dr [facility] discussed w/ PCP and cardio Dr Segovia 78 yr male male PMH  HTN HLD CAD s/p CABG 2017 MI DM2, venous stasis dermatitis, bladder stones, prostate CA, hypothyroidism presents to the ED c/o lower back pain s/p slip and fall backwards found to have CT showed L1 distraction injury and bl T12 pedicle fx transfered from [facility] to [facility] for procedure     Spinal Fx - discussed w/ cardio Dr Segovia cleared by cardio for procedure discussed w/ Neurosurgery PA    SOB / Wheeze - discussed w/ Dr Rojas will see shortly     HTN HLD CAD s/p CABG  - can resume ACE BB Lasix statin once ok w/ primary team  hold asa for procedure.      CKD3 - renal function stable currently renally dose meds monitor discussed Dr Rowan nephro aware      DM2 insulin ss  hold metformin     Hypothyroidism - synthroid 88mcg    BPH - taking flomax at home.      DVT PPX     PCP Dr [facility] discussed w/ PCP and cardio Dr Segovia - discussed w/ covering Dr Turner at [place]

## 2018-12-06 NOTE — CONSULT NOTE ADULT - ASSESSMENT
A/P: 77 y/o M pt w/ PMHx CAD (on asa), MI, HTN, HLD, DM, venous stasis dermatitis, bladder stones, prostate CA, hypothyroidism presents to the ED c/o lower back pain s/p slip and fall backwards on 11/22    Gluteal CLeft/ coccyx WOund- in area of previous surgery- TRIAD paste w/ pericare  BLE PVD w/ superficial ulcers- ADAPTIC/ Compression BLE  Consider MARY ANNE/PVR, XRay  BLE elevation  Abx per Medicine/ ID  Moisturize intact skin w/ SWEEN cream BID  con't Nutrition (as tolerated), Nutrition Consult  con't Offloading   con't Pericare  Care as per medicine will follow w/ you  Upon discharge f/u as outpatient at [facility] [address] [place] [address] [phone]  Seen w/ attng and D/w team  Thank you for this consult  Luly Powell PA-C CWS [id number]

## 2018-12-06 NOTE — CONSULT NOTE ADULT - PROVIDER SPECIALTY LIST ADULT
Cardiology
ENT
Endocrinology
Endocrinology
Heme/Onc
Internal Medicine
Nephrology
Neurosurgery
Orthopedics
Orthopedics
Pulmonology
Pulmonology
Wound Care
Nephrology

## 2018-12-06 NOTE — CONSULT NOTE ADULT - REASON FOR ADMISSION
back pain

## 2018-12-06 NOTE — CONSULT NOTE ADULT - CONSULT REASON
BLE and Sacral wounds
Fracture L1
Intractable back pain
Pre-op Pulmonary Evaluation/Wheezing
Preop evaluation
Prostate cacner
YELITZA
dm2 uncontrolled
management of diabetes mellitus
medical DM management
sob  bull  multi organ failure  chf  pleural eff  atelectasis  prostate ca  frailty  fall
spinal fx
stridor
YELITZA, CKD

## 2018-12-07 ENCOUNTER — APPOINTMENT (OUTPATIENT)
Dept: SPINE | Facility: HOSPITAL | Age: 78
End: 2018-12-07

## 2018-12-07 DIAGNOSIS — I48.91 UNSPECIFIED ATRIAL FIBRILLATION: ICD-10-CM

## 2018-12-07 LAB
24R-OH-CALCIDIOL SERPL-MCNC: 38 NG/ML — SIGNIFICANT CHANGE UP (ref 30–80)
ANION GAP SERPL CALC-SCNC: 15 MMOL/L — SIGNIFICANT CHANGE UP (ref 5–17)
BUN SERPL-MCNC: 62 MG/DL — HIGH (ref 7–23)
CALCIUM SERPL-MCNC: 9.6 MG/DL — SIGNIFICANT CHANGE UP (ref 8.4–10.5)
CHLORIDE SERPL-SCNC: 108 MMOL/L — SIGNIFICANT CHANGE UP (ref 96–108)
CO2 SERPL-SCNC: 24 MMOL/L — SIGNIFICANT CHANGE UP (ref 22–31)
CREAT SERPL-MCNC: 1.74 MG/DL — HIGH (ref 0.5–1.3)
GLUCOSE BLDC GLUCOMTR-MCNC: 118 MG/DL — HIGH (ref 70–99)
GLUCOSE BLDC GLUCOMTR-MCNC: 126 MG/DL — HIGH (ref 70–99)
GLUCOSE BLDC GLUCOMTR-MCNC: 135 MG/DL — HIGH (ref 70–99)
GLUCOSE BLDC GLUCOMTR-MCNC: 138 MG/DL — HIGH (ref 70–99)
GLUCOSE SERPL-MCNC: 148 MG/DL — HIGH (ref 70–99)
HCT VFR BLD CALC: 45 % — SIGNIFICANT CHANGE UP (ref 39–50)
HGB BLD-MCNC: 14.5 G/DL — SIGNIFICANT CHANGE UP (ref 13–17)
M PROTEIN 24H UR ELPH-MRATE: SIGNIFICANT CHANGE UP
MAGNESIUM SERPL-MCNC: 2.3 MG/DL — SIGNIFICANT CHANGE UP (ref 1.6–2.6)
MCHC RBC-ENTMCNC: 29.4 PG — SIGNIFICANT CHANGE UP (ref 27–34)
MCHC RBC-ENTMCNC: 32.3 GM/DL — SIGNIFICANT CHANGE UP (ref 32–36)
MCV RBC AUTO: 91 FL — SIGNIFICANT CHANGE UP (ref 80–100)
PHOSPHATE SERPL-MCNC: 3.8 MG/DL — SIGNIFICANT CHANGE UP (ref 2.5–4.5)
PLATELET # BLD AUTO: 228 K/UL — SIGNIFICANT CHANGE UP (ref 150–400)
POTASSIUM SERPL-MCNC: 4.4 MMOL/L — SIGNIFICANT CHANGE UP (ref 3.5–5.3)
POTASSIUM SERPL-SCNC: 4.4 MMOL/L — SIGNIFICANT CHANGE UP (ref 3.5–5.3)
RBC # BLD: 4.94 M/UL — SIGNIFICANT CHANGE UP (ref 4.2–5.8)
RBC # FLD: 16.6 % — HIGH (ref 10.3–14.5)
SODIUM SERPL-SCNC: 147 MMOL/L — HIGH (ref 135–145)
TROPONIN T, HIGH SENSITIVITY RESULT: 84 NG/L — HIGH (ref 0–51)
WBC # BLD: 9 K/UL — SIGNIFICANT CHANGE UP (ref 3.8–10.5)
WBC # FLD AUTO: 9 K/UL — SIGNIFICANT CHANGE UP (ref 3.8–10.5)

## 2018-12-07 PROCEDURE — 99232 SBSQ HOSP IP/OBS MODERATE 35: CPT | Mod: GC

## 2018-12-07 PROCEDURE — 93010 ELECTROCARDIOGRAM REPORT: CPT

## 2018-12-07 RX ORDER — METOPROLOL TARTRATE 50 MG
5 TABLET ORAL EVERY 6 HOURS
Qty: 0 | Refills: 0 | Status: DISCONTINUED | OUTPATIENT
Start: 2018-12-07 | End: 2018-12-09

## 2018-12-07 RX ORDER — ACETAMINOPHEN 500 MG
1000 TABLET ORAL ONCE
Qty: 0 | Refills: 0 | Status: COMPLETED | OUTPATIENT
Start: 2018-12-07 | End: 2018-12-07

## 2018-12-07 RX ORDER — HEPARIN SODIUM 5000 [USP'U]/ML
5000 INJECTION INTRAVENOUS; SUBCUTANEOUS EVERY 8 HOURS
Qty: 0 | Refills: 0 | Status: DISCONTINUED | OUTPATIENT
Start: 2018-12-07 | End: 2018-12-09

## 2018-12-07 RX ORDER — POLYETHYLENE GLYCOL 3350 17 G/17G
17 POWDER, FOR SOLUTION ORAL ONCE
Qty: 0 | Refills: 0 | Status: COMPLETED | OUTPATIENT
Start: 2018-12-07 | End: 2018-12-07

## 2018-12-07 RX ORDER — SODIUM CHLORIDE 9 MG/ML
1000 INJECTION INTRAMUSCULAR; INTRAVENOUS; SUBCUTANEOUS
Qty: 0 | Refills: 0 | Status: DISCONTINUED | OUTPATIENT
Start: 2018-12-07 | End: 2018-12-07

## 2018-12-07 RX ORDER — METOPROLOL TARTRATE 50 MG
25 TABLET ORAL
Qty: 0 | Refills: 0 | Status: DISCONTINUED | OUTPATIENT
Start: 2018-12-07 | End: 2018-12-08

## 2018-12-07 RX ADMIN — Medication 100 MICROGRAM(S): at 05:30

## 2018-12-07 RX ADMIN — OXYCODONE HYDROCHLORIDE 10 MILLIGRAM(S): 5 TABLET ORAL at 21:30

## 2018-12-07 RX ADMIN — SODIUM CHLORIDE 3 MILLILITER(S): 9 INJECTION INTRAMUSCULAR; INTRAVENOUS; SUBCUTANEOUS at 13:19

## 2018-12-07 RX ADMIN — FENTANYL CITRATE 1 PATCH: 50 INJECTION INTRAVENOUS at 10:48

## 2018-12-07 RX ADMIN — ATORVASTATIN CALCIUM 40 MILLIGRAM(S): 80 TABLET, FILM COATED ORAL at 22:36

## 2018-12-07 RX ADMIN — SODIUM CHLORIDE 75 MILLILITER(S): 9 INJECTION INTRAMUSCULAR; INTRAVENOUS; SUBCUTANEOUS at 15:58

## 2018-12-07 RX ADMIN — Medication 650 MILLIGRAM(S): at 23:15

## 2018-12-07 RX ADMIN — SODIUM CHLORIDE 3 MILLILITER(S): 9 INJECTION INTRAMUSCULAR; INTRAVENOUS; SUBCUTANEOUS at 05:29

## 2018-12-07 RX ADMIN — FENTANYL CITRATE 1 PATCH: 50 INJECTION INTRAVENOUS at 08:30

## 2018-12-07 RX ADMIN — Medication 25 MILLIGRAM(S): at 17:39

## 2018-12-07 RX ADMIN — POLYETHYLENE GLYCOL 3350 17 GRAM(S): 17 POWDER, FOR SOLUTION ORAL at 17:39

## 2018-12-07 RX ADMIN — Medication 100 MILLIGRAM(S): at 22:37

## 2018-12-07 RX ADMIN — Medication 5 MILLIGRAM(S): at 22:42

## 2018-12-07 RX ADMIN — OXYCODONE HYDROCHLORIDE 5 MILLIGRAM(S): 5 TABLET ORAL at 22:40

## 2018-12-07 RX ADMIN — Medication 650 MILLIGRAM(S): at 22:40

## 2018-12-07 RX ADMIN — OXYCODONE HYDROCHLORIDE 10 MILLIGRAM(S): 5 TABLET ORAL at 16:41

## 2018-12-07 RX ADMIN — SODIUM CHLORIDE 3 MILLILITER(S): 9 INJECTION INTRAMUSCULAR; INTRAVENOUS; SUBCUTANEOUS at 22:35

## 2018-12-07 RX ADMIN — OXYCODONE HYDROCHLORIDE 10 MILLIGRAM(S): 5 TABLET ORAL at 15:34

## 2018-12-07 RX ADMIN — OXYCODONE HYDROCHLORIDE 10 MILLIGRAM(S): 5 TABLET ORAL at 20:29

## 2018-12-07 RX ADMIN — HEPARIN SODIUM 5000 UNIT(S): 5000 INJECTION INTRAVENOUS; SUBCUTANEOUS at 22:37

## 2018-12-07 RX ADMIN — Medication 400 MILLIGRAM(S): at 00:27

## 2018-12-07 RX ADMIN — Medication 12.5 MILLIGRAM(S): at 05:30

## 2018-12-07 NOTE — PROGRESS NOTE ADULT - SUBJECTIVE AND OBJECTIVE BOX
Chief Complaint: f/u DM2    History:  Surgery canceled today due to episode of afib. No longer NPO. Does not have abdominal pain, nausea, vomiting.     MEDICATIONS  (STANDING):  atorvastatin 40 milliGRAM(s) Oral at bedtime  cholecalciferol 1000 Unit(s) Oral daily  dextrose 5%. 1000 milliLiter(s) (50 mL/Hr) IV Continuous <Continuous>  dextrose 50% Injectable 12.5 Gram(s) IV Push once  dextrose 50% Injectable 25 Gram(s) IV Push once  dextrose 50% Injectable 25 Gram(s) IV Push once  docusate sodium 100 milliGRAM(s) Oral three times a day  insulin lispro (HumaLOG) corrective regimen sliding scale   SubCutaneous three times a day before meals  insulin lispro (HumaLOG) corrective regimen sliding scale   SubCutaneous at bedtime  levothyroxine 100 MICROGram(s) Oral daily  metoprolol tartrate 25 milliGRAM(s) Oral two times a day  sodium chloride 0.9% lock flush 3 milliLiter(s) IV Push every 8 hours  sodium chloride 0.9%. 1000 milliLiter(s) (75 mL/Hr) IV Continuous <Continuous>    MEDICATIONS  (PRN):  acetaminophen   Tablet .. 650 milliGRAM(s) Oral every 6 hours PRN Temp greater or equal to 38C (100.4F), Mild Pain (1 - 3)  dextrose 40% Gel 15 Gram(s) Oral once PRN Blood Glucose LESS THAN 70 milliGRAM(s)/deciliter  glucagon  Injectable 1 milliGRAM(s) IntraMuscular once PRN Glucose LESS THAN 70 milligrams/deciliter  metoprolol tartrate Injectable 5 milliGRAM(s) IV Push every 6 hours PRN HR>110  ondansetron Injectable 4 milliGRAM(s) IV Push every 6 hours PRN Nausea and/or Vomiting  oxyCODONE    IR 5 milliGRAM(s) Oral every 4 hours PRN Moderate Pain (4 - 6)  oxyCODONE    IR 10 milliGRAM(s) Oral every 4 hours PRN Severe Pain (7 - 10)  senna 2 Tablet(s) Oral at bedtime PRN Constipation      Allergies  No Known Allergies    PHYSICAL EXAM:  VITALS: T(C): 36.6 (12-07-18 @ 15:15)  T(F): 97.9 (12-07-18 @ 15:15), Max: 98.5 (12-06-18 @ 21:20)  HR: 119 (12-07-18 @ 15:15) (62 - 119)  BP: 129/71 (12-07-18 @ 15:15) (123/80 - 137/75)  RR:  (18 - 18)  SpO2:  (93% - 98%)  Wt(kg): --  GENERAL: NAD, well-developed  EYES: No proptosis, anicteric  HEENT:  Atraumatic, Normocephalic  RESPIRATORY: Clear to auscultation bilaterally; No rales, rhonchi, wheezing, or rubs  CARDIOVASCULAR: Regular rate and rhythm; No murmurs  GI: Soft, nontender, non distended    POCT Blood Glucose.: 135 mg/dL (12-07-18 @ 13:26)  POCT Blood Glucose.: 118 mg/dL (12-07-18 @ 09:31)  POCT Blood Glucose.: 132 mg/dL (12-06-18 @ 21:37)  POCT Blood Glucose.: 139 mg/dL (12-06-18 @ 18:50)  POCT Blood Glucose.: 220 mg/dL (12-06-18 @ 17:38)  POCT Blood Glucose.: 147 mg/dL (12-06-18 @ 15:32)  POCT Blood Glucose.: 168 mg/dL (12-06-18 @ 12:26)  POCT Blood Glucose.: 131 mg/dL (12-06-18 @ 10:26)  POCT Blood Glucose.: 129 mg/dL (12-05-18 @ 22:06)  POCT Blood Glucose.: 150 mg/dL (12-05-18 @ 18:38)  POCT Blood Glucose.: 125 mg/dL (12-05-18 @ 07:53)  POCT Blood Glucose.: 145 mg/dL (12-04-18 @ 21:16)  POCT Blood Glucose.: 131 mg/dL (12-04-18 @ 16:46)      12-07    147<H>  |  108  |  62<H>  ----------------------------<  148<H>  4.4   |  24  |  1.74<H>    EGFR if : 42<L>  EGFR if non : 36<L>    Ca    9.6      12-07  Mg     2.3     12-07  Phos  3.8     12-07    TPro  6.4  /  Alb  3.2<L>  /  TBili  1.7<H>  /  DBili  x   /  AST  23  /  ALT  11  /  AlkPhos  158<H>  12-05          Thyroid Function Tests:  12-01 @ 11:41 TSH 8.42 FreeT4 -- T3 -- Anti TPO -- Anti Thyroglobulin Ab -- TSI --      Hemoglobin A1C, Whole Blood: 5.8 % <H> [4.0 - 5.6] (12-02-18 @ 11:51)

## 2018-12-07 NOTE — PROGRESS NOTE ADULT - SUBJECTIVE AND OBJECTIVE BOX
Follow-up Pulm Progress Note  Jurgen Rojas MD  [phone]    No new respiratory events overnight.  Denies SOB/CP.  Events noted: AF with RVR - now with HR   LE dopplers negative for prox DVT  O2 sat 88% RA    Medications:  Vital Signs Last 24 Hrs  T(C): 36.3 (07 Dec 2018 10:02), Max: 36.9 (06 Dec 2018 21:20)  T(F): 97.4 (07 Dec 2018 10:02), Max: 98.5 (06 Dec 2018 21:20)  HR: 91 (07 Dec 2018 10:02) (62 - 101)  BP: 137/75 (07 Dec 2018 10:02) (123/80 - 155/93)  BP(mean): --  RR: 18 (07 Dec 2018 10:02) (18 - 18)  SpO2: 97% (07 Dec 2018 10:02) (93% - 98%)      12-06 @ 07:01  -  12-07 @ 07:00  --------------------------------------------------------  IN: 1200 mL / OUT: 400 mL / NET: 800 mL        LABS:                        13.8   7.3   )-----------( 218      ( 06 Dec 2018 08:21 )             43.1     12-06    145  |  105  |  61<H>  ----------------------------<  152<H>  4.3   |  24  |  1.84<H>    Ca    9.3      06 Dec 2018 08:21    TPro  6.4  /  Alb  3.2<L>  /  TBili  1.7<H>  /  DBili  x   /  AST  23  /  ALT  11  /  AlkPhos  158<H>  12-05      PT/INR - ( 06 Dec 2018 08:21 )   PT: 14.3 sec;   INR: 1.25 ratio         PTT - ( 06 Dec 2018 08:21 )  PTT:33.8 sec    Serum Pro-Brain Natriuretic Peptide: 78861 pg/mL (12-06-18 @ 08:21)      CULTURES:  Culture Results:   No growth to date. (12-03 @ 19:21)  Culture Results:   Testing in progress (12-03 @ 19:21)  Culture Results:   <10,000 CFU/ml  Normal Urogenital fito present (12-01 @ 12:51)    Most recent blood culture -- 12-03 @ 19:21   -- -- .Body Fluid Pleural Fluid 12-03 @ 19:21      Physical Examination:  PULM:   CVS: Regular rate and rhythm, no murmurs, rubs, or gallops  ABD: Soft, non-tender  EXT:  No clubbing, cyanosis, or edema    RADIOLOGY REVIEWED  CXR:    CT chest:    TTE:

## 2018-12-07 NOTE — PROGRESS NOTE ADULT - ASSESSMENT
Systolic CHF  R pleural effusion: small to moderate, likely unchanged from prior CT  AF in setting of systolic CHF  CAD  Mild hypoxemia: secondary to basilar atelectasis/effusion    REC    Patient at increased risk for resp complications post-op, though no contraindication to planned surgery with general anesthesia. Hypoxemia is borderline on RA and corrects easily with nasal oxygen. Would resume DVT prophylaxis postop, and monitor cardio-resp postop. Will aim for negative balance as tolerated postoperatively. Discussed with cardiology and NS service

## 2018-12-07 NOTE — PROGRESS NOTE ADULT - ASSESSMENT
78 yr male male PMH  HTN HLD CAD s/p CABG 2017 MI DM2, venous stasis dermatitis, bladder stones, prostate CA, hypothyroidism presents to the ED c/o lower back pain s/p slip and fall backwards found to have CT showed L1 distraction injury and bl T12 pedicle fx transfered from [facility] to [facility] for procedure     Spinal Fx - for repair discussed w/ Neurosurgery PA    Afib - on ekg reviewed discussed w/ Dr Segovia will tartrate dose of bb     HTN HLD CAD s/p CABG  - can resume ACE Lasix statin once ok w/ primary team  hold asa for procedure.      SOB / Wheeze - seen by ENT / PULM stable     CKD3 - renally dose meds myeloma? labs as per nephro Heme onc consult.      DM2 insulin ss  hold metformin     Hypothyroidism - synthroid 88mcg    BPH - taking flomax at home.      DVT PPX

## 2018-12-07 NOTE — PROVIDER CONTACT NOTE (OTHER) - ASSESSMENT
Pt's HR going between 101-111 bpm, does not c/o chest pain, previous HR between 60's -80's bpm no hx of a.fib

## 2018-12-07 NOTE — CHART NOTE - NSCHARTNOTEFT_GEN_A_CORE
Requested to obtain troponin prior to OR by anesthesia due to new onset a. fib.  Troponin returned as 84, patient asymptomatic.  OR cancelled.  Discussed with cardiology.  Transferred to telemetry floor.  No need for any further workup at this time as per Dr. Segovia.  He advised to check another troponin tomorrow morning and they will continue to follow.

## 2018-12-07 NOTE — PROGRESS NOTE ADULT - SUBJECTIVE AND OBJECTIVE BOX
HPI:  Patient is a 78 year old male w/ multiple medical problems with back pain after a fall.  Presented to ED when pain did not improve.  Imaging showed T12 fracture.  Transferred to [facility] for neurosurgical management.    OVERNIGHT EVENTS:  Called by nursing this am that patient's heart rate elevated.  EKG done showing a. fib.  Cardiology and medicine/hospitalist following and called.  Patient on spinal precautions.  Planned to go to OR this am.      Vital Signs Last 24 Hrs  T(C): 36.3 (07 Dec 2018 10:02), Max: 36.9 (06 Dec 2018 21:20)  T(F): 97.4 (07 Dec 2018 10:02), Max: 98.5 (06 Dec 2018 21:20)  HR: 91 (07 Dec 2018 10:02) (62 - 101)  BP: 137/75 (07 Dec 2018 10:02) (123/80 - 155/93)  BP(mean): --  RR: 18 (07 Dec 2018 10:02) (18 - 18)  SpO2: 97% (07 Dec 2018 10:02) (93% - 98%)      PHYSICAL EXAM:  Neurological: awake, alert, oriented x3, follows commands, speech clear and fluent, moves all extremities x4 w/ 5/5 strength throughout, sensation present, intact, equal throughout    Cardiovascular: +s1, s2  Respiratory: clear to auscultation b/l  Gastrointestinal: soft, non-distended, non-tender  Genitourinary: +voiding    TUBES/LINES:  [x] none    DIET:  [x] npo/ivf    LABS:                        13.8   7.3   )-----------( 218      ( 06 Dec 2018 08:21 )             43.1     12-06    145  |  105  |  61<H>  ----------------------------<  152<H>  4.3   |  24  |  1.84<H>    Ca    9.3      06 Dec 2018 08:21    TPro  6.4  /  Alb  3.2<L>  /  TBili  1.7<H>  /  DBili  x   /  AST  23  /  ALT  11  /  AlkPhos  158<H>  12-05    PT/INR - ( 06 Dec 2018 08:21 )   PT: 14.3 sec;   INR: 1.25 ratio         PTT - ( 06 Dec 2018 08:21 )  PTT:33.8 sec        CAPILLARY BLOOD GLUCOSE      POCT Blood Glucose.: 118 mg/dL (07 Dec 2018 09:31)  POCT Blood Glucose.: 132 mg/dL (06 Dec 2018 21:37)  POCT Blood Glucose.: 139 mg/dL (06 Dec 2018 18:50)  POCT Blood Glucose.: 220 mg/dL (06 Dec 2018 17:38)  POCT Blood Glucose.: 147 mg/dL (06 Dec 2018 15:32)  POCT Blood Glucose.: 168 mg/dL (06 Dec 2018 12:26)      Allergies    No Known Allergies        MEDICATIONS:  Antibiotics:  none    Neuro:  acetaminophen   Tablet .. 650 milliGRAM(s) Oral every 6 hours PRN  ondansetron Injectable 4 milliGRAM(s) IV Push every 6 hours PRN  oxyCODONE    IR 5 milliGRAM(s) Oral every 4 hours PRN  oxyCODONE    IR 10 milliGRAM(s) Oral every 4 hours PRN    Anticoagulation:  none    OTHER:  atorvastatin 40 milliGRAM(s) Oral at bedtime  dextrose 40% Gel 15 Gram(s) Oral once PRN  dextrose 50% Injectable 12.5 Gram(s) IV Push once  dextrose 50% Injectable 25 Gram(s) IV Push once  dextrose 50% Injectable 25 Gram(s) IV Push once  docusate sodium 100 milliGRAM(s) Oral three times a day  glucagon  Injectable 1 milliGRAM(s) IntraMuscular once PRN  insulin lispro (HumaLOG) corrective regimen sliding scale   SubCutaneous three times a day before meals  insulin lispro (HumaLOG) corrective regimen sliding scale   SubCutaneous at bedtime  levothyroxine 100 MICROGram(s) Oral daily  metoprolol tartrate 25 milliGRAM(s) Oral two times a day  metoprolol tartrate Injectable 5 milliGRAM(s) IV Push every 6 hours PRN  senna 2 Tablet(s) Oral at bedtime PRN    IVF:  cholecalciferol 1000 Unit(s) Oral daily  dextrose 5%. 1000 milliLiter(s) IV Continuous <Continuous>  sodium chloride 0.9% lock flush 3 milliLiter(s) IV Push every 8 hours    CULTURES:  Culture Results:   No growth to date. (12-03 @ 19:21)  Culture Results:   Testing in progress (12-03 @ 19:21)    RADIOLOGY & ADDITIONAL TESTS:  no new imaging

## 2018-12-07 NOTE — PROGRESS NOTE ADULT - SUBJECTIVE AND OBJECTIVE BOX
Conversant, no apparent distress    Vital Signs Last 24 Hrs  T(C): 36.6 (12-07-18 @ 13:02), Max: 36.9 (12-06-18 @ 21:20)  T(F): 97.8 (12-07-18 @ 13:02), Max: 98.5 (12-06-18 @ 21:20)  HR: 104 (12-07-18 @ 13:02) (62 - 104)  BP: 128/82 (12-07-18 @ 13:02) (123/80 - 155/93)  RR: 18 (12-07-18 @ 13:02) (18 - 18)  SpO2: 94% (12-07-18 @ 13:02) (93% - 98%)    PERRLA, EOMI  Neck non tender, supple  Normal respiratory effort, lungs clear to auscultation  Heart with RRR, no murmurs or rubs  Extr tr peripheral edema, b/l LE dressed  Abdomen soft, NT, ND, + BS  Appropriate affect, AO                         14.5   9.0   )-----------( 228      ( 07 Dec 2018 13:02 )             45.0     07 Dec 2018 12:10    147    |  108    |  62     ----------------------------<  148    4.4     |  24     |  1.74     Ca    9.6        07 Dec 2018 12:10  Phos  3.8       07 Dec 2018 12:10  Mg     2.3       07 Dec 2018 12:10    acetaminophen   Tablet .. 650 milliGRAM(s) Oral every 6 hours PRN  atorvastatin 40 milliGRAM(s) Oral at bedtime  cholecalciferol 1000 Unit(s) Oral daily  dextrose 40% Gel 15 Gram(s) Oral once PRN  dextrose 5%. 1000 milliLiter(s) IV Continuous <Continuous>  dextrose 50% Injectable 12.5 Gram(s) IV Push once  dextrose 50% Injectable 25 Gram(s) IV Push once  dextrose 50% Injectable 25 Gram(s) IV Push once  docusate sodium 100 milliGRAM(s) Oral three times a day  glucagon  Injectable 1 milliGRAM(s) IntraMuscular once PRN  insulin lispro (HumaLOG) corrective regimen sliding scale   SubCutaneous three times a day before meals  insulin lispro (HumaLOG) corrective regimen sliding scale   SubCutaneous at bedtime  levothyroxine 100 MICROGram(s) Oral daily  metoprolol tartrate 25 milliGRAM(s) Oral two times a day  metoprolol tartrate Injectable 5 milliGRAM(s) IV Push every 6 hours PRN  ondansetron Injectable 4 milliGRAM(s) IV Push every 6 hours PRN  oxyCODONE    IR 5 milliGRAM(s) Oral every 4 hours PRN  oxyCODONE    IR 10 milliGRAM(s) Oral every 4 hours PRN  senna 2 Tablet(s) Oral at bedtime PRN  sodium chloride 0.9% lock flush 3 milliLiter(s) IV Push every 8 hours  sodium chloride 0.9%. 1000 milliLiter(s) IV Continuous <Continuous>    A/P:    Suspect baseline DM CKD III  S/p hemodynamic YELITZA on CKD in setting of ARB, Lasix, Ketorolac  Avoid nephrotoxins  Renal fx has improved and is likely at baseline  No objection to planned surgery from renal pov  Positive SPEP on Dec 2 noted  Bence Jones ordered  Consider Heme/onc input  Afib, Cardiology following

## 2018-12-07 NOTE — CHART NOTE - NSCHARTNOTEFT_GEN_A_CORE
Spoke to pulmonary consult following the patient.  Lower extremity duplex reviewed and showed no DVT b/l however could not rule out calf vein DVT.  NO objections to proceeding with OR case as planned today from pulmonary perspective as per Dr. Rojas.  Pulmonary to follow patient post op.

## 2018-12-07 NOTE — PROGRESS NOTE ADULT - SUBJECTIVE AND OBJECTIVE BOX
Patient is a 78y old  Male who presents with a chief complaint of back pain (07 Dec 2018 11:06)      SUBJECTIVE / OVERNIGHT EVENTS:    pt resting in bed no cp sob fever chill no currently complaints pt lethargic but responds to commands appropriatly     Vital Signs Last 24 Hrs  T(C): 36.3 (07 Dec 2018 10:02), Max: 36.9 (06 Dec 2018 21:20)  T(F): 97.4 (07 Dec 2018 10:02), Max: 98.5 (06 Dec 2018 21:20)  HR: 91 (07 Dec 2018 10:02) (62 - 101)  BP: 137/75 (07 Dec 2018 10:02) (123/80 - 155/93)  BP(mean): --  RR: 18 (07 Dec 2018 10:02) (18 - 18)  SpO2: 97% (07 Dec 2018 10:02) (93% - 98%)  I&O's Summary    06 Dec 2018 07:01  -  07 Dec 2018 07:00  --------------------------------------------------------  IN: 1200 mL / OUT: 400 mL / NET: 800 mL            LABS:                        13.8   7.3   )-----------( 218      ( 06 Dec 2018 08:21 )             43.1     12-06    145  |  105  |  61<H>  ----------------------------<  152<H>  4.3   |  24  |  1.84<H>    Ca    9.3      06 Dec 2018 08:21    TPro  6.4  /  Alb  3.2<L>  /  TBili  1.7<H>  /  DBili  x   /  AST  23  /  ALT  11  /  AlkPhos  158<H>  12-05    PT/INR - ( 06 Dec 2018 08:21 )   PT: 14.3 sec;   INR: 1.25 ratio         PTT - ( 06 Dec 2018 08:21 )  PTT:33.8 sec  CAPILLARY BLOOD GLUCOSE      POCT Blood Glucose.: 118 mg/dL (07 Dec 2018 09:31)  POCT Blood Glucose.: 132 mg/dL (06 Dec 2018 21:37)  POCT Blood Glucose.: 139 mg/dL (06 Dec 2018 18:50)  POCT Blood Glucose.: 220 mg/dL (06 Dec 2018 17:38)  POCT Blood Glucose.: 147 mg/dL (06 Dec 2018 15:32)  POCT Blood Glucose.: 168 mg/dL (06 Dec 2018 12:26)            RADIOLOGY & ADDITIONAL TESTS:    Imaging Personally Reviewed:  [x] YES  [ ] NO    Consultant(s) Notes Reviewed:  [x] YES  [ ] NO      MEDICATIONS  (STANDING):  atorvastatin 40 milliGRAM(s) Oral at bedtime  cholecalciferol 1000 Unit(s) Oral daily  dextrose 5%. 1000 milliLiter(s) (50 mL/Hr) IV Continuous <Continuous>  dextrose 50% Injectable 12.5 Gram(s) IV Push once  dextrose 50% Injectable 25 Gram(s) IV Push once  dextrose 50% Injectable 25 Gram(s) IV Push once  docusate sodium 100 milliGRAM(s) Oral three times a day  insulin lispro (HumaLOG) corrective regimen sliding scale   SubCutaneous three times a day before meals  insulin lispro (HumaLOG) corrective regimen sliding scale   SubCutaneous at bedtime  levothyroxine 100 MICROGram(s) Oral daily  metoprolol tartrate 25 milliGRAM(s) Oral two times a day  sodium chloride 0.9% lock flush 3 milliLiter(s) IV Push every 8 hours  sodium chloride 0.9%. 1000 milliLiter(s) (75 mL/Hr) IV Continuous <Continuous>    MEDICATIONS  (PRN):  acetaminophen   Tablet .. 650 milliGRAM(s) Oral every 6 hours PRN Temp greater or equal to 38C (100.4F), Mild Pain (1 - 3)  dextrose 40% Gel 15 Gram(s) Oral once PRN Blood Glucose LESS THAN 70 milliGRAM(s)/deciliter  glucagon  Injectable 1 milliGRAM(s) IntraMuscular once PRN Glucose LESS THAN 70 milligrams/deciliter  metoprolol tartrate Injectable 5 milliGRAM(s) IV Push every 6 hours PRN HR>110  ondansetron Injectable 4 milliGRAM(s) IV Push every 6 hours PRN Nausea and/or Vomiting  oxyCODONE    IR 5 milliGRAM(s) Oral every 4 hours PRN Moderate Pain (4 - 6)  oxyCODONE    IR 10 milliGRAM(s) Oral every 4 hours PRN Severe Pain (7 - 10)  senna 2 Tablet(s) Oral at bedtime PRN Constipation      Care Discussed with Consultants/Other Providers [x] YES  [ ] NO    HEALTH ISSUES - PROBLEM Dx:  Atrial fibrillation, unspecified type: Atrial fibrillation, unspecified type  Essential hypertension: Essential hypertension  Hyperlipidemia, unspecified hyperlipidemia type: Hyperlipidemia, unspecified hyperlipidemia type  Fracture, thoracic vertebra: Fracture, thoracic vertebra  Acquired hypothyroidism: Acquired hypothyroidism  Type 2 diabetes mellitus with stage 3 chronic kidney disease, with long-term current use of insulin: Type 2 diabetes mellitus with stage 3 chronic kidney disease, with long-term current use of insulin  Stridor: Stridor  Preop cardiovascular exam: Preop cardiovascular exam  YELITZA (acute kidney injury): YELITZA (acute kidney injury)  Cystitis: Cystitis  Pleural effusion: Pleural effusion  Fluid overload: Fluid overload  Prostate cancer: Prostate cancer  Hypothyroidism: Hypothyroidism  Type 2 diabetes mellitus without complication: Type 2 diabetes mellitus without complication  Diabetic ulcer of both lower extremities: Diabetic ulcer of both lower extremities  Fracture of vertebra, thoracic: Fracture of vertebra, thoracic  Fracture of vertebra, lumbar: Fracture of vertebra, lumbar

## 2018-12-07 NOTE — PROGRESS NOTE ADULT - ASSESSMENT
HPI:  Patient is a 78 year old male w/ multiple medical problems with back pain after a fall.  Presented to ED when pain did not improve.  Imaging showed T12 fracture.  Transferred to [facility] for neurosurgical management.    PLAN:  -strict spine precautions  -ekg reviewed with cardiology, besides new onset a. fib, no acute or concerning changes as per Dr. Segovia  -ivp metoprolol prn, po metoprolol increased  -home medications  -HISS for glucose control  -hold lovenox/heparin for OR today, SCDs only for DVT prophylaxis  -senna and colace for bowel regimen  -npo/ivf for OR  -nasal cannula to improve O2 sat  -pt post op  -am labs    Spectra #[id number]

## 2018-12-07 NOTE — PROGRESS NOTE ADULT - SUBJECTIVE AND OBJECTIVE BOX
[facility] Cardiology Progress Note  _______________________________    Pt. seen and examined. Patient noted to be in atrial fibrillation with rapid ventricular rate this morning. asymptomatic. planned for surgery later today.    T(C): 36.3 (12-07-18 @ 10:02), Max: 36.9 (12-06-18 @ 21:20)  HR: 91 (12-07-18 @ 10:02) (62 - 101)  BP: 137/75 (12-07-18 @ 10:02) (123/80 - 155/93)  RR: 18 (12-07-18 @ 10:02) (18 - 18)  SpO2: 97% (12-07-18 @ 10:02) (93% - 98%)  I&O's Summary    06 Dec 2018 07:01  -  07 Dec 2018 07:00  --------------------------------------------------------  IN: 1200 mL / OUT: 400 mL / NET: 800 mL        PHYSICAL EXAM:  GENERAL: Alert, NAD.  NECK: Supple  CHEST/LUNG: crackles bibasilarly  HEART: S1 S2 normal, tachycardic; No murmurs, rubs, or gallops  ABDOMEN: Soft, Nontender, Nondistended; Bowel sounds present  EXTREMITIES:  Trace LE edema.      LABS:                        13.8   7.3   )-----------( 218      ( 06 Dec 2018 08:21 )             43.1     12-06    145  |  105  |  61<H>  ----------------------------<  152<H>  4.3   |  24  |  1.84<H>    Ca    9.3      06 Dec 2018 08:21    TPro  6.4  /  Alb  3.2<L>  /  TBili  1.7<H>  /  DBili  x   /  AST  23  /  ALT  11  /  AlkPhos  158<H>  12-05    PT/INR - ( 06 Dec 2018 08:21 )   PT: 14.3 sec;   INR: 1.25 ratio         PTT - ( 06 Dec 2018 08:21 )  PTT:33.8 sec  CARDIAC MARKERS ( 03 Dec 2018 07:34 )  x     / x     / 156 U/L / x     / x      CARDIAC MARKERS ( 02 Dec 2018 16:43 )  x     / x     / 175 U/L / x     / x      CARDIAC MARKERS ( 01 Dec 2018 01:51 )  .042 ng/mL / x     / x     / x     / x        MEDICATIONS  (STANDING):  atorvastatin 40 milliGRAM(s) Oral at bedtime  cholecalciferol 1000 Unit(s) Oral daily  dextrose 5%. 1000 milliLiter(s) (50 mL/Hr) IV Continuous <Continuous>  dextrose 50% Injectable 12.5 Gram(s) IV Push once  dextrose 50% Injectable 25 Gram(s) IV Push once  dextrose 50% Injectable 25 Gram(s) IV Push once  docusate sodium 100 milliGRAM(s) Oral three times a day  insulin lispro (HumaLOG) corrective regimen sliding scale   SubCutaneous three times a day before meals  insulin lispro (HumaLOG) corrective regimen sliding scale   SubCutaneous at bedtime  levothyroxine 100 MICROGram(s) Oral daily  metoprolol tartrate 12.5 milliGRAM(s) Oral every 12 hours  sodium chloride 0.9% lock flush 3 milliLiter(s) IV Push every 8 hours    MEDICATIONS  (PRN):  acetaminophen   Tablet .. 650 milliGRAM(s) Oral every 6 hours PRN Temp greater or equal to 38C (100.4F), Mild Pain (1 - 3)  dextrose 40% Gel 15 Gram(s) Oral once PRN Blood Glucose LESS THAN 70 milliGRAM(s)/deciliter  glucagon  Injectable 1 milliGRAM(s) IntraMuscular once PRN Glucose LESS THAN 70 milligrams/deciliter  ondansetron Injectable 4 milliGRAM(s) IV Push every 6 hours PRN Nausea and/or Vomiting  oxyCODONE    IR 5 milliGRAM(s) Oral every 4 hours PRN Moderate Pain (4 - 6)  oxyCODONE    IR 10 milliGRAM(s) Oral every 4 hours PRN Severe Pain (7 - 10)  senna 2 Tablet(s) Oral at bedtime PRN Constipation        RADIOLOGY & ADDITIONAL TESTS:

## 2018-12-07 NOTE — PROGRESS NOTE ADULT - ATTENDING COMMENTS
Agree with assessment and plan as above by Dr. Pemberton. Reviewed all pertinent labs, glucose values, and imaging studies. Modifications made as indicated above.     Alejandro Iverson D.O  [phone]

## 2018-12-08 LAB
ANION GAP SERPL CALC-SCNC: 16 MMOL/L — SIGNIFICANT CHANGE UP (ref 5–17)
BUN SERPL-MCNC: 64 MG/DL — HIGH (ref 7–23)
CALCIUM SERPL-MCNC: 9.9 MG/DL — SIGNIFICANT CHANGE UP (ref 8.4–10.5)
CHLORIDE SERPL-SCNC: 104 MMOL/L — SIGNIFICANT CHANGE UP (ref 96–108)
CO2 SERPL-SCNC: 23 MMOL/L — SIGNIFICANT CHANGE UP (ref 22–31)
CREAT SERPL-MCNC: 1.87 MG/DL — HIGH (ref 0.5–1.3)
CULTURE RESULTS: NO GROWTH — SIGNIFICANT CHANGE UP
GLUCOSE BLDC GLUCOMTR-MCNC: 131 MG/DL — HIGH (ref 70–99)
GLUCOSE BLDC GLUCOMTR-MCNC: 136 MG/DL — HIGH (ref 70–99)
GLUCOSE BLDC GLUCOMTR-MCNC: 144 MG/DL — HIGH (ref 70–99)
GLUCOSE BLDC GLUCOMTR-MCNC: 144 MG/DL — HIGH (ref 70–99)
GLUCOSE SERPL-MCNC: 155 MG/DL — HIGH (ref 70–99)
HCT VFR BLD CALC: 47.4 % — SIGNIFICANT CHANGE UP (ref 39–50)
HGB BLD-MCNC: 15.2 G/DL — SIGNIFICANT CHANGE UP (ref 13–17)
MCHC RBC-ENTMCNC: 29.5 PG — SIGNIFICANT CHANGE UP (ref 27–34)
MCHC RBC-ENTMCNC: 32 GM/DL — SIGNIFICANT CHANGE UP (ref 32–36)
MCV RBC AUTO: 92.1 FL — SIGNIFICANT CHANGE UP (ref 80–100)
PLATELET # BLD AUTO: 269 K/UL — SIGNIFICANT CHANGE UP (ref 150–400)
POTASSIUM SERPL-MCNC: 4.4 MMOL/L — SIGNIFICANT CHANGE UP (ref 3.5–5.3)
POTASSIUM SERPL-SCNC: 4.4 MMOL/L — SIGNIFICANT CHANGE UP (ref 3.5–5.3)
RBC # BLD: 5.14 M/UL — SIGNIFICANT CHANGE UP (ref 4.2–5.8)
RBC # FLD: 16.8 % — HIGH (ref 10.3–14.5)
SODIUM SERPL-SCNC: 143 MMOL/L — SIGNIFICANT CHANGE UP (ref 135–145)
SPECIMEN SOURCE: SIGNIFICANT CHANGE UP
TROPONIN T, HIGH SENSITIVITY RESULT: 96 NG/L — HIGH (ref 0–51)
TSH SERPL-MCNC: 9.8 UIU/ML — HIGH (ref 0.27–4.2)
WBC # BLD: 11.5 K/UL — HIGH (ref 3.8–10.5)
WBC # FLD AUTO: 11.5 K/UL — HIGH (ref 3.8–10.5)

## 2018-12-08 RX ORDER — DOCUSATE SODIUM 100 MG
100 CAPSULE ORAL
Qty: 0 | Refills: 0 | Status: DISCONTINUED | OUTPATIENT
Start: 2018-12-08 | End: 2018-12-09

## 2018-12-08 RX ORDER — FUROSEMIDE 40 MG
40 TABLET ORAL DAILY
Qty: 0 | Refills: 0 | Status: DISCONTINUED | OUTPATIENT
Start: 2018-12-08 | End: 2018-12-09

## 2018-12-08 RX ORDER — METOPROLOL TARTRATE 50 MG
37.5 TABLET ORAL
Qty: 0 | Refills: 0 | Status: DISCONTINUED | OUTPATIENT
Start: 2018-12-08 | End: 2018-12-09

## 2018-12-08 RX ADMIN — Medication 40 MILLIGRAM(S): at 14:30

## 2018-12-08 RX ADMIN — OXYCODONE HYDROCHLORIDE 5 MILLIGRAM(S): 5 TABLET ORAL at 14:30

## 2018-12-08 RX ADMIN — Medication 5 MILLIGRAM(S): at 21:09

## 2018-12-08 RX ADMIN — OXYCODONE HYDROCHLORIDE 10 MILLIGRAM(S): 5 TABLET ORAL at 02:04

## 2018-12-08 RX ADMIN — SODIUM CHLORIDE 3 MILLILITER(S): 9 INJECTION INTRAMUSCULAR; INTRAVENOUS; SUBCUTANEOUS at 14:21

## 2018-12-08 RX ADMIN — OXYCODONE HYDROCHLORIDE 10 MILLIGRAM(S): 5 TABLET ORAL at 03:00

## 2018-12-08 RX ADMIN — Medication 100 MICROGRAM(S): at 05:08

## 2018-12-08 RX ADMIN — Medication 37.5 MILLIGRAM(S): at 17:55

## 2018-12-08 RX ADMIN — Medication 5 MILLIGRAM(S): at 16:57

## 2018-12-08 RX ADMIN — Medication 1000 UNIT(S): at 14:30

## 2018-12-08 RX ADMIN — HEPARIN SODIUM 5000 UNIT(S): 5000 INJECTION INTRAVENOUS; SUBCUTANEOUS at 14:30

## 2018-12-08 RX ADMIN — SODIUM CHLORIDE 3 MILLILITER(S): 9 INJECTION INTRAMUSCULAR; INTRAVENOUS; SUBCUTANEOUS at 05:01

## 2018-12-08 RX ADMIN — ATORVASTATIN CALCIUM 40 MILLIGRAM(S): 80 TABLET, FILM COATED ORAL at 22:17

## 2018-12-08 RX ADMIN — HEPARIN SODIUM 5000 UNIT(S): 5000 INJECTION INTRAVENOUS; SUBCUTANEOUS at 05:05

## 2018-12-08 RX ADMIN — Medication 100 MILLIGRAM(S): at 17:55

## 2018-12-08 RX ADMIN — OXYCODONE HYDROCHLORIDE 5 MILLIGRAM(S): 5 TABLET ORAL at 15:00

## 2018-12-08 RX ADMIN — Medication 25 MILLIGRAM(S): at 05:05

## 2018-12-08 RX ADMIN — OXYCODONE HYDROCHLORIDE 5 MILLIGRAM(S): 5 TABLET ORAL at 05:05

## 2018-12-08 RX ADMIN — OXYCODONE HYDROCHLORIDE 5 MILLIGRAM(S): 5 TABLET ORAL at 06:00

## 2018-12-08 RX ADMIN — Medication 100 MILLIGRAM(S): at 14:30

## 2018-12-08 RX ADMIN — OXYCODONE HYDROCHLORIDE 10 MILLIGRAM(S): 5 TABLET ORAL at 13:07

## 2018-12-08 RX ADMIN — HEPARIN SODIUM 5000 UNIT(S): 5000 INJECTION INTRAVENOUS; SUBCUTANEOUS at 21:09

## 2018-12-08 RX ADMIN — OXYCODONE HYDROCHLORIDE 10 MILLIGRAM(S): 5 TABLET ORAL at 13:45

## 2018-12-08 RX ADMIN — Medication 100 MILLIGRAM(S): at 05:08

## 2018-12-08 RX ADMIN — SODIUM CHLORIDE 3 MILLILITER(S): 9 INJECTION INTRAMUSCULAR; INTRAVENOUS; SUBCUTANEOUS at 21:02

## 2018-12-08 NOTE — PROGRESS NOTE ADULT - ASSESSMENT
78 year old male with cad now being considered for surgery for L1 distraction injury and bl T12 pedicle fx. Evidence of recent myocardial injury by troponins with arrhythmia.  Likely demand ischemia however  obstructive disease given his history,   clearly a possibility as well.  Pt not a candidate for anticoagulation for multiple reasons  (admitted with falls).  Baseline status poor.  Not currently an acceptable elective surgical candidate from cardiac standpoint

## 2018-12-08 NOTE — PROGRESS NOTE ADULT - SUBJECTIVE AND OBJECTIVE BOX
Pt. seen and examined.  Son at bedside.  recent events reviewed and discussed with son as well.  Pt awake and responsive, recently medicated for pain and somewhat confused. Troponins trended up today,   Denies any new chest pain or dyspnea, though unreliable.  Telemetry with mild tachycardia, sinus and afib recently noted.     Telemetry -  Vital Signs Last 24 Hrs  T(C): 37 (08 Dec 2018 13:23), Max: 37 (08 Dec 2018 13:23)  T(F): 98.6 (08 Dec 2018 13:23), Max: 98.6 (08 Dec 2018 13:23)  HR: 130 (08 Dec 2018 14:18) (61 - 130)  BP: 117/73 (08 Dec 2018 14:18) (113/71 - 138/80)  BP(mean): --  RR: 18 (08 Dec 2018 14:18) (18 - 18)  SpO2: 95% (08 Dec 2018 14:18) (93% - 95%)    I&O's Summary    07 Dec 2018 07:01  -  08 Dec 2018 07:00  --------------------------------------------------------  IN: 420 mL / OUT: 0 mL / NET: 420 mL    08 Dec 2018 07:01  -  08 Dec 2018 14:53  --------------------------------------------------------  IN: 480 mL / OUT: 0 mL / NET: 480 mL        PHYSICAL EXAM:  GENERAL: awake,  , NAD.  NECK: Supple, No JVD, no carotid bruit.  CHEST/LUNG: Clear to auscultation bilaterally; No wheezes, rales, or rhonchi.  HEART: S1 S2 mild tachy  ABDOMEN: Soft, Nontender, Nondistended; Bowel sounds present  EXTREMITIES:  compression device in place,     LABS:                        15.2   11.5  )-----------( 269      ( 08 Dec 2018 06:41 )             47.4     12-08    143  |  104  |  64<H>  ----------------------------<  155<H>  4.4   |  23  |  1.87<H>    Ca    9.9      08 Dec 2018 06:41  Phos  3.8     12-07  Mg     2.3     12-07        CARDIAC MARKERS ( 03 Dec 2018 07:34 )  x     / x     / 156 U/L / x     / x      CARDIAC MARKERS ( 02 Dec 2018 16:43 )  x     / x     / 175 U/L / x     / x                  MEDICATIONS  (STANDING):  atorvastatin 40 milliGRAM(s) Oral at bedtime  cholecalciferol 1000 Unit(s) Oral daily  dextrose 5%. 1000 milliLiter(s) (50 mL/Hr) IV Continuous <Continuous>  dextrose 50% Injectable 12.5 Gram(s) IV Push once  dextrose 50% Injectable 25 Gram(s) IV Push once  dextrose 50% Injectable 25 Gram(s) IV Push once  docusate sodium 100 milliGRAM(s) Oral three times a day  furosemide    Tablet 40 milliGRAM(s) Oral daily  heparin  Injectable 5000 Unit(s) SubCutaneous every 8 hours  insulin lispro (HumaLOG) corrective regimen sliding scale   SubCutaneous three times a day before meals  insulin lispro (HumaLOG) corrective regimen sliding scale   SubCutaneous at bedtime  levothyroxine 100 MICROGram(s) Oral daily  metoprolol tartrate 25 milliGRAM(s) Oral two times a day  sodium chloride 0.9% lock flush 3 milliLiter(s) IV Push every 8 hours    MEDICATIONS  (PRN):  acetaminophen   Tablet .. 650 milliGRAM(s) Oral every 6 hours PRN Temp greater or equal to 38C (100.4F), Mild Pain (1 - 3)  dextrose 40% Gel 15 Gram(s) Oral once PRN Blood Glucose LESS THAN 70 milliGRAM(s)/deciliter  glucagon  Injectable 1 milliGRAM(s) IntraMuscular once PRN Glucose LESS THAN 70 milligrams/deciliter  metoprolol tartrate Injectable 5 milliGRAM(s) IV Push every 6 hours PRN HR>110  ondansetron Injectable 4 milliGRAM(s) IV Push every 6 hours PRN Nausea and/or Vomiting  oxyCODONE    IR 5 milliGRAM(s) Oral every 4 hours PRN Moderate Pain (4 - 6)  oxyCODONE    IR 10 milliGRAM(s) Oral every 4 hours PRN Severe Pain (7 - 10)  senna 2 Tablet(s) Oral at bedtime PRN Constipation      RADIOLOGY & ADDITIONAL TESTS:

## 2018-12-08 NOTE — PROGRESS NOTE ADULT - SUBJECTIVE AND OBJECTIVE BOX
Follow-up Pulm Progress Note  Jurgen Rojas MD  [phone]    Events noted: Surgery deferred by anesthesia due to AF  Patient afebrile and hemodynamcially stable: breathing comfortably supine on nasal oxygen    Vital Signs Last 24 Hrs  T(C): 36.6 (08 Dec 2018 04:30), Max: 36.8 (08 Dec 2018 01:05)  T(F): 97.8 (08 Dec 2018 04:30), Max: 98.2 (08 Dec 2018 01:05)  HR: 62 (08 Dec 2018 04:30) (62 - 120)  BP: 137/79 (08 Dec 2018 04:30) (113/71 - 138/80)  BP(mean): --  RR: 18 (08 Dec 2018 04:30) (18 - 18)  SpO2: 93% (08 Dec 2018 04:30) (93% - 94%)                        15.2   11.5  )-----------( 269      ( 08 Dec 2018 06:41 )             47.4       12-08    143  |  104  |  64<H>  ----------------------------<  155<H>  4.4   |  23  |  1.87<H>    Ca    9.9      08 Dec 2018 06:41  Phos  3.8     12-07  Mg     2.3     12-07      PT/INR - ( 06 Dec 2018 08:21 )   PT: 14.3 sec;   INR: 1.25 ratio         PTT - ( 06 Dec 2018 08:21 )  PTT:33.8 sec    Serum Pro-Brain Natriuretic Peptide: 20639 pg/mL (12-06-18 @ 08:21)      CULTURES:  Culture Results:   No growth to date. (12-03 @ 19:21)  Culture Results:   Testing in progress (12-03 @ 19:21)  Culture Results:   <10,000 CFU/ml  Normal Urogenital fito present (12-01 @ 12:51)    Most recent blood culture -- 12-03 @ 19:21   -- -- .Body Fluid Pleural Fluid 12-03 @ 19:21      Physical Examination:  PULM: few rhonchi, no wheeze  CVS: Regular rate and rhythm, no murmurs, rubs, or gallops  ABD: Soft, non-tender  EXT:  1+ LE and presacral edema    RADIOLOGY REVIEWED  CXR:    CT chest:    TTE:

## 2018-12-08 NOTE — PROGRESS NOTE ADULT - SUBJECTIVE AND OBJECTIVE BOX
Conversant, no apparent distress    Vital Signs Last 24 Hrs  T(C): 36.6 (12-08-18 @ 04:30), Max: 36.8 (12-08-18 @ 01:05)  T(F): 97.8 (12-08-18 @ 04:30), Max: 98.2 (12-08-18 @ 01:05)  HR: 62 (12-08-18 @ 04:30) (62 - 120)  BP: 137/79 (12-08-18 @ 04:30) (113/71 - 138/80)  RR: 18 (12-08-18 @ 04:30) (18 - 18)  SpO2: 93% (12-08-18 @ 04:30) (93% - 94%)    Card S1S2  Lungs good air entry b/l  Abd soft NT, ND  Extr no edema                                 15.2   11.5  )-----------( 269      ( 08 Dec 2018 06:41 )             47.4     08 Dec 2018 06:41    143    |  104    |  64     ----------------------------<  155    4.4     |  23     |  1.87     Ca    9.9        08 Dec 2018 06:41  Phos  3.8       07 Dec 2018 12:10  Mg     2.3       07 Dec 2018 12:10    acetaminophen   Tablet .. 650 milliGRAM(s) Oral every 6 hours PRN  atorvastatin 40 milliGRAM(s) Oral at bedtime  cholecalciferol 1000 Unit(s) Oral daily  dextrose 40% Gel 15 Gram(s) Oral once PRN  dextrose 5%. 1000 milliLiter(s) IV Continuous <Continuous>  dextrose 50% Injectable 12.5 Gram(s) IV Push once  dextrose 50% Injectable 25 Gram(s) IV Push once  dextrose 50% Injectable 25 Gram(s) IV Push once  docusate sodium 100 milliGRAM(s) Oral three times a day  glucagon  Injectable 1 milliGRAM(s) IntraMuscular once PRN  heparin  Injectable 5000 Unit(s) SubCutaneous every 8 hours  insulin lispro (HumaLOG) corrective regimen sliding scale   SubCutaneous three times a day before meals  insulin lispro (HumaLOG) corrective regimen sliding scale   SubCutaneous at bedtime  levothyroxine 100 MICROGram(s) Oral daily  metoprolol tartrate 25 milliGRAM(s) Oral two times a day  metoprolol tartrate Injectable 5 milliGRAM(s) IV Push every 6 hours PRN  ondansetron Injectable 4 milliGRAM(s) IV Push every 6 hours PRN  oxyCODONE    IR 5 milliGRAM(s) Oral every 4 hours PRN  oxyCODONE    IR 10 milliGRAM(s) Oral every 4 hours PRN  senna 2 Tablet(s) Oral at bedtime PRN  sodium chloride 0.9% lock flush 3 milliLiter(s) IV Push every 8 hours	    A/P:    Suspect baseline DM CKD III  S/p hemodynamic YELITZA on CKD in setting of ARB, Lasix, Ketorolac  Avoid nephrotoxins  Renal fx has improved and is likely at baseline  No objection to planned surgery from renal pov  Positive SPEP on Dec 2 noted  Bence Jones pending  Consider Heme/onc input  New Afib, Cardiology following

## 2018-12-08 NOTE — PROGRESS NOTE ADULT - ASSESSMENT
78M s/p fall w/ three column thoracolumbar fx. He is currently not cardiologically cleared for the OR and needs further risk stratification. No neurosurgical intervention at this time.    q4 neuro checks  spine precautions  continue lasix per pulm  f/u cardiology recs re: cath vs pharm stress test

## 2018-12-08 NOTE — PROGRESS NOTE ADULT - SUBJECTIVE AND OBJECTIVE BOX
Visit Summary: Patient seen and evaluated at bedside. Per cardiology, patient is no longer cleared for surgery and needs further risk stratification via either catheter angiogram or pharmacologic stress test. He is receiving lasix for pleural effusion secondary to CHF as per pulm. Patient is neurologically stable.    Overnight Events: no acute events o/n    Exam:  T(C): 37 (12-08-18 @ 13:23), Max: 37 (12-08-18 @ 13:23)  HR: 130 (12-08-18 @ 14:18) (61 - 130)  BP: 117/73 (12-08-18 @ 14:18) (113/71 - 138/80)  RR: 18 (12-08-18 @ 14:18) (18 - 18)  SpO2: 95% (12-08-18 @ 14:18) (93% - 95%)  Wt(kg): --    AAOx3, EOS, FC  PERRL, EOMI  WHALEN 5/5, no drift                            15.2   11.5  )-----------( 269      ( 08 Dec 2018 06:41 )             47.4     12-08    143  |  104  |  64<H>  ----------------------------<  155<H>  4.4   |  23  |  1.87<H>    Ca    9.9      08 Dec 2018 06:41  Phos  3.8     12-07  Mg     2.3     12-07

## 2018-12-08 NOTE — PROGRESS NOTE ADULT - ASSESSMENT
Systolic CHF  R pleural effusion: small to moderate, likely unchanged from prior CT  AF in setting of systolic CHF  CAD  Mild hypoxemia: secondary to basilar atelectasis/effusion    REC    Diurese as tolerated: O>I - lasix 40 mg po qd ordered  f/u CXR monday AM  Cardiology f/u  Pain management

## 2018-12-09 VITALS
RESPIRATION RATE: 18 BRPM | DIASTOLIC BLOOD PRESSURE: 71 MMHG | OXYGEN SATURATION: 94 % | SYSTOLIC BLOOD PRESSURE: 124 MMHG | HEART RATE: 130 BPM

## 2018-12-09 LAB
ALBUMIN SERPL ELPH-MCNC: 3.3 G/DL — SIGNIFICANT CHANGE UP (ref 3.3–5)
ALP SERPL-CCNC: 158 U/L — HIGH (ref 40–120)
ALT FLD-CCNC: 22 U/L — SIGNIFICANT CHANGE UP (ref 10–45)
ANION GAP SERPL CALC-SCNC: 16 MMOL/L — SIGNIFICANT CHANGE UP (ref 5–17)
AST SERPL-CCNC: 39 U/L — SIGNIFICANT CHANGE UP (ref 10–40)
BILIRUB SERPL-MCNC: 1.9 MG/DL — HIGH (ref 0.2–1.2)
BUN SERPL-MCNC: 79 MG/DL — HIGH (ref 7–23)
CALCIUM SERPL-MCNC: 9.6 MG/DL — SIGNIFICANT CHANGE UP (ref 8.4–10.5)
CHLORIDE SERPL-SCNC: 110 MMOL/L — HIGH (ref 96–108)
CO2 SERPL-SCNC: 24 MMOL/L — SIGNIFICANT CHANGE UP (ref 22–31)
CREAT SERPL-MCNC: 2.31 MG/DL — HIGH (ref 0.5–1.3)
GLUCOSE BLDC GLUCOMTR-MCNC: 123 MG/DL — HIGH (ref 70–99)
GLUCOSE BLDC GLUCOMTR-MCNC: 128 MG/DL — HIGH (ref 70–99)
GLUCOSE BLDC GLUCOMTR-MCNC: 150 MG/DL — HIGH (ref 70–99)
GLUCOSE SERPL-MCNC: 158 MG/DL — HIGH (ref 70–99)
POTASSIUM SERPL-MCNC: 4.7 MMOL/L — SIGNIFICANT CHANGE UP (ref 3.5–5.3)
POTASSIUM SERPL-SCNC: 4.7 MMOL/L — SIGNIFICANT CHANGE UP (ref 3.5–5.3)
PROT SERPL-MCNC: 6.8 G/DL — SIGNIFICANT CHANGE UP (ref 6–8.3)
SODIUM SERPL-SCNC: 150 MMOL/L — HIGH (ref 135–145)
TROPONIN T, HIGH SENSITIVITY RESULT: 115 NG/L — HIGH (ref 0–51)

## 2018-12-09 PROCEDURE — 99285 EMERGENCY DEPT VISIT HI MDM: CPT | Mod: 25

## 2018-12-09 PROCEDURE — A9561: CPT

## 2018-12-09 PROCEDURE — 86140 C-REACTIVE PROTEIN: CPT

## 2018-12-09 PROCEDURE — 81001 URINALYSIS AUTO W/SCOPE: CPT

## 2018-12-09 PROCEDURE — 83036 HEMOGLOBIN GLYCOSYLATED A1C: CPT

## 2018-12-09 PROCEDURE — 93970 EXTREMITY STUDY: CPT

## 2018-12-09 PROCEDURE — 82550 ASSAY OF CK (CPK): CPT

## 2018-12-09 PROCEDURE — G8979: CPT | Mod: CI

## 2018-12-09 PROCEDURE — 80053 COMPREHEN METABOLIC PANEL: CPT

## 2018-12-09 PROCEDURE — 87070 CULTURE OTHR SPECIMN AEROBIC: CPT

## 2018-12-09 PROCEDURE — 84100 ASSAY OF PHOSPHORUS: CPT

## 2018-12-09 PROCEDURE — 97110 THERAPEUTIC EXERCISES: CPT

## 2018-12-09 PROCEDURE — 97161 PT EVAL LOW COMPLEX 20 MIN: CPT | Mod: CI

## 2018-12-09 PROCEDURE — 71250 CT THORAX DX C-: CPT

## 2018-12-09 PROCEDURE — 82784 ASSAY IGA/IGD/IGG/IGM EACH: CPT

## 2018-12-09 PROCEDURE — 84443 ASSAY THYROID STIM HORMONE: CPT

## 2018-12-09 PROCEDURE — 72131 CT LUMBAR SPINE W/O DYE: CPT

## 2018-12-09 PROCEDURE — 87205 SMEAR GRAM STAIN: CPT

## 2018-12-09 PROCEDURE — 85730 THROMBOPLASTIN TIME PARTIAL: CPT

## 2018-12-09 PROCEDURE — 78306 BONE IMAGING WHOLE BODY: CPT

## 2018-12-09 PROCEDURE — 84156 ASSAY OF PROTEIN URINE: CPT

## 2018-12-09 PROCEDURE — 76775 US EXAM ABDO BACK WALL LIM: CPT

## 2018-12-09 PROCEDURE — 71045 X-RAY EXAM CHEST 1 VIEW: CPT

## 2018-12-09 PROCEDURE — 85652 RBC SED RATE AUTOMATED: CPT

## 2018-12-09 PROCEDURE — 86900 BLOOD TYPING SEROLOGIC ABO: CPT

## 2018-12-09 PROCEDURE — 87075 CULTR BACTERIA EXCEPT BLOOD: CPT

## 2018-12-09 PROCEDURE — 93005 ELECTROCARDIOGRAM TRACING: CPT

## 2018-12-09 PROCEDURE — 36415 COLL VENOUS BLD VENIPUNCTURE: CPT

## 2018-12-09 PROCEDURE — 86850 RBC ANTIBODY SCREEN: CPT

## 2018-12-09 PROCEDURE — 82306 VITAMIN D 25 HYDROXY: CPT

## 2018-12-09 PROCEDURE — 92950 HEART/LUNG RESUSCITATION CPR: CPT

## 2018-12-09 PROCEDURE — G8978: CPT | Mod: CI

## 2018-12-09 PROCEDURE — 86803 HEPATITIS C AB TEST: CPT

## 2018-12-09 PROCEDURE — 86901 BLOOD TYPING SEROLOGIC RH(D): CPT

## 2018-12-09 PROCEDURE — 86334 IMMUNOFIX E-PHORESIS SERUM: CPT

## 2018-12-09 PROCEDURE — 83735 ASSAY OF MAGNESIUM: CPT

## 2018-12-09 PROCEDURE — 83880 ASSAY OF NATRIURETIC PEPTIDE: CPT

## 2018-12-09 PROCEDURE — G8980: CPT | Mod: CI

## 2018-12-09 PROCEDURE — 84484 ASSAY OF TROPONIN QUANT: CPT

## 2018-12-09 PROCEDURE — 85027 COMPLETE CBC AUTOMATED: CPT

## 2018-12-09 PROCEDURE — 82962 GLUCOSE BLOOD TEST: CPT

## 2018-12-09 PROCEDURE — 96374 THER/PROPH/DIAG INJ IV PUSH: CPT

## 2018-12-09 PROCEDURE — 96372 THER/PROPH/DIAG INJ SC/IM: CPT

## 2018-12-09 PROCEDURE — 86325 OTHER IMMUNOELECTROPHORESIS: CPT

## 2018-12-09 PROCEDURE — G0103: CPT

## 2018-12-09 PROCEDURE — 87086 URINE CULTURE/COLONY COUNT: CPT

## 2018-12-09 PROCEDURE — 85610 PROTHROMBIN TIME: CPT

## 2018-12-09 PROCEDURE — 83615 LACTATE (LD) (LDH) ENZYME: CPT

## 2018-12-09 PROCEDURE — 80048 BASIC METABOLIC PNL TOTAL CA: CPT

## 2018-12-09 RX ORDER — INSULIN LISPRO 100/ML
VIAL (ML) SUBCUTANEOUS
Qty: 0 | Refills: 0 | Status: DISCONTINUED | OUTPATIENT
Start: 2018-12-09 | End: 2018-12-09

## 2018-12-09 RX ORDER — SODIUM CHLORIDE 9 MG/ML
1000 INJECTION, SOLUTION INTRAVENOUS
Qty: 0 | Refills: 0 | Status: DISCONTINUED | OUTPATIENT
Start: 2018-12-09 | End: 2018-12-09

## 2018-12-09 RX ORDER — LEVOTHYROXINE SODIUM 125 MCG
50 TABLET ORAL AT BEDTIME
Qty: 0 | Refills: 0 | Status: DISCONTINUED | OUTPATIENT
Start: 2018-12-09 | End: 2018-12-09

## 2018-12-09 RX ORDER — DEXTROSE 50 % IN WATER 50 %
25 SYRINGE (ML) INTRAVENOUS ONCE
Qty: 0 | Refills: 0 | Status: DISCONTINUED | OUTPATIENT
Start: 2018-12-09 | End: 2018-12-09

## 2018-12-09 RX ORDER — INSULIN LISPRO 100/ML
VIAL (ML) SUBCUTANEOUS AT BEDTIME
Qty: 0 | Refills: 0 | Status: DISCONTINUED | OUTPATIENT
Start: 2018-12-09 | End: 2018-12-09

## 2018-12-09 RX ORDER — MORPHINE SULFATE 50 MG/1
1 CAPSULE, EXTENDED RELEASE ORAL EVERY 4 HOURS
Qty: 0 | Refills: 0 | Status: DISCONTINUED | OUTPATIENT
Start: 2018-12-09 | End: 2018-12-09

## 2018-12-09 RX ORDER — GLUCAGON INJECTION, SOLUTION 0.5 MG/.1ML
1 INJECTION, SOLUTION SUBCUTANEOUS ONCE
Qty: 0 | Refills: 0 | Status: DISCONTINUED | OUTPATIENT
Start: 2018-12-09 | End: 2018-12-09

## 2018-12-09 RX ORDER — FUROSEMIDE 40 MG
40 TABLET ORAL DAILY
Qty: 0 | Refills: 0 | Status: DISCONTINUED | OUTPATIENT
Start: 2018-12-09 | End: 2018-12-09

## 2018-12-09 RX ORDER — DEXTROSE 50 % IN WATER 50 %
15 SYRINGE (ML) INTRAVENOUS ONCE
Qty: 0 | Refills: 0 | Status: DISCONTINUED | OUTPATIENT
Start: 2018-12-09 | End: 2018-12-09

## 2018-12-09 RX ORDER — SODIUM CHLORIDE 9 MG/ML
1000 INJECTION INTRAMUSCULAR; INTRAVENOUS; SUBCUTANEOUS
Qty: 0 | Refills: 0 | Status: DISCONTINUED | OUTPATIENT
Start: 2018-12-09 | End: 2018-12-09

## 2018-12-09 RX ORDER — METOPROLOL TARTRATE 50 MG
7.5 TABLET ORAL EVERY 6 HOURS
Qty: 0 | Refills: 0 | Status: DISCONTINUED | OUTPATIENT
Start: 2018-12-09 | End: 2018-12-09

## 2018-12-09 RX ORDER — MORPHINE SULFATE 50 MG/1
2 CAPSULE, EXTENDED RELEASE ORAL EVERY 4 HOURS
Qty: 0 | Refills: 0 | Status: DISCONTINUED | OUTPATIENT
Start: 2018-12-09 | End: 2018-12-09

## 2018-12-09 RX ORDER — DEXTROSE 50 % IN WATER 50 %
12.5 SYRINGE (ML) INTRAVENOUS ONCE
Qty: 0 | Refills: 0 | Status: DISCONTINUED | OUTPATIENT
Start: 2018-12-09 | End: 2018-12-09

## 2018-12-09 RX ADMIN — Medication 37.5 MILLIGRAM(S): at 06:17

## 2018-12-09 RX ADMIN — Medication 100 MICROGRAM(S): at 06:15

## 2018-12-09 RX ADMIN — Medication 100 MILLIGRAM(S): at 06:16

## 2018-12-09 RX ADMIN — Medication 40 MILLIGRAM(S): at 06:15

## 2018-12-09 RX ADMIN — SODIUM CHLORIDE 3 MILLILITER(S): 9 INJECTION INTRAMUSCULAR; INTRAVENOUS; SUBCUTANEOUS at 06:13

## 2018-12-09 RX ADMIN — MORPHINE SULFATE 1 MILLIGRAM(S): 50 CAPSULE, EXTENDED RELEASE ORAL at 16:20

## 2018-12-09 RX ADMIN — OXYCODONE HYDROCHLORIDE 5 MILLIGRAM(S): 5 TABLET ORAL at 05:00

## 2018-12-09 RX ADMIN — OXYCODONE HYDROCHLORIDE 5 MILLIGRAM(S): 5 TABLET ORAL at 04:13

## 2018-12-09 RX ADMIN — HEPARIN SODIUM 5000 UNIT(S): 5000 INJECTION INTRAVENOUS; SUBCUTANEOUS at 16:09

## 2018-12-09 RX ADMIN — SODIUM CHLORIDE 3 MILLILITER(S): 9 INJECTION INTRAMUSCULAR; INTRAVENOUS; SUBCUTANEOUS at 16:14

## 2018-12-09 RX ADMIN — OXYCODONE HYDROCHLORIDE 10 MILLIGRAM(S): 5 TABLET ORAL at 07:09

## 2018-12-09 RX ADMIN — MORPHINE SULFATE 1 MILLIGRAM(S): 50 CAPSULE, EXTENDED RELEASE ORAL at 16:01

## 2018-12-09 RX ADMIN — OXYCODONE HYDROCHLORIDE 10 MILLIGRAM(S): 5 TABLET ORAL at 06:16

## 2018-12-09 RX ADMIN — HEPARIN SODIUM 5000 UNIT(S): 5000 INJECTION INTRAVENOUS; SUBCUTANEOUS at 06:16

## 2018-12-09 NOTE — PROGRESS NOTE ADULT - PROBLEM SELECTOR PLAN 2
as above
as above
as above  mri ordered  no signs of cord compression
as above  mri ordered  no signs of cord compression
-   -Lopressor 37.5 mg bid  -Lopressor 5 mg iv q6h prn  -Tele monitoring
-  -Lopressor 25 mg bid  -Lopressor 5 mg iv q6h prn  -Hold anticoagulation for now given upcoming surgery  -Tele monitoring
-  increase -Lopressor 37.5 mg bid  -Lopressor 5 mg iv q6h prn  -Tele monitoring
- Synthroid increased to 100 mcg daily  - repeat TFTs as outpatient in 4-6 weeks

## 2018-12-09 NOTE — PROGRESS NOTE ADULT - REASON FOR ADMISSION
back pain

## 2018-12-09 NOTE — DISCHARGE NOTE FOR THE EXPIRED PATIENT - HOSPITAL COURSE
78M transferred from outside hospital for unstable lumbar spine fracture. Patient inittialy planned for surgery 12/7; however surgery was delayed given new onset afib and troponin leak. Cardiology, nephrology, endocrinology, internal medicine, and pulmonology all following. All had initially cleared the patient for surgery. After surgery was delayed due to new onset afib and troponin leak patient continued to leak troponins and conitnued to be in Afib. Cardiology continued to follow for management patient on telemetry. On 12/9 at 7:53 rhythm changes prompted CODE BLUE. Resuscitation was attempted however, ROSC was no achieved Patient subsequently pronounced by CODE team.

## 2018-12-09 NOTE — CHART NOTE - NSCHARTNOTEFT_GEN_A_CORE
Code blue called at 07:52.    Briefly patient is a 78 year old man with medical history significant for CAD, HTN, DM2, prostate CA admitted for L1 and bilateral T12 pedicle fractures and elevated troponins and Afib RVR this admission. CODE BLUE called at 07:53 after rhythm changes noted on telemetry. Patient previously in MAT vs. sinus tachycardia with sudden change in rhythm to Code blue called at 07:52.    Briefly patient is a 78 year old man with medical history significant for CAD, HTN, DM2, prostate CA admitted for L1 and bilateral T12 pedicle fractures and elevated troponins and Afib RVR this admission. CODE BLUE called at 07:53 after rhythm changes noted on telemetry. Patient previously in MAT vs. sinus tachycardia 120s with 1st degree block and sudden change to possible 3rd degree block/AV dissociation at rate 30s. On arrival to the room, patient noted to be agonally breathing, pulse thready and then not longer palpable. Chest compressions and ACLS protocol started immediately. Family notified of arrest by phone. Patient given epi x1, calcium carbonate x1, bicarbonate x1. V- fib noted on initial rhythm check and shocked was delivered without ROSC. Patient remained in V-fib at second rhythm check and shock delivered without ROSC. Ventricular escape beats vs. rhythm noted on all subsequent rhythm checks. On one occasion, thready pulses were palpable however BP could not be obtained. Normal saline bolus and Levophed started however patient reverted into PEA arrest. Overall patient given epi x6, amio 300 x1, amio 150 x1, calcium carbonate x2, bicarb amp x1 and glucose amp x1. Suspect arrest secondary to myocardial infarction in setting of rising troponins and rhythm changes. CPR performed for total 30 mins without ROSC. Patient pronounced dead by cardiopulmonary criteria at 08:21 and family was notified.    MOON Jimenez PGY3  MAR #[id number] Code blue called at 07:52.    Briefly patient is a 78 year old man with medical history significant for CAD, HTN, DM2, prostate CA admitted for L1 and bilateral T12 pedicle fractures and elevated troponins and Afib RVR this admission. CODE BLUE called at 07:53 after rhythm changes noted on telemetry. Patient previously in MAT vs. sinus tachycardia 120s with 1st degree block and sudden change to possible 3rd degree block/AV dissociation at rate 30s. On arrival to the room, patient noted to be agonally breathing, pulse thready and then not longer palpable. Chest compressions and ACLS protocol started immediately. Family notified of arrest by phone. Patient given epi x1, calcium carbonate x1, bicarbonate x1. V- fib noted on initial rhythm check and shocked was delivered without ROSC. Patient remained in V-fib at second rhythm check and shock delivered without ROSC. Ventricular escape beats vs. rhythm noted on all subsequent rhythm checks. On one occasion, thready pulses were palpable however BP could not be obtained. Normal saline bolus and Levophed started however patient reverted into PEA arrest. CPR was resumed for 5 more minutes. Overall patient given epi x6, amio 300 x1, amio 150 x1, calcium carbonate x2, bicarb amp x1 and glucose amp x1. Suspect arrest secondary to myocardial infarction in setting of rising troponins and rhythm changes. CPR performed for total 30 mins without ROSC. Patient pronounced dead by cardiopulmonary criteria at 08:21 and family was notified.    MOON Jimenez PGY3  MAR #[id number] Code blue called at 07:52.    Briefly patient is a 78 year old man with medical history significant for CAD, HTN, DM2, prostate CA admitted for L1 and bilateral T12 pedicle fractures and elevated troponins and Afib RVR this admission. CODE BLUE called at 07:53 after rhythm changes noted on telemetry. Patient previously in MAT vs. sinus tachycardia 120s with 1st degree block and sudden change to possible 3rd degree block/AV dissociation at rate 30s. On arrival to the room, patient noted to be agonally breathing, pulse thready and then not longer palpable. Chest compressions and ACLS protocol started immediately. Family notified of arrest by phone. Patient given epi x1, calcium carbonate x1, bicarbonate x1. V- fib noted on initial rhythm check and shocked was delivered without ROSC. Patient remained in V-fib at second rhythm check and shock delivered without ROSC. Ventricular escape beats vs. rhythm noted on all subsequent rhythm checks. On one occasion, thready pulses were palpable however BP could not be obtained. Normal saline bolus and Levophed started however patient reverted into PEA arrest. CPR was resumed for 5 more minutes. Overall patient given epi x6, amio 300 x1, amio 150 x1, calcium carbonate x2, bicarb amp x1 and glucose amp x1. Suspect arrest secondary to myocardial infarction in setting of rising troponins and rhythm changes noted. CPR performed for total 30 mins without ROSC. Patient pronounced dead by cardiopulmonary criteria at 08:21 and family was notified.    MOON Jimenez PGY3  MAR #[id number]

## 2018-12-09 NOTE — PROGRESS NOTE ADULT - PROBLEM SELECTOR PROBLEM 2
Fracture of vertebra, thoracic
Acquired hypothyroidism
Atrial fibrillation, unspecified type

## 2018-12-09 NOTE — DISCHARGE NOTE FOR THE EXPIRED PATIENT - SECONDARY DIAGNOSIS.
Closed fracture of lumbar vertebra, unspecified fracture morphology, unspecified lumbar vertebral level, initial encounter YELITZA (acute kidney injury) Coronary artery disease with angina pectoris, unspecified vessel or lesion type, unspecified whether native or transplanted heart Essential hypertension Type 2 diabetes mellitus without complication, unspecified whether long term insulin use

## 2018-12-09 NOTE — PROGRESS NOTE ADULT - ASSESSMENT
78 year old male with cad now being considered for surgery for L1 distraction injury and bl T12 pedicle fx. Evidence of recent myocardial injury by troponins with arrhythmia.  Likely demand ischemia however  obstructive disease given his history,   clearly a possibility as well.  Pt not a candidate for anticoagulation for multiple reasons  (admitted with falls).  Baseline status poor.  Not currently an acceptable elective surgical candidate from cardiac standpoint.  Heart rate a bit better this AM but BP down a bit with it as well.  Would consider further titration up of metoprolol if rates persists above 90 after another 24 hours of recent dose change.  Of note, TSH up a bit, may need to consider supplementation or endo eval.  Would repeat troponin/cpk as well as tsh tomorrow

## 2018-12-09 NOTE — PROGRESS NOTE ADULT - SUBJECTIVE AND OBJECTIVE BOX
Visit Summary: Patient seen and evaluated at bedside.    Overnight Events: no acute events o/n    Exam:  Vital Signs Last 24 Hrs  T(C): 36.3 (09 Dec 2018 04:21), Max: 37 (08 Dec 2018 13:23)  T(F): 97.4 (09 Dec 2018 04:21), Max: 98.6 (08 Dec 2018 13:23)  HR: 104 (09 Dec 2018 04:21) (61 - 130)  BP: 116/75 (09 Dec 2018 04:21) (115/70 - 120/78)  BP(mean): --  RR: 18 (09 Dec 2018 04:21) (18 - 18)  SpO2: 100% (09 Dec 2018 04:21) (94% - 100%)    AAOx3, EOS, FC  PERRL, EOMI  WHALEN 5/5, no drift                            15.2   11.5  )-----------( 269      ( 08 Dec 2018 06:41 )             47.4     12-08    143  |  104  |  64<H>  ----------------------------<  155<H>  4.4   |  23  |  1.87<H>    Ca    9.9      08 Dec 2018 06:41  Phos  3.8     12-07  Mg     2.3     12-07

## 2018-12-09 NOTE — PROGRESS NOTE ADULT - PROBLEM SELECTOR PLAN 1
transudative eff  160 cc drained  cx pending  MGUS  CHF  CKD  bone scan aborted  pt is full code  prognosis guarded to poor  pt has prostate ca  back pain  cont fentanyl patch and prn oxycodone, bowel regimen  assist with ADL  will follow  heme and renal and cardio follow up  serial labs  serial PE
back pain  pleural eff  ckd shadia  obesity  poss STEFFEN  atelectasis  bone scan pending  on emp ABX for poss UTI  on lasix for HF  poss thoracentesis today with IR  I and O  serial labs  replete lytes  pain regimen, opioids, bowel regimen, supportive measures  prognosis guarded  assist with ADL as needed  will follow  tolerating room air
cont humalog scale coverage alone  cont cons cho diet  goal bg 100-180 in hosp setting
cont humalog scale coverage alone  outpt anti-diabetes regimen on hold  goal bg 100-180 in hosp setting
for mri today  adjust pain meds  pt eval after mri  ortho fu
for transfer to [facility] today to neurosx for mri and further care  pain control
ortho eval called and noted  pt eval called  pain control  bone scan  mri t-l spine
pleural eff  atelectasis  planned for thoracentesis tomorrow with IR  diuresis  I and O  prostate ca, mets  pain regimen  dvt P  heme onc eval and follow up  caution with opioids  serial labs  serial PE  on o2 support  keep sat > 88 pct  will follow
prostate ca  mets disease  anemia  ckd  shadia  I and O  renal follow up  on diuresis at present  replete lytes  supportive management  on o2 as needed, keep sat > 88 pct  imaging and labs reviewed  cont pain regimen, will add long acting pain relief - fentanyl patch - lowest dose - cont bowel regimen and breakthrough Rx regimen  GOC discussed, pt remains full code  will follow  prognosis guarded
refused mri yesterday  discussed with spine ortho - will see pt today - may benefit from t-l spine brace  pain meds adjusted  fentanyl patch ordered  pt
-  -Recommend proceeding with procedure based on the following     Revised cardiac risk index      Not high risk surgery      History of  ischemic heart disease, congestive heart failure      No history of stroke      Preop insulin use      Preop creatinine <2mg/dL  Given the above patient is considered MODERATE risk for INTERMEDIATE risk procedure. Doing further risk stratification such as cardiac catheterization, stress testing will only delay procedure patient needs and may cause harm to patient given ckd.   -Diuresis with lasix 20 mg iv daily.
- continue with low correction sliding scale qac and qhs  - consistent carb diet  - will follow  - for discharge: would stop Metformin and Victoza and discharge patient home on home dose of Tresiba only. He will follow up with his endocrinologist Dr. Marta Hicks.
Would NOT recommend proceeding with surgery at this point given his risks of severe complications from a cardiac standpoint.  Will need to "cool down" regardless and we can re-evaluate then.  Will increase b-blockade for better overall rate/rhythm control.  Would likely need further risk stratification with cath or pharm stress before would consider proceeding with surgery, but if more conservative spine management is an acceptable option, would prefer this course.   Will follow  -Diuresis with lasix 20 mg iv daily.
Would NOT recommend proceeding with surgery at this point given his risks of severe complications from a cardiac standpoint.  Will need to "cool down" regardless and we can re-evaluate then.  Will increase b-blockade for better overall rate/rhythm control.  Would likely need further risk stratification with cath or pharm stress before would consider proceeding with surgery, but if more conservative spine management is an acceptable option, would prefer this course.   Will follow  -Diuresis with lasix 20 mg iv daily.

## 2018-12-09 NOTE — PROGRESS NOTE ADULT - SUBJECTIVE AND OBJECTIVE BOX
Pt. seen and examined.  Sleeping and not awoken    Telemetry -  Vital Signs Last 24 Hrs  T(C): 36.4 (09 Dec 2018 11:33), Max: 36.9 (08 Dec 2018 20:22)  T(F): 97.6 (09 Dec 2018 11:33), Max: 98.4 (08 Dec 2018 20:22)  HR: 93 (09 Dec 2018 11:33) (93 - 130)  BP: 103/70 (09 Dec 2018 11:33) (103/70 - 119/71)  BP(mean): --  RR: 18 (09 Dec 2018 11:33) (18 - 18)  SpO2: 91% (09 Dec 2018 11:33) (91% - 100%)    I&O's Summary    08 Dec 2018 07:01  -  09 Dec 2018 07:00  --------------------------------------------------------  IN: 680 mL / OUT: 0 mL / NET: 680 mL    09 Dec 2018 07:01  -  09 Dec 2018 14:02  --------------------------------------------------------  IN: 360 mL / OUT: 0 mL / NET: 360 mL        PHYSICAL EXAM:  GENERAL: Alert, NAD.  NECK: Supple, No JVD, no carotid bruit.  CHEST/LUNG: grossly clear to auscultation bilaterally; No wheezes, rales, or rhonchi.  HEART: unchanged  ABDOMEN: Soft, Nontender, Nondistended; Bowel sounds present  EXTREMITIES:  No LE edema.      LABS:                        15.2   11.5  )-----------( 269      ( 08 Dec 2018 06:41 )             47.4     12-08    143  |  104  |  64<H>  ----------------------------<  155<H>  4.4   |  23  |  1.87<H>    Ca    9.9      08 Dec 2018 06:41        CARDIAC MARKERS ( 03 Dec 2018 07:34 )  x     / x     / 156 U/L / x     / x      CARDIAC MARKERS ( 02 Dec 2018 16:43 )  x     / x     / 175 U/L / x     / x                  MEDICATIONS  (STANDING):  atorvastatin 40 milliGRAM(s) Oral at bedtime  cholecalciferol 1000 Unit(s) Oral daily  dextrose 5%. 1000 milliLiter(s) (50 mL/Hr) IV Continuous <Continuous>  dextrose 50% Injectable 12.5 Gram(s) IV Push once  dextrose 50% Injectable 25 Gram(s) IV Push once  dextrose 50% Injectable 25 Gram(s) IV Push once  docusate sodium Liquid 100 milliGRAM(s) Oral two times a day  furosemide    Tablet 40 milliGRAM(s) Oral daily  heparin  Injectable 5000 Unit(s) SubCutaneous every 8 hours  insulin lispro (HumaLOG) corrective regimen sliding scale   SubCutaneous three times a day before meals  insulin lispro (HumaLOG) corrective regimen sliding scale   SubCutaneous at bedtime  levothyroxine 100 MICROGram(s) Oral daily  metoprolol tartrate 37.5 milliGRAM(s) Oral two times a day  sodium chloride 0.9% lock flush 3 milliLiter(s) IV Push every 8 hours    MEDICATIONS  (PRN):  acetaminophen   Tablet .. 650 milliGRAM(s) Oral every 6 hours PRN Temp greater or equal to 38C (100.4F), Mild Pain (1 - 3)  dextrose 40% Gel 15 Gram(s) Oral once PRN Blood Glucose LESS THAN 70 milliGRAM(s)/deciliter  glucagon  Injectable 1 milliGRAM(s) IntraMuscular once PRN Glucose LESS THAN 70 milligrams/deciliter  metoprolol tartrate Injectable 5 milliGRAM(s) IV Push every 6 hours PRN HR>110  ondansetron Injectable 4 milliGRAM(s) IV Push every 6 hours PRN Nausea and/or Vomiting  oxyCODONE    IR 5 milliGRAM(s) Oral every 4 hours PRN Moderate Pain (4 - 6)  oxyCODONE    IR 10 milliGRAM(s) Oral every 4 hours PRN Severe Pain (7 - 10)  senna 2 Tablet(s) Oral at bedtime PRN Constipation      RADIOLOGY & ADDITIONAL TESTS:

## 2018-12-09 NOTE — PROGRESS NOTE ADULT - PROBLEM SELECTOR PROBLEM 1
Pleural effusion
Fracture of vertebra, lumbar
Pleural effusion
Prostate cancer
Prostate cancer
Type 2 diabetes mellitus without complication
Type 2 diabetes mellitus without complication
Preop cardiovascular exam
Type 2 diabetes mellitus with stage 3 chronic kidney disease, with long-term current use of insulin

## 2018-12-09 NOTE — PROGRESS NOTE ADULT - PROVIDER SPECIALTY LIST ADULT
Anesthesia
Cardiology
Endocrinology
Heme/Onc
Internal Medicine
Nephrology
Neurosurgery
Pulmonology
Internal Medicine

## 2018-12-09 NOTE — PROGRESS NOTE ADULT - SUBJECTIVE AND OBJECTIVE BOX
Called to patient's bedside for intubation.  On arrival pt in cardiopulmonary arrest.  CPR being performed by primary team.  DL X 3, pt with copious gastric contents in posterior pharynx.  grade 2 view, ETT passed easily.    Positive end-tidal carbon dioxide via EasyCap, positive bilateral breath sounds.   ETT secured at 24 cm at lips. CXR to be reviewed by primary team.   Pt still in cardiac arrest after intubation.   Primary team performing resuscitative measures.

## 2019-01-02 LAB
CULTURE RESULTS: SIGNIFICANT CHANGE UP
SPECIMEN SOURCE: SIGNIFICANT CHANGE UP

## 2019-01-23 LAB
CULTURE RESULTS: SIGNIFICANT CHANGE UP
SPECIMEN SOURCE: SIGNIFICANT CHANGE UP

## 2019-07-02 NOTE — ED ADULT NURSE NOTE - PMH
02-Jul-2019 09:46 Bladder stones  s/p extraction  CAD (coronary artery disease)    Hyperlipidemia    Hypothyroidism    Myocardial infarction  1995  Prostate cancer  s/p brachytherapy  Type 2 diabetes mellitus without complication    Venous stasis dermatitis of both lower extremities

## 2019-07-15 NOTE — DIETITIAN INITIAL EVALUATION ADULT. - NUTRITION INTERVENTION
[x] [facility] @ [facility]  [address]  [place], [address] [address] [facility]  Phone [phone]  Fax [phone]    Physical Therapy Discharge Note    Date: 7/15/2019      Patient: Gonzales Baker  : 1988  MRN: [id number]    Physician: Kelli Churchill MD     Diagnosis: Scapular dyskinesis (G25.89)  Onset Date: 2019   Rehab Diagnosis: Scapulothoracic weakness  Total visits attended: 10  Cancels/No shows: 2/2  Date of initial visit: 2019               [] Patient recovered from conditions. Treatment goals were met. [] Patient received maximum benefit. No further therapy indicated at this time. [] Patient demonstrated improvement from condition with  ** Of  ** Short term goals met. []Patient demonstrated improvement from condition with **   Of **  Long term goals met. [] Patient to continue exercise/home instructions independently. [] Therapy interrupted due to:    [x] Patient has 2 or more no shows/cancels, is discontinued per our policy. [] Patient has completed prescribed number of treatment sessions. [] Other:      Pain level at evaluation was   7   /10 and at discharge was   unknown    /10    It Is My Understanding That The:  [] Patient returned to work. [] Patient demonstrated improved level of function. [] Patient returned to previous functional level. [] Patient's current functional status is unknown due to no-shows  [] Other:     Recommendations/Comments:  Patient \"no-showed\" for his last two scheduled treatment sessions. Need to discontinue physical therapy at this time due to compliance issues.      Treatment Included:  [x] Therapeutic Exercise    [] Modalities:  [] Therapeutic Activity    [] Ultrasound  [] Electrical Stimulation  [] Gait Training     [] Massage       [] Lumbar/Cervical Traction  [] Neuromuscular Re-education [x] Cold/hot pack [] Iontophoresis: 4 mg/mL  [x] Instruction in Home Exercise Program Meals and Snack

## 2019-11-11 NOTE — ED PROVIDER NOTE - OBJECTIVE STATEMENT
79 y/o M pt w/ PMHx CAD, MI, HTN, HLD, DM, venous stasis dermatitis, bladder stones, prostate CA, hypothyroidism presents to the ED c/o lower back pain s/p slip and fall backwards on 11/22. Reports he fell landing on his back resulting in lower back pain, saw orthopedist 2-3 days ago, Rxd 5mg oxycodone-acetaminophen. Also XRs completed at that time which revealed arthritis, no fx. He endorses that the medication is not relieving his pain. Pt denies bladder or bowel dysfunction, fever, chills or any other complaints at this time.  Ortho: Dr. Bragg No

## 2020-08-31 NOTE — PATIENT PROFILE ADULT. - NS PRO ABUSE SCREEN AFRAID ANYONE YN
----- Message from Brittany Saunders MD sent at 8/26/2020  4:11 PM CDT -----  Please inform patient of normal results.  
Patient informed of normal mammogram results.  
no

## 2020-09-25 NOTE — DISCHARGE NOTE FOR THE EXPIRED PATIENT - NS PATIENT DEATH CRITERIA
Patient is dead based on Cardiopulmonary criteria including absent breath sounds, pulselessness and/or asystole Provider Procedure Text (A): After obtaining clear surgical margins the defect was repaired by another provider.

## 2021-03-12 NOTE — H&P ADULT. - NO PERTINENT FAMILY HISTORY
Pt requesting refill on metoprolol   Future appt scheduled   Medication sent to pharmacy.     
<<----- Click to add NO pertinent Family History

## 2022-02-04 NOTE — ED ADULT NURSE NOTE - ATTEMPT TO OOB
Patient requesting order for stress test to be sent to [facility]
Patient was referred to cardiology, Dr. Joaquin Miller.  I can order stress test and send to [facility] if that is his request.
no

## 2022-03-31 NOTE — PROGRESS NOTE ADULT - SUBJECTIVE AND OBJECTIVE BOX
Patient is a 78y old  Male who presents with a chief complaint of back pain (02 Dec 2018 08:20)      INTERVAL HPI/OVERNIGHT EVENTS: chart noted, mri ordered, pt still with back pain, wound care noted    MEDICATIONS  (STANDING):  aspirin enteric coated 325 milliGRAM(s) Oral daily  atorvastatin 40 milliGRAM(s) Oral at bedtime  cefTRIAXone   IVPB      cefTRIAXone   IVPB 1 Gram(s) IV Intermittent every 24 hours  dextrose 5%. 1000 milliLiter(s) (50 mL/Hr) IV Continuous <Continuous>  dextrose 50% Injectable 12.5 Gram(s) IV Push once  dextrose 50% Injectable 25 Gram(s) IV Push once  dextrose 50% Injectable 25 Gram(s) IV Push once  furosemide    Tablet 40 milliGRAM(s) Oral daily  heparin  Injectable 5000 Unit(s) SubCutaneous every 12 hours  insulin lispro (HumaLOG) corrective regimen sliding scale   SubCutaneous three times a day before meals  levothyroxine 100 MICROGram(s) Oral daily  losartan 25 milliGRAM(s) Oral daily  metoprolol tartrate 12.5 milliGRAM(s) Oral two times a day  sertraline 100 milliGRAM(s) Oral daily  tamsulosin 0.4 milliGRAM(s) Oral every 12 hours    MEDICATIONS  (PRN):  acetaminophen   Tablet .. 1000 milliGRAM(s) Oral three times a day PRN Temp greater or equal to 38C (100.4F), Mild Pain (1 - 3)  bisacodyl 5 milliGRAM(s) Oral every 12 hours PRN Constipation  dextrose 40% Gel 15 Gram(s) Oral once PRN Blood Glucose LESS THAN 70 milliGRAM(s)/deciliter  glucagon  Injectable 1 milliGRAM(s) IntraMuscular once PRN Glucose LESS THAN 70 milligrams/deciliter  oxyCODONE    IR 5 milliGRAM(s) Oral every 4 hours PRN moderate to severe pain      Allergies    No Known Allergies    Intolerances        REVIEW OF SYSTEMS:  CONSTITUTIONAL: No fever, weight loss, or fatigue  EYES: No eye pain, visual disturbances  ENMT:  No difficulty hearing, tinnitus, vertigo; No sinus or throat pain  NECK: No pain or stiffness  RESPIRATORY: No cough, wheezing, chills or hemoptysis; No shortness of breath  CARDIOVASCULAR: No chest pain, palpitations, dizziness  GASTROINTESTINAL: No abdominal or epigastric pain. No nausea, vomiting, or hematemesis; No diarrhea or constipation. No melena or hematochezia.  GENITOURINARY: No dysuria, frequency, hematuria, or incontinence  NEUROLOGICAL: No headaches, memory loss, loss of strength, numbness, or tremors  SKIN: No itching, burning  LYMPH NODES: No enlarged glands  MUSCULOSKELETAL: back pain  PSYCHIATRIC: No depression, mood swings  HEME/LYMPH: No easy bruising, or bleeding gums  ALLERGY AND IMMUNOLOGIC: No hives    Vital Signs Last 24 Hrs  T(C): 36.6 (02 Dec 2018 07:30), Max: 36.6 (02 Dec 2018 07:30)  T(F): 97.8 (02 Dec 2018 07:30), Max: 97.8 (02 Dec 2018 07:30)  HR: 90 (02 Dec 2018 07:30) (89 - 94)  BP: 94/60 (02 Dec 2018 07:30) (91/59 - 116/74)  BP(mean): --  RR: 17 (02 Dec 2018 07:30) (17 - 18)  SpO2: 95% (02 Dec 2018 07:30) (95% - 100%)    PHYSICAL EXAM:  GENERAL:  mild distress  HEAD:  Atraumatic, Normocephalic  EYES: EOMI, PERRLA, conjunctiva and sclera clear  ENMT: No tonsillar erythema, exudates, or enlargement   NECK: Supple, No JVD  NERVOUS SYSTEM:  Alert & Oriented X3, Good concentration  CHEST/LUNG: fair b/l bs  HEART: Regular rate and rhythm  ABDOMEN: soft and with edema  EXTREMITIES:  2+ Peripheral Pulses , edema with chronic changes and open wounds  LYMPH: No lymphadenopathy noted  SKIN: No rashes     LABS:                        12.7   8.39  )-----------( 180      ( 02 Dec 2018 05:58 )             40.1     02 Dec 2018 05:58    142    |  106    |  66     ----------------------------<  122    4.5     |  26     |  2.56     Ca    8.4        02 Dec 2018 05:58      PT/INR - ( 02 Dec 2018 05:58 )   PT: 15.5 sec;   INR: 1.41 ratio         PTT - ( 01 Dec 2018 01:51 )  PTT:34.9 sec  Urinalysis Basic - ( 01 Dec 2018 08:52 )    Color: Yellow / Appearance: Clear / S.020 / pH: x  Gluc: x / Ketone: Trace  / Bili: Small / Urobili: 4 mg/dL   Blood: x / Protein: 100 mg/dL / Nitrite: Positive   Leuk Esterase: Small / RBC: 0-2 /HPF / WBC 11-25   Sq Epi: x / Non Sq Epi: Few / Bacteria: Moderate      CAPILLARY BLOOD GLUCOSE      POCT Blood Glucose.: 119 mg/dL (02 Dec 2018 07:42)  POCT Blood Glucose.: 127 mg/dL (01 Dec 2018 16:47)    blood culture --   <10,000 CFU/ml  Normal Urogenital fito present    @ 12:51      urine culture --   @ 12:51  results   <10,000 CFU/ml  Normal Urogenital fito present  @ 12:51    wound with gram statin --     @ 12:51  organism  --    @ 12:51  specimen source .Urine Clean Catch (Midstream)   @ 12:51      RADIOLOGY & ADDITIONAL TESTS:  no new      Consultant(s) Notes Reviewed:  [x ] YES  [ ] NO    Care Discussed with Consultants/Other Providers [x ] YES  [ ] NO    Advanced care planning discussed with patient and family, advanced care planning forms reviewed, discussed, and completed.  20 minutes spent. No

## 2022-07-18 NOTE — PROVIDER CONTACT NOTE (CHANGE IN STATUS NOTIFICATION) - RECOMMENDATIONS
Cavilon daily and critic-aid or Tara q-shift and PRN. Maintain a moisture free environment, use super-sorb purple pads no diapers, T&P Q2H  anterior tibia wounds cleanse with NS, apply xeroform daily
VTE Assessment already completed for this visit

## 2023-03-08 NOTE — H&P ADULT - PROBLEM SELECTOR PLAN 7
(1) Oriented to own ability Controlled  - Continue Losartan 25mg qd Prelim read on CXR showed increased congestion on L side  - No documented hx of CHF. Elevated pro-bnp  - s/p Lasix 40mg x1  - Pt saturating well on room air. Due to YELITZA, hold Lasix, defer to primary team to restart Lasix.  - Check echo  - Cardio consulted- [facility] Prelim read on CXR showed increased congestion on L side  - No documented hx of CHF. Elevated pro-bnp and GORDON  - s/p Lasix 40mg x1. Pt saturating well on room air. Due to YELITZA, hold Lasix, defer to primary team to restart Lasix.  - Check echo  - Cardio consulted- [facility] Prelim read on CXR showed increased congestion on L side  - No documented hx of CHF. Elevated pro-bnp and GORDON  - s/p Lasix 40mg x1. Pt saturating well on room air.   Restart lasix at discretion of cardiology   - Check echo  - Cardio consulted- [facility] group

## 2023-03-17 NOTE — PATIENT PROFILE ADULT. - PMH
MyChart message not read by patient. Letter mailed to patient with the [facility] information.     For Pharmacy Admin Tracking Only    Program: [address] in place:  No  Gap Closed?: No   Time Spent (min): 5
Bladder stones  s/p extraction  CAD (coronary artery disease)    Hyperlipidemia    Hypothyroidism    Myocardial infarction  1995  Prostate cancer  s/p brachytherapy  Type 2 diabetes mellitus without complication    Venous stasis dermatitis of both lower extremities

## 2023-04-13 NOTE — PHYSICAL THERAPY INITIAL EVALUATION ADULT - GAIT PATTERN USED, PT EVAL
3-point gait Taltz Counseling: I discussed with the patient the risks of ixekizumab including but not limited to immunosuppression, serious infections, worsening of inflammatory bowel disease and drug reactions.  The patient understands that monitoring is required including a PPD at baseline and must alert us or the primary physician if symptoms of infection or other concerning signs are noted.

## 2023-06-08 NOTE — H&P ADULT. - EXTREMITIES COMMENTS
General
b/l venous stasis dermatitis, left LE with demarcated area of erythema and edema up to area slightly below knee  diabetic foot ulcer on left heel with no signs of infection, no purulent drainage, pink granular tissue

## 2024-09-03 NOTE — PROGRESS NOTE ADULT - PROBLEM SELECTOR PLAN 4
vs CKD?  Cr. 1.5. As per chart review, patient may be at baseline.   - s/p 1L normal saline  - CXR prelim read showed pleural effusion  - Avoid nephrotoxic medications  - monitor bmp Never smoker

## 2024-12-09 NOTE — BRIEF OPERATIVE NOTE - PRE-OP DX
Pleural effusion on right  12/04/2018    Active  Rogelio Gomez

## 2025-04-27 NOTE — DIETITIAN INITIAL EVALUATION ADULT. - NS FNS REASON FOR WEIGHT CHANG
up    Discharge medications:  Current Discharge Medication List        (Please note that portions of this note may have been completed with a voice recognition program. Efforts were made to edit the dictations but occasionally words are mis-transcribed.)    Ho Holloway DO, FACEP  Board certified in Emergency Medicine   [facility]        Ho Holloway,   04/27/25 1238       Ho Holloway DO  04/27/25 [id number]    
decreased po intake

## 2025-05-05 NOTE — ED PROVIDER NOTE - SKIN [+], MLM
Patient advised. No questions or concerns. See 4/22 encounter for update obtained today.    Repeat orders placed.         calf redness and swelling/RASH